# Patient Record
Sex: MALE | Race: WHITE | Employment: OTHER | URBAN - METROPOLITAN AREA
[De-identification: names, ages, dates, MRNs, and addresses within clinical notes are randomized per-mention and may not be internally consistent; named-entity substitution may affect disease eponyms.]

---

## 2018-10-23 ENCOUNTER — OFFICE VISIT (OUTPATIENT)
Dept: INTERNAL MEDICINE CLINIC | Age: 63
End: 2018-10-23
Payer: MEDICAID

## 2018-10-23 VITALS
OXYGEN SATURATION: 96 % | BODY MASS INDEX: 34.74 KG/M2 | HEART RATE: 72 BPM | SYSTOLIC BLOOD PRESSURE: 110 MMHG | HEIGHT: 68 IN | WEIGHT: 229.2 LBS | DIASTOLIC BLOOD PRESSURE: 74 MMHG

## 2018-10-23 DIAGNOSIS — G81.14 LEFT SPASTIC HEMIPLEGIA (HCC): Primary | ICD-10-CM

## 2018-10-23 DIAGNOSIS — Z12.11 COLON CANCER SCREENING: ICD-10-CM

## 2018-10-23 DIAGNOSIS — Z00.00 HEALTHCARE MAINTENANCE: ICD-10-CM

## 2018-10-23 PROCEDURE — 82274 ASSAY TEST FOR BLOOD FECAL: CPT | Performed by: NURSE PRACTITIONER

## 2018-10-23 PROCEDURE — 99202 OFFICE O/P NEW SF 15 MIN: CPT | Performed by: NURSE PRACTITIONER

## 2018-10-23 RX ORDER — LISINOPRIL 20 MG/1
20 TABLET ORAL DAILY
COMMUNITY
Start: 2015-12-12 | End: 2019-05-28 | Stop reason: SDUPTHER

## 2018-10-23 RX ORDER — GABAPENTIN 100 MG/1
200 CAPSULE ORAL 2 TIMES DAILY
COMMUNITY
Start: 2015-12-12 | End: 2018-11-12 | Stop reason: ALTCHOICE

## 2018-10-23 RX ORDER — LORATADINE 10 MG/1
10 TABLET ORAL DAILY PRN
COMMUNITY
Start: 2015-11-06 | End: 2020-03-18 | Stop reason: SDUPTHER

## 2018-10-23 RX ORDER — HYDRALAZINE HYDROCHLORIDE 50 MG/1
50 TABLET, FILM COATED ORAL 3 TIMES DAILY
COMMUNITY
Start: 2013-02-19 | End: 2019-02-16 | Stop reason: SDUPTHER

## 2018-10-23 RX ORDER — AMLODIPINE BESYLATE 10 MG/1
10 TABLET ORAL DAILY
COMMUNITY
Start: 2013-02-19 | End: 2019-05-09 | Stop reason: SDUPTHER

## 2018-10-23 RX ORDER — ATORVASTATIN CALCIUM 40 MG/1
40 TABLET, FILM COATED ORAL DAILY
COMMUNITY
End: 2019-07-11 | Stop reason: SDUPTHER

## 2018-10-23 RX ORDER — DANTROLENE SODIUM 25 MG/1
25 CAPSULE ORAL SEE ADMIN INSTRUCTIONS
COMMUNITY
Start: 2015-11-27 | End: 2019-01-03

## 2018-10-23 ASSESSMENT — PATIENT HEALTH QUESTIONNAIRE - PHQ9
SUM OF ALL RESPONSES TO PHQ QUESTIONS 1-9: 0
1. LITTLE INTEREST OR PLEASURE IN DOING THINGS: 0
2. FEELING DOWN, DEPRESSED OR HOPELESS: 0
SUM OF ALL RESPONSES TO PHQ QUESTIONS 1-9: 0
SUM OF ALL RESPONSES TO PHQ9 QUESTIONS 1 & 2: 0

## 2018-10-23 NOTE — PROGRESS NOTES
10/23/2018    Sharon Ferreira (:  1955) is a 61 y.o. male, here for evaluation of the following medical concerns: This patient is being seen today for referral for Physical Therapy referral and  hypertension. He would like to establish care and have preventive services. He lives alone and has family support in the city. He receives Home health aide assistance daily through [email or web address], assistance with transportation and with with meals. Wheelchair and 4 prong cane assistive devices aid in ambulation. Hypertension   This is a chronic problem. The current episode started more than 1 year ago. The problem is controlled. Pertinent negatives include no chest pain, headaches, neck pain, palpitations or shortness of breath. There are no associated agents to hypertension. Risk factors for coronary artery disease include obesity and male gender. Past treatments include direct vasodilators, calcium channel blockers and ACE inhibitors. The current treatment provides significant improvement. There are no compliance problems. Hypertensive end-organ damage includes CAD/MI. Hyperlipidemia   This is a chronic problem. The current episode started more than 1 year ago. The problem is controlled. There are no known factors aggravating his hyperlipidemia. Pertinent negatives include no chest pain or shortness of breath. Current antihyperlipidemic treatment includes diet change and statins. The current treatment provides moderate improvement of lipids. There are no compliance problems. Risk factors for coronary artery disease include male sex, obesity and hypertension. Hemiparesis:  Has left-sided weakness, ambulates using a 4 prong cane/ wheelchair. Has spasms, tremors, and abnormal tone to left side. Limited active/passive ROM. Fingers to left hand are contracted. Presently not receiving. PT/OT/SP. Reports increased spasticity and increased tone in the AM.  Reports improvement with previous PT.   No barriers Abdominal: Soft. Bowel sounds are normal.   Musculoskeletal:        Left upper arm: He exhibits swelling and deformity (left-sided hemiparesis). Left forearm: He exhibits swelling and deformity (left-sided hemiparesis). Left hand: He exhibits decreased range of motion (left-sided hemiparesis), deformity and swelling. He exhibits no tenderness (left hemiplegia and tone. No passive ROM, limited active ROM). Decreased sensation noted. Decreased strength noted. He exhibits finger abduction, thumb/finger opposition and wrist extension trouble. Left foot: There is decreased range of motion (left-sided hemiparesis, limited movement of left leg and foot, abnormal gait). Left-sided hemiparesis from previous stroke. Limited passive ROM left arm and leg. Active ROM with tone. Swelling of left arm and leg. Fingers are contracted. Lymphadenopathy:        Head (right side): No submental, no submandibular and no tonsillar adenopathy present. Head (left side): No submental, no submandibular and no tonsillar adenopathy present. Right cervical: No superficial cervical adenopathy present. Left cervical: No superficial cervical adenopathy present. Neurological: He is alert and oriented to person, place, and time. He displays atrophy. Tremors: with active ROM. He exhibits abnormal muscle tone. Coordination and gait abnormal. GCS eye subscore is 4. GCS verbal subscore is 5. GCS motor subscore is 6. Skin: Skin is warm and dry. Psychiatric: He has a normal mood and affect. His behavior is normal. Judgment and thought content normal. His speech is slurred (intermittently, from stroke). ASSESSMENT/PLAN:  1. Healthcare maintenance  -have lab work done  Continue current medications. - CBC Auto Differential; Future  - Comprehensive Metabolic Panel; Future  - Hepatitis C Antibody; Future  - Lipid Panel; Future  - Vitamin D 25 Hydroxy;  Future  - T4, Free; Future  - TSH with Reflex;

## 2018-10-24 ENCOUNTER — TELEPHONE (OUTPATIENT)
Dept: INTERNAL MEDICINE CLINIC | Age: 63
End: 2018-10-24

## 2018-10-24 ASSESSMENT — ENCOUNTER SYMPTOMS
COLOR CHANGE: 0
WHEEZING: 0
BACK PAIN: 0
ABDOMINAL DISTENTION: 0
CONSTIPATION: 0
DIARRHEA: 0
SINUS PRESSURE: 0
CHEST TIGHTNESS: 0
SHORTNESS OF BREATH: 0
VOICE CHANGE: 0

## 2018-11-08 ENCOUNTER — TELEPHONE (OUTPATIENT)
Dept: INTERNAL MEDICINE CLINIC | Age: 63
End: 2018-11-08

## 2018-11-08 DIAGNOSIS — I63.9 CEREBROVASCULAR ACCIDENT (CVA), UNSPECIFIED MECHANISM (HCC): ICD-10-CM

## 2018-11-08 NOTE — TELEPHONE ENCOUNTER
Needs orders to go to [facility] for OT and [facility] for [facility]  [phone].  Call [facility] [phone]

## 2018-11-12 ENCOUNTER — TELEPHONE (OUTPATIENT)
Dept: INTERNAL MEDICINE CLINIC | Age: 63
End: 2018-11-12

## 2018-11-12 RX ORDER — GABAPENTIN 100 MG/1
200 CAPSULE ORAL 2 TIMES DAILY
Qty: 120 CAPSULE | Refills: 0 | Status: SHIPPED | OUTPATIENT
Start: 2018-11-12 | End: 2018-12-21 | Stop reason: SDUPTHER

## 2018-11-12 RX ORDER — GABAPENTIN 100 MG/1
200 CAPSULE ORAL 2 TIMES DAILY
Qty: 90 CAPSULE | Status: CANCELLED | OUTPATIENT
Start: 2018-11-12

## 2018-11-13 LAB
ALBUMIN SERPL-MCNC: 4.6 G/DL
ALP BLD-CCNC: 59 U/L
ALT SERPL-CCNC: 15 U/L
AST SERPL-CCNC: 14 U/L
BASOPHILS ABSOLUTE: 0.4 /ΜL
BASOPHILS RELATIVE PERCENT: 0.4 %
BILIRUB SERPL-MCNC: 0.5 MG/DL (ref 0.1–1.4)
BUN BLDV-MCNC: 21 MG/DL
CALCIUM SERPL-MCNC: 9.5 MG/DL
CHLORIDE BLD-SCNC: 104 MMOL/L
CHOLESTEROL, FASTING: 118
CO2: 26 MMOL/L
CREAT SERPL-MCNC: 1.02 MG/DL
EOSINOPHILS ABSOLUTE: 249 /ΜL
EOSINOPHILS RELATIVE PERCENT: 3.5 %
GLUCOSE FASTING: 81 MG/DL
HCT VFR BLD CALC: NORMAL % (ref 41–53)
HDLC SERPL-MCNC: NORMAL MG/DL (ref 35–70)
HEMOGLOBIN: NORMAL G/DL (ref 13.5–17.5)
LDL CHOLESTEROL CALCULATED: 66 MG/DL (ref 0–160)
LYMPHOCYTES ABSOLUTE: 2549 /ΜL
LYMPHOCYTES RELATIVE PERCENT: 35.9 %
MCH RBC QN AUTO: 30.2 PG
MCHC RBC AUTO-ENTMCNC: 33.5 G/DL
MCV RBC AUTO: 90.1 FL
MONOCYTES ABSOLUTE: 7.7 /ΜL
MONOCYTES RELATIVE PERCENT: 7.7 %
NEUTROPHILS ABSOLUTE: 3728 /ΜL
NEUTROPHILS RELATIVE PERCENT: 52.5 %
PDW BLD-RTO: 12.9 %
PLATELET # BLD: 239 K/ΜL
PMV BLD AUTO: 11.4 FL
POTASSIUM SERPL-SCNC: 4.3 MMOL/L
RBC # BLD: 5.73 10^6/ΜL
SODIUM BLD-SCNC: 140 MMOL/L
TOTAL PROTEIN: 7.6 G/DL (ref 6.4–8.2)
TRIGLYCERIDE, FASTING: 86
VITAMIN D 25-HYDROXY: 33
VITAMIN D2, 25 HYDROXY: NORMAL
VITAMIN D3,25 HYDROXY: NORMAL
WBC # BLD: 7.1 10^3/ML

## 2018-11-14 ENCOUNTER — TELEPHONE (OUTPATIENT)
Dept: INTERNAL MEDICINE CLINIC | Age: 63
End: 2018-11-14

## 2018-11-15 ENCOUNTER — TELEPHONE (OUTPATIENT)
Dept: INTERNAL MEDICINE CLINIC | Age: 63
End: 2018-11-15

## 2018-11-15 LAB
CONTROL: PRESENT
HEMOCCULT STL QL: NEGATIVE

## 2018-11-15 NOTE — TELEPHONE ENCOUNTER
Called patient he stated that he had his lab work done on 11/13/18 at [facility]. We are waiting for the results.

## 2018-11-15 NOTE — TELEPHONE ENCOUNTER
Spoke with [facility] PT. She is going to call  Braydon Sabine to get him scheduled today.  I called to let him know his FIT test was Negative,

## 2018-11-19 ENCOUNTER — TELEPHONE (OUTPATIENT)
Dept: INTERNAL MEDICINE CLINIC | Age: 63
End: 2018-11-19

## 2018-11-28 ENCOUNTER — TELEPHONE (OUTPATIENT)
Dept: INTERNAL MEDICINE CLINIC | Age: 63
End: 2018-11-28

## 2018-12-21 RX ORDER — GABAPENTIN 100 MG/1
200 CAPSULE ORAL 2 TIMES DAILY
Qty: 120 CAPSULE | Refills: 2 | Status: SHIPPED | OUTPATIENT
Start: 2018-12-21 | End: 2019-03-18 | Stop reason: SDUPTHER

## 2019-01-03 RX ORDER — DANTROLENE SODIUM 50 MG/1
CAPSULE ORAL
Qty: 180 CAPSULE | Refills: 0 | Status: SHIPPED | OUTPATIENT
Start: 2019-01-03 | End: 2019-01-24 | Stop reason: SDUPTHER

## 2019-01-15 DIAGNOSIS — Z86.73 HISTORY OF STROKE: ICD-10-CM

## 2019-01-15 DIAGNOSIS — I63.019 CEREBRAL INFARCTION DUE TO THROMBOSIS OF VERTEBRAL ARTERY, UNSPECIFIED BLOOD VESSEL LATERALITY (HCC): Primary | ICD-10-CM

## 2019-01-17 PROBLEM — I69.398 ABNORMALITY OF GAIT AS LATE EFFECT OF CEREBROVASCULAR ACCIDENT (CVA): Status: ACTIVE | Noted: 2019-01-17

## 2019-01-17 PROBLEM — R26.9 ABNORMALITY OF GAIT AS LATE EFFECT OF CEREBROVASCULAR ACCIDENT (CVA): Status: ACTIVE | Noted: 2019-01-17

## 2019-01-24 ENCOUNTER — OFFICE VISIT (OUTPATIENT)
Dept: INTERNAL MEDICINE CLINIC | Age: 64
End: 2019-01-24
Payer: MEDICAID

## 2019-01-24 VITALS
DIASTOLIC BLOOD PRESSURE: 75 MMHG | BODY MASS INDEX: 36.57 KG/M2 | OXYGEN SATURATION: 97 % | HEART RATE: 66 BPM | WEIGHT: 233 LBS | SYSTOLIC BLOOD PRESSURE: 131 MMHG | HEIGHT: 67 IN

## 2019-01-24 DIAGNOSIS — R26.9 ABNORMALITY OF GAIT AS LATE EFFECT OF CEREBROVASCULAR ACCIDENT (CVA): ICD-10-CM

## 2019-01-24 DIAGNOSIS — E78.5 HYPERLIPIDEMIA, UNSPECIFIED HYPERLIPIDEMIA TYPE: ICD-10-CM

## 2019-01-24 DIAGNOSIS — I63.019 CEREBRAL INFARCTION DUE TO THROMBOSIS OF VERTEBRAL ARTERY, UNSPECIFIED BLOOD VESSEL LATERALITY (HCC): Primary | ICD-10-CM

## 2019-01-24 DIAGNOSIS — I25.10 ATHEROSCLEROSIS OF CORONARY ARTERY OF NATIVE HEART WITHOUT ANGINA PECTORIS, UNSPECIFIED VESSEL OR LESION TYPE: ICD-10-CM

## 2019-01-24 DIAGNOSIS — I69.398 ABNORMALITY OF GAIT AS LATE EFFECT OF CEREBROVASCULAR ACCIDENT (CVA): ICD-10-CM

## 2019-01-24 DIAGNOSIS — I10 HYPERTENSION, UNSPECIFIED TYPE: ICD-10-CM

## 2019-01-24 PROCEDURE — 99213 OFFICE O/P EST LOW 20 MIN: CPT | Performed by: NURSE PRACTITIONER

## 2019-01-24 RX ORDER — DANTROLENE SODIUM 100 MG/1
100 CAPSULE ORAL 2 TIMES DAILY
Qty: 60 CAPSULE | Refills: 3 | Status: SHIPPED | OUTPATIENT
Start: 2019-01-24 | End: 2019-05-31 | Stop reason: SDUPTHER

## 2019-01-24 ASSESSMENT — ENCOUNTER SYMPTOMS
VOMITING: 0
SHORTNESS OF BREATH: 0
BACK PAIN: 0
COLOR CHANGE: 0
DIARRHEA: 0
COUGH: 0
BLOOD IN STOOL: 0
CHOKING: 0
APNEA: 0
CONSTIPATION: 0

## 2019-02-07 ENCOUNTER — TELEPHONE (OUTPATIENT)
Dept: INTERNAL MEDICINE CLINIC | Age: 64
End: 2019-02-07

## 2019-02-18 RX ORDER — HYDRALAZINE HYDROCHLORIDE 50 MG/1
TABLET, FILM COATED ORAL
Qty: 180 TABLET | Refills: 1 | Status: SHIPPED | OUTPATIENT
Start: 2019-02-18 | End: 2019-06-11 | Stop reason: SDUPTHER

## 2019-02-25 ENCOUNTER — TELEPHONE (OUTPATIENT)
Dept: INTERNAL MEDICINE CLINIC | Age: 64
End: 2019-02-25

## 2019-02-26 ENCOUNTER — TELEPHONE (OUTPATIENT)
Dept: INTERNAL MEDICINE CLINIC | Age: 64
End: 2019-02-26

## 2019-03-18 RX ORDER — GABAPENTIN 100 MG/1
200 CAPSULE ORAL 2 TIMES DAILY
Qty: 120 CAPSULE | Refills: 2 | Status: SHIPPED | OUTPATIENT
Start: 2019-03-18 | End: 2019-06-12 | Stop reason: SDUPTHER

## 2019-03-22 ENCOUNTER — OFFICE VISIT (OUTPATIENT)
Dept: CARDIOLOGY CLINIC | Age: 64
End: 2019-03-22
Payer: COMMERCIAL

## 2019-03-22 VITALS
HEIGHT: 67 IN | BODY MASS INDEX: 35.94 KG/M2 | DIASTOLIC BLOOD PRESSURE: 70 MMHG | SYSTOLIC BLOOD PRESSURE: 142 MMHG | HEART RATE: 72 BPM | WEIGHT: 229 LBS

## 2019-03-22 DIAGNOSIS — I10 ESSENTIAL HYPERTENSION: ICD-10-CM

## 2019-03-22 DIAGNOSIS — I25.10 ATHEROSCLEROSIS OF NATIVE CORONARY ARTERY OF NATIVE HEART WITHOUT ANGINA PECTORIS: Primary | ICD-10-CM

## 2019-03-22 DIAGNOSIS — E78.49 OTHER HYPERLIPIDEMIA: ICD-10-CM

## 2019-03-22 PROCEDURE — 93000 ELECTROCARDIOGRAM COMPLETE: CPT | Performed by: INTERNAL MEDICINE

## 2019-03-22 PROCEDURE — 99214 OFFICE O/P EST MOD 30 MIN: CPT | Performed by: INTERNAL MEDICINE

## 2019-04-25 ENCOUNTER — OFFICE VISIT (OUTPATIENT)
Dept: INTERNAL MEDICINE CLINIC | Age: 64
End: 2019-04-25
Payer: COMMERCIAL

## 2019-04-25 VITALS
WEIGHT: 227.2 LBS | OXYGEN SATURATION: 95 % | DIASTOLIC BLOOD PRESSURE: 78 MMHG | HEART RATE: 65 BPM | SYSTOLIC BLOOD PRESSURE: 118 MMHG | BODY MASS INDEX: 35.58 KG/M2

## 2019-04-25 DIAGNOSIS — I10 ESSENTIAL HYPERTENSION: ICD-10-CM

## 2019-04-25 DIAGNOSIS — R26.89 BALANCE DISORDER: ICD-10-CM

## 2019-04-25 DIAGNOSIS — R26.9 ABNORMALITY OF GAIT AS LATE EFFECT OF CEREBROVASCULAR ACCIDENT (CVA): Primary | ICD-10-CM

## 2019-04-25 DIAGNOSIS — I63.9 CEREBRAL INFARCTION, UNSPECIFIED MECHANISM (HCC): ICD-10-CM

## 2019-04-25 DIAGNOSIS — I69.398 ABNORMALITY OF GAIT AS LATE EFFECT OF CEREBROVASCULAR ACCIDENT (CVA): Primary | ICD-10-CM

## 2019-04-25 PROCEDURE — 99213 OFFICE O/P EST LOW 20 MIN: CPT | Performed by: NURSE PRACTITIONER

## 2019-04-25 ASSESSMENT — PATIENT HEALTH QUESTIONNAIRE - PHQ9
SUM OF ALL RESPONSES TO PHQ QUESTIONS 1-9: 0
1. LITTLE INTEREST OR PLEASURE IN DOING THINGS: 0
SUM OF ALL RESPONSES TO PHQ9 QUESTIONS 1 & 2: 0
SUM OF ALL RESPONSES TO PHQ QUESTIONS 1-9: 0
2. FEELING DOWN, DEPRESSED OR HOPELESS: 0

## 2019-04-25 NOTE — PROGRESS NOTES
HENT: Negative for congestion. Eyes: Negative for visual disturbance. Respiratory: Negative for chest tightness and shortness of breath. Cardiovascular: Negative for chest pain, palpitations and leg swelling. Gastrointestinal: Negative for abdominal pain, constipation and diarrhea. Endocrine: Negative for polyuria. Genitourinary: Negative for dysuria. Musculoskeletal: Positive for gait problem (ambulates with 4 prong cane/wheelchair). Negative for arthralgias and myalgias. Skin: Negative for rash. Neurological: Positive for tremors and weakness (right sided weakness). Negative for dizziness, light-headedness and headaches. Psychiatric/Behavioral: Negative for agitation, decreased concentration and sleep disturbance. The patient is not nervous/anxious. Prior to Visit Medications    Medication Sig Taking? Authorizing Provider   Misc. Devices [facility]) MISC 1 each by Does not apply route daily Yes MADISYN Loredo CNP   hydrALAZINE (APRESOLINE) 50 MG tablet TAKE 1 TABLET BY MOUTH EVERY 8 HOURS Yes MADISYN Loredo CNP   dantrolene (DANTRIUM) 100 MG capsule Take 1 capsule by mouth 2 times daily Yes MADISYN Loredo CNPc. Devices (QUAD CANE) MISC 1 each by Does not apply route daily narrow base Quad cane (NBQC) Yes MADISYN Loredo CNP   atorvastatin (LIPITOR) 40 MG tablet Take 40 mg by mouth daily Yes Historical Provider, MD   amLODIPine (NORVASC) 10 MG tablet Take 10 mg by mouth daily Yes Historical Provider, MD   loratadine (CLARITIN) 10 MG tablet Take 10 mg by mouth daily as needed Yes Historical Provider, MD   lisinopril (PRINIVIL;ZESTRIL) 20 MG tablet Take 20 mg by mouth daily Yes Historical Provider, MD   Sennosides 17.2 MG TABS Take 17.2 mg by mouth Yes Historical Provider, MD   gabapentin (NEURONTIN) 100 MG capsule Take 2 capsules by mouth 2 times daily for 30 days.  Intended supply: 30 days  MADISYN Loredo CNP        Social History     Tobacco Use    Smoking status: Never Smoker    Smokeless tobacco: Never Used   Substance Use Topics    Alcohol use: No        Vitals:    04/25/19 0936   BP: 118/78   Pulse: 65   SpO2: 95%   Weight: 227 lb 3.2 oz (103.1 kg)     Estimated body mass index is 35.58 kg/m² as calculated from the following:    Height as of 3/22/19: 5' 7\" (1.702 m). Weight as of this encounter: 227 lb 3.2 oz (103.1 kg). Physical Exam   Constitutional: He is oriented to person, place, and time. He appears well-developed and well-nourished. HENT:   Head: Normocephalic. Right Ear: External ear normal.   Left Ear: External ear normal.   Eyes: Lids are normal.   Cardiovascular: Normal rate, regular rhythm, S1 normal, S2 normal, normal heart sounds and intact distal pulses. Pulmonary/Chest: Effort normal and breath sounds normal.   Abdominal: Normal appearance and bowel sounds are normal.   Neurological: He is alert and oriented to person, place, and time. He displays atrophy (right side), tremor (intermittent) and abnormal reflex. A sensory deficit is present. He exhibits abnormal muscle tone (right side). Coordination and gait abnormal. GCS eye subscore is 4. GCS verbal subscore is 5. GCS motor subscore is 6. Residual right sided weakness   Skin: Skin is warm and dry. Vitals reviewed. ASSESSMENT/PLAN:  1. Abnormality of gait as late effect of cerebrovascular accident (CVA)  -he has right sided weakness  -he can ambulate with 4 prong cane short distances  -Necessary for wheelchair for physician appointments and transportation  - Misc. Devices [facility]) MISC; 1 each by Does not apply route daily  Dispense: 1 each; Refill: 0    2. Cerebral infarction, unspecified mechanism ([facility] [address]  -residual right sided weakness  - Misc. Devices [facility]) MISC; 1 each by Does not apply route daily  Dispense: 1 each; Refill: 0  -Keep Neurology appointment    3. Balance disorder  -unsteady gait  - Mis.  Devices [facility]) MISC; 1 each by Does not apply route daily  Dispense: 1 each; Refill: 0    4. Essential hypertension  -controlled  -Continue current medications. Return in about 6 months (around 10/25/2019).         --MADISYN Alejo - CNP on 4/27/2019 at 11:50 AM

## 2019-04-26 RX ORDER — WHEELCHAIR
1 EACH MISCELLANEOUS DAILY
Qty: 1 EACH | Refills: 0 | Status: SHIPPED | OUTPATIENT
Start: 2019-04-26 | End: 2020-04-14 | Stop reason: ALTCHOICE

## 2019-04-27 ASSESSMENT — ENCOUNTER SYMPTOMS
CHEST TIGHTNESS: 0
CONSTIPATION: 0
SHORTNESS OF BREATH: 0
DIARRHEA: 0
ABDOMINAL PAIN: 0

## 2019-04-30 ENCOUNTER — HOSPITAL ENCOUNTER (OUTPATIENT)
Dept: ULTRASOUND IMAGING | Age: 64
Discharge: HOME | End: 2019-04-30
Payer: COMMERCIAL

## 2019-04-30 DIAGNOSIS — I25.10 ATHEROSCLEROSIS OF NATIVE CORONARY ARTERY OF NATIVE HEART WITHOUT ANGINA PECTORIS: ICD-10-CM

## 2019-04-30 DIAGNOSIS — I10 ESSENTIAL HYPERTENSION: ICD-10-CM

## 2019-04-30 PROCEDURE — 76775 US EXAM ABDO BACK WALL LIM: CPT

## 2019-05-03 ENCOUNTER — TELEPHONE (OUTPATIENT)
Dept: CARDIOLOGY CLINIC | Age: 64
End: 2019-05-03

## 2019-05-03 NOTE — TELEPHONE ENCOUNTER
----- Message from Ce Yang RN sent at 5/2/2019  7:06 PM EDT -----  Please call and tell him he has no evidence of AAA  dgb/mm

## 2019-05-03 NOTE — TELEPHONE ENCOUNTER
Call placed to patient who was not available to speak with. Per hippa I did leave a message on his vm regarding his results. Patient encouraged to call office with any questions.

## 2019-05-06 ENCOUNTER — TELEPHONE (OUTPATIENT)
Dept: INTERNAL MEDICINE CLINIC | Age: 64
End: 2019-05-06

## 2019-05-06 DIAGNOSIS — I69.398 ABNORMALITY OF GAIT AS LATE EFFECT OF CEREBROVASCULAR ACCIDENT (CVA): Primary | ICD-10-CM

## 2019-05-06 DIAGNOSIS — R26.9 ABNORMALITY OF GAIT AS LATE EFFECT OF CEREBROVASCULAR ACCIDENT (CVA): Primary | ICD-10-CM

## 2019-05-06 DIAGNOSIS — I63.9 CEREBRAL INFARCTION, UNSPECIFIED MECHANISM (HCC): ICD-10-CM

## 2019-05-10 RX ORDER — AMLODIPINE BESYLATE 10 MG/1
TABLET ORAL
Qty: 90 TABLET | Refills: 1 | Status: SHIPPED | OUTPATIENT
Start: 2019-05-10 | End: 2019-11-01 | Stop reason: SDUPTHER

## 2019-05-31 ENCOUNTER — OFFICE VISIT (OUTPATIENT)
Dept: NEUROLOGY | Age: 64
End: 2019-05-31
Payer: COMMERCIAL

## 2019-05-31 VITALS
WEIGHT: 229 LBS | DIASTOLIC BLOOD PRESSURE: 82 MMHG | BODY MASS INDEX: 32.78 KG/M2 | HEART RATE: 67 BPM | SYSTOLIC BLOOD PRESSURE: 126 MMHG | HEIGHT: 70 IN

## 2019-05-31 DIAGNOSIS — I69.398 ABNORMALITY OF GAIT AS LATE EFFECT OF CEREBROVASCULAR ACCIDENT (CVA): ICD-10-CM

## 2019-05-31 DIAGNOSIS — G81.94 LEFT HEMIPARESIS (HCC): Primary | ICD-10-CM

## 2019-05-31 DIAGNOSIS — R26.9 ABNORMALITY OF GAIT AS LATE EFFECT OF CEREBROVASCULAR ACCIDENT (CVA): ICD-10-CM

## 2019-05-31 DIAGNOSIS — G25.3 MYOCLONUS: ICD-10-CM

## 2019-05-31 PROCEDURE — 99245 OFF/OP CONSLTJ NEW/EST HI 55: CPT | Performed by: PSYCHIATRY & NEUROLOGY

## 2019-05-31 NOTE — LETTER
[facility]  [address] [facility] [address]  Phone: [phone]  Fax: [phone]    Noreen Burgos*        May 31, 2019       Patient: Nora Sanderson   MR Number: [id number]   YOB: 1955   Date of Visit: 5/31/2019       Dear Dr. Alyssia Epps: Thank you for the request for consultation for Nora Sanderson to me for the evaluation of involuntary movements on the right side and chronic left hemiparesis. Below are the relevant portions of my assessment and plan of care. NEUROLOGY CONSULTATION     Chief Complaint   Patient presents with    Cerebrovascular Accident     Patient is here today to establish care. Patient had a stroke in 2012 and just wants to be evaluated. HISTORY OF PRESENT ILLNESS :    Nora Sanderson is a 59 y.o. male who is referred by Dr. 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 She was treated at [facility] in [facility]. Since then patient has had left-sided spastic hemiparesis. In the last couple of years patient has developed involuntary movements in his right leg. It starts in the right leg and sometimes if he puts pressure on the leg can spread to the whole right side of the body. It comes and goes without any clear other aggravating or relieving factors. Symptoms are moderately severe. Patient was concerned about the right-sided involuntary movements and hence this consultation. Patient has not noticed any significant change on the left side in the last few years.     REVIEW OF SYSTEMS    Constitutional:  []   Chills   [x]  Fatigue   []  Fevers   []  Malaise   []  Weight loss     [] Denies all of the above    Eyes:  []  Double vision   []  Blurry vision     [x] Denies all of the above    Ears, nose, mouth, throat, and face:  Smoking status: Never Smoker    Smokeless tobacco: Never Used   Substance and Sexual Activity    Alcohol use: No    Drug use: No    Sexual activity: None   Lifestyle    Physical activity:     Days per week: None     Minutes per session: None    Stress: None   Relationships    Social connections:     Talks on phone: None     Gets together: None     Attends [employer] service: None     Active member of club or organization: None     Attends meetings of clubs or organizations: None     Relationship status: None    Intimate partner violence:     Fear of current or ex partner: None     Emotionally abused: None     Physically abused: None     Forced sexual activity: None   Other Topics Concern    None   Social History Narrative    Recently relocated from [place]. Lives alone. PHYSICAL EXAMINATION:  /82   Pulse 67   Ht 5' 10\" (1.778 m)   Wt 229 lb (103.9 kg)   BMI 32.86 kg/m²    Appearance: Well appearing, well nourished and in no distress  Mental Status Exam: Patient is alert, oriented to person, place and time. Recent and remote memory is normal  Fund of Knowledge is normal  Attention/concentration is normal.   Speech : No dysarthria  Language : No aphasia  Funduscopic Exam: sharp disc margins  Cranial Nerves:   II: Visual fields:  Full to confrontation  III: Pupils:  equal, round, reactive to light  III,IV,VI: Extra Ocular Movements are intact. No nystagmus  V: Facial sensation is intact to pin prick and light touch  VII: Facial strength and movements: intact and symmetric smile,cheek puffing and eyebrow elevation  VIII: Hearing:  Intact to finger rub bilaterally  IX: Palate  elevation is symmetric  XI: Shoulder shrug is intact  XII: Tongue movements are normal  Motor:  Muscle tone is increased on the left side and normal on the right side. Strength is 5 over 5 on the right side 3+ over 5 on the left arm and 4/5 in the left leg. Sensory: Decreased to light touch and  pin prick in the left arm and leg  Coordination:  Normal  Finger to Nose and Heel to Hicks on the right side and impaired on the left side   . Reflexes:  DTR +2 on the right side and 3 on the left side. Plantar response: Flexor on the right side and extensor on the left side. Gait: Gait is impaired  Romberg: negative  Vascular: No carotid bruit bilaterally        DATA:  LABS:  General Labs:    CBC:   Lab Results   Component Value Date    WBC 7.1 11/13/2018    RBC 5.73 11/13/2018    MCV 90.1 11/13/2018    MCH 30.2 11/13/2018    MCHC 33.5 11/13/2018    RDW 12.9 11/13/2018     11/13/2018    MPV 11.4 11/13/2018     BMP:    Lab Results   Component Value Date     11/13/2018    K 4.3 11/13/2018     11/13/2018    CO2 26 11/13/2018    BUN 21 11/13/2018    LABALBU 4.6 11/13/2018    CREATININE 1.02 11/13/2018    CALCIUM 9.5 11/13/2018     RADIOLOGY REVIEW:  I have reviewed radiology report(s) of:  CT scan of the head from 2012 as well as 2013    IMPRESSION :  Residual left hemiparesis from previous right thalamic and basal ganglia hemorrhagic infarction  Patient had some involuntary movements on the right arm and leg at times. This seems more when he was paying attention and when he was distracted these movements stopped. 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   I will get a CT head to make sure that these does not have any left-sided hemispheric structural lesion  He will continue with aggressive control of hypertension  Continue statin therapy  I will see him back in 3 months for follow-up  Thank you for this consultation          Please note a portion of this chart was generated using dragon dictation software. Although every effort was made to ensure the accuracy of this automated transcription, some errors in transcription may have occurred. If you have questions, please do not hesitate to call me. I look forward to following Desmond Courser along with you.     Sincerely,        Ryan Gilbert MD    CC providers:  Silvia Luna, APRN - CNP  [phone]  VIA In Basket

## 2019-05-31 NOTE — PROGRESS NOTES
NEUROLOGY CONSULTATION     Chief Complaint   Patient presents with    Cerebrovascular Accident     Patient is here today to establish care. Patient had a stroke in 2012 and just wants to be evaluated. HISTORY OF PRESENT ILLNESS :    Nora Sanderson is a 59 y.o. male who is referred by Dr. 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 She was treated at [facility] in [facility]. Since then patient has had left-sided spastic hemiparesis. In the last couple of years patient has developed involuntary movements in his right leg. It starts in the right leg and sometimes if he puts pressure on the leg can spread to the whole right side of the body. It comes and goes without any clear other aggravating or relieving factors. Symptoms are moderately severe. Patient was concerned about the right-sided involuntary movements and hence this consultation. Patient has not noticed any significant change on the left side in the last few years.     REVIEW OF SYSTEMS    Constitutional:  []   Chills   [x]  Fatigue   []  Fevers   []  Malaise   []  Weight loss     [] Denies all of the above    Eyes:  []  Double vision   []  Blurry vision     [x] Denies all of the above    Ears, nose, mouth, throat, and face:   [] Hearing loss    []   Hoarseness      [x]  Snoring    []  Tinnitus       [] Denies all of the above     Respiratory:   []  Cough    []  Shortness of breath         [x] Denies all of the above     Cardiovascular:   []  Chest pain    []  Exertional chest pressure/discomfort           [] Palpitations    []  Syncope     [x] Denies all of the above    Gastrointestinal:   []  Abdominal pain   [x]  Constipation    []  Diarrhea    []   Dysphagia                      [] Denies all of the above    Genitourinary:      []  Frequency   []  Hematuria     [] Urinary incontinence           [x] Denies all of the above     Hematologic/lymphatic:  []  Bleeding    []  Easy bruising   []  Anemia  [x] Denies all of the above     Musculoskeletal:   [x] Back pain       []  Myalgias    [x]  Neck pain           [] Denies all of the above    Neurological: As noted in HPI    Behavioral/Psych:   [] Anxiety    []  Depression     []  Mood swings     [x] Denies all of the above     Endocrine:   []  Temperature intolerance     [x] Fatigue      [] Denies all of the above     Allergic/Immunologic:   [x] Hay fever    [] Denies all of the above     Past Medical History:   Diagnosis Date    HTN (hypertension)     Hyperlipemia     S/P PTCA (percutaneous transluminal coronary angioplasty) 2010    two stents place    Stroke ([facility] [address] 10/2012    left-sided weakness     Family History   Problem Relation Age of Onset    Hypertension Mother     Heart Disease Father     Breast Cancer Sister     Heart Attack Brother      Social History     Socioeconomic History    Marital status:      Spouse name: None    Number of children: 3    Years of education: None    Highest education level: None   Occupational History    Occupation: remodeling   Social Needs    Financial resource strain: None    Food insecurity:     Worry: None     Inability: None    Transportation needs:     Medical: None     Non-medical: None   Tobacco Use    Smoking status: Never Smoker    Smokeless tobacco: Never Used   Substance and Sexual Activity    Alcohol use: No    Drug use: No    Sexual activity: None   Lifestyle    Physical activity:     Days per week: None     Minutes per session: None    Stress: None   Relationships    Social connections:     Talks on phone: None     Gets together: None     Attends [employer] service: None     Active member of club or organization: None     Attends meetings of clubs or organizations: None     Relationship status: None    Intimate partner violence:     Fear of current or ex partner: None     Emotionally abused: None     Physically abused: None     Forced sexual activity: None   Other Topics Concern    None   Social History Narrative    Recently relocated from [facility]. Lives alone. PHYSICAL EXAMINATION:  /82   Pulse 67   Ht 5' 10\" (1.778 m)   Wt 229 lb (103.9 kg)   BMI 32.86 kg/m²   Appearance: Well appearing, well nourished and in no distress  Mental Status Exam: Patient is alert, oriented to person, place and time. Recent and remote memory is normal  Fund of Knowledge is normal  Attention/concentration is normal.   Speech : No dysarthria  Language : No aphasia  Funduscopic Exam: sharp disc margins  Cranial Nerves:   II: Visual fields:  Full to confrontation  III: Pupils:  equal, round, reactive to light  III,IV,VI: Extra Ocular Movements are intact. No nystagmus  V: Facial sensation is intact to pin prick and light touch  VII: Facial strength and movements: intact and symmetric smile,cheek puffing and eyebrow elevation  VIII: Hearing:  Intact to finger rub bilaterally  IX: Palate  elevation is symmetric  XI: Shoulder shrug is intact  XII: Tongue movements are normal  Motor:  Muscle tone is increased on the left side and normal on the right side. Strength is 5 over 5 on the right side 3+ over 5 on the left arm and 4/5 in the left leg. Sensory: Decreased to light touch and  pin prick in the left arm and leg  Coordination:  Normal  Finger to Nose and Heel to Hicks on the right side and impaired on the left side   . Reflexes:  DTR +2 on the right side and 3 on the left side. Plantar response: Flexor on the right side and extensor on the left side.   Gait: Gait is impaired  Romberg: negative  Vascular: No carotid bruit bilaterally        DATA:  LABS:  General Labs:    CBC:   Lab Results   Component Value Date    WBC 7.1 11/13/2018    RBC 5.73 11/13/2018    MCV 90.1 11/13/2018    MCH 30.2 11/13/2018    MCHC 33.5 11/13/2018    RDW 12.9 11/13/2018     11/13/2018    MPV 11.4 11/13/2018     BMP:    Lab Results   Component Value Date     11/13/2018    K 4.3 11/13/2018     11/13/2018    CO2 26 11/13/2018    BUN 21 11/13/2018    LABALBU 4.6 11/13/2018    CREATININE 1.02 11/13/2018    CALCIUM 9.5 11/13/2018     RADIOLOGY REVIEW:  I have reviewed radiology report(s) of:  CT scan of the head from 2012 as well as 2013    IMPRESSION :  Residual left hemiparesis from previous right thalamic and basal ganglia hemorrhagic infarction  Patient had some involuntary movements on the right arm and leg at times. This seems more when he was paying attention and when he was distracted these movements stopped. 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 I will get a CT head to make sure that these does not have any left-sided hemispheric structural lesion  He will continue with aggressive control of hypertension  Continue statin therapy  I will see him back in 3 months for follow-up  Thank you for this consultation          Please note a portion of this chart was generated using dragon dictation software. Although every effort was made to ensure the accuracy of this automated transcription, some errors in transcription may have occurred.

## 2019-05-31 NOTE — PATIENT INSTRUCTIONS
Please call with any questions or concerns:   [facility]  @ [phone]. LAB RESULTS:  Please obtain any labs or diagnostic tests as discussed today. You should call the office to check the results. Please allow  3 to 7 days for us to get these results. MEDICATION LIST:  Please bring an accurate list of your medications to every visit. APPOINTMENT CONFIRMATION:  We will call you the day before your scheduled appointment to confirm. If we are unable to reach you, you MUST call back by the end of the day to confirm the appointment or we may be forced to cancel.

## 2019-06-03 RX ORDER — DANTROLENE SODIUM 100 MG/1
CAPSULE ORAL
Qty: 60 CAPSULE | Refills: 3 | Status: SHIPPED | OUTPATIENT
Start: 2019-06-03 | End: 2019-09-28 | Stop reason: SDUPTHER

## 2019-06-11 ENCOUNTER — HOSPITAL ENCOUNTER (OUTPATIENT)
Dept: CT IMAGING | Age: 64
Discharge: HOME | End: 2019-06-11
Payer: COMMERCIAL

## 2019-06-11 DIAGNOSIS — G81.94 LEFT HEMIPARESIS (HCC): ICD-10-CM

## 2019-06-11 DIAGNOSIS — G25.3 MYOCLONUS: ICD-10-CM

## 2019-06-11 DIAGNOSIS — R26.9 ABNORMALITY OF GAIT AS LATE EFFECT OF CEREBROVASCULAR ACCIDENT (CVA): ICD-10-CM

## 2019-06-11 DIAGNOSIS — I69.398 ABNORMALITY OF GAIT AS LATE EFFECT OF CEREBROVASCULAR ACCIDENT (CVA): ICD-10-CM

## 2019-06-11 PROCEDURE — 70450 CT HEAD/BRAIN W/O DYE: CPT

## 2019-06-12 ENCOUNTER — TELEPHONE (OUTPATIENT)
Dept: NEUROLOGY | Age: 64
End: 2019-06-12

## 2019-06-12 RX ORDER — GABAPENTIN 100 MG/1
200 CAPSULE ORAL 2 TIMES DAILY
Qty: 120 CAPSULE | Refills: 2 | Status: SHIPPED | OUTPATIENT
Start: 2019-06-12 | End: 2019-08-30 | Stop reason: SDUPTHER

## 2019-06-12 NOTE — TELEPHONE ENCOUNTER
Request refill for these following medications.     gabapentin (NEURONTIN) 100 MG capsule      Disp 120 refill 2  Last refill 3-18-19

## 2019-06-28 ENCOUNTER — TELEPHONE (OUTPATIENT)
Dept: INTERNAL MEDICINE CLINIC | Age: 64
End: 2019-06-28

## 2019-07-01 ENCOUNTER — TELEPHONE (OUTPATIENT)
Dept: INTERNAL MEDICINE CLINIC | Age: 64
End: 2019-07-01

## 2019-07-10 NOTE — TELEPHONE ENCOUNTER
Your appointment with Kylee Serrano is 9-10-19 @ 12:30pm At the [address] office. Address is [address].

## 2019-07-16 ENCOUNTER — TELEPHONE (OUTPATIENT)
Dept: INTERNAL MEDICINE CLINIC | Age: 64
End: 2019-07-16

## 2019-07-16 DIAGNOSIS — R26.9 ABNORMALITY OF GAIT AS LATE EFFECT OF CEREBROVASCULAR ACCIDENT (CVA): Primary | ICD-10-CM

## 2019-07-16 DIAGNOSIS — I63.019 CEREBRAL INFARCTION DUE TO THROMBOSIS OF VERTEBRAL ARTERY, UNSPECIFIED BLOOD VESSEL LATERALITY (HCC): ICD-10-CM

## 2019-07-16 DIAGNOSIS — G83.24 PARALYSIS OF LEFT HAND (HCC): ICD-10-CM

## 2019-07-16 DIAGNOSIS — G81.14 LEFT SPASTIC HEMIPLEGIA (HCC): ICD-10-CM

## 2019-07-16 DIAGNOSIS — I69.398 ABNORMALITY OF GAIT AS LATE EFFECT OF CEREBROVASCULAR ACCIDENT (CVA): Primary | ICD-10-CM

## 2019-07-16 DIAGNOSIS — G83.24 PARALYSIS OF LEFT UPPER EXTREMITY (HCC): ICD-10-CM

## 2019-07-16 DIAGNOSIS — R26.89 BALANCE DISORDER: ICD-10-CM

## 2019-08-08 ENCOUNTER — OFFICE VISIT (OUTPATIENT)
Dept: INTERNAL MEDICINE CLINIC | Age: 64
End: 2019-08-08
Payer: COMMERCIAL

## 2019-08-08 VITALS
WEIGHT: 219.8 LBS | OXYGEN SATURATION: 95 % | HEIGHT: 71 IN | HEART RATE: 68 BPM | SYSTOLIC BLOOD PRESSURE: 137 MMHG | DIASTOLIC BLOOD PRESSURE: 82 MMHG | BODY MASS INDEX: 30.77 KG/M2

## 2019-08-08 DIAGNOSIS — H26.9 CATARACT OF BOTH EYES, UNSPECIFIED CATARACT TYPE: ICD-10-CM

## 2019-08-08 DIAGNOSIS — Z01.818 PRE-OP EXAM: Primary | ICD-10-CM

## 2019-08-08 LAB
ANION GAP SERPL CALCULATED.3IONS-SCNC: 22 MMOL/L (ref 3–16)
BASOPHILS ABSOLUTE: 0 K/UL (ref 0–0.2)
BASOPHILS RELATIVE PERCENT: 0.4 %
BUN BLDV-MCNC: 23 MG/DL (ref 7–20)
CALCIUM SERPL-MCNC: 9.3 MG/DL (ref 8.3–10.6)
CHLORIDE BLD-SCNC: 99 MMOL/L (ref 99–110)
CO2: 21 MMOL/L (ref 21–32)
CREAT SERPL-MCNC: 1 MG/DL (ref 0.8–1.3)
EOSINOPHILS ABSOLUTE: 0.1 K/UL (ref 0–0.6)
EOSINOPHILS RELATIVE PERCENT: 2.2 %
GFR AFRICAN AMERICAN: >60
GFR NON-AFRICAN AMERICAN: >60
GLUCOSE BLD-MCNC: 58 MG/DL (ref 70–99)
HCT VFR BLD CALC: 46.3 % (ref 40.5–52.5)
HEMOGLOBIN: 15 G/DL (ref 13.5–17.5)
LYMPHOCYTES ABSOLUTE: 1.9 K/UL (ref 1–5.1)
LYMPHOCYTES RELATIVE PERCENT: 30 %
MCH RBC QN AUTO: 29.9 PG (ref 26–34)
MCHC RBC AUTO-ENTMCNC: 32.5 G/DL (ref 31–36)
MCV RBC AUTO: 92 FL (ref 80–100)
MONOCYTES ABSOLUTE: 0.6 K/UL (ref 0–1.3)
MONOCYTES RELATIVE PERCENT: 9.8 %
NEUTROPHILS ABSOLUTE: 3.7 K/UL (ref 1.7–7.7)
NEUTROPHILS RELATIVE PERCENT: 57.6 %
PDW BLD-RTO: 14.7 % (ref 12.4–15.4)
PLATELET # BLD: 223 K/UL (ref 135–450)
PMV BLD AUTO: 9.8 FL (ref 5–10.5)
POTASSIUM SERPL-SCNC: 4.2 MMOL/L (ref 3.5–5.1)
RBC # BLD: 5.03 M/UL (ref 4.2–5.9)
SODIUM BLD-SCNC: 142 MMOL/L (ref 136–145)
WBC # BLD: 6.4 K/UL (ref 4–11)

## 2019-08-08 PROCEDURE — 99212 OFFICE O/P EST SF 10 MIN: CPT | Performed by: NURSE PRACTITIONER

## 2019-08-08 PROCEDURE — 93000 ELECTROCARDIOGRAM COMPLETE: CPT | Performed by: NURSE PRACTITIONER

## 2019-08-08 PROCEDURE — 36415 COLL VENOUS BLD VENIPUNCTURE: CPT | Performed by: NURSE PRACTITIONER

## 2019-08-08 NOTE — PROGRESS NOTES
PCP Manuela Flores.   Diagnosis    Coronary atherosclerosis    C[facility] ([facility]    Stroke ([facility]    Abnormality of gait as late effect of cerebrovascular accident (CVA)    Essential hypertension    Other hyperlipidemia       Past Medical History:   Diagnosis Date    HTN (hypertension)     Hyperlipemia     S/P PTCA (percutaneous transluminal coronary angioplasty) 2010    two stents place    Stroke ([facility] 10/2012    left-sided weakness     Past Surgical History:   Procedure Laterality Date    INGUINAL HERNIA REPAIR      right side     Family History   Problem Relation Age of Onset    Hypertension Mother     Heart Disease Father     Breast Cancer Sister     Heart Attack Brother      Social History     Socioeconomic History    Marital status:      Spouse name: Not on file    Number of children: 3    Years of education: Not on file    Highest education level: Not on file   Occupational History    Occupation: remodeling   Social Needs    Financial resource strain: Not on file    Food insecurity:     Worry: Not on file     Inability: Not on file    Transportation needs:     Medical: Not on file     Non-medical: Not on file   Tobacco Use    Smoking status: Never Smoker    Smokeless tobacco: Never Used   Substance and Sexual Activity    Alcohol use: No    Drug use: No    Sexual activity: Not on file   Lifestyle    Physical activity:     Days per week: Not on file     Minutes per session: Not on file    Stress: Not on file   Relationships    Social connections:     Talks on phone: Not on file     Gets together: Not on file     Attends Mandaen service: Not on file     Active member of club or organization: Not on file     Attends meetings of clubs or organizations: Not on file     Relationship status: Not on file    Intimate partner violence:     Fear of current or ex partner: Not on file     Emotionally abused: Not on file     Physically abused: Not on file     Forced sexual activity: Not on file 11/13/2018 4.3     Chloride 11/13/2018 104     CO2 11/13/2018 26     Calcium 11/13/2018 9.5     AST 11/13/2018 14     Alkaline Phosphatase 11/13/2018 59     Total Protein 11/13/2018 7.6     Alb 11/13/2018 4.6     Total Bilirubin 11/13/2018 0.5     ALT 11/13/2018 15     WBC 11/13/2018 7.1     RBC 11/13/2018 5.73     MCV 11/13/2018 90.1     MCH 11/13/2018 30.2     MCHC 11/13/2018 33.5     Platelets 86/28/9222 239     RDW 11/13/2018 12.9     MPV 11/13/2018 11.4     Neutrophils % 11/13/2018 52.5     Lymphocytes % 11/13/2018 35.9     Monocytes % 11/13/2018 7.7     Eosinophils % 11/13/2018 3.5     Basophils % 11/13/2018 0.4     Neutrophils # 11/13/2018 3,728     Lymphocytes # 11/13/2018 2,549     Monocytes # 11/13/2018 7.7     Eosinophils # 11/13/2018 249     Basophils # 11/13/2018 0.4     Vit D, 25-Hydroxy 11/13/2018 33            Assessment:       59 y.o. patient with planned surgery as above. Known risk factors for perioperative complications: Hypertension, stroke, s/p PTCA        Plan:     1. Preoperative workup as follows: ECG, hemoglobin, hematocrit, electrolytes, creatinine, glucose  2. Change in medication regimen before surgery: None  3. Prophylaxis for cardiac events with perioperative beta-blockers: Not indicated  4. Cleared for surgery pending lab results.

## 2019-08-28 ENCOUNTER — ANESTHESIA EVENT (OUTPATIENT)
Dept: SURGERY | Age: 64
End: 2019-08-28
Payer: MEDICARE

## 2019-08-29 ENCOUNTER — OFFICE VISIT (OUTPATIENT)
Dept: NEUROLOGY | Age: 64
End: 2019-08-29
Payer: COMMERCIAL

## 2019-08-29 ENCOUNTER — PREP FOR PROCEDURE (OUTPATIENT)
Dept: OPHTHALMOLOGY | Age: 64
End: 2019-08-29

## 2019-08-29 VITALS
SYSTOLIC BLOOD PRESSURE: 134 MMHG | HEIGHT: 70 IN | DIASTOLIC BLOOD PRESSURE: 84 MMHG | HEART RATE: 80 BPM | WEIGHT: 219 LBS | BODY MASS INDEX: 31.35 KG/M2

## 2019-08-29 DIAGNOSIS — R26.9 ABNORMALITY OF GAIT AS LATE EFFECT OF CEREBROVASCULAR ACCIDENT (CVA): ICD-10-CM

## 2019-08-29 DIAGNOSIS — I69.398 ABNORMALITY OF GAIT AS LATE EFFECT OF CEREBROVASCULAR ACCIDENT (CVA): ICD-10-CM

## 2019-08-29 DIAGNOSIS — G81.94 LEFT HEMIPARESIS (HCC): Primary | ICD-10-CM

## 2019-08-29 DIAGNOSIS — G25.3 MYOCLONUS: ICD-10-CM

## 2019-08-29 PROCEDURE — 99213 OFFICE O/P EST LOW 20 MIN: CPT | Performed by: PSYCHIATRY & NEUROLOGY

## 2019-08-29 RX ORDER — SODIUM CHLORIDE 0.9 % (FLUSH) 0.9 %
10 SYRINGE (ML) INJECTION EVERY 12 HOURS SCHEDULED
Status: CANCELLED | OUTPATIENT
Start: 2019-08-29

## 2019-08-29 RX ORDER — CIPROFLOXACIN HYDROCHLORIDE 3.5 MG/ML
1 SOLUTION/ DROPS TOPICAL SEE ADMIN INSTRUCTIONS
Status: CANCELLED | OUTPATIENT
Start: 2019-08-29

## 2019-08-29 RX ORDER — TETRACAINE HYDROCHLORIDE 5 MG/ML
1 SOLUTION OPHTHALMIC ONCE
Status: CANCELLED | OUTPATIENT
Start: 2019-08-29 | End: 2019-08-29

## 2019-08-29 RX ORDER — SODIUM CHLORIDE 0.9 % (FLUSH) 0.9 %
10 SYRINGE (ML) INJECTION PRN
Status: CANCELLED | OUTPATIENT
Start: 2019-08-29

## 2019-08-29 NOTE — PROGRESS NOTES
Talks on phone: None     Gets together: None     Attends Yazidi service: None     Active member of club or organization: None     Attends meetings of clubs or organizations: None     Relationship status: None    Intimate partner violence:     Fear of current or ex partner: None     Emotionally abused: None     Physically abused: None     Forced sexual activity: None   Other Topics Concern    None   Social History Narrative    Recently relocated from [place]. Lives alone. Objective:  Exam:  /84   Pulse 80   Ht 5' 10\" (1.778 m)   Wt 219 lb (99.3 kg)   BMI 31.42 kg/m²   This is a well-nourished patient in no acute distress  Awake alert oriented x3. Speech normal.  Pupils equal round reactive to light. Extraocular movements intact. Face symmetrical.  Tongue midline. Motor exam shows a left hemiparesis. Right side normal.  Left plantar extensor on the right is flexor. Gait impaired. Data :  LABS:  General Labs:    CBC:   Lab Results   Component Value Date    WBC 6.4 08/08/2019    RBC 5.03 08/08/2019    HGB 15.0 08/08/2019    HCT 46.3 08/08/2019    MCV 92.0 08/08/2019    MCH 29.9 08/08/2019    MCHC 32.5 08/08/2019    RDW 14.7 08/08/2019     08/08/2019    MPV 9.8 08/08/2019     BMP:    Lab Results   Component Value Date     08/08/2019    K 4.2 08/08/2019    CL 99 08/08/2019    CO2 21 08/08/2019    BUN 23 08/08/2019    LABALBU 4.6 11/13/2018    CREATININE 1.0 08/08/2019    CALCIUM 9.3 08/08/2019    GFRAA >60 08/08/2019    LABGLOM >60 08/08/2019    GLUCOSE 58 08/08/2019     RADIOLOGY REVIEW:  I have reviewed radiology image(s) and reports(s) of: CT scan of the head    Impression :  Episodic involuntary movements involving the left side of the body which can be controlled by concentration and sometimes stops when he is distracted. Probably related to stress. CT head images were reviewed with the patient. There is evidence of old stroke on the right side.   No new lesions

## 2019-08-30 ENCOUNTER — HOSPITAL ENCOUNTER (OUTPATIENT)
Age: 64
Setting detail: OUTPATIENT SURGERY
Discharge: HOME | End: 2019-08-30
Attending: OPHTHALMOLOGY | Admitting: OPHTHALMOLOGY
Payer: MEDICARE

## 2019-08-30 ENCOUNTER — ANESTHESIA (OUTPATIENT)
Dept: SURGERY | Age: 64
End: 2019-08-30
Payer: MEDICARE

## 2019-08-30 VITALS
WEIGHT: 219 LBS | DIASTOLIC BLOOD PRESSURE: 80 MMHG | HEART RATE: 62 BPM | BODY MASS INDEX: 32.44 KG/M2 | SYSTOLIC BLOOD PRESSURE: 135 MMHG | RESPIRATION RATE: 14 BRPM | TEMPERATURE: 97.2 F | HEIGHT: 69 IN | OXYGEN SATURATION: 98 %

## 2019-08-30 VITALS
RESPIRATION RATE: 15 BRPM | DIASTOLIC BLOOD PRESSURE: 68 MMHG | OXYGEN SATURATION: 100 % | TEMPERATURE: 98.6 F | SYSTOLIC BLOOD PRESSURE: 130 MMHG

## 2019-08-30 PROCEDURE — 2500000003 HC RX 250 WO HCPCS: Performed by: NURSE ANESTHETIST, CERTIFIED REGISTERED

## 2019-08-30 PROCEDURE — 3600000014 HC SURGERY LEVEL 4 ADDTL 15MIN: Performed by: OPHTHALMOLOGY

## 2019-08-30 PROCEDURE — V2632 POST CHMBR INTRAOCULAR LENS: HCPCS | Performed by: OPHTHALMOLOGY

## 2019-08-30 PROCEDURE — 6370000000 HC RX 637 (ALT 250 FOR IP): Performed by: OPHTHALMOLOGY

## 2019-08-30 PROCEDURE — 2709999900 HC NON-CHARGEABLE SUPPLY: Performed by: OPHTHALMOLOGY

## 2019-08-30 PROCEDURE — 3700000001 HC ADD 15 MINUTES (ANESTHESIA): Performed by: OPHTHALMOLOGY

## 2019-08-30 PROCEDURE — 3700000000 HC ANESTHESIA ATTENDED CARE: Performed by: OPHTHALMOLOGY

## 2019-08-30 PROCEDURE — 6360000002 HC RX W HCPCS: Performed by: NURSE ANESTHETIST, CERTIFIED REGISTERED

## 2019-08-30 PROCEDURE — 3600000004 HC SURGERY LEVEL 4 BASE: Performed by: OPHTHALMOLOGY

## 2019-08-30 PROCEDURE — 2580000003 HC RX 258: Performed by: ANESTHESIOLOGY

## 2019-08-30 PROCEDURE — 2500000003 HC RX 250 WO HCPCS: Performed by: OPHTHALMOLOGY

## 2019-08-30 PROCEDURE — 7100000010 HC PHASE II RECOVERY - FIRST 15 MIN: Performed by: OPHTHALMOLOGY

## 2019-08-30 DEVICE — LENS IOL SN60WF 14.0D: Type: IMPLANTABLE DEVICE | Site: EYE | Status: FUNCTIONAL

## 2019-08-30 RX ORDER — SODIUM CHLORIDE 0.9 % (FLUSH) 0.9 %
10 SYRINGE (ML) INJECTION PRN
Status: DISCONTINUED | OUTPATIENT
Start: 2019-08-30 | End: 2019-08-30 | Stop reason: HOSPADM

## 2019-08-30 RX ORDER — TETRACAINE HYDROCHLORIDE 5 MG/ML
1 SOLUTION OPHTHALMIC ONCE
Status: COMPLETED | OUTPATIENT
Start: 2019-08-30 | End: 2019-08-30

## 2019-08-30 RX ORDER — PROPOFOL 10 MG/ML
INJECTION, EMULSION INTRAVENOUS PRN
Status: DISCONTINUED | OUTPATIENT
Start: 2019-08-30 | End: 2019-08-30 | Stop reason: SDUPTHER

## 2019-08-30 RX ORDER — SODIUM CHLORIDE 9 MG/ML
INJECTION, SOLUTION INTRAVENOUS CONTINUOUS
Status: DISCONTINUED | OUTPATIENT
Start: 2019-08-30 | End: 2019-08-30 | Stop reason: HOSPADM

## 2019-08-30 RX ORDER — LIDOCAINE HYDROCHLORIDE 20 MG/ML
INJECTION, SOLUTION EPIDURAL; INFILTRATION; INTRACAUDAL; PERINEURAL PRN
Status: DISCONTINUED | OUTPATIENT
Start: 2019-08-30 | End: 2019-08-30 | Stop reason: SDUPTHER

## 2019-08-30 RX ORDER — TETRACAINE HYDROCHLORIDE 5 MG/ML
SOLUTION OPHTHALMIC
Status: COMPLETED | OUTPATIENT
Start: 2019-08-30 | End: 2019-08-30

## 2019-08-30 RX ORDER — SODIUM CHLORIDE 0.9 % (FLUSH) 0.9 %
10 SYRINGE (ML) INJECTION EVERY 12 HOURS SCHEDULED
Status: DISCONTINUED | OUTPATIENT
Start: 2019-08-30 | End: 2019-08-30 | Stop reason: HOSPADM

## 2019-08-30 RX ORDER — SODIUM CHLORIDE 0.9 % (FLUSH) 0.9 %
10 SYRINGE (ML) INJECTION EVERY 12 HOURS SCHEDULED
Status: DISCONTINUED | OUTPATIENT
Start: 2019-08-30 | End: 2019-08-30 | Stop reason: SDUPTHER

## 2019-08-30 RX ORDER — SODIUM CHLORIDE 0.9 % (FLUSH) 0.9 %
10 SYRINGE (ML) INJECTION PRN
Status: DISCONTINUED | OUTPATIENT
Start: 2019-08-30 | End: 2019-08-30 | Stop reason: SDUPTHER

## 2019-08-30 RX ORDER — BALANCED SALT SOLUTION 6.4; .75; .48; .3; 3.9; 1.7 MG/ML; MG/ML; MG/ML; MG/ML; MG/ML; MG/ML
SOLUTION OPHTHALMIC
Status: COMPLETED | OUTPATIENT
Start: 2019-08-30 | End: 2019-08-30

## 2019-08-30 RX ORDER — CIPROFLOXACIN HYDROCHLORIDE 3.5 MG/ML
1 SOLUTION/ DROPS TOPICAL SEE ADMIN INSTRUCTIONS
Status: COMPLETED | OUTPATIENT
Start: 2019-08-30 | End: 2019-08-30

## 2019-08-30 RX ORDER — BRIMONIDINE TARTRATE 2 MG/ML
SOLUTION/ DROPS OPHTHALMIC
Status: COMPLETED | OUTPATIENT
Start: 2019-08-30 | End: 2019-08-30

## 2019-08-30 RX ADMIN — POVIDONE-IODINE: 5 SOLUTION OPHTHALMIC at 06:50

## 2019-08-30 RX ADMIN — Medication 3 ML: at 06:50

## 2019-08-30 RX ADMIN — PROPOFOL 20 MG: 10 INJECTION, EMULSION INTRAVENOUS at 07:41

## 2019-08-30 RX ADMIN — PROPOFOL 20 MG: 10 INJECTION, EMULSION INTRAVENOUS at 07:32

## 2019-08-30 RX ADMIN — TETRACAINE HYDROCHLORIDE 1 DROP: 5 SOLUTION OPHTHALMIC at 06:50

## 2019-08-30 RX ADMIN — PROPOFOL 50 MG: 10 INJECTION, EMULSION INTRAVENOUS at 07:29

## 2019-08-30 RX ADMIN — LIDOCAINE HYDROCHLORIDE 75 MG: 20 INJECTION, SOLUTION EPIDURAL; INFILTRATION; INTRACAUDAL; PERINEURAL at 07:26

## 2019-08-30 RX ADMIN — PROPOFOL 150 MG: 10 INJECTION, EMULSION INTRAVENOUS at 07:26

## 2019-08-30 RX ADMIN — PROPOFOL 30 MG: 10 INJECTION, EMULSION INTRAVENOUS at 07:44

## 2019-08-30 RX ADMIN — PROPOFOL 20 MG: 10 INJECTION, EMULSION INTRAVENOUS at 07:39

## 2019-08-30 RX ADMIN — PROPOFOL 40 MG: 10 INJECTION, EMULSION INTRAVENOUS at 07:37

## 2019-08-30 RX ADMIN — LIDOCAINE HYDROCHLORIDE 25 MG: 20 INJECTION, SOLUTION EPIDURAL; INFILTRATION; INTRACAUDAL; PERINEURAL at 07:29

## 2019-08-30 RX ADMIN — SODIUM CHLORIDE: 0.9 INJECTION, SOLUTION INTRAVENOUS at 07:11

## 2019-08-30 RX ADMIN — CIPROFLOXACIN 1 DROP: 3 SOLUTION OPHTHALMIC at 06:50

## 2019-08-30 RX ADMIN — Medication 3 ML: at 07:11

## 2019-08-30 RX ADMIN — PROPOFOL 20 MG: 10 INJECTION, EMULSION INTRAVENOUS at 07:35

## 2019-08-30 RX ADMIN — CIPROFLOXACIN 1 DROP: 3 SOLUTION OPHTHALMIC at 07:11

## 2019-08-30 ASSESSMENT — PULMONARY FUNCTION TESTS
PIF_VALUE: 0
PIF_VALUE: 5
PIF_VALUE: 0
PIF_VALUE: 5
PIF_VALUE: 15
PIF_VALUE: 5
PIF_VALUE: 0
PIF_VALUE: 11
PIF_VALUE: 5
PIF_VALUE: 5
PIF_VALUE: 1
PIF_VALUE: 6
PIF_VALUE: 5
PIF_VALUE: 2
PIF_VALUE: 5
PIF_VALUE: 7
PIF_VALUE: 5
PIF_VALUE: 4
PIF_VALUE: 14
PIF_VALUE: 14
PIF_VALUE: 0
PIF_VALUE: 5
PIF_VALUE: 3
PIF_VALUE: 5

## 2019-08-30 ASSESSMENT — PAIN SCALES - GENERAL
PAINLEVEL_OUTOF10: 0
PAINLEVEL_OUTOF10: 0

## 2019-08-30 ASSESSMENT — PAIN - FUNCTIONAL ASSESSMENT: PAIN_FUNCTIONAL_ASSESSMENT: 0-10

## 2019-08-30 ASSESSMENT — ENCOUNTER SYMPTOMS: SHORTNESS OF BREATH: 0

## 2019-08-30 NOTE — ANESTHESIA POSTPROCEDURE EVALUATION
Department of Anesthesiology  Postprocedure Note    Patient: Samara Bee  MRN: [id number]  YOB: 1955  Date of evaluation: 8/30/2019  Time:  12:28 PM     Procedure Summary     Date:  08/30/19 Room / Location:  [facility]    Anesthesia Start:  0399 Anesthesia Stop:  8052    Procedure:  PHACOEMULSIFICATION WITH INTRAOCULAR LENS IMPLANT (Right Eye) Diagnosis:       Combined forms of age-related cataract of right eye      (cataract of right eye)    Surgeon:  Callie Oleary MD Responsible Provider:  Bita Burgess MD    Anesthesia Type:  general ASA Status:  3          Anesthesia Type: general    Fredy Phase I: Fredy Score: 10    Fredy Phase II: Fredy Score: 10    Last vitals: Reviewed and per EMR flowsheets.        Anesthesia Post Evaluation    Patient location during evaluation: PACU  Level of consciousness: awake and alert  Airway patency: patent  Nausea & Vomiting: no nausea and no vomiting  Complications: no  Cardiovascular status: blood pressure returned to baseline  Respiratory status: acceptable  Hydration status: euvolemic  Comments: Postoperative Anesthesia Note    Name:    Samara Bee  MRN:      [id number]    Patient Vitals in the past 12 hrs:  08/30/19 0806, BP:135/80, Pulse:62, Resp:14, SpO2:98 %  08/30/19 0756, BP:122/76, Temp:97.2 °F (36.2 °C), Temp src:Temporal, Pulse:63, Resp:16, SpO2:98 %  08/30/19 0648, BP:(!) 141/88, Temp:98.1 °F (36.7 °C), Temp src:Temporal, Pulse:57, Resp:16, SpO2:97 %, Height:5' 9\" (1.753 m), Weight:219 lb (99.3 kg)     LABS:    CBC  Lab Results       Component                Value               Date/Time                  WBC                      6.4                 08/08/2019 08:15 AM        HGB                      15.0                08/08/2019 08:15 AM        HCT                      46.3                08/08/2019 08:15 AM        PLT                      223                 08/08/2019 08:15 AM   RENAL  Lab Results

## 2019-09-05 ENCOUNTER — PREP FOR PROCEDURE (OUTPATIENT)
Dept: OPHTHALMOLOGY | Age: 64
End: 2019-09-05

## 2019-09-05 ENCOUNTER — ANESTHESIA EVENT (OUTPATIENT)
Dept: SURGERY | Age: 64
End: 2019-09-05
Payer: MEDICARE

## 2019-09-05 RX ORDER — TETRACAINE HYDROCHLORIDE 5 MG/ML
1 SOLUTION OPHTHALMIC ONCE
Status: CANCELLED | OUTPATIENT
Start: 2019-09-05 | End: 2019-09-05

## 2019-09-05 RX ORDER — CIPROFLOXACIN HYDROCHLORIDE 3.5 MG/ML
1 SOLUTION/ DROPS TOPICAL SEE ADMIN INSTRUCTIONS
Status: CANCELLED | OUTPATIENT
Start: 2019-09-05

## 2019-09-05 RX ORDER — SODIUM CHLORIDE 0.9 % (FLUSH) 0.9 %
10 SYRINGE (ML) INJECTION PRN
Status: CANCELLED | OUTPATIENT
Start: 2019-09-05

## 2019-09-05 RX ORDER — SODIUM CHLORIDE 0.9 % (FLUSH) 0.9 %
10 SYRINGE (ML) INJECTION EVERY 12 HOURS SCHEDULED
Status: CANCELLED | OUTPATIENT
Start: 2019-09-05

## 2019-09-06 ENCOUNTER — HOSPITAL ENCOUNTER (OUTPATIENT)
Age: 64
Setting detail: OUTPATIENT SURGERY
Discharge: HOME | End: 2019-09-06
Attending: OPHTHALMOLOGY | Admitting: OPHTHALMOLOGY
Payer: MEDICARE

## 2019-09-06 ENCOUNTER — ANESTHESIA (OUTPATIENT)
Dept: SURGERY | Age: 64
End: 2019-09-06
Payer: MEDICARE

## 2019-09-06 VITALS
BODY MASS INDEX: 32.44 KG/M2 | HEART RATE: 63 BPM | WEIGHT: 219 LBS | DIASTOLIC BLOOD PRESSURE: 65 MMHG | RESPIRATION RATE: 16 BRPM | OXYGEN SATURATION: 96 % | HEIGHT: 69 IN | TEMPERATURE: 97.5 F | SYSTOLIC BLOOD PRESSURE: 115 MMHG

## 2019-09-06 VITALS
SYSTOLIC BLOOD PRESSURE: 93 MMHG | DIASTOLIC BLOOD PRESSURE: 56 MMHG | RESPIRATION RATE: 14 BRPM | OXYGEN SATURATION: 100 %

## 2019-09-06 PROCEDURE — V2632 POST CHMBR INTRAOCULAR LENS: HCPCS | Performed by: OPHTHALMOLOGY

## 2019-09-06 PROCEDURE — 3700000001 HC ADD 15 MINUTES (ANESTHESIA): Performed by: OPHTHALMOLOGY

## 2019-09-06 PROCEDURE — 2500000003 HC RX 250 WO HCPCS: Performed by: OPHTHALMOLOGY

## 2019-09-06 PROCEDURE — 6370000000 HC RX 637 (ALT 250 FOR IP): Performed by: OPHTHALMOLOGY

## 2019-09-06 PROCEDURE — 7100000011 HC PHASE II RECOVERY - ADDTL 15 MIN: Performed by: OPHTHALMOLOGY

## 2019-09-06 PROCEDURE — 3600000014 HC SURGERY LEVEL 4 ADDTL 15MIN: Performed by: OPHTHALMOLOGY

## 2019-09-06 PROCEDURE — 3700000000 HC ANESTHESIA ATTENDED CARE: Performed by: OPHTHALMOLOGY

## 2019-09-06 PROCEDURE — 2580000003 HC RX 258: Performed by: ANESTHESIOLOGY

## 2019-09-06 PROCEDURE — 7100000010 HC PHASE II RECOVERY - FIRST 15 MIN: Performed by: OPHTHALMOLOGY

## 2019-09-06 PROCEDURE — 6360000002 HC RX W HCPCS: Performed by: NURSE ANESTHETIST, CERTIFIED REGISTERED

## 2019-09-06 PROCEDURE — 2709999900 HC NON-CHARGEABLE SUPPLY: Performed by: OPHTHALMOLOGY

## 2019-09-06 PROCEDURE — 3600000004 HC SURGERY LEVEL 4 BASE: Performed by: OPHTHALMOLOGY

## 2019-09-06 PROCEDURE — 2500000003 HC RX 250 WO HCPCS: Performed by: NURSE ANESTHETIST, CERTIFIED REGISTERED

## 2019-09-06 DEVICE — LENS INTOCU +14.5 DIOPT L13MM DIA6MM 0DEG HAPTIC ANG A: Type: IMPLANTABLE DEVICE | Site: LENS | Status: FUNCTIONAL

## 2019-09-06 RX ORDER — SODIUM CHLORIDE 0.9 % (FLUSH) 0.9 %
10 SYRINGE (ML) INJECTION PRN
Status: DISCONTINUED | OUTPATIENT
Start: 2019-09-06 | End: 2019-09-06 | Stop reason: HOSPADM

## 2019-09-06 RX ORDER — PROPOFOL 10 MG/ML
INJECTION, EMULSION INTRAVENOUS PRN
Status: DISCONTINUED | OUTPATIENT
Start: 2019-09-06 | End: 2019-09-06 | Stop reason: SDUPTHER

## 2019-09-06 RX ORDER — ONDANSETRON 2 MG/ML
4 INJECTION INTRAMUSCULAR; INTRAVENOUS
Status: DISCONTINUED | OUTPATIENT
Start: 2019-09-06 | End: 2019-09-06 | Stop reason: HOSPADM

## 2019-09-06 RX ORDER — ONDANSETRON 2 MG/ML
INJECTION INTRAMUSCULAR; INTRAVENOUS PRN
Status: DISCONTINUED | OUTPATIENT
Start: 2019-09-06 | End: 2019-09-06 | Stop reason: SDUPTHER

## 2019-09-06 RX ORDER — SODIUM CHLORIDE 0.9 % (FLUSH) 0.9 %
10 SYRINGE (ML) INJECTION EVERY 12 HOURS SCHEDULED
Status: DISCONTINUED | OUTPATIENT
Start: 2019-09-06 | End: 2019-09-06 | Stop reason: HOSPADM

## 2019-09-06 RX ORDER — LIDOCAINE HYDROCHLORIDE 20 MG/ML
INJECTION, SOLUTION EPIDURAL; INFILTRATION; INTRACAUDAL; PERINEURAL PRN
Status: DISCONTINUED | OUTPATIENT
Start: 2019-09-06 | End: 2019-09-06 | Stop reason: SDUPTHER

## 2019-09-06 RX ORDER — TETRACAINE HYDROCHLORIDE 5 MG/ML
1 SOLUTION OPHTHALMIC ONCE
Status: COMPLETED | OUTPATIENT
Start: 2019-09-06 | End: 2019-09-06

## 2019-09-06 RX ORDER — SODIUM CHLORIDE 9 MG/ML
INJECTION, SOLUTION INTRAVENOUS CONTINUOUS
Status: DISCONTINUED | OUTPATIENT
Start: 2019-09-06 | End: 2019-09-06 | Stop reason: HOSPADM

## 2019-09-06 RX ORDER — CIPROFLOXACIN HYDROCHLORIDE 3.5 MG/ML
1 SOLUTION/ DROPS TOPICAL SEE ADMIN INSTRUCTIONS
Status: COMPLETED | OUTPATIENT
Start: 2019-09-06 | End: 2019-09-06

## 2019-09-06 RX ORDER — SODIUM CHLORIDE 0.9 % (FLUSH) 0.9 %
10 SYRINGE (ML) INJECTION PRN
Status: DISCONTINUED | OUTPATIENT
Start: 2019-09-06 | End: 2019-09-06 | Stop reason: SDUPTHER

## 2019-09-06 RX ORDER — LIDOCAINE HYDROCHLORIDE 10 MG/ML
INJECTION, SOLUTION EPIDURAL; INFILTRATION; INTRACAUDAL; PERINEURAL
Status: COMPLETED | OUTPATIENT
Start: 2019-09-06 | End: 2019-09-06

## 2019-09-06 RX ORDER — TETRACAINE HYDROCHLORIDE 5 MG/ML
SOLUTION OPHTHALMIC
Status: COMPLETED | OUTPATIENT
Start: 2019-09-06 | End: 2019-09-06

## 2019-09-06 RX ORDER — BALANCED SALT SOLUTION 6.4; .75; .48; .3; 3.9; 1.7 MG/ML; MG/ML; MG/ML; MG/ML; MG/ML; MG/ML
SOLUTION OPHTHALMIC
Status: COMPLETED | OUTPATIENT
Start: 2019-09-06 | End: 2019-09-06

## 2019-09-06 RX ORDER — BRIMONIDINE TARTRATE 2 MG/ML
SOLUTION/ DROPS OPHTHALMIC
Status: COMPLETED | OUTPATIENT
Start: 2019-09-06 | End: 2019-09-06

## 2019-09-06 RX ORDER — SODIUM CHLORIDE 0.9 % (FLUSH) 0.9 %
10 SYRINGE (ML) INJECTION EVERY 12 HOURS SCHEDULED
Status: DISCONTINUED | OUTPATIENT
Start: 2019-09-06 | End: 2019-09-06 | Stop reason: SDUPTHER

## 2019-09-06 RX ADMIN — TETRACAINE HYDROCHLORIDE 1 DROP: 5 SOLUTION OPHTHALMIC at 06:56

## 2019-09-06 RX ADMIN — ONDANSETRON 4 MG: 2 INJECTION INTRAMUSCULAR; INTRAVENOUS at 07:33

## 2019-09-06 RX ADMIN — PROPOFOL 200 MG: 10 INJECTION, EMULSION INTRAVENOUS at 07:27

## 2019-09-06 RX ADMIN — Medication 3 ML: at 06:56

## 2019-09-06 RX ADMIN — Medication 3 ML: at 07:01

## 2019-09-06 RX ADMIN — SODIUM CHLORIDE: 9 INJECTION, SOLUTION INTRAVENOUS at 06:57

## 2019-09-06 RX ADMIN — CIPROFLOXACIN 1 DROP: 3 SOLUTION OPHTHALMIC at 07:01

## 2019-09-06 RX ADMIN — POVIDONE-IODINE 0.1 ML: 5 SOLUTION OPHTHALMIC at 06:57

## 2019-09-06 RX ADMIN — SODIUM CHLORIDE: 9 INJECTION, SOLUTION INTRAVENOUS at 07:20

## 2019-09-06 RX ADMIN — CIPROFLOXACIN 1 DROP: 3 SOLUTION OPHTHALMIC at 06:56

## 2019-09-06 RX ADMIN — LIDOCAINE HYDROCHLORIDE 100 MG: 20 INJECTION, SOLUTION EPIDURAL; INFILTRATION; INTRACAUDAL; PERINEURAL at 07:27

## 2019-09-06 ASSESSMENT — PULMONARY FUNCTION TESTS
PIF_VALUE: 3
PIF_VALUE: 13
PIF_VALUE: 9
PIF_VALUE: 12
PIF_VALUE: 0
PIF_VALUE: 12
PIF_VALUE: 10
PIF_VALUE: 11
PIF_VALUE: 7
PIF_VALUE: 3
PIF_VALUE: 3
PIF_VALUE: 1
PIF_VALUE: 3
PIF_VALUE: 10
PIF_VALUE: 9
PIF_VALUE: 3
PIF_VALUE: 3
PIF_VALUE: 2
PIF_VALUE: 3
PIF_VALUE: 12
PIF_VALUE: 8
PIF_VALUE: 0
PIF_VALUE: 3

## 2019-09-06 ASSESSMENT — PAIN SCALES - GENERAL
PAINLEVEL_OUTOF10: 0

## 2019-09-06 ASSESSMENT — PAIN - FUNCTIONAL ASSESSMENT: PAIN_FUNCTIONAL_ASSESSMENT: 0-10

## 2019-09-06 ASSESSMENT — ENCOUNTER SYMPTOMS: SHORTNESS OF BREATH: 0

## 2019-09-06 NOTE — OP NOTE
Emerita Vaughn    OPERATIVE NOTE    Preoperative Diagnosis: Cataract left eye    Postoperative Diagnosis: Cataract left eye    Procedure: Phacoemulsification with intraocular lens inplantation, left eye  Surgeon: Sixto Gilbert MD    Anesthesia: General.    Complications: none    Estimated blood loss: minimal    Specimens: none    Indications for procedure: The patient is a 59y.o. year old with decreased vision, glare and halos around lights, and trouble with activities of daily living. Examination revealed a visually significant cataract in the left eye. Risks, benefits, and alternatives to surgery were discussed with the patient and the patient elected to proceed with phacoemulsification with lens implantation. Details of the procedure: Following informed consent, the patient was taken to the operating room and placed in the supine position. General anesthesia was administered. The eye was prepped and draped in the usual sterile fashion using aseptic technique for cataract surgery. A side port incision was made. 1% preservative free lidocaine was injected through the side port incision for topical anesthesia. The eye was filled with viscoelastic and a 2.4 mm keratome blade was used to make a 3-plane clear corneal incision in the temporal cornea. The cystitome was used to make a tear in the anterior capsule and a Utrata forceps was used to make a complete curvilinear capsulorrhexis. The lens was hydrodissected and freely rotated. Phacoemulsification was performed. Irrigation/aspiration was used to remove all cortical material from the capsular bag. The eye was filled with viscoelastic and a foldable posterior chamber intraocular lens was injected into the capsular bag. The lens was rotated to the appropriate axis as needed. Irrigation/aspiration was used to remove all excess viscoelastic. The eye was pressurized with BSS and the wounds were check for leaks and none were found.   The patient had

## 2019-09-06 NOTE — ANESTHESIA POSTPROCEDURE EVALUATION
Department of Anesthesiology  Postprocedure Note    Patient: Filipe Gandara  MRN: [id number]  YOB: 1955  Date of evaluation: 9/6/2019  Time:  8:29 AM     Procedure Summary     Date:  09/06/19 Room / Location:  [address] [facility]    Anesthesia Start:  1474 Anesthesia Stop:  1204    Procedure:  PHACOEMULSIFICATION WITH INTRAOCULAR LENS IMPLANT (Left Eye) Diagnosis:       Combined forms of age-related cataract of left eye      (cataract of left eye)    Surgeon:  Neetu Mckoen MD Responsible Provider:  Clementine Mcgee MD    Anesthesia Type:  general ASA Status:  3          Anesthesia Type: general    Fredy Phase I: Fredy Score: 10    Fredy Phase II: Fredy Score: 10    Last vitals: Reviewed and per EMR flowsheets.        Anesthesia Post Evaluation    Patient location during evaluation: PACU  Patient participation: complete - patient participated  Level of consciousness: awake and alert  Airway patency: patent  Nausea & Vomiting: no nausea and no vomiting  Complications: no  Cardiovascular status: hemodynamically stable  Respiratory status: acceptable  Hydration status: stable

## 2019-09-06 NOTE — ANESTHESIA PRE PROCEDURE
Devices [facility]) MISC 1 each by Does not apply route daily 1 each 0    Misc.  Devices (QUAD CANE) MISC 1 each by Does not apply route daily narrow base Quad cane (NBQC) 1 each 0    loratadine (CLARITIN) 10 MG tablet Take 10 mg by mouth daily as needed      Sennosides 17.2 MG TABS Take 17.2 mg by mouth       Current Facility-Administered Medications   Medication Dose Route Frequency Provider Last Rate Last Dose    0.9 % sodium chloride infusion   Intravenous Continuous Alena Kraft MD 75 mL/hr at 19 0657      sodium chloride flush 0.9 % injection 10 mL  10 mL Intravenous 2 times per day Alena Kraft MD        sodium chloride flush 0.9 % injection 10 mL  10 mL Intravenous PRN Alena Kraft MD        cyclopentolate 1%, phenylephrine 2.5%, tropicamide 1%, ketorolac 0.5% ophthalmic solution  3 mL Ophthalmic See Admin Instructions Cayden Link MD   3 mL at 19 0701     Vital Signs (Current)   Vitals:    19 0857 1954   BP:  118/67   Pulse:  65   Resp:  16   Temp:  98.1 °F (36.7 °C)   TempSrc:  Temporal   SpO2:  98%   Weight: 219 lb (99.3 kg)    Height: 5' 9\" (1.753 m)                                           BP Readings from Last 3 Encounters:   19 118/67   19 130/68   19 135/80     Vital Signs Statistics (for past 48 hrs)     Temp  Av.1 °F (36.7 °C)  Min: 98.1 °F (36.7 °C)   Min taken time: 19  Max: 98.1 °F (36.7 °C)   Max taken time: 19  Pulse  Av  Min: 72   Min taken time: 19  Max: 72   Max taken time: 19  Resp  Av  Min: 12   Min taken time: 19  Max: 12   Max taken time: 19  BP  Min: 118/67   Min taken time: 19  Max: 118/67   Max taken time: 19  SpO2  Av %  Min: 98 %   Min taken time: 19  Max: 98 %   Max taken time: 19  BP Readings from Last 3 Encounters:   19 118/67   19 130/68   19 135/80

## 2019-09-30 ENCOUNTER — TELEPHONE (OUTPATIENT)
Dept: INTERNAL MEDICINE CLINIC | Age: 64
End: 2019-09-30

## 2019-09-30 RX ORDER — DANTROLENE SODIUM 100 MG/1
CAPSULE ORAL
Qty: 60 CAPSULE | Refills: 3 | Status: SHIPPED | OUTPATIENT
Start: 2019-09-30 | End: 2020-01-24

## 2019-10-22 ENCOUNTER — OFFICE VISIT (OUTPATIENT)
Dept: INTERNAL MEDICINE CLINIC | Age: 64
End: 2019-10-22
Payer: MEDICAID

## 2019-10-22 VITALS
DIASTOLIC BLOOD PRESSURE: 74 MMHG | HEART RATE: 68 BPM | BODY MASS INDEX: 36.46 KG/M2 | OXYGEN SATURATION: 95 % | WEIGHT: 218.8 LBS | SYSTOLIC BLOOD PRESSURE: 122 MMHG | HEIGHT: 65 IN

## 2019-10-22 DIAGNOSIS — R26.9 ABNORMALITY OF GAIT AS LATE EFFECT OF CEREBROVASCULAR ACCIDENT (CVA): ICD-10-CM

## 2019-10-22 DIAGNOSIS — E78.49 OTHER HYPERLIPIDEMIA: ICD-10-CM

## 2019-10-22 DIAGNOSIS — I69.398 ABNORMALITY OF GAIT AS LATE EFFECT OF CEREBROVASCULAR ACCIDENT (CVA): ICD-10-CM

## 2019-10-22 DIAGNOSIS — I10 ESSENTIAL HYPERTENSION: Primary | ICD-10-CM

## 2019-10-22 PROCEDURE — 99213 OFFICE O/P EST LOW 20 MIN: CPT | Performed by: NURSE PRACTITIONER

## 2019-10-22 ASSESSMENT — ENCOUNTER SYMPTOMS
CHEST TIGHTNESS: 0
CONSTIPATION: 0
RECTAL PAIN: 0
BLOOD IN STOOL: 0
ANAL BLEEDING: 0
ABDOMINAL PAIN: 0
DIARRHEA: 0
SHORTNESS OF BREATH: 0
NAUSEA: 0

## 2019-11-01 RX ORDER — AMLODIPINE BESYLATE 10 MG/1
TABLET ORAL
Qty: 90 TABLET | Refills: 1 | Status: SHIPPED | OUTPATIENT
Start: 2019-11-01 | End: 2020-03-18 | Stop reason: SDUPTHER

## 2019-11-05 ENCOUNTER — TELEPHONE (OUTPATIENT)
Dept: INTERNAL MEDICINE CLINIC | Age: 64
End: 2019-11-05

## 2019-11-05 DIAGNOSIS — I69.398 ABNORMALITY OF GAIT AS LATE EFFECT OF CEREBROVASCULAR ACCIDENT (CVA): Primary | ICD-10-CM

## 2019-11-05 DIAGNOSIS — I63.019 CEREBRAL INFARCTION DUE TO THROMBOSIS OF VERTEBRAL ARTERY, UNSPECIFIED BLOOD VESSEL LATERALITY (HCC): ICD-10-CM

## 2019-11-05 DIAGNOSIS — R26.9 ABNORMALITY OF GAIT AS LATE EFFECT OF CEREBROVASCULAR ACCIDENT (CVA): Primary | ICD-10-CM

## 2019-11-12 ENCOUNTER — OFFICE VISIT (OUTPATIENT)
Dept: PSYCHIATRY | Age: 64
End: 2019-11-12
Payer: MEDICAID

## 2019-11-12 VITALS
DIASTOLIC BLOOD PRESSURE: 82 MMHG | SYSTOLIC BLOOD PRESSURE: 144 MMHG | WEIGHT: 218.4 LBS | HEIGHT: 69 IN | BODY MASS INDEX: 32.35 KG/M2 | HEART RATE: 62 BPM

## 2019-11-12 DIAGNOSIS — F32.A DEPRESSION, UNSPECIFIED DEPRESSION TYPE: Primary | ICD-10-CM

## 2019-11-12 DIAGNOSIS — F44.4 PSYCHOGENIC TREMOR: ICD-10-CM

## 2019-11-12 PROCEDURE — 99204 OFFICE O/P NEW MOD 45 MIN: CPT | Performed by: NURSE PRACTITIONER

## 2019-11-12 RX ORDER — SERTRALINE HYDROCHLORIDE 25 MG/1
25 TABLET, FILM COATED ORAL NIGHTLY
Qty: 30 TABLET | Refills: 3 | Status: SHIPPED | OUTPATIENT
Start: 2019-11-12 | End: 2020-03-09 | Stop reason: SDUPTHER

## 2019-11-12 ASSESSMENT — ANXIETY QUESTIONNAIRES
4. TROUBLE RELAXING: 0-NOT AT ALL
5. BEING SO RESTLESS THAT IT IS HARD TO SIT STILL: 2-OVER HALF THE DAYS
7. FEELING AFRAID AS IF SOMETHING AWFUL MIGHT HAPPEN: 1-SEVERAL DAYS
3. WORRYING TOO MUCH ABOUT DIFFERENT THINGS: 2-OVER HALF THE DAYS
6. BECOMING EASILY ANNOYED OR IRRITABLE: 2-OVER HALF THE DAYS
1. FEELING NERVOUS, ANXIOUS, OR ON EDGE: 2-OVER HALF THE DAYS
2. NOT BEING ABLE TO STOP OR CONTROL WORRYING: 1-SEVERAL DAYS
GAD7 TOTAL SCORE: 10

## 2019-11-12 ASSESSMENT — PATIENT HEALTH QUESTIONNAIRE - PHQ9
4. FEELING TIRED OR HAVING LITTLE ENERGY: 3
1. LITTLE INTEREST OR PLEASURE IN DOING THINGS: 0
8. MOVING OR SPEAKING SO SLOWLY THAT OTHER PEOPLE COULD HAVE NOTICED. OR THE OPPOSITE, BEING SO FIGETY OR RESTLESS THAT YOU HAVE BEEN MOVING AROUND A LOT MORE THAN USUAL: 0
2. FEELING DOWN, DEPRESSED OR HOPELESS: 0
10. IF YOU CHECKED OFF ANY PROBLEMS, HOW DIFFICULT HAVE THESE PROBLEMS MADE IT FOR YOU TO DO YOUR WORK, TAKE CARE OF THINGS AT HOME, OR GET ALONG WITH OTHER PEOPLE: 0
9. THOUGHTS THAT YOU WOULD BE BETTER OFF DEAD, OR OF HURTING YOURSELF: 0
SUM OF ALL RESPONSES TO PHQ9 QUESTIONS 1 & 2: 0
5. POOR APPETITE OR OVEREATING: 0
SUM OF ALL RESPONSES TO PHQ QUESTIONS 1-9: 9
7. TROUBLE CONCENTRATING ON THINGS, SUCH AS READING THE NEWSPAPER OR WATCHING TELEVISION: 0
6. FEELING BAD ABOUT YOURSELF - OR THAT YOU ARE A FAILURE OR HAVE LET YOURSELF OR YOUR FAMILY DOWN: 3
SUM OF ALL RESPONSES TO PHQ QUESTIONS 1-9: 9
3. TROUBLE FALLING OR STAYING ASLEEP: 3

## 2019-11-29 RX ORDER — GABAPENTIN 100 MG/1
200 CAPSULE ORAL 2 TIMES DAILY
Qty: 60 CAPSULE | Refills: 3 | Status: SHIPPED | OUTPATIENT
Start: 2019-11-29 | End: 2020-03-23 | Stop reason: SDUPTHER

## 2019-12-04 RX ORDER — ATORVASTATIN CALCIUM 40 MG/1
TABLET, FILM COATED ORAL
Qty: 30 TABLET | Refills: 5 | Status: SHIPPED | OUTPATIENT
Start: 2019-12-04 | End: 2020-03-18 | Stop reason: SDUPTHER

## 2019-12-12 RX ORDER — GABAPENTIN 100 MG/1
200 CAPSULE ORAL 2 TIMES DAILY
Qty: 120 CAPSULE | Refills: 3 | Status: SHIPPED | OUTPATIENT
Start: 2019-12-12 | End: 2020-04-13

## 2019-12-20 ENCOUNTER — TELEPHONE (OUTPATIENT)
Dept: INTERNAL MEDICINE CLINIC | Age: 64
End: 2019-12-20

## 2020-01-27 RX ORDER — DANTROLENE SODIUM 100 MG/1
CAPSULE ORAL
Qty: 60 CAPSULE | Refills: 3 | Status: SHIPPED | OUTPATIENT
Start: 2020-01-27 | End: 2020-03-16 | Stop reason: ALTCHOICE

## 2020-02-28 ENCOUNTER — OFFICE VISIT (OUTPATIENT)
Dept: PSYCHIATRY | Age: 65
End: 2020-02-28
Payer: MEDICARE

## 2020-02-28 VITALS
SYSTOLIC BLOOD PRESSURE: 127 MMHG | WEIGHT: 220 LBS | BODY MASS INDEX: 32.58 KG/M2 | DIASTOLIC BLOOD PRESSURE: 84 MMHG | HEART RATE: 72 BPM | HEIGHT: 69 IN

## 2020-02-28 PROCEDURE — G8484 FLU IMMUNIZE NO ADMIN: HCPCS | Performed by: NURSE PRACTITIONER

## 2020-02-28 PROCEDURE — G8417 CALC BMI ABV UP PARAM F/U: HCPCS | Performed by: NURSE PRACTITIONER

## 2020-02-28 PROCEDURE — 3017F COLORECTAL CA SCREEN DOC REV: CPT | Performed by: NURSE PRACTITIONER

## 2020-02-28 PROCEDURE — 1036F TOBACCO NON-USER: CPT | Performed by: NURSE PRACTITIONER

## 2020-02-28 PROCEDURE — 4040F PNEUMOC VAC/ADMIN/RCVD: CPT | Performed by: NURSE PRACTITIONER

## 2020-02-28 PROCEDURE — 1123F ACP DISCUSS/DSCN MKR DOCD: CPT | Performed by: NURSE PRACTITIONER

## 2020-02-28 PROCEDURE — G8427 DOCREV CUR MEDS BY ELIG CLIN: HCPCS | Performed by: NURSE PRACTITIONER

## 2020-02-28 PROCEDURE — 99213 OFFICE O/P EST LOW 20 MIN: CPT | Performed by: NURSE PRACTITIONER

## 2020-02-28 ASSESSMENT — PATIENT HEALTH QUESTIONNAIRE - PHQ9
6. FEELING BAD ABOUT YOURSELF - OR THAT YOU ARE A FAILURE OR HAVE LET YOURSELF OR YOUR FAMILY DOWN: 0
8. MOVING OR SPEAKING SO SLOWLY THAT OTHER PEOPLE COULD HAVE NOTICED. OR THE OPPOSITE, BEING SO FIGETY OR RESTLESS THAT YOU HAVE BEEN MOVING AROUND A LOT MORE THAN USUAL: 0
10. IF YOU CHECKED OFF ANY PROBLEMS, HOW DIFFICULT HAVE THESE PROBLEMS MADE IT FOR YOU TO DO YOUR WORK, TAKE CARE OF THINGS AT HOME, OR GET ALONG WITH OTHER PEOPLE: 0
SUM OF ALL RESPONSES TO PHQ QUESTIONS 1-9: 5
7. TROUBLE CONCENTRATING ON THINGS, SUCH AS READING THE NEWSPAPER OR WATCHING TELEVISION: 1
1. LITTLE INTEREST OR PLEASURE IN DOING THINGS: 0
9. THOUGHTS THAT YOU WOULD BE BETTER OFF DEAD, OR OF HURTING YOURSELF: 0
4. FEELING TIRED OR HAVING LITTLE ENERGY: 2
SUM OF ALL RESPONSES TO PHQ QUESTIONS 1-9: 5
5. POOR APPETITE OR OVEREATING: 0
3. TROUBLE FALLING OR STAYING ASLEEP: 2
SUM OF ALL RESPONSES TO PHQ9 QUESTIONS 1 & 2: 0
2. FEELING DOWN, DEPRESSED OR HOPELESS: 0

## 2020-02-28 ASSESSMENT — ANXIETY QUESTIONNAIRES
4. TROUBLE RELAXING: 1-SEVERAL DAYS
7. FEELING AFRAID AS IF SOMETHING AWFUL MIGHT HAPPEN: 0-NOT AT ALL
3. WORRYING TOO MUCH ABOUT DIFFERENT THINGS: 1-SEVERAL DAYS
5. BEING SO RESTLESS THAT IT IS HARD TO SIT STILL: 1-SEVERAL DAYS
1. FEELING NERVOUS, ANXIOUS, OR ON EDGE: 0-NOT AT ALL
GAD7 TOTAL SCORE: 3
6. BECOMING EASILY ANNOYED OR IRRITABLE: 0-NOT AT ALL
2. NOT BEING ABLE TO STOP OR CONTROL WORRYING: 0-NOT AT ALL

## 2020-02-28 NOTE — PROGRESS NOTES
PSYCHIATRY PROGRESS NOTE    Pb Henriquez  20    Face to Face time: 45 mins  CC:   Chief Complaint   Patient presents with    Follow-up     Per excerpt of initial eval as completed by this provider on 19:    History of stroke, L sided weakness due to this.      Reports having tremors from previous stroke. Patient has heard that anti-depressants could potentially \"slow down his brain\"      Continues to have these tremors, states that  Neurologist can't figure out what the tremors are from (per Neurology note, stress induced tremors/psychogenic dystonia)     Having head jerking more frequently, R arm involuntary jerks out to his side, leg will tremor as well, and his back will twist around. Worried about falling        Sleep poor, 3-3.5 hours at night. Issues with tremors at night.      One month ago when to  stroke research, backwards walking study, he actually completed it. 2 weeks of 4x/week, put in a harness, ran as fast as he could with his can, about 60 feet. Then stand on a balance pad. This has helped him tremendously in regards to advancing with his movement.      Participates in PT and OT weekly.      7 years ago he couldn't move after stroke. Therefore,  his mood is better because he is moving around and able to get around. Eventually, he wants to be able to drive.      One grandson. Two daughters just got . A lot of good things going in life, feels fortunate.      Issues with memory after the stroke. Had an episode of dehydration/constipation after CVA, \"didn't care if I lived or . \" Cookie Moore that he survived that as many people did not think he would.      Reports depression when he thinks about the people around him that have passed away. He was in the nursing home for one year after stroke. Had two roommates die that he was very close with. His cousin also  during this time and he took that really hard.     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  Able to difficulty with task completion  + fidgets  + talks excessively            + history of ADHD symptoms or signs in elementary school            +history of academic performance problems               PTSD: DENIES nightmares, flashbacks, hypervigilance, easily startled, decreased sleep, reliving the event, avoiding situations that remind you of trauma, physical and mental paralysis when reminded of the experience, same despair, easily angry or irritable, trouble concentrating, fear for safety,  Numbness of emotions, feeling of detachment     Eating disorders: DENIES history of eating disorder         GORDON 7 SCORE 2/28/2020 11/12/2019   GORDON-7 Total Score 3 10     Interpretation of GORDON-7 score: 5-9 = mild anxiety, 10-14 = moderate anxiety,   15+ = severe anxiety. Recommend referral to behavioral health for scores 10 or greater. PHQ-9 Total Score: 5 (2/28/2020  9:35 AM)  Thoughts that you would be better off dead, or of hurting yourself in some way: 0 (2/28/2020  9:35 AM)    PHQ-9 Total Score: 9 (11/12/2019  1:32 PM)  Thoughts that you would be better off dead, or of hurting yourself in some way: 0 (11/12/2019  1:32 PM)  Interpretation of PHQ-9 score:  1-4 = minimal depression, 5-9 = mild depression,   10-14 = moderate depression; 15-19 = moderately severe depression, 20-27 = severe depression      Past Psychiatric History:               Prior hospitalizations: Denies              Prior diagnoses:  Denies              Outpatient Treatment: [facility].  Would see a psychologist sometimes                          Psychiatrist: Denies                              Therapist: Denies              Suicide Attempts: Denies              Hx SH:  Denies     Past Psychopharmacologic Trials (including response/reactions):  Denies      Past Medical/Surgical History:   Past Medical History:   Diagnosis Date    CAD (coronary artery disease)     HTN (hypertension)     Hyperlipemia     S/P PTCA (percutaneous transluminal  Sennosides 17.2 MG TABS Take 17.2 mg by mouth      gabapentin (NEURONTIN) 100 MG capsule Take 2 capsules by mouth 2 times daily for 30 days. Indications: take 2 tab morning and then 1 tab a@ 1:30pm, 1 tab @ 5:30pm on Wrk Days Intended supply: 30 days 120 capsule 3     No current facility-administered medications on file prior to visit. Controlled Substance Monitoring:    Acute and Chronic Pain Monitoring:   No flowsheet data found. OBJECTIVE:  Vitals:    Wt Readings from Last 3 Encounters:   02/28/20 220 lb (99.8 kg)   11/12/19 218 lb 6.4 oz (99.1 kg)   10/22/19 218 lb 12.8 oz (99.2 kg)       Vitals:    02/28/20 0934   BP: 127/84   Site: Right Upper Arm   Position: Sitting   Cuff Size: Large Adult   Pulse: 72   Weight: 220 lb (99.8 kg)   Height: 5' 9\" (1.753 m)           ROS: Denies trouble with fever, rash, headache, vision changes, chest pain, shortness of breath, nausea, extremity pain, weakness, dysuria. + Tremors, unsteady gait, sometimes pain in limbs post CVA     Mental Status Exam:      Appearance    alert, cooperative, appropriate dress for season, well groomed, appears stated age  Muscle strength/tone: L sided atrophy, tremors, muscle spasms/involuntary movements right sided  Gait/station: unsteady when standing, manual wheelchair  Speech    spontaneous, normal rate and normal volume  Mood    Euthymic  Affect    normal affect Congruent to thought content and mood  Thought Content    intact, no delusions voiced  Thought Process    coherent   Associations    logical connections  Perceptions: denies AH/VH, does not appear preoccupied with the internal environment  Insight    Good  Judgment    Intact  Orientation    oriented to person, place, time, and general circumstances  Memory    recent and remote memory intact  Attention/Concentration    intact  Ability to understand instructions Yes  Ability to respond meaningfully Yes  Language: [address] of knowledge/Intellect: Average  SI:   no suicidal ideation  HI: Denies HI      Labs:     No visits with results within 6 Month(s) from this visit. Latest known visit with results is:   Office Visit on 2019   Component Date Value    WBC 2019 6.4     RBC 2019 5.03     Hemoglobin 2019 15.0     Hematocrit 2019 46.3     MCV 2019 92.0     MCH 2019 29.9     MCHC 2019 32.5     RDW 2019 14.7     Platelets  223     MPV 2019 9.8     Neutrophils % 2019 57.6     Lymphocytes % 2019 30.0     Monocytes % 2019 9.8     Eosinophils % 2019 2.2     Basophils % 2019 0.4     Neutrophils Absolute 2019 3.7     Lymphocytes Absolute 2019 1.9     Monocytes Absolute 2019 0.6     Eosinophils Absolute 2019 0.1     Basophils Absolute 2019 0.0     Sodium 2019 142     Potassium 2019 4.2     Chloride 2019 99     CO2 2019 21     Anion Gap 2019 22*    Glucose 2019 58*    BUN 2019 23*    CREATININE 2019 1.0     GFR Non- 2019 >60     GFR  2019 >60     Calcium 2019 9.3        Last Drug screen:  No results found for: Wright[place] Mathews, LABBENZ, COCAIMETSCRU, THC, MDMA, LABMETH, OPIATESCREENURINE, OXTCOSU, PHENCYCLIDINESCREENURINE, PROPOXYPHENE, METAMPU        Imagin19:  Ct head wo contrast:    Impression   No hemorrhage or mass.       Remote ischemic changes on right       Paranasal sinus disease         ASSESSMENT AND PLAN     Diagnosis Orders   1. Depression, unspecified depression type     2. Psychogenic tremor         1. Safety: NO Imminent risk of danger to/self/others based on the factors considered below. Appropriate for outpatient level of care.   Safety plan includes: 911, PES, hotlines, and interventions discussed today.      Risk factors: Age <25 or >49, male gender, chronic pain or medical illness, social isolation,

## 2020-03-09 RX ORDER — SERTRALINE HYDROCHLORIDE 25 MG/1
25 TABLET, FILM COATED ORAL NIGHTLY
Qty: 30 TABLET | Refills: 3 | Status: SHIPPED | OUTPATIENT
Start: 2020-03-09 | End: 2021-04-12 | Stop reason: ALTCHOICE

## 2020-03-16 ENCOUNTER — TELEPHONE (OUTPATIENT)
Dept: INTERNAL MEDICINE CLINIC | Age: 65
End: 2020-03-16

## 2020-03-16 RX ORDER — BACLOFEN 20 MG/1
40 TABLET ORAL 2 TIMES DAILY
Qty: 60 TABLET | Refills: 3 | Status: SHIPPED | OUTPATIENT
Start: 2020-03-16 | End: 2020-03-18 | Stop reason: SDUPTHER

## 2020-03-16 NOTE — TELEPHONE ENCOUNTER
Dantrolene isn't listed under [facility] covered medications. He goes through [facility]. He says he takes this for \"tone\"  (muscle tone? ?) after having his stroke. He would like to know if there is an alternative medicine or can we get it to be authorized by his insurance? Please call him back about this.

## 2020-03-19 RX ORDER — BACLOFEN 20 MG/1
40 TABLET ORAL 2 TIMES DAILY
Qty: 360 TABLET | Refills: 1 | Status: SHIPPED
Start: 2020-03-19 | End: 2020-08-11 | Stop reason: ALTCHOICE

## 2020-03-19 RX ORDER — ATORVASTATIN CALCIUM 40 MG/1
40 TABLET, FILM COATED ORAL DAILY
Qty: 90 TABLET | Refills: 1 | Status: SHIPPED | OUTPATIENT
Start: 2020-03-19 | End: 2020-09-30 | Stop reason: SDUPTHER

## 2020-03-19 RX ORDER — HYDRALAZINE HYDROCHLORIDE 50 MG/1
50 TABLET, FILM COATED ORAL 3 TIMES DAILY
Qty: 270 TABLET | Refills: 1 | Status: SHIPPED | OUTPATIENT
Start: 2020-03-19 | End: 2020-09-30 | Stop reason: SDUPTHER

## 2020-03-19 RX ORDER — AMLODIPINE BESYLATE 10 MG/1
10 TABLET ORAL DAILY
Qty: 90 TABLET | Refills: 1 | Status: SHIPPED | OUTPATIENT
Start: 2020-03-19 | End: 2020-05-11 | Stop reason: SDUPTHER

## 2020-03-19 RX ORDER — LORATADINE 10 MG/1
10 TABLET ORAL DAILY
Qty: 90 TABLET | Refills: 1 | Status: SHIPPED | OUTPATIENT
Start: 2020-03-19

## 2020-03-19 RX ORDER — LISINOPRIL 20 MG/1
20 TABLET ORAL DAILY
Qty: 90 TABLET | Refills: 1 | Status: SHIPPED | OUTPATIENT
Start: 2020-03-19 | End: 2020-09-30 | Stop reason: SDUPTHER

## 2020-03-23 NOTE — TELEPHONE ENCOUNTER
Lov: 10/22/19    Future Appointments   Date Time Provider Dolores Marcelle   4/7/2020  8:30 AM Deirdre Pascual MD [facility]io [facility]   4/21/2020  9:15 AM Chesterhill Rasher, APRN - CNP [facility]   4/24/2020 12:30 PM MADISYN Tinajero CNP PSY [facility]

## 2020-03-24 RX ORDER — GABAPENTIN 100 MG/1
200 CAPSULE ORAL 4 TIMES DAILY
Qty: 360 CAPSULE | Refills: 1 | Status: SHIPPED | OUTPATIENT
Start: 2020-03-24 | End: 2020-04-13

## 2020-04-02 ENCOUNTER — TELEPHONE (OUTPATIENT)
Dept: CARDIOLOGY CLINIC | Age: 65
End: 2020-04-02

## 2020-04-02 NOTE — TELEPHONE ENCOUNTER
Had flu with temp 2 days ago with nausea and diarrhea, vomiting. Unsure of temp. Will change next week visit to a virtual visit. He denies chest pain, sob palpitations or dizziness. He is self isolating for one month.   Instructed to call if he has any change in symptoms

## 2020-04-07 ENCOUNTER — VIRTUAL VISIT (OUTPATIENT)
Dept: CARDIOLOGY CLINIC | Age: 65
End: 2020-04-07
Payer: MEDICARE

## 2020-04-07 ENCOUNTER — TELEPHONE (OUTPATIENT)
Dept: CARDIOLOGY CLINIC | Age: 65
End: 2020-04-07

## 2020-04-07 VITALS
BODY MASS INDEX: 34.53 KG/M2 | SYSTOLIC BLOOD PRESSURE: 127 MMHG | HEIGHT: 67 IN | DIASTOLIC BLOOD PRESSURE: 79 MMHG | TEMPERATURE: 97.9 F | WEIGHT: 220 LBS

## 2020-04-07 PROCEDURE — G2012 BRIEF CHECK IN BY MD/QHP: HCPCS | Performed by: INTERNAL MEDICINE

## 2020-04-07 NOTE — PROGRESS NOTES
with no signs of ataxia         [] Normal range of motion of neck        [] Abnormal-       Neurological:        [] No Facial Asymmetry (Cranial nerve 7 motor function) (limited exam to video visit)          [] No gaze palsy        [] Abnormal-         Skin:        [] No significant exanthematous lesions or discoloration noted on facial skin         [] Abnormal-            Psychiatric:       [x] Normal Affect [] No Hallucinations        [] Abnormal-     Other pertinent observable physical exam findings-   4/3/19 abd ultrasound--neg for AAA  2012  Echo--EF 45%  2015  Carotid  Doppler--no significant stenosis  ASSESSMENT/PLAN:  Assessment:   CAD   2009 APRIL to prox circ and mid right  2/2/17 myoview  Stable  On aspirin without excess bleeding or bruising     Htn  /79 (Site: Right Upper Arm, Position: Sitting, Cuff Size: Large Adult)   Temp 97.9 °F (36.6 °C)   Ht 5' 7\" (1.702 m)   Wt 220 lb (99.8 kg)   BMI 34.46 kg/m²    on lisinopril hydralazine norvasc  stable    hchol   11/13/18  Tc 1118 ldl 66 tri 86  On lipitor 40 mg daily  Will repeat     CVA  2012  intracranial hemorrhage [facility] [place])  Residual left sided spastic hemiparesis ( from previous right thalamic and basal ganglia hemorrhagic infarction   thought to be due to htn, was not on plavix at the time of the stroke  Followed by Dr Elizabet Moon  involuntary movement of right right arm and leg--thought to be due to stress--follow up with Dr Breanne Sanchez all medications  Call for any changes  Follow up in one year    Denver Gerson is a 72 y.o. male being evaluated by a Virtual Visit (video visit) encounter to address concerns as mentioned above. A caregiver was present when appropriate. Due to this being a TeleHealth encounter (During [facility] public health emergency), evaluation of the following organ systems was limited: Vitals/Constitutional/EENT/Resp/CV/GI//MS/Neuro/Skin/Heme-Lymph-Imm.   Pursuant to the emergency declaration under the [facility], [address] [place] [address] authority and the [employer] Act, this Virtual Visit was conducted with patient's (and/or legal guardian's) consent, to reduce the patient's risk of exposure to COVID-19 and provide necessary medical care. The patient (and/or legal guardian) has also been advised to contact this office for worsening conditions or problems, and seek emergency medical treatment and/or call 911 if deemed necessary. Services were provided through a video synchronous discussion virtually to substitute for in-person clinic visit. Patient and provider were located at their individual homes. --Angelica Blood RN on 4/7/2020 at 9:04 AM   Provider Jessica Womack is working as a scribe for and in the presence of ciara Cosby MD). Working as a scribe, Tom Jon may have prepopulated components of this note with my historical  intellectual property under my direct supervision. Any additions to this intellectual property were performed in my presence and at my direction. Furthermore, the content and accuracy of this note have been reviewed by ciara Cosby MD). An electronic signature was used to authenticate this note.

## 2020-04-12 NOTE — PROGRESS NOTES
[address] [facility]  [address] [address] [place] [address]  Dept: [phone]     TELEHEALTH EVALUATION -- Audio (During ZYEIJ-41 public health emergency)  2020   Bren Murcia (:  1955)is a 72 y.o. male, here for evaluation via telephone. ( visual portion of phone did not connect)    LOV: 10/22/2019  This is his 6 month follow up for hypertension, hyperlipidemia, and stroke. [facility]   Specialty Team  Psychiatry- Mukund Monteiro, [facility]  Neurology- Dr. Chelsea Rubi  Cardiology- Dr. Alejandra Rahman  Today he states he is doing well,  he reports his aide is not coming due to COVID restrictions, states his daughter is assisting him at home. His therapy at [facility] has been postponed due to COVID restrictions. He reports consistent tremors that at times awakens him at night, continues to exercise and walk frequently at home. He reports his blood pressure 126/69. Consent:  He and/or health care decision maker is aware that that he may receive a bill for this telephone service, depending on his insurance coverage, and has provided verbal consent to proceed: Yes      Documentation:  I communicated with the patient and/or health care decision maker about hypertension, hyperlipidemia, and stroke. Details of this discussion including any medical advice provided: Continue medications as prescribed, resume physical therapy when permitted. Advised patient that lab work will be postponed for 3 months. I affirm this is a Patient Initiated Episode with an Established Patient who has not had a related appointment within my department in the past 7 days or scheduled within the next 24 hours.     Total Time: minutes: 5-10 minutes    Note: not billable if this call serves to triage the patient into an appointment for the relevant concern      Lab Results   Component Value Date    [address] 66 2018     Lab Results   Component Value Date    WBC 6.4 2019    HGB 15.0 2019    HCT 46.3 Provider, MD        Social History     Tobacco Use    Smoking status: Never Smoker    Smokeless tobacco: Never Used   Substance Use Topics    Alcohol use: No        There were no vitals filed for this visit. Estimated body mass index is 34.46 kg/m² as calculated from the following:    Height as of 4/7/20: 5' 7\" (1.702 m). Weight as of 4/7/20: 220 lb (99.8 kg). Physical Exam    ASSESSMENT/PLAN:  1. Essential hypertension  -Continue current medications.  -Labs postponed due to COVID, to be drawn in 3 months    2. Other hyperlipidemia  -Continue current medications.  -Labs postponed due to COVID, to be drawn in 3 months    3. 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 No follow-ups on file. Kaylene Gonzalez is a 72 y.o. male being evaluated by a telephone encounter to address concerns as mentioned above. A caregiver was present when appropriate. (During [facility] public health emergency), evaluation of the following organ systems was limited: Vitals/Constitutional/EENT/Resp/CV/GI//MS/Neuro/Skin/Heme-Lymph-Imm. Pursuant to the emergency declaration under the [facility], 1135 waiver authority and the [employer] and [employer] Act, this telephone Visit was conducted with patient's consent, to reduce the patient's risk of exposure to COVID-19 and provide necessary medical care. The patient (and/or legal guardian) has also been advised to contact this office for worsening conditions or problems, and seek emergency medical treatment and/or call 911 if deemed necessary. Patient and provider were located at their individual homes. --MADISYN Neely CNP on 4/14/2020 at 10:36 AM    An electronic signature was used to authenticate this note.     --MADISYN Neely CNP on 4/14/2020 at 10:36 AM

## 2020-04-13 RX ORDER — GABAPENTIN 100 MG/1
CAPSULE ORAL
Qty: 360 CAPSULE | Refills: 1 | Status: SHIPPED | OUTPATIENT
Start: 2020-04-13 | End: 2020-04-27 | Stop reason: SDUPTHER

## 2020-04-14 ENCOUNTER — OFFICE VISIT (OUTPATIENT)
Dept: INTERNAL MEDICINE CLINIC | Age: 65
End: 2020-04-14
Payer: MEDICARE

## 2020-04-14 PROCEDURE — G8428 CUR MEDS NOT DOCUMENT: HCPCS | Performed by: NURSE PRACTITIONER

## 2020-04-14 PROCEDURE — 99441 PR PHYS/QHP TELEPHONE EVALUATION 5-10 MIN: CPT | Performed by: NURSE PRACTITIONER

## 2020-04-14 PROCEDURE — G8417 CALC BMI ABV UP PARAM F/U: HCPCS | Performed by: NURSE PRACTITIONER

## 2020-04-15 PROBLEM — H25.813 COMBINED FORMS OF AGE-RELATED CATARACT, BILATERAL: Status: ACTIVE | Noted: 2020-04-15

## 2020-04-15 PROBLEM — R25.2 SPASTICITY: Status: ACTIVE | Noted: 2019-07-09

## 2020-04-15 PROBLEM — Z96.1 PRESENCE OF INTRAOCULAR LENS: Status: ACTIVE | Noted: 2020-04-15

## 2020-04-18 ENCOUNTER — TELEPHONE (OUTPATIENT)
Dept: INTERNAL MEDICINE CLINIC | Age: 65
End: 2020-04-18

## 2020-04-18 NOTE — TELEPHONE ENCOUNTER
Patient son would like to know if dosage on recent medication could be changed because his dad had a fall this morning and had to be picked up by paramedics.      Please advise [phone]

## 2020-04-22 ENCOUNTER — TELEPHONE (OUTPATIENT)
Dept: INTERNAL MEDICINE CLINIC | Age: 65
End: 2020-04-22

## 2020-04-23 NOTE — TELEPHONE ENCOUNTER
Patient stated that he will wait until her sees Dr. Huma Trevino again. He said that he has discussed this and Dr. Huma Trevino dosen;t understand why he is having the increased tremors.

## 2020-04-27 ENCOUNTER — VIRTUAL VISIT (OUTPATIENT)
Dept: INTERNAL MEDICINE CLINIC | Age: 65
End: 2020-04-27
Payer: MEDICARE

## 2020-04-27 PROCEDURE — G0402 INITIAL PREVENTIVE EXAM: HCPCS | Performed by: NURSE PRACTITIONER

## 2020-04-27 PROCEDURE — 4040F PNEUMOC VAC/ADMIN/RCVD: CPT | Performed by: NURSE PRACTITIONER

## 2020-04-27 PROCEDURE — 1123F ACP DISCUSS/DSCN MKR DOCD: CPT | Performed by: NURSE PRACTITIONER

## 2020-04-27 PROCEDURE — 3017F COLORECTAL CA SCREEN DOC REV: CPT | Performed by: NURSE PRACTITIONER

## 2020-04-27 RX ORDER — GABAPENTIN 100 MG/1
100 CAPSULE ORAL 4 TIMES DAILY
Qty: 30 CAPSULE | Refills: 0 | Status: SHIPPED | OUTPATIENT
Start: 2020-04-27 | End: 2020-08-11 | Stop reason: SDUPTHER

## 2020-04-27 RX ORDER — DANTROLENE SODIUM 100 MG/1
CAPSULE ORAL
Qty: 60 CAPSULE | Refills: 3 | Status: SHIPPED | OUTPATIENT
Start: 2020-04-27 | End: 2020-06-30 | Stop reason: SDUPTHER

## 2020-04-27 RX ORDER — GABAPENTIN 100 MG/1
100 CAPSULE ORAL 4 TIMES DAILY
Qty: 360 CAPSULE | Refills: 1 | Status: SHIPPED | OUTPATIENT
Start: 2020-04-27 | End: 2020-09-17 | Stop reason: SDUPTHER

## 2020-04-27 SDOH — ECONOMIC STABILITY: FOOD INSECURITY: WITHIN THE PAST 12 MONTHS, THE FOOD YOU BOUGHT JUST DIDN'T LAST AND YOU DIDN'T HAVE MONEY TO GET MORE.: NEVER TRUE

## 2020-04-27 SDOH — ECONOMIC STABILITY: TRANSPORTATION INSECURITY
IN THE PAST 12 MONTHS, HAS THE LACK OF TRANSPORTATION KEPT YOU FROM MEDICAL APPOINTMENTS OR FROM GETTING MEDICATIONS?: NO

## 2020-04-27 SDOH — ECONOMIC STABILITY: INCOME INSECURITY: HOW HARD IS IT FOR YOU TO PAY FOR THE VERY BASICS LIKE FOOD, HOUSING, MEDICAL CARE, AND HEATING?: NOT VERY HARD

## 2020-04-27 SDOH — ECONOMIC STABILITY: TRANSPORTATION INSECURITY
IN THE PAST 12 MONTHS, HAS LACK OF TRANSPORTATION KEPT YOU FROM MEETINGS, WORK, OR FROM GETTING THINGS NEEDED FOR DAILY LIVING?: NO

## 2020-04-27 SDOH — ECONOMIC STABILITY: FOOD INSECURITY: WITHIN THE PAST 12 MONTHS, YOU WORRIED THAT YOUR FOOD WOULD RUN OUT BEFORE YOU GOT MONEY TO BUY MORE.: NEVER TRUE

## 2020-04-27 ASSESSMENT — PATIENT HEALTH QUESTIONNAIRE - PHQ9
2. FEELING DOWN, DEPRESSED OR HOPELESS: 0
SUM OF ALL RESPONSES TO PHQ9 QUESTIONS 1 & 2: 0
SUM OF ALL RESPONSES TO PHQ QUESTIONS 1-9: 0
1. LITTLE INTEREST OR PLEASURE IN DOING THINGS: 0
SUM OF ALL RESPONSES TO PHQ QUESTIONS 1-9: 0

## 2020-04-27 NOTE — PATIENT INSTRUCTIONS
Personalized Preventive Plan for Jack Albrecht - 4/27/2020  Medicare offers a range of preventive health benefits. Some of the tests and screenings are paid in full while other may be subject to a deductible, co-insurance, and/or copay. Some of these benefits include a comprehensive review of your medical history including lifestyle, illnesses that may run in your family, and various assessments and screenings as appropriate. After reviewing your medical record and screening and assessments performed today your provider may have ordered immunizations, labs, imaging, and/or referrals for you. A list of these orders (if applicable) as well as your Preventive Care list are included within your After Visit Summary for your review. Other Preventive Recommendations:    · A preventive eye exam performed by an eye specialist is recommended every 1-2 years to screen for glaucoma; cataracts, macular degeneration, and other eye disorders. · A preventive dental visit is recommended every 6 months. · Try to get at least 150 minutes of exercise per week or 10,000 steps per day on a pedometer . · Order or download the FREE \"Exercise & Physical Activity: Your Everyday Guide\" from [employer]. Call 9-620.745.2764 or search [employer] online. · You need 1938-8706 mg of calcium and 2947-8346 IU of vitamin D per day. It is possible to meet your calcium requirement with diet alone, but a vitamin D supplement is usually necessary to meet this goal.  · When exposed to the sun, use a sunscreen that protects against both UVA and UVB radiation with an SPF of 30 or greater. Reapply every 2 to 3 hours or after sweating, drying off with a towel, or swimming. · Always wear a seat belt when traveling in a car. Always wear a helmet when riding a bicycle or motorcycle.

## 2020-04-29 ENCOUNTER — TELEPHONE (OUTPATIENT)
Dept: ORTHOPEDIC SURGERY | Age: 65
End: 2020-04-29

## 2020-05-01 ENCOUNTER — TELEPHONE (OUTPATIENT)
Dept: INTERNAL MEDICINE CLINIC | Age: 65
End: 2020-05-01

## 2020-05-01 ENCOUNTER — PATIENT MESSAGE (OUTPATIENT)
Dept: INTERNAL MEDICINE CLINIC | Age: 65
End: 2020-05-01

## 2020-05-06 ENCOUNTER — TELEPHONE (OUTPATIENT)
Dept: INTERNAL MEDICINE CLINIC | Age: 65
End: 2020-05-06

## 2020-05-11 NOTE — TELEPHONE ENCOUNTER
Lov: 4-14-20    Future Appointments   Date Time Provider Dolores Ibanez   8/11/2020  9:00 AM MADISYN Ling - CNP [facility]

## 2020-05-13 RX ORDER — AMLODIPINE BESYLATE 10 MG/1
10 TABLET ORAL DAILY
Qty: 90 TABLET | Refills: 1 | Status: SHIPPED | OUTPATIENT
Start: 2020-05-13 | End: 2021-02-18 | Stop reason: SDUPTHER

## 2020-06-15 ENCOUNTER — TELEPHONE (OUTPATIENT)
Dept: INTERNAL MEDICINE CLINIC | Age: 65
End: 2020-06-15

## 2020-06-17 ENCOUNTER — OFFICE VISIT (OUTPATIENT)
Dept: PRIMARY CARE CLINIC | Age: 65
End: 2020-06-17
Payer: MEDICARE

## 2020-06-17 VITALS — HEART RATE: 64 BPM | TEMPERATURE: 98.2 F | OXYGEN SATURATION: 98 %

## 2020-06-17 PROCEDURE — G8428 CUR MEDS NOT DOCUMENT: HCPCS | Performed by: NURSE PRACTITIONER

## 2020-06-17 PROCEDURE — G8417 CALC BMI ABV UP PARAM F/U: HCPCS | Performed by: NURSE PRACTITIONER

## 2020-06-17 PROCEDURE — 1123F ACP DISCUSS/DSCN MKR DOCD: CPT | Performed by: NURSE PRACTITIONER

## 2020-06-17 PROCEDURE — 99211 OFF/OP EST MAY X REQ PHY/QHP: CPT | Performed by: NURSE PRACTITIONER

## 2020-06-17 PROCEDURE — 1036F TOBACCO NON-USER: CPT | Performed by: NURSE PRACTITIONER

## 2020-06-17 PROCEDURE — 4040F PNEUMOC VAC/ADMIN/RCVD: CPT | Performed by: NURSE PRACTITIONER

## 2020-06-17 PROCEDURE — 3017F COLORECTAL CA SCREEN DOC REV: CPT | Performed by: NURSE PRACTITIONER

## 2020-06-17 NOTE — PATIENT INSTRUCTIONS
Advance Care Planning  People with COVID-19 may have no symptoms, mild symptoms, such as fever, cough, and shortness of breath or they may have more severe illness, developing severe and fatal pneumonia. As a result, Advance Care Planning with attention to naming a health care decision maker (someone you trust to make healthcare decisions for you if you could not speak for yourself) and sharing other health care preferences is important BEFORE a possible health crisis. Please contact your Primary Care Provider to discuss Advance Care Planning. Preventing the Spread of Coronavirus Disease 2019 in Homes and Residential Communities  For the most recent information go to [email or web address]    Prevention steps for People with confirmed or suspected COVID-19 (including persons under investigation) who do not need to be hospitalized  and   People with confirmed COVID-19 who were hospitalized and determined to be medically stable to go home    Your healthcare provider and public health staff will evaluate whether you can be cared for at home. If it is determined that you do not need to be hospitalized and can be isolated at home, you will be monitored by staff from your local or state health department. You should follow the prevention steps below until a healthcare provider or local or state health department says you can return to your normal activities. Stay home except to get medical care  People who are mildly ill with COVID-19 are able to isolate at home during their illness. You should restrict activities outside your home, except for getting medical care. Do not go to work, school, or public areas. Avoid using public transportation, ride-sharing, or taxis. Separate yourself from other people and animals in your home  People: As much as possible, you should stay in a specific room and away from other people in your home.  Also, you should use a separate before eating or preparing food. If soap and water are not readily available, use an alcohol-based hand  with at least 60% alcohol, covering all surfaces of your hands and rubbing them together until they feel dry. Soap and water are the best option if hands are visibly dirty. Avoid touching your eyes, nose, and mouth with unwashed hands. Avoid sharing personal household items  You should not share dishes, drinking glasses, cups, eating utensils, towels, or bedding with other people or pets in your home. After using these items, they should be washed thoroughly with soap and water. Clean all high-touch surfaces everyday  High touch surfaces include counters, tabletops, doorknobs, bathroom fixtures, toilets, phones, keyboards, tablets, and bedside tables. Also, clean any surfaces that may have blood, stool, or body fluids on them. Use a household cleaning spray or wipe, according to the label instructions. Labels contain instructions for safe and effective use of the cleaning product including precautions you should take when applying the product, such as wearing gloves and making sure you have good ventilation during use of the product. Monitor your symptoms  Seek prompt medical attention if your illness is worsening (e.g., difficulty breathing). Before seeking care, call your healthcare provider and tell them that you have, or are being evaluated for, COVID-19. Put on a facemask before you enter the facility. These steps will help the healthcare providers office to keep other people in the office or waiting room from getting infected or exposed. Ask your healthcare provider to call the local or state health department. Persons who are placed under active monitoring or facilitated self-monitoring should follow instructions provided by their local health department or occupational health professionals, as appropriate. When working with your local health department check their available hours.   If you

## 2020-06-19 LAB
SARS-COV-2: NOT DETECTED
SOURCE: NORMAL

## 2020-06-30 RX ORDER — DANTROLENE SODIUM 100 MG/1
CAPSULE ORAL
Qty: 120 CAPSULE | Refills: 1 | Status: SHIPPED | OUTPATIENT
Start: 2020-06-30 | End: 2020-11-27 | Stop reason: SDUPTHER

## 2020-06-30 NOTE — TELEPHONE ENCOUNTER
This medication Dantrolene 100 mg BID was changed to a 90 day supply. Sent to provider for approval. SD 6-30-20.

## 2020-06-30 NOTE — TELEPHONE ENCOUNTER
Need a 7 day supply sent over to [employer] , [phone] for Dantroloene 100 mg 2 time a day. Can we please change it to 90 day supply and send to [employer].

## 2020-08-09 NOTE — PROGRESS NOTES
[address]  [address] [facility] [place] [address]  Dept: [phone]       2020   Due to the COVID-19 pandemic restrictions on close contact interactions as recommended by CDC and health authorities, the patient's visit was conducted via telemedicine in lieu of a face to face visit. Patient verbally consented and agreed to proceed  Luli Lawrence (:  1955)is a 72 y.o. male, here for evaluation of the following medical concerns: This patient recently relocated to this area. Dominick Lara had a prior stroke (), residual left-sided spastic hemiparesis. He reports history of heart attack, followed by Cardiology and Neurology. Korene Oms stroke he has been active in several exercise programs and working part-time. Since pandemic has not been working. He recently has been able to start exercising at the [facility] (twice weekly). He ambulates using wheelchair and 4 prong cane. He denies recent falls or skin breakdown. He continues to report difficulty with tone and spasticity. He denies fever, cough or feeling ill. HPI     Treatment Adherence:   Medication compliance:  compliant most of the time  Diet compliance:  compliant most of the time  Weight trend: unable to assess due to virtual visit  Current exercise: reports going to wellness center twice weekly to increase motor capability of left side. Barriers: history of stroke, causing left hemiparesis    Hypertension:  Home blood pressure monitoring: No. Patient denies chest pain, lightheadedness, blurred vision, palpitations and fatigue. Antihypertensive medication side effects: no medication side effects noted. Use of agents associated with hypertension: none.                                         Sodium (mmol/L)   Date Value   2019 142    BUN (mg/dL)   Date Value   2019 23 (H)    Glucose (mg/dL)   Date Value   2019 58 (L)      Potassium (mmol/L)   Date Value   2019 4.2 CREATININE (mg/dL)   Date Value   08/08/2019 1.0         Hyperlipidemia:  No new myalgias or GI upset on atorvastatin (Lipitor). Lab Results   Component Value Date    LDLCALC 66 11/13/2018     Lab Results   Component Value Date    ALT 15 11/13/2018    AST 14 11/13/2018        Lab Results   Component Value Date    LDLCALC 66 11/13/2018     Lab Results   Component Value Date    WBC 6.4 08/08/2019    HGB 15.0 08/08/2019    HCT 46.3 08/08/2019    MCV 92.0 08/08/2019     08/08/2019     Lab Results   Component Value Date     08/08/2019    K 4.2 08/08/2019    CL 99 08/08/2019    CO2 21 08/08/2019    BUN 23 (H) 08/08/2019    CREATININE 1.0 08/08/2019    GLUCOSE 58 (L) 08/08/2019    CALCIUM 9.3 08/08/2019    PROT 7.6 11/13/2018    LABALBU 4.6 11/13/2018    BILITOT 0.5 11/13/2018    ALKPHOS 59 11/13/2018    AST 14 11/13/2018    ALT 15 11/13/2018    LABGLOM >60 08/08/2019    GFRAA >60 08/08/2019       Review of Systems   Constitutional: Negative for activity change and fever. HENT: Negative for congestion. Eyes: Negative for visual disturbance. Respiratory: Negative for chest tightness and shortness of breath. Cardiovascular: Negative for chest pain, palpitations and leg swelling. Gastrointestinal: Negative for abdominal pain, blood in stool, constipation, diarrhea, nausea, rectal pain and vomiting. Endocrine: Negative for polyuria. Genitourinary: Negative for dysuria. Musculoskeletal: Positive for gait problem. Negative for arthralgias and myalgias. Skin: Negative for rash. Neurological: Positive for tremors and weakness (left-sided hemaparesis). Negative for dizziness, light-headedness and headaches. Psychiatric/Behavioral: Negative for agitation, decreased concentration and sleep disturbance. The patient is not nervous/anxious. Prior to Visit Medications    Medication Sig Taking?  Authorizing Provider   dantrolene (DANTRIUM) 100 MG capsule TAKE 1 CAPSULE BY MOUTH TWICE A DAY treatment and/or call 911 if deemed necessary. Patient identification was verified at the start of the visit: Yes    Total time spent for this encounter: Not billed by time    Services were provided through a video synchronous discussion virtually to substitute for in-person clinic visit. Patient and provider were located at their individual homes. An electronic signature was used to authenticate this note.       --MADISYN Robertson - CNP on 8/11/2020 at 9:41 AM

## 2020-08-11 ENCOUNTER — VIRTUAL VISIT (OUTPATIENT)
Dept: INTERNAL MEDICINE CLINIC | Age: 65
End: 2020-08-11
Payer: MEDICARE

## 2020-08-11 PROBLEM — G81.94 LEFT HEMIPARESIS (HCC): Status: ACTIVE | Noted: 2020-08-11

## 2020-08-11 PROCEDURE — 99214 OFFICE O/P EST MOD 30 MIN: CPT | Performed by: NURSE PRACTITIONER

## 2020-08-11 PROCEDURE — 4040F PNEUMOC VAC/ADMIN/RCVD: CPT | Performed by: NURSE PRACTITIONER

## 2020-08-11 PROCEDURE — G8427 DOCREV CUR MEDS BY ELIG CLIN: HCPCS | Performed by: NURSE PRACTITIONER

## 2020-08-11 PROCEDURE — 1036F TOBACCO NON-USER: CPT | Performed by: NURSE PRACTITIONER

## 2020-08-11 PROCEDURE — 1123F ACP DISCUSS/DSCN MKR DOCD: CPT | Performed by: NURSE PRACTITIONER

## 2020-08-11 PROCEDURE — 3017F COLORECTAL CA SCREEN DOC REV: CPT | Performed by: NURSE PRACTITIONER

## 2020-08-11 PROCEDURE — G8417 CALC BMI ABV UP PARAM F/U: HCPCS | Performed by: NURSE PRACTITIONER

## 2020-08-11 ASSESSMENT — ENCOUNTER SYMPTOMS
RECTAL PAIN: 0
BLOOD IN STOOL: 0
ABDOMINAL PAIN: 0
SHORTNESS OF BREATH: 0
CONSTIPATION: 0
NAUSEA: 0
CHEST TIGHTNESS: 0
VOMITING: 0
DIARRHEA: 0

## 2020-08-11 ASSESSMENT — VISUAL ACUITY: OU: 1

## 2020-08-14 ENCOUNTER — HOSPITAL ENCOUNTER (OUTPATIENT)
Age: 65
Discharge: HOME | End: 2020-08-14
Payer: MEDICARE

## 2020-08-14 LAB
A/G RATIO: 1.1 (ref 1.1–2.2)
ALBUMIN SERPL-MCNC: 4.1 G/DL (ref 3.4–5)
ALP BLD-CCNC: 69 U/L (ref 40–129)
ALT SERPL-CCNC: 13 U/L (ref 10–40)
ANION GAP SERPL CALCULATED.3IONS-SCNC: 12 MMOL/L (ref 3–16)
AST SERPL-CCNC: 16 U/L (ref 15–37)
BASOPHILS ABSOLUTE: 0 K/UL (ref 0–0.2)
BASOPHILS RELATIVE PERCENT: 0.4 %
BILIRUB SERPL-MCNC: 0.4 MG/DL (ref 0–1)
BUN BLDV-MCNC: 20 MG/DL (ref 7–20)
CALCIUM SERPL-MCNC: 9.3 MG/DL (ref 8.3–10.6)
CHLORIDE BLD-SCNC: 101 MMOL/L (ref 99–110)
CHOLESTEROL, TOTAL: 101 MG/DL (ref 0–199)
CO2: 24 MMOL/L (ref 21–32)
CREAT SERPL-MCNC: 0.8 MG/DL (ref 0.8–1.3)
EOSINOPHILS ABSOLUTE: 0.1 K/UL (ref 0–0.6)
EOSINOPHILS RELATIVE PERCENT: 1.9 %
GFR AFRICAN AMERICAN: >60
GFR NON-AFRICAN AMERICAN: >60
GLOBULIN: 3.7 G/DL
GLUCOSE BLD-MCNC: 91 MG/DL (ref 70–99)
HCT VFR BLD CALC: 47.1 % (ref 40.5–52.5)
HDLC SERPL-MCNC: 28 MG/DL (ref 40–60)
HEMOGLOBIN: 15.4 G/DL (ref 13.5–17.5)
LDL CHOLESTEROL CALCULATED: 56 MG/DL
LYMPHOCYTES ABSOLUTE: 2.1 K/UL (ref 1–5.1)
LYMPHOCYTES RELATIVE PERCENT: 32.2 %
MCH RBC QN AUTO: 29.8 PG (ref 26–34)
MCHC RBC AUTO-ENTMCNC: 32.8 G/DL (ref 31–36)
MCV RBC AUTO: 90.9 FL (ref 80–100)
MONOCYTES ABSOLUTE: 0.6 K/UL (ref 0–1.3)
MONOCYTES RELATIVE PERCENT: 9 %
NEUTROPHILS ABSOLUTE: 3.6 K/UL (ref 1.7–7.7)
NEUTROPHILS RELATIVE PERCENT: 56.5 %
PDW BLD-RTO: 15 % (ref 12.4–15.4)
PLATELET # BLD: 201 K/UL (ref 135–450)
PLATELET SLIDE REVIEW: ADEQUATE
PMV BLD AUTO: 9.8 FL (ref 5–10.5)
POTASSIUM SERPL-SCNC: 4.1 MMOL/L (ref 3.5–5.1)
PROSTATE SPECIFIC ANTIGEN: 4.17 NG/ML (ref 0–4)
RBC # BLD: 5.18 M/UL (ref 4.2–5.9)
SODIUM BLD-SCNC: 137 MMOL/L (ref 136–145)
TOTAL PROTEIN: 7.8 G/DL (ref 6.4–8.2)
TRIGL SERPL-MCNC: 87 MG/DL (ref 0–150)
TSH REFLEX: 2.86 UIU/ML (ref 0.27–4.2)
VLDLC SERPL CALC-MCNC: 17 MG/DL
WBC # BLD: 6.4 K/UL (ref 4–11)

## 2020-08-14 PROCEDURE — 86803 HEPATITIS C AB TEST: CPT

## 2020-08-14 PROCEDURE — 84153 ASSAY OF PSA TOTAL: CPT

## 2020-08-14 PROCEDURE — 36415 COLL VENOUS BLD VENIPUNCTURE: CPT

## 2020-08-14 PROCEDURE — 85025 COMPLETE CBC W/AUTO DIFF WBC: CPT

## 2020-08-14 PROCEDURE — 80061 LIPID PANEL: CPT

## 2020-08-14 PROCEDURE — 84443 ASSAY THYROID STIM HORMONE: CPT

## 2020-08-14 PROCEDURE — 80053 COMPREHEN METABOLIC PANEL: CPT

## 2020-08-18 LAB — HEPATITIS C ANTIBODY INTERPRETATION: NORMAL

## 2020-09-17 RX ORDER — GABAPENTIN 100 MG/1
100 CAPSULE ORAL 4 TIMES DAILY
Qty: 360 CAPSULE | Refills: 1 | Status: SHIPPED | OUTPATIENT
Start: 2020-09-17 | End: 2021-01-25 | Stop reason: SDUPTHER

## 2020-09-30 ENCOUNTER — OFFICE VISIT (OUTPATIENT)
Dept: INTERNAL MEDICINE CLINIC | Age: 65
End: 2020-09-30
Payer: MEDICARE

## 2020-09-30 VITALS
DIASTOLIC BLOOD PRESSURE: 77 MMHG | HEART RATE: 54 BPM | SYSTOLIC BLOOD PRESSURE: 134 MMHG | RESPIRATION RATE: 18 BRPM | TEMPERATURE: 97.5 F | OXYGEN SATURATION: 97 %

## 2020-09-30 PROCEDURE — G8427 DOCREV CUR MEDS BY ELIG CLIN: HCPCS | Performed by: NURSE PRACTITIONER

## 2020-09-30 PROCEDURE — G8417 CALC BMI ABV UP PARAM F/U: HCPCS | Performed by: NURSE PRACTITIONER

## 2020-09-30 PROCEDURE — 4040F PNEUMOC VAC/ADMIN/RCVD: CPT | Performed by: NURSE PRACTITIONER

## 2020-09-30 PROCEDURE — 1036F TOBACCO NON-USER: CPT | Performed by: NURSE PRACTITIONER

## 2020-09-30 PROCEDURE — 90732 PPSV23 VACC 2 YRS+ SUBQ/IM: CPT | Performed by: NURSE PRACTITIONER

## 2020-09-30 PROCEDURE — 3017F COLORECTAL CA SCREEN DOC REV: CPT | Performed by: NURSE PRACTITIONER

## 2020-09-30 PROCEDURE — 1123F ACP DISCUSS/DSCN MKR DOCD: CPT | Performed by: NURSE PRACTITIONER

## 2020-09-30 PROCEDURE — G0009 ADMIN PNEUMOCOCCAL VACCINE: HCPCS | Performed by: NURSE PRACTITIONER

## 2020-09-30 PROCEDURE — 99214 OFFICE O/P EST MOD 30 MIN: CPT | Performed by: NURSE PRACTITIONER

## 2020-09-30 RX ORDER — ATORVASTATIN CALCIUM 40 MG/1
40 TABLET, FILM COATED ORAL DAILY
Qty: 90 TABLET | Refills: 1 | Status: SHIPPED | OUTPATIENT
Start: 2020-09-30 | End: 2021-02-10 | Stop reason: SDUPTHER

## 2020-09-30 RX ORDER — LISINOPRIL 20 MG/1
20 TABLET ORAL DAILY
Qty: 90 TABLET | Refills: 1 | Status: SHIPPED | OUTPATIENT
Start: 2020-09-30 | End: 2021-02-10 | Stop reason: SDUPTHER

## 2020-09-30 RX ORDER — HYDRALAZINE HYDROCHLORIDE 50 MG/1
50 TABLET, FILM COATED ORAL 3 TIMES DAILY
Qty: 270 TABLET | Refills: 1 | Status: SHIPPED | OUTPATIENT
Start: 2020-09-30 | End: 2021-02-10 | Stop reason: SDUPTHER

## 2020-09-30 ASSESSMENT — ENCOUNTER SYMPTOMS
ABDOMINAL PAIN: 0
DIARRHEA: 0
CHEST TIGHTNESS: 0
CONSTIPATION: 0
SHORTNESS OF BREATH: 0

## 2020-09-30 NOTE — PROGRESS NOTES
[address]  [address]  [address] [facility] [place] [address]  Dept: [phone]       2020     Francisco J Smith (:  1955)is a 72 y.o. male, here for evaluation of the following medical concerns:    HPI  This patient recently relocated to this area. [facility] had a prior stroke (), residual left-sided spastic hemiparesis. He reports history of heart attack  followed by Cardiology and Neurology.  Since stroke he has been active in several exercise programs and working part-time. He recently has been able to start exercising at the [address] [employer] [facility] (twice weekly). He ambulates using wheelchair and 4 prong cane. He denies recent falls or skin breakdown. He continues to report difficulty with tone and spasticity. He denies fever, cough or feeling ill. Treatment Adherence:   Medication compliance:  compliant most of the time  Diet compliance:  compliant most of the time  Weight trend: stable  Current exercise: reports going to wellness center twice weekly to increase motor capability of left side. Barriers: history of stroke, causing left hemiparesis     Hypertension:  Home blood pressure monitoring: No. Patient denies chest pain, lightheadedness, blurred vision, palpitations and fatigue. Antihypertensive medication side effects: no medication side effects noted. Use of agents associated with hypertension: none.                 Lab Results   Component Value Date    CHOL 101 2020     Lab Results   Component Value Date    TRIG 87 2020     Lab Results   Component Value Date    HDL 28 (L) 2020     Lab Results   Component Value Date    LDLCALC 56 2020    LDLCALC 66 2018     Lab Results   Component Value Date    LABVLDL 17 2020     No results found for: [facility]  Lab Results   Component Value Date    WBC 6.4 2020    HGB 15.4 2020    HCT 47.1 2020    MCV 90.9 2020     2020     Lab Results Component Value Date     08/14/2020    K 4.1 08/14/2020     08/14/2020    CO2 24 08/14/2020    BUN 20 08/14/2020    CREATININE 0.8 08/14/2020    GLUCOSE 91 08/14/2020    CALCIUM 9.3 08/14/2020    PROT 7.8 08/14/2020    LABALBU 4.1 08/14/2020    BILITOT 0.4 08/14/2020    ALKPHOS 69 08/14/2020    AST 16 08/14/2020    ALT 13 08/14/2020    LABGLOM >60 08/14/2020    GFRAA >60 08/14/2020    AGRATIO 1.1 08/14/2020    GLOB 3.7 08/14/2020       Review of Systems   Constitutional: Negative for activity change and fever. HENT: Negative for congestion. Eyes: Negative for visual disturbance. Respiratory: Negative for chest tightness and shortness of breath. Cardiovascular: Negative for chest pain, palpitations and leg swelling. Gastrointestinal: Negative for abdominal pain, constipation and diarrhea. Endocrine: Negative for polyuria. Genitourinary: Negative for dysuria. Musculoskeletal: Positive for gait problem (abnormal due to CVA). Negative for arthralgias and myalgias. Skin: Negative for rash. Neurological: Positive for weakness (left side ). Negative for dizziness, light-headedness and headaches. Chronic spasticity and tone     Psychiatric/Behavioral: Negative for agitation, decreased concentration and sleep disturbance. The patient is not nervous/anxious. Prior to Visit Medications    Medication Sig Taking? Authorizing Provider   hydrALAZINE (APRESOLINE) 50 MG tablet Take 1 tablet by mouth 3 times daily Yes MADISYN Law CNP   lisinopril (PRINIVIL;ZESTRIL) 20 MG tablet Take 1 tablet by mouth daily Yes AMDISYN Law CNP   atorvastatin (LIPITOR) 40 MG tablet Take 1 tablet by mouth daily Yes MADISYN Law CNP   loratadine (CLARITIN) 10 MG tablet Take 1 tablet by mouth daily Pt needs to come in and be seen before more rx's are given.  Yes MADISYN Law CNP   Sennosides 17.2 MG TABS Take 17.2 mg by mouth Yes Historical Provider, MD gabapentin (NEURONTIN) 100 MG capsule Take 1 capsule by mouth 4 times daily for 180 days. Charmaine MADISYN Wyman CNP   dantrolene (DANTRIUM) 100 MG capsule TAKE 1 CAPSULE BY MOUTH TWICE A DAY  Keeganmarla MADISYN Wyman CNP   amLODIPine (NORVASC) 10 MG tablet Take 1 tablet by mouth daily  Keeganmarla MADISYN Wyman CNP   sertraline (ZOLOFT) 25 MG tablet Take 1 tablet by mouth nightly  Patient not taking: Reported on 9/30/2020  MADISYN Chen CNP   aspirin 81 MG tablet Take 81 mg by mouth daily  Historical Provider, MD        Social History     Tobacco Use    Smoking status: Never Smoker    Smokeless tobacco: Never Used   Substance Use Topics    Alcohol use: No        Vitals:    09/30/20 1023   BP: 134/77   Pulse: 54   Resp: 18   Temp: 97.5 °F (36.4 °C)   TempSrc: Temporal   SpO2: 97%     Estimated body mass index is 34.46 kg/m² as calculated from the following:    Height as of 4/7/20: 5' 7\" (1.702 m). Weight as of 4/7/20: 220 lb (99.8 kg). Physical Exam  Vitals signs reviewed. Constitutional:       Appearance: Normal appearance. He is well-developed, well-groomed and overweight. HENT:      Head: Normocephalic. Right Ear: Hearing normal.      Left Ear: Hearing normal.   Eyes:      General: Lids are normal. Vision grossly intact. Cardiovascular:      Rate and Rhythm: Normal rate and regular rhythm. Heart sounds: Normal heart sounds, S1 normal and S2 normal.   Pulmonary:      Effort: Pulmonary effort is normal.      Breath sounds: Normal breath sounds. Abdominal:      General: Bowel sounds are normal.      Palpations: Abdomen is soft. Tenderness: There is no abdominal tenderness. Neurological:      Mental Status: He is alert and oriented to person, place, and time. Psychiatric:         Attention and Perception: Attention normal.         Mood and Affect: Mood normal.         Speech: Speech normal.         Behavior: Behavior is cooperative. ASSESSMENT/PLAN:  1. Flu vaccine need  -declined    2. Need for pneumococcal vaccine    - PNEUMOVAX 23 subcutaneous/IM (Pneumococcal polysaccharide vaccine 23-valent >= 1yo)    3. At high risk for falls  -given tips for safety    4. Essential hypertension  -Continue current medications. Return in about 6 months (around 3/30/2021) for HTN. Reviewed patient's pertinent medical history, relevant laboratory results, imaging studies, and health maintenance. Medications have been reviewed and discussed with the patient, refills otherwise up-to-date. Discussed the importance of adhering to current medication regimen. Advised:  (1) continue to work on eating a healthy balanced diet; (2) stay active by exercising within your personal limits. Patient was advised to keep future appointments with their respective specialty care team(s). Questions and concerns addressed, care plan reviewed and patient is agreeable with the care plan following 308 MADISYN Hurd CNP on 10/2/2020 at 12:12 PM    On the basis of positive falls risk screening, assessment and plan is as follows: home safety tips provided.

## 2020-10-02 ASSESSMENT — VISUAL ACUITY: OU: 1

## 2020-10-09 ENCOUNTER — NURSE ONLY (OUTPATIENT)
Dept: INTERNAL MEDICINE CLINIC | Age: 65
End: 2020-10-09
Payer: MEDICARE

## 2020-10-09 PROCEDURE — 90694 VACC AIIV4 NO PRSRV 0.5ML IM: CPT | Performed by: FAMILY MEDICINE

## 2020-10-09 PROCEDURE — G0008 ADMIN INFLUENZA VIRUS VAC: HCPCS | Performed by: FAMILY MEDICINE

## 2020-10-09 NOTE — PROGRESS NOTES
Vaccine Information Sheet, \"Influenza - Inactivated\"  given to Clair Rodrigues, or parent/legal guardian of  Clair Rodrigues and verbalized understanding. Patient responses:    Have you ever had a reaction to a flu vaccine? No  Do you have any current illness? No  Have you ever had Guillian Topmost Syndrome? No  Do you have a serious allergy to any of the follow: Neomycin, Polymyxin, Thimerosal, eggs or egg products? No    Flu vaccine given per order. Please see immunization tab. Risks and benefits explained. Current VIS given.

## 2020-10-28 ENCOUNTER — TELEPHONE (OUTPATIENT)
Dept: INTERNAL MEDICINE CLINIC | Age: 65
End: 2020-10-28

## 2020-10-28 NOTE — TELEPHONE ENCOUNTER
Pt is calling to find out if a rx for the following  Med was sent in:   gabapentin (NEURONTIN) 100 MG capsule     Pt ramy: [phone]  Thank you

## 2020-10-28 NOTE — TELEPHONE ENCOUNTER
[facility] will fill RX. It was on hold d/t not covered by Medicare. They will use the discount card he had in the past. He really wants it sent to [facility] mail order from now on.

## 2020-11-27 RX ORDER — DANTROLENE SODIUM 100 MG/1
100 CAPSULE ORAL 2 TIMES DAILY
Qty: 180 CAPSULE | Refills: 1 | Status: SHIPPED | OUTPATIENT
Start: 2020-11-27 | End: 2021-08-02 | Stop reason: SDUPTHER

## 2021-01-04 ENCOUNTER — TELEPHONE (OUTPATIENT)
Dept: PRIMARY CARE CLINIC | Age: 66
End: 2021-01-04

## 2021-01-04 NOTE — TELEPHONE ENCOUNTER
Pt had stroke 8 years ago. He had a backwards walking study at [facility] and the  neurologist there recommended that he get a Baclofen pump to see if that would help with the tone in his legs. Pt is asking of Hersevita Smith would give him a referral to someone who does that. PATIENT:  [phone]   If he does not answer may leave VM, text or e-mail.

## 2021-01-04 NOTE — TELEPHONE ENCOUNTER
Tracey Williamson, would you like Juan Miguel Serranocel to have an appointment with you to discuss this referral?

## 2021-01-08 NOTE — TELEPHONE ENCOUNTER
The pain doctors are not in pain medication pumps. Give patient an appointment to see one of our pain doctors.

## 2021-01-14 NOTE — TELEPHONE ENCOUNTER
Pt called back about the previous msg. I told him what Dr. Dontrell Randle said. He is waiting to hear back about a pain doctor. He wanted me to point out that he does not have pain, but he has spasticity (tone) in his legs. That's what the pump is for. He said to let him know when we find something out.

## 2021-01-22 ENCOUNTER — TELEPHONE (OUTPATIENT)
Dept: PRIMARY CARE CLINIC | Age: 66
End: 2021-01-22

## 2021-01-25 RX ORDER — GABAPENTIN 100 MG/1
100 CAPSULE ORAL 4 TIMES DAILY
Qty: 360 CAPSULE | Refills: 1 | Status: SHIPPED | OUTPATIENT
Start: 2021-01-25 | End: 2021-09-08 | Stop reason: SDUPTHER

## 2021-02-10 RX ORDER — LISINOPRIL 20 MG/1
20 TABLET ORAL DAILY
Qty: 90 TABLET | Refills: 1 | Status: SHIPPED | OUTPATIENT
Start: 2021-02-10 | End: 2021-08-02 | Stop reason: SDUPTHER

## 2021-02-10 RX ORDER — HYDRALAZINE HYDROCHLORIDE 50 MG/1
50 TABLET, FILM COATED ORAL 3 TIMES DAILY
Qty: 270 TABLET | Refills: 1 | Status: SHIPPED | OUTPATIENT
Start: 2021-02-10 | End: 2021-08-02 | Stop reason: SDUPTHER

## 2021-02-10 RX ORDER — ATORVASTATIN CALCIUM 40 MG/1
40 TABLET, FILM COATED ORAL DAILY
Qty: 90 TABLET | Refills: 1 | Status: SHIPPED | OUTPATIENT
Start: 2021-02-10 | End: 2021-10-29 | Stop reason: SDUPTHER

## 2021-02-18 RX ORDER — AMLODIPINE BESYLATE 10 MG/1
10 TABLET ORAL DAILY
Qty: 90 TABLET | Refills: 1 | Status: SHIPPED | OUTPATIENT
Start: 2021-02-18 | End: 2021-02-24 | Stop reason: SDUPTHER

## 2021-02-24 RX ORDER — AMLODIPINE BESYLATE 10 MG/1
10 TABLET ORAL DAILY
Qty: 90 TABLET | Refills: 1 | Status: SHIPPED | OUTPATIENT
Start: 2021-02-24 | End: 2021-08-02 | Stop reason: SDUPTHER

## 2021-03-08 ENCOUNTER — TELEPHONE (OUTPATIENT)
Dept: NEUROLOGY | Age: 66
End: 2021-03-08

## 2021-03-08 NOTE — TELEPHONE ENCOUNTER
I do not think Ailin Tinoco does anything with baclofen pumps, I believe that may be pain management. I will print for his review when he returns to office.

## 2021-03-15 ENCOUNTER — TELEPHONE (OUTPATIENT)
Dept: PRIMARY CARE CLINIC | Age: 66
End: 2021-03-15

## 2021-03-15 NOTE — TELEPHONE ENCOUNTER
----- Message from Dora Victoria sent at 3/12/2021 12:03 PM EST -----  Subject: Referral Request    QUESTIONS   Reason for referral request? Pt is in need of an baclofen pump. He states   that he may need an pain management doctor. Has the physician seen you for this condition before? Yes  Select a date? 2021-02-12  Select the physician (PCP or Specialist)? Caro Lorenzo   Preferred Specialist (if applicable)? Do you already have an appointment scheduled? No  Additional Information for Provider?   ---------------------------------------------------------------------------  --------------  CALL BACK INFO  What is the best way for the office to contact you? OK to leave message on   voicemail  Preferred Call Back Phone Number?  [phone]

## 2021-03-16 DIAGNOSIS — R25.2 SPASTICITY: Primary | ICD-10-CM

## 2021-03-25 ENCOUNTER — TELEPHONE (OUTPATIENT)
Dept: PRIMARY CARE CLINIC | Age: 66
End: 2021-03-25

## 2021-03-25 DIAGNOSIS — R25.2 SPASTICITY: Primary | ICD-10-CM

## 2021-03-25 NOTE — TELEPHONE ENCOUNTER
Need a referral to Dr Shante Rose, she is a neurologist., baclophin pump. evaluation  and treatment     Please call him when this is done.   [phone]    Her number is [phone]

## 2021-03-26 ENCOUNTER — TELEPHONE (OUTPATIENT)
Dept: PRIMARY CARE CLINIC | Age: 66
End: 2021-03-26

## 2021-03-26 NOTE — TELEPHONE ENCOUNTER
----- Message from MADISYN Peters CNP sent at 3/25/2021  5:21 PM EDT -----  Regarding: external referral  Please send external referral and the notes from the Neurologist he seen here at [facility].  Let him know when you have sent the referral

## 2021-03-29 NOTE — PROGRESS NOTES
[id number] [facility] [address] [address]  Dept: [phone]       2021     Tara Murillo (:  1955)is a 77 y.o. male, here for evaluation of CAD, hypertension, hyperlipidemia. He presents today in a wheelchair and has a 4 prong cane for assistance to stand.      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 Since then patient has had left-sided spastic hemiparesis. In the last couple of years patient has developed tremors in his right leg. It starts in the right leg and sometimes if he puts pressure on the leg, can spread to the whole right side of the body. It comes and goes without any clear other aggravating or relieving factors. Symptoms are moderately severe.    Since stroke he has been active in several exercise programs and working part-time. He reports he had a fall in the bathtub without injury last month. He has an alarm pendant. He lives alone.     Treatment Adherence:   Medication compliance:  compliant most of the time  Diet compliance:  compliant most of the time  Weight trend: stable  Current exercise: reports going to wellness center twice weekly to increase motor capability of left side.   Barriers: history of stroke, causing left hemiparesis     Hypertension:  Home blood pressure monitoring: No. Patient denies chest pain, lightheadedness, blurred vision, palpitations and fatigue.  Antihypertensive medication side effects: no medication side effects noted.  Use of agents associated with hypertension: none.       Hyperlipidemia:  No new myalgias or GI upset on atorvastatin (Lipitor).             He participates in a weekly virtual occupational therapy study with other patients that have suffered from a stroke.      He participated in a walking study at [facility] and a Neurologist recommended he obtain a baclofen pump. A referral was sent, patient reports he has not been contacted. Will send referral again. He has bilateral lower extremity swelling, wears compression stockings, and swelling resolves with elevation of legs. Lab Results   Component Value Date    CHOL 101 08/14/2020     Lab Results   Component Value Date    TRIG 87 08/14/2020     Lab Results   Component Value Date    HDL 28 (L) 08/14/2020     Lab Results   Component Value Date    LDLCALC 56 08/14/2020    LDLCALC 66 11/13/2018     Lab Results   Component Value Date    LABVLDL 17 08/14/2020     Lab Results   Component Value Date    WBC 6.4 08/14/2020    HGB 15.4 08/14/2020    HCT 47.1 08/14/2020    MCV 90.9 08/14/2020     08/14/2020     Lab Results   Component Value Date     08/14/2020    K 4.1 08/14/2020     08/14/2020    CO2 24 08/14/2020    BUN 20 08/14/2020    CREATININE 0.8 08/14/2020    GLUCOSE 91 08/14/2020    CALCIUM 9.3 08/14/2020    PROT 7.8 08/14/2020    LABALBU 4.1 08/14/2020    BILITOT 0.4 08/14/2020    ALKPHOS 69 08/14/2020    AST 16 08/14/2020    ALT 13 08/14/2020    LABGLOM >60 08/14/2020    GFRAA >60 08/14/2020    AGRATIO 1.1 08/14/2020    GLOB 3.7 08/14/2020       Review of Systems   Cardiovascular: Positive for leg swelling (left greater than right). Hypertension, hyperlipidemia and CAD   Neurological:        History of a right thalamic and basal ganglia hemorrhagic infarction in October 2012. Since then patient has had left-sided spastic hemiparesis. Right leg tremors       Prior to Visit Medications    Medication Sig Taking?  Authorizing Provider   amLODIPine (NORVASC) 10 MG tablet Take 1 tablet by mouth daily Yes Estrellita Dooley, APRN - CNP   atorvastatin (LIPITOR) 40 MG tablet Take 1 tablet by mouth daily Yes Estrellita Dooley, APRN - CNP   hydrALAZINE (APRESOLINE) 50 MG tablet Take 1 tablet by mouth 3 times daily Yes Estrellita Dooley APRN - CNP   lisinopril (PRINIVIL;ZESTRIL) 20 MG tablet Take 1 tablet by mouth daily Yes MADISYN Benítez CNP   gabapentin (NEURONTIN) 100 MG capsule Take 1 capsule by mouth 4 times daily for 180 days. Yes MADISYN Benítez CNP   dantrolene (DANTRIUM) 100 MG capsule Take 1 capsule by mouth 2 times daily Yes MADISYN Benítez CNP   loratadine (CLARITIN) 10 MG tablet Take 1 tablet by mouth daily Pt needs to come in and be seen before more rx's are given. Yes MADISYN Benítez CNP   aspirin 81 MG tablet Take 81 mg by mouth daily Yes Historical Provider, MD   Sennosides 17.2 MG TABS Take 17.2 mg by mouth Yes Historical Provider, MD   sertraline (ZOLOFT) 25 MG tablet Take 1 tablet by mouth nightly  Patient not taking: Reported on 4/1/2021  MADISYN Licona CNP        Social History     Tobacco Use    Smoking status: Never Smoker    Smokeless tobacco: Never Used   Substance Use Topics    Alcohol use: No        Vitals:    04/01/21 1433   BP: 104/66   Pulse: 71   Temp: 98.1 °F (36.7 °C)   TempSrc: Temporal   SpO2: 96%   Weight: 225 lb 6.4 oz (102.2 kg)     Estimated body mass index is 35.3 kg/m² as calculated from the following:    Height as of 4/7/20: 5' 7\" (1.702 m). Weight as of this encounter: 225 lb 6.4 oz (102.2 kg). Physical Exam  Vitals signs reviewed. Constitutional:       Appearance: He is well-developed. He is not diaphoretic. HENT:      Head: Normocephalic. Right Ear: Hearing and external ear normal. No tenderness. Left Ear: Hearing and external ear normal. No tenderness. Nose: Nose normal.   Eyes:      General: Lids are normal.   Cardiovascular:      Rate and Rhythm: Normal rate and regular rhythm. Heart sounds: Normal heart sounds, S1 normal and S2 normal.   Pulmonary:      Effort: Pulmonary effort is normal.      Breath sounds: Normal breath sounds. Musculoskeletal: Normal range of motion.    Lymphadenopathy:      Head:      Right side of head: No submental or submandibular following today's visit.       --Aylin Barrow, MADISYN - CNP on 4/1/2021 at 8:21 PM

## 2021-04-01 ENCOUNTER — OFFICE VISIT (OUTPATIENT)
Dept: PRIMARY CARE CLINIC | Age: 66
End: 2021-04-01
Payer: MEDICARE

## 2021-04-01 VITALS
BODY MASS INDEX: 35.3 KG/M2 | OXYGEN SATURATION: 96 % | TEMPERATURE: 98.1 F | SYSTOLIC BLOOD PRESSURE: 104 MMHG | DIASTOLIC BLOOD PRESSURE: 66 MMHG | HEART RATE: 71 BPM | WEIGHT: 225.4 LBS

## 2021-04-01 DIAGNOSIS — R25.2 SPASTICITY: Primary | ICD-10-CM

## 2021-04-01 DIAGNOSIS — I63.019 CEREBRAL INFARCTION DUE TO THROMBOSIS OF VERTEBRAL ARTERY, UNSPECIFIED BLOOD VESSEL LATERALITY (HCC): ICD-10-CM

## 2021-04-01 DIAGNOSIS — E78.49 OTHER HYPERLIPIDEMIA: ICD-10-CM

## 2021-04-01 DIAGNOSIS — I10 ESSENTIAL HYPERTENSION: ICD-10-CM

## 2021-04-01 PROCEDURE — 1123F ACP DISCUSS/DSCN MKR DOCD: CPT | Performed by: NURSE PRACTITIONER

## 2021-04-01 PROCEDURE — 1036F TOBACCO NON-USER: CPT | Performed by: NURSE PRACTITIONER

## 2021-04-01 PROCEDURE — 4040F PNEUMOC VAC/ADMIN/RCVD: CPT | Performed by: NURSE PRACTITIONER

## 2021-04-01 PROCEDURE — G8427 DOCREV CUR MEDS BY ELIG CLIN: HCPCS | Performed by: NURSE PRACTITIONER

## 2021-04-01 PROCEDURE — G8417 CALC BMI ABV UP PARAM F/U: HCPCS | Performed by: NURSE PRACTITIONER

## 2021-04-01 PROCEDURE — 99214 OFFICE O/P EST MOD 30 MIN: CPT | Performed by: NURSE PRACTITIONER

## 2021-04-01 PROCEDURE — 3017F COLORECTAL CA SCREEN DOC REV: CPT | Performed by: NURSE PRACTITIONER

## 2021-04-01 ASSESSMENT — PATIENT HEALTH QUESTIONNAIRE - PHQ9
SUM OF ALL RESPONSES TO PHQ QUESTIONS 1-9: 0
SUM OF ALL RESPONSES TO PHQ QUESTIONS 1-9: 0

## 2021-04-02 ENCOUNTER — TELEPHONE (OUTPATIENT)
Dept: PRIMARY CARE CLINIC | Age: 66
End: 2021-04-02

## 2021-04-02 NOTE — TELEPHONE ENCOUNTER
----- Message from MADISYN Hanks CNP sent at 4/1/2021  3:11 PM EDT -----  Regarding: referral  Send referral with notes.

## 2021-04-12 ENCOUNTER — OFFICE VISIT (OUTPATIENT)
Dept: CARDIOLOGY CLINIC | Age: 66
End: 2021-04-12
Payer: MEDICARE

## 2021-04-12 VITALS
BODY MASS INDEX: 35.2 KG/M2 | HEART RATE: 86 BPM | DIASTOLIC BLOOD PRESSURE: 80 MMHG | HEIGHT: 67 IN | RESPIRATION RATE: 18 BRPM | SYSTOLIC BLOOD PRESSURE: 110 MMHG | OXYGEN SATURATION: 98 % | WEIGHT: 224.3 LBS

## 2021-04-12 DIAGNOSIS — E78.5 HYPERLIPIDEMIA, UNSPECIFIED HYPERLIPIDEMIA TYPE: ICD-10-CM

## 2021-04-12 DIAGNOSIS — I63.9 CEREBROVASCULAR ACCIDENT (CVA), UNSPECIFIED MECHANISM (HCC): ICD-10-CM

## 2021-04-12 DIAGNOSIS — I25.10 CORONARY ARTERY DISEASE INVOLVING NATIVE CORONARY ARTERY OF NATIVE HEART WITHOUT ANGINA PECTORIS: Primary | ICD-10-CM

## 2021-04-12 DIAGNOSIS — I10 ESSENTIAL HYPERTENSION: ICD-10-CM

## 2021-04-12 PROCEDURE — G8417 CALC BMI ABV UP PARAM F/U: HCPCS | Performed by: INTERNAL MEDICINE

## 2021-04-12 PROCEDURE — 1036F TOBACCO NON-USER: CPT | Performed by: INTERNAL MEDICINE

## 2021-04-12 PROCEDURE — 1123F ACP DISCUSS/DSCN MKR DOCD: CPT | Performed by: INTERNAL MEDICINE

## 2021-04-12 PROCEDURE — 99214 OFFICE O/P EST MOD 30 MIN: CPT | Performed by: INTERNAL MEDICINE

## 2021-04-12 PROCEDURE — G8427 DOCREV CUR MEDS BY ELIG CLIN: HCPCS | Performed by: INTERNAL MEDICINE

## 2021-04-12 PROCEDURE — 4040F PNEUMOC VAC/ADMIN/RCVD: CPT | Performed by: INTERNAL MEDICINE

## 2021-04-12 PROCEDURE — 3017F COLORECTAL CA SCREEN DOC REV: CPT | Performed by: INTERNAL MEDICINE

## 2021-04-12 NOTE — ASSESSMENT & PLAN NOTE
Lab Results   Component Value Date    CHOL 101 08/14/2020    TRIG 87 08/14/2020    HDL 28 08/14/2020    LDLCALC 56 08/14/2020     Meds~ Lipitor  Plan~ stable

## 2021-04-12 NOTE — PATIENT INSTRUCTIONS
Continue with your current medications  Call our office if you have any concerns  Follow up with Dr Haylie Selby 1 year

## 2021-04-12 NOTE — PROGRESS NOTES
[facility]   Cardiac Evaluation      Patient: Epi Briggs  YOB: 1955         Chief Complaint   Patient presents with   Eddie Wheeler Cardiologist    Coronary Artery Disease    Hypertension    Hyperlipidemia        Referring provider: MADISYN Higgins CNP    History of Present Illness:   Mr Yolis Ray is a 59 y.o. male seen for management of CAD, Hypertension, hyperlipidemia. He was previously seen as an new patient with Dr Claudia Wiley in '19. He also has a history of Hemorrhagic CVA with tremors (from htn was off plavix at the time of the stroke), ambulates with a cane and goes to  at [facility]. Today he is here in a wheelchair. He is able to walk with a cane for shorter distances. He states that he has mild SOB  but it is not new. He denies chest pain/tightness. He has no concerns today. He has tremors from his CVA and is slow moving in the morning that improves throughout the day. He gets up out of his chair often and walks every day with his cane. He lives alone and is able to care for himself. With regard to medication therapy he/she has been compliant with prescribed regimen and has tolerated therapy to date. Past Medical History:   has a past medical history of CAD (coronary artery disease), HTN (hypertension), Hyperlipemia, S/P PTCA (percutaneous transluminal coronary angioplasty), and Stroke ([facility] [address] Surgical History:   has a past surgical history that includes Inguinal hernia repair; Cardiac surgery; Intracapsular cataract extraction (Right, 8/30/2019); and Intracapsular cataract extraction (Left, 9/6/2019).      Current Outpatient Medications   Medication Sig Dispense Refill    amLODIPine (NORVASC) 10 MG tablet Take 1 tablet by mouth daily 90 tablet 1    atorvastatin (LIPITOR) 40 MG tablet Take 1 tablet by mouth daily 90 tablet 1    hydrALAZINE (APRESOLINE) 50 MG tablet Take 1 tablet by mouth 3 times daily 270 tablet 1    lisinopril (PRINIVIL;ZESTRIL) 20 MG tablet Take 1 tablet by mouth daily 90 tablet 1    gabapentin (NEURONTIN) 100 MG capsule Take 1 capsule by mouth 4 times daily for 180 days. 360 capsule 1    dantrolene (DANTRIUM) 100 MG capsule Take 1 capsule by mouth 2 times daily 180 capsule 1    loratadine (CLARITIN) 10 MG tablet Take 1 tablet by mouth daily Pt needs to come in and be seen before more rx's are given. 90 tablet 1    aspirin 81 MG tablet Take 81 mg by mouth daily      Sennosides 17.2 MG TABS Take 17.2 mg by mouth       No current facility-administered medications for this visit. Allergies:  Patient has no known allergies.      Social History:  Social History     Socioeconomic History    Marital status:      Spouse name: Not on file    Number of children: 3    Years of education: Not on file    Highest education level: Not on file   Occupational History    Occupation: remodeling   Social Needs    Financial resource strain: Not very hard    Food insecurity     Worry: Never true     Inability: Never true    Transportation needs     Medical: No     Non-medical: No   Tobacco Use    Smoking status: Never Smoker    Smokeless tobacco: Never Used   Substance and Sexual Activity    Alcohol use: No    Drug use: No    Sexual activity: Not on file   Lifestyle    Physical activity     Days per week: Not on file     Minutes per session: Not on file    Stress: Not on file   Relationships    Social connections     Talks on phone: Not on file     Gets together: Not on file     Attends Latter-day service: Not on file     Active member of club or organization: Not on file     Attends meetings of clubs or organizations: Not on file     Relationship status: Not on file    Intimate partner violence     Fear of current or ex partner: Not on file     Emotionally abused: Not on file     Physically abused: Not on file     Forced sexual activity: Not on file   Other Topics Concern    Not on file   Social History Narrative    Recently relocated from [place] [facility]. Lives alone. Family History:   Family History   Problem Relation Age of Onset    Hypertension Mother     Heart Disease Father     Breast Cancer Sister     Heart Attack Brother      Family history has been reviewed and not pertinent except as noted above. Review of Systems:   · Constitutional: there has been no unanticipated weight loss. No change in energy or activity level   · Eyes: No visual changes   · ENT: No Headaches, hearing loss or vertigo. No mouth sores or sore throat. · Cardiovascular: Reviewed in HPI  · Respiratory: No cough or wheezing, no sputum production. · Gastrointestinal: No abdominal pain, appetite loss, blood in stools. No change in bowel or bladder habits. · Genitourinary: No nocturia, dysuria, trouble voiding  · Musculoskeletal:  No gait disturbance, weakness or joint complaints. · Integumentary: No rash or pruritis. · Neurological: No headache, change in muscle strength, numbness or tingling. No change in gait, balance, coordination, mood, affect, memory, mentation, behavior. · Psychiatric: No anxiety or depression  · Endocrine: No malaise or fever  · Hematologic/Lymphatic: No abnormal bruising or bleeding, blood clots or swollen lymph nodes. · Allergic/Immunologic: No nasal congestion or hives. Physical Examination:    Vitals:    04/12/21 1351   BP: 110/80   Site: Right Upper Arm   Position: Sitting   Cuff Size: Large Adult   Pulse: 86   Resp: 18   SpO2: 98%   Weight: 224 lb 4.8 oz (101.7 kg)   Height: 5' 7\" (1.702 m)     Body mass index is 35.13 kg/m². Wt Readings from Last 3 Encounters:   04/12/21 224 lb 4.8 oz (101.7 kg)   04/01/21 225 lb 6.4 oz (102.2 kg)   04/07/20 220 lb (99.8 kg)      BP Readings from Last 3 Encounters:   04/12/21 110/80   04/01/21 104/66   09/30/20 134/77        Physical Examination:    · CONSTITUTIONAL: Well developed, well nourished  · EYES: PERRLA.  No xanthelasma, sclera non icteric  · presence of Dr. Rogelio Thomson MD by Jori Car RN.    I, Dr. Rogelio Thomson, personally performed the services described in this documentation, as scribed by the above signed scribe in my presence. It is both accurate and complete to my knowledge. I agree with the details independently gathered by the clinical support staff, while the remaining scribed note accurately describes my personal service to the patient.

## 2021-07-02 RX ORDER — HYDRALAZINE HYDROCHLORIDE 50 MG/1
50 TABLET, FILM COATED ORAL 3 TIMES DAILY
Qty: 270 TABLET | Refills: 1 | Status: CANCELLED | OUTPATIENT
Start: 2021-07-02

## 2021-08-02 RX ORDER — HYDRALAZINE HYDROCHLORIDE 50 MG/1
50 TABLET, FILM COATED ORAL 3 TIMES DAILY
Qty: 270 TABLET | Refills: 1 | Status: SHIPPED | OUTPATIENT
Start: 2021-08-02 | End: 2021-12-14 | Stop reason: SDUPTHER

## 2021-08-02 RX ORDER — DANTROLENE SODIUM 100 MG/1
100 CAPSULE ORAL 2 TIMES DAILY
Qty: 180 CAPSULE | Refills: 1 | Status: SHIPPED | OUTPATIENT
Start: 2021-08-02 | End: 2021-12-14 | Stop reason: SDUPTHER

## 2021-08-02 RX ORDER — AMLODIPINE BESYLATE 10 MG/1
10 TABLET ORAL DAILY
Qty: 90 TABLET | Refills: 1 | Status: SHIPPED | OUTPATIENT
Start: 2021-08-02 | End: 2021-08-13 | Stop reason: SDUPTHER

## 2021-08-02 RX ORDER — LISINOPRIL 20 MG/1
20 TABLET ORAL DAILY
Qty: 90 TABLET | Refills: 1 | Status: SHIPPED | OUTPATIENT
Start: 2021-08-02 | End: 2021-12-14 | Stop reason: SDUPTHER

## 2021-08-02 NOTE — TELEPHONE ENCOUNTER
Medication:   Requested Prescriptions     Pending Prescriptions Disp Refills    amLODIPine (NORVASC) 10 MG tablet 90 tablet 1     Sig: Take 1 tablet by mouth daily    dantrolene (DANTRIUM) 100 MG capsule 180 capsule 1     Sig: Take 1 capsule by mouth 2 times daily    lisinopril (PRINIVIL;ZESTRIL) 20 MG tablet 90 tablet 1     Sig: Take 1 tablet by mouth daily    hydrALAZINE (APRESOLINE) 50 MG tablet 270 tablet 1     Sig: Take 1 tablet by mouth 3 times daily        Last Filled:      Patient Phone Number: [phone] (home)     Last appt: 4/1/2021   Next appt: 10/4/2021    Last OARRS: No flowsheet data found.

## 2021-08-04 ENCOUNTER — TELEPHONE (OUTPATIENT)
Dept: PRIMARY CARE CLINIC | Age: 66
End: 2021-08-04

## 2021-08-04 NOTE — TELEPHONE ENCOUNTER
----- Message from Andres Issa sent at 8/3/2021  3:50 PM EDT -----  Subject: Referral Request    QUESTIONS   Reason for referral request? Pt. would like a referral to Leonarda Schofield. Robert Maddox MD, MSc Professor of Neurology at [facility]   Has the physician seen you for this condition before? No   Preferred Specialist (if applicable)? Do you already have an appointment scheduled? No  Additional Information for Provider? Can text or send an email  ---------------------------------------------------------------------------  --------------  [address]  What is the best way for the office to contact you? OK to leave message on   voicemail, OK to respond with electronic message via [employer] portal (only   for patients who have registered [employer] account)  Preferred Call Back Phone Number?  [phone]

## 2021-08-05 DIAGNOSIS — I69.398 ABNORMALITY OF GAIT AS LATE EFFECT OF CEREBROVASCULAR ACCIDENT (CVA): ICD-10-CM

## 2021-08-05 DIAGNOSIS — R25.2 SPASTICITY: Primary | ICD-10-CM

## 2021-08-05 DIAGNOSIS — R26.9 ABNORMALITY OF GAIT AS LATE EFFECT OF CEREBROVASCULAR ACCIDENT (CVA): ICD-10-CM

## 2021-08-05 DIAGNOSIS — I63.019 CEREBRAL INFARCTION DUE TO THROMBOSIS OF VERTEBRAL ARTERY, UNSPECIFIED BLOOD VESSEL LATERALITY (HCC): ICD-10-CM

## 2021-08-05 DIAGNOSIS — G81.94 LEFT HEMIPARESIS (HCC): ICD-10-CM

## 2021-08-13 RX ORDER — AMLODIPINE BESYLATE 10 MG/1
10 TABLET ORAL DAILY
Qty: 90 TABLET | Refills: 3 | Status: SHIPPED | OUTPATIENT
Start: 2021-08-13 | End: 2021-12-14 | Stop reason: SDUPTHER

## 2021-08-13 NOTE — TELEPHONE ENCOUNTER
Medication:   Requested Prescriptions     Pending Prescriptions Disp Refills    amLODIPine (NORVASC) 10 MG tablet 90 tablet 1     Sig: Take 1 tablet by mouth daily        Last Filled:      Patient Phone Number: [phone] (home)     Last appt: 4/1/2021   Next appt: 10/4/2021    Last OARRS: No flowsheet data found.

## 2021-09-08 ENCOUNTER — OFFICE VISIT (OUTPATIENT)
Dept: PRIMARY CARE CLINIC | Age: 66
End: 2021-09-08
Payer: MEDICARE

## 2021-09-08 VITALS
HEART RATE: 68 BPM | HEIGHT: 67 IN | WEIGHT: 217 LBS | TEMPERATURE: 97.1 F | SYSTOLIC BLOOD PRESSURE: 115 MMHG | OXYGEN SATURATION: 95 % | DIASTOLIC BLOOD PRESSURE: 68 MMHG | BODY MASS INDEX: 34.06 KG/M2

## 2021-09-08 DIAGNOSIS — Z13.29 SCREENING FOR THYROID DISORDER: ICD-10-CM

## 2021-09-08 DIAGNOSIS — I10 ESSENTIAL HYPERTENSION: ICD-10-CM

## 2021-09-08 DIAGNOSIS — B35.1 FUNGAL INFECTION OF NAIL: ICD-10-CM

## 2021-09-08 DIAGNOSIS — Z13.0 SCREENING FOR DEFICIENCY ANEMIA: ICD-10-CM

## 2021-09-08 DIAGNOSIS — T14.8XXA BLISTER: ICD-10-CM

## 2021-09-08 DIAGNOSIS — G81.94 LEFT HEMIPARESIS (HCC): ICD-10-CM

## 2021-09-08 DIAGNOSIS — S91.302D OPEN WOUND OF LEFT FOOT, SUBSEQUENT ENCOUNTER: Primary | ICD-10-CM

## 2021-09-08 PROCEDURE — 4040F PNEUMOC VAC/ADMIN/RCVD: CPT | Performed by: NURSE PRACTITIONER

## 2021-09-08 PROCEDURE — 1036F TOBACCO NON-USER: CPT | Performed by: NURSE PRACTITIONER

## 2021-09-08 PROCEDURE — 99214 OFFICE O/P EST MOD 30 MIN: CPT | Performed by: NURSE PRACTITIONER

## 2021-09-08 PROCEDURE — G8417 CALC BMI ABV UP PARAM F/U: HCPCS | Performed by: NURSE PRACTITIONER

## 2021-09-08 PROCEDURE — 1123F ACP DISCUSS/DSCN MKR DOCD: CPT | Performed by: NURSE PRACTITIONER

## 2021-09-08 PROCEDURE — G8427 DOCREV CUR MEDS BY ELIG CLIN: HCPCS | Performed by: NURSE PRACTITIONER

## 2021-09-08 PROCEDURE — 3017F COLORECTAL CA SCREEN DOC REV: CPT | Performed by: NURSE PRACTITIONER

## 2021-09-08 RX ORDER — SULFAMETHOXAZOLE AND TRIMETHOPRIM 400; 80 MG/1; MG/1
1 TABLET ORAL 2 TIMES DAILY
COMMUNITY
End: 2022-05-06 | Stop reason: ALTCHOICE

## 2021-09-08 RX ORDER — CEPHALEXIN 500 MG/1
500 CAPSULE ORAL 4 TIMES DAILY
COMMUNITY
End: 2022-01-19

## 2021-09-08 RX ORDER — GABAPENTIN 100 MG/1
100 CAPSULE ORAL 4 TIMES DAILY
Qty: 360 CAPSULE | Refills: 1 | Status: SHIPPED | OUTPATIENT
Start: 2021-09-08 | End: 2022-01-19 | Stop reason: SDUPTHER

## 2021-09-08 SDOH — ECONOMIC STABILITY: FOOD INSECURITY: WITHIN THE PAST 12 MONTHS, THE FOOD YOU BOUGHT JUST DIDN'T LAST AND YOU DIDN'T HAVE MONEY TO GET MORE.: NEVER TRUE

## 2021-09-08 SDOH — ECONOMIC STABILITY: FOOD INSECURITY: WITHIN THE PAST 12 MONTHS, YOU WORRIED THAT YOUR FOOD WOULD RUN OUT BEFORE YOU GOT MONEY TO BUY MORE.: NEVER TRUE

## 2021-09-08 ASSESSMENT — ENCOUNTER SYMPTOMS
CONSTIPATION: 0
DIARRHEA: 0
ABDOMINAL PAIN: 0
CHEST TIGHTNESS: 0
SHORTNESS OF BREATH: 0

## 2021-09-08 ASSESSMENT — SOCIAL DETERMINANTS OF HEALTH (SDOH): HOW HARD IS IT FOR YOU TO PAY FOR THE VERY BASICS LIKE FOOD, HOUSING, MEDICAL CARE, AND HEATING?: NOT HARD AT ALL

## 2021-09-08 NOTE — PROGRESS NOTES
Sarahi Marino (:  1955) is a 77 y.o. male,Established patient, here for evaluation of the following chief complaint(s): Wound Check (urgent care - foot wound pressure wound)  Has left foot wound and blister on posterior lower left leg. Daughter is present during appointment. ASSESSMENT/PLAN:  1. Open wound of left foot, subsequent encounter  -     [address] [address]  -follow up with Podiatry  -Bactrim  -Keflex  2. [address]  3. Left hemiparesis (HCC)-Stable  -Uses wheelchair and 4 prong cane  -Denies recent falls  4. Fungal infection of nail  -     Amb External Referral To Podiatry  5. Essential hypertension  -Norvasc  -Lisinopril  -Hydralazine  -     Comprehensive Metabolic Panel  6. Screening for deficiency anemia  -     CBC Auto Differential  7. Screening for thyroid disorder  -     TSH with Reflex      I am having Sarahi Marino maintain his Sennosides, aspirin, loratadine, atorvastatin, dantrolene, lisinopril, hydrALAZINE, amLODIPine, cephALEXin, sulfamethoxazole-trimethoprim, and gabapentin. Return in about 6 weeks (around 10/20/2021). 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 In the last couple of years patient has developed tremors in his right leg.  It starts in the right leg and sometimes if he puts pressure on the leg, can spread to the whole right side of the body.  It comes and goes without any clear other aggravating or relieving factors.  Symptoms are moderately severe.    Since stroke he has been active in several exercise programs and working part-time. He reports he had a fall in the bathtub without injury last month. He has an alarm pendant. He lives alone.     He is able to use both feet to move around The patient is a 58y Male complaining of code: adult cardiac. in wheelchair, stand, and ambulate short distances with 4 prong cane. Reports the bottom of left foot was bothering him for 2 days, daughter found a blister on the bottom of his foot. She expelled blood and pus on Friday and Saturday, keeping wound clean and dry. Was evaluated by Urgent care on Sunday, started on Bactrim. Swelling and redness increased, evaluated at Urgent care yesterday, started on Keflex in addition to Bactrim and advised to see PCP. Daughter also noted a blister on the back of his left leg, states he spends a lot of time in recliner.      Hypertension:  Home blood pressure monitoring: No. Patient denies chest pain, lightheadedness, blurred vision, palpitations and fatigue.  Antihypertensive medication side effects: no medication side effects noted.  Use of agents associated with hypertension: none.       Hyperlipidemia:  No new myalgias or GI upset on atorvastatin (Lipitor).              Review of Systems   Constitutional: Negative for activity change and fever. HENT: Negative for congestion. Uses wheelchair and 4 prong cane to ambulate   Eyes: Negative for visual disturbance. Respiratory: Negative for chest tightness and shortness of breath. Cardiovascular: Positive for leg swelling (left greater than right). Negative for chest pain and palpitations. Hypertension, hyperlipidemia and CAD   Gastrointestinal: Negative for abdominal pain, constipation and diarrhea. Endocrine: Negative for polyuria. Genitourinary: Negative for dysuria. Musculoskeletal: Negative for arthralgias and myalgias. Skin: Positive for wound (wound left foot and blister left posterior leg). Negative for rash. Neurological: Negative for dizziness, light-headedness and headaches. History of a right thalamic and basal ganglia hemorrhagic infarction in October 2012. Since then patient has had left-sided spastic hemiparesis.   Right leg tremors   Psychiatric/Behavioral: Negative for signature was used to authenticate this note.     --Zac Tate, MADISYN - CNP

## 2021-09-10 ENCOUNTER — VIRTUAL VISIT (OUTPATIENT)
Dept: PRIMARY CARE CLINIC | Age: 66
End: 2021-09-10
Payer: MEDICARE

## 2021-09-10 DIAGNOSIS — S90.822D BLISTER OF LEFT FOOT, SUBSEQUENT ENCOUNTER: Primary | ICD-10-CM

## 2021-09-10 DIAGNOSIS — S80.822D BLISTER OF LEFT LOWER EXTREMITY, SUBSEQUENT ENCOUNTER: ICD-10-CM

## 2021-09-10 PROBLEM — S90.822A BLISTER OF LEFT FOOT: Status: ACTIVE | Noted: 2021-09-10

## 2021-09-10 PROBLEM — S80.822A BLISTER OF LEFT LEG: Status: ACTIVE | Noted: 2021-09-10

## 2021-09-10 PROCEDURE — 1123F ACP DISCUSS/DSCN MKR DOCD: CPT | Performed by: NURSE PRACTITIONER

## 2021-09-10 PROCEDURE — 3017F COLORECTAL CA SCREEN DOC REV: CPT | Performed by: NURSE PRACTITIONER

## 2021-09-10 PROCEDURE — 4040F PNEUMOC VAC/ADMIN/RCVD: CPT | Performed by: NURSE PRACTITIONER

## 2021-09-10 PROCEDURE — G8427 DOCREV CUR MEDS BY ELIG CLIN: HCPCS | Performed by: NURSE PRACTITIONER

## 2021-09-10 PROCEDURE — 99212 OFFICE O/P EST SF 10 MIN: CPT | Performed by: NURSE PRACTITIONER

## 2021-09-10 PROCEDURE — 1036F TOBACCO NON-USER: CPT | Performed by: NURSE PRACTITIONER

## 2021-09-10 PROCEDURE — G8417 CALC BMI ABV UP PARAM F/U: HCPCS | Performed by: NURSE PRACTITIONER

## 2021-09-10 ASSESSMENT — VISUAL ACUITY: OU: 1

## 2021-09-10 ASSESSMENT — ENCOUNTER SYMPTOMS
CHEST TIGHTNESS: 0
ABDOMINAL PAIN: 0
DIARRHEA: 0
SHORTNESS OF BREATH: 0
CONSTIPATION: 0

## 2021-09-10 NOTE — PROGRESS NOTES
Lino Leon (:  1955) is a 77 y.o. male,Established patient, here for evaluation of the following chief complaint(s): Foot Swelling (pressure wound)  Daughter is present during appointment. 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 In the last couple of years patient has developed tremors in his right leg.  It starts in the right leg and sometimes if he puts pressure on the leg, can spread to the whole right side of the body.  It comes and goes without any clear other aggravating or relieving factors.  Symptoms are moderately severe.    Since stroke he has been active in several exercise programs and working part-time. Victoria Alessandro reports he had a fall in the bathtub without injury last month. He has an alarm pendant. He lives alone. ASSESSMENT/PLAN:  1. Blister of left foot, subsequent encounter  -Keep clean and dry  -Continue antibiotics  -F/U with wound care on Monday  -Seek medical attention, confusion, fever, increased redness or decreased sensation    2. Blister of left lower extremity, subsequent encounter  -Keep clean and dry  -Continue antibiotics  -F/U with wound care on Monday      I am having Lino Leon maintain his Sennosides, aspirin, loratadine, atorvastatin, dantrolene, lisinopril, hydrALAZINE, amLODIPine, cephALEXin, sulfamethoxazole-trimethoprim, and gabapentin. No follow-ups on file. Subjective   SUBJECTIVE/OBJECTIVE:  HPI  He is able to use both feet to move around in wheelchair, stand, and ambulate short distances with 4 prong cane. Reports the bottom of left foot was bothering him for 2 days, daughter found a blister on the bottom of his foot. She expelled blood and pus on Friday and Saturday, keeping wound clean and dry. Was evaluated by Urgent care on , started on Bactrim.  Swelling and redness increased, evaluated at Urgent care yesterday, started on Keflex in addition to Bactrim and advised to see PCP. Daughter also noted a blister on the back of his left leg, states he spends a lot of time in recliner. Both wounds were evaluated on 9/8. Bj states the wound on his foot has improved, significant amount of drainage this morning on dressing but redness has decreased. Skin remains intact over blister. She called the office yesterday concerned about the blister on the back of his leg that burst. She has been keeping area clean and dry. Skin has been removed from blister on back of leg. He is planning to work today. He does remain in his wheelchair at work, does not have to stand. He has an appointment with wound care on Monday. Baseline has decreased sensation to extremity due to stroke and neuropathy. Review of Systems   Constitutional: Negative for activity change and fever. HENT: Negative for congestion. Uses wheelchair and 4 prong cane to ambulate   Eyes: Negative for visual disturbance. Respiratory: Negative for chest tightness and shortness of breath. Cardiovascular: Positive for leg swelling (left greater than right). Negative for chest pain and palpitations. Hypertension, hyperlipidemia and CAD   Gastrointestinal: Negative for abdominal pain, constipation and diarrhea. Endocrine: Negative for polyuria. Genitourinary: Negative for dysuria. Musculoskeletal: Negative for arthralgias and myalgias. Skin: Positive for wound (wound left foot and blister left posterior leg). Negative for rash. Neurological: Negative for dizziness, light-headedness and headaches. History of a right thalamic and basal ganglia hemorrhagic infarction in October 2012. Since then patient has had left-sided spastic hemiparesis. Right leg tremors   Psychiatric/Behavioral: Negative for agitation, decreased concentration and sleep disturbance. The patient is not nervous/anxious.            Objective Physical exam limited due to virtual visit. Physical Exam  Constitutional:       Appearance: Normal appearance. He is well-developed and well-groomed. HENT:      Head: Normocephalic. Right Ear: Hearing normal.      Left Ear: Hearing normal.   Eyes:      General: Lids are normal. Vision grossly intact. Pulmonary:      Effort: Pulmonary effort is normal.   Musculoskeletal:        Feet:    Skin:         Neurological:      Mental Status: He is alert. Psychiatric:         Attention and Perception: Attention normal.         Mood and Affect: Mood normal.         Behavior: Behavior is cooperative. On this date 9/10/2021 I have spent 15 minutes reviewing previous notes, test results and face to face with the patient discussing the diagnosis and importance of compliance with the treatment plan as well as documenting on the day of the visit. Margaux Mahmood, was evaluated through a synchronous (real-time) audio-video encounter. The patient (or guardian if applicable) is aware that this is a billable service. Verbal consent to proceed has been obtained within the past 12 months. The visit was conducted pursuant to the emergency declaration under the [address] [place] [address], [address] [place] [address] authority and the [employer] and [employer] General Act. Patient identification was verified, and a caregiver was present when appropriate. The patient was located in a state where the provider was credentialed to provide care. Total time spent for this encounter: Not billed by time    --MADISYN Cancino CNP on 9/10/2021 at 10:15 AM    An electronic signature was used to authenticate this note. An electronic signature was used to authenticate this note.     --MADISYN Cancino CNP

## 2021-09-13 ENCOUNTER — HOSPITAL ENCOUNTER (OUTPATIENT)
Dept: WOUND CARE | Age: 66
Discharge: HOME | End: 2021-09-13
Payer: MEDICARE

## 2021-09-13 VITALS
HEIGHT: 67 IN | RESPIRATION RATE: 16 BRPM | TEMPERATURE: 97.4 F | HEART RATE: 66 BPM | BODY MASS INDEX: 33.99 KG/M2 | DIASTOLIC BLOOD PRESSURE: 84 MMHG | SYSTOLIC BLOOD PRESSURE: 143 MMHG

## 2021-09-13 DIAGNOSIS — S91.302A OPEN WOUND OF LEFT FOOT, INITIAL ENCOUNTER: ICD-10-CM

## 2021-09-13 DIAGNOSIS — M79.89 LEG SWELLING: ICD-10-CM

## 2021-09-13 DIAGNOSIS — L89.522 PRESSURE INJURY OF LEFT ANKLE, STAGE 2 (HCC): Primary | ICD-10-CM

## 2021-09-13 PROCEDURE — 99213 OFFICE O/P EST LOW 20 MIN: CPT

## 2021-09-13 PROCEDURE — 99213 OFFICE O/P EST LOW 20 MIN: CPT | Performed by: NURSE PRACTITIONER

## 2021-09-13 PROCEDURE — 11042 DBRDMT SUBQ TIS 1ST 20SQCM/<: CPT | Performed by: NURSE PRACTITIONER

## 2021-09-13 PROCEDURE — 11042 DBRDMT SUBQ TIS 1ST 20SQCM/<: CPT

## 2021-09-13 RX ORDER — BACITRACIN, NEOMYCIN, POLYMYXIN B 400; 3.5; 5 [USP'U]/G; MG/G; [USP'U]/G
OINTMENT TOPICAL ONCE
Status: CANCELLED | OUTPATIENT
Start: 2021-09-13 | End: 2021-09-13

## 2021-09-13 RX ORDER — BACITRACIN ZINC AND POLYMYXIN B SULFATE 500; 1000 [USP'U]/G; [USP'U]/G
OINTMENT TOPICAL ONCE
Status: CANCELLED | OUTPATIENT
Start: 2021-09-13 | End: 2021-09-13

## 2021-09-13 RX ORDER — LIDOCAINE HYDROCHLORIDE 20 MG/ML
JELLY TOPICAL ONCE
Status: CANCELLED | OUTPATIENT
Start: 2021-09-13 | End: 2021-09-13

## 2021-09-13 RX ORDER — CLOBETASOL PROPIONATE 0.5 MG/G
OINTMENT TOPICAL ONCE
Status: CANCELLED | OUTPATIENT
Start: 2021-09-13 | End: 2021-09-13

## 2021-09-13 RX ORDER — LIDOCAINE 50 MG/G
OINTMENT TOPICAL ONCE
Status: CANCELLED | OUTPATIENT
Start: 2021-09-13 | End: 2021-09-13

## 2021-09-13 RX ORDER — GINSENG 100 MG
CAPSULE ORAL ONCE
Status: CANCELLED | OUTPATIENT
Start: 2021-09-13 | End: 2021-09-13

## 2021-09-13 RX ORDER — LIDOCAINE HYDROCHLORIDE 40 MG/ML
SOLUTION TOPICAL ONCE
Status: CANCELLED | OUTPATIENT
Start: 2021-09-13 | End: 2021-09-13

## 2021-09-13 RX ORDER — LIDOCAINE 40 MG/G
CREAM TOPICAL ONCE
Status: CANCELLED | OUTPATIENT
Start: 2021-09-13 | End: 2021-09-13

## 2021-09-13 RX ORDER — GENTAMICIN SULFATE 1 MG/G
OINTMENT TOPICAL ONCE
Status: CANCELLED | OUTPATIENT
Start: 2021-09-13 | End: 2021-09-13

## 2021-09-13 RX ORDER — LIDOCAINE 50 MG/G
OINTMENT TOPICAL ONCE
Status: COMPLETED | OUTPATIENT
Start: 2021-09-13 | End: 2021-09-13

## 2021-09-13 RX ORDER — BETAMETHASONE DIPROPIONATE 0.05 %
OINTMENT (GRAM) TOPICAL ONCE
Status: CANCELLED | OUTPATIENT
Start: 2021-09-13 | End: 2021-09-13

## 2021-09-13 RX ADMIN — LIDOCAINE: 50 OINTMENT TOPICAL at 12:16

## 2021-09-13 ASSESSMENT — PAIN SCALES - GENERAL: PAINLEVEL_OUTOF10: 0

## 2021-09-13 NOTE — PLAN OF CARE
Patient Name:  Lino Leon  YOB: 1955  Today's Date:  September 13, 2021  Medical Record Number:  [id number]  Provider:    [address] [place] [address] Pkwy   Appointment Treatment Guidelines        The [address] [place] [address] Pkwy Appointment Treatment Guidelines were reviewed on September 13, 2021 with the patient. Mr. Dalila Vieyra verbalizes understanding of the [address] [place] [address] Pkwy Appointment Treatment Guidelines.       Electronically signed by Luis M Tanner RN on 9/13/21 at 10:41 AM EDT
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
## 2021-09-13 NOTE — LETTER
[address] [address]  [phone]  Dept: [phone]   TODAYS DATE: 9/8/2021    [address] [place] [address],[address] Floor Wound Care   Appointment Treatment Guidelines  Welcome Mr. Bisi Patten to the [address] [place] [address] Pkwy. We appreciate the confidence you have shown in choosing us as your wound care provider. Our goal is to heal your wound(s) as quickly as possible. Please read the items below regarding the nature of your appointments. 1. We will make every effort to schedule appointments that are convenient for you. Certain days and times may not be available, depending on your providers office hours and details of your care. 2. Patients will not necessarily be brought to an exam room in the order in which they arrive. Many providers work out of this office and patients are here for different procedures. Our goal is to serve you as quickly as possible. 3. We acknowledge that your time is valuable. Please remember that wound healing takes time and we appreciate your understanding that the length of each patients appointment will vary depending upon their need. 4. It is for your protection that we ask for insurance cards, photo ID, and new consent forms on your first visit and periodically throughout your treatment at all our facilities. 5. Wound Care treatment is known to be most effective when provided on a regular basis. Missed appointments, and not following the recommended plan of care can result in ineffective treatment and a poor outcome. If you find it difficult to keep appointments or to follow the recommended plan of care, it is your responsibility to let the staff know, so that we can work with you toward a solution. 6. If you need to miss an appointment, please call to let us know. We expect 24 hours notice for all cancellations.  We also expect missed visits to be rescheduled as soon as possible, preferably within the same week to promote the most effective healing time for your wound(s). 7. If you will be late for an appointment, please call our center to be sure that the provider can still see you when you arrive. If you are more than 15 minutes late your appointment may need to be rescheduled. 8. If two (2) appointments are missed without notifying us, your care plan may be discontinued. The same may happen if multiple visits are cancelled or rescheduled, even with notice. A missed visit is time when another patient, who also needs care, could have been seen. Thank you for your understanding and consideration.

## 2021-09-20 ENCOUNTER — HOSPITAL ENCOUNTER (OUTPATIENT)
Dept: WOUND CARE | Age: 66
Discharge: HOME | End: 2021-09-20
Payer: MEDICARE

## 2021-09-20 VITALS
SYSTOLIC BLOOD PRESSURE: 132 MMHG | HEART RATE: 55 BPM | TEMPERATURE: 98.2 F | RESPIRATION RATE: 16 BRPM | DIASTOLIC BLOOD PRESSURE: 78 MMHG

## 2021-09-20 DIAGNOSIS — L89.522 PRESSURE INJURY OF LEFT ANKLE, STAGE 2 (HCC): Primary | ICD-10-CM

## 2021-09-20 DIAGNOSIS — S91.302A OPEN WOUND OF LEFT FOOT, INITIAL ENCOUNTER: ICD-10-CM

## 2021-09-20 DIAGNOSIS — M79.89 LEG SWELLING: ICD-10-CM

## 2021-09-20 PROCEDURE — 99213 OFFICE O/P EST LOW 20 MIN: CPT | Performed by: NURSE PRACTITIONER

## 2021-09-20 PROCEDURE — 99212 OFFICE O/P EST SF 10 MIN: CPT

## 2021-09-20 RX ORDER — CLOBETASOL PROPIONATE 0.5 MG/G
OINTMENT TOPICAL ONCE
Status: CANCELLED | OUTPATIENT
Start: 2021-09-20 | End: 2021-09-20

## 2021-09-20 RX ORDER — GENTAMICIN SULFATE 1 MG/G
OINTMENT TOPICAL ONCE
Status: CANCELLED | OUTPATIENT
Start: 2021-09-20 | End: 2021-09-20

## 2021-09-20 RX ORDER — LIDOCAINE 50 MG/G
OINTMENT TOPICAL ONCE
Status: CANCELLED | OUTPATIENT
Start: 2021-09-20 | End: 2021-09-20

## 2021-09-20 RX ORDER — LIDOCAINE HYDROCHLORIDE 20 MG/ML
JELLY TOPICAL ONCE
Status: CANCELLED | OUTPATIENT
Start: 2021-09-20 | End: 2021-09-20

## 2021-09-20 RX ORDER — BACITRACIN ZINC AND POLYMYXIN B SULFATE 500; 1000 [USP'U]/G; [USP'U]/G
OINTMENT TOPICAL ONCE
Status: CANCELLED | OUTPATIENT
Start: 2021-09-20 | End: 2021-09-20

## 2021-09-20 RX ORDER — LIDOCAINE 40 MG/G
CREAM TOPICAL ONCE
Status: CANCELLED | OUTPATIENT
Start: 2021-09-20 | End: 2021-09-20

## 2021-09-20 RX ORDER — GINSENG 100 MG
CAPSULE ORAL ONCE
Status: CANCELLED | OUTPATIENT
Start: 2021-09-20 | End: 2021-09-20

## 2021-09-20 RX ORDER — BETAMETHASONE DIPROPIONATE 0.05 %
OINTMENT (GRAM) TOPICAL ONCE
Status: CANCELLED | OUTPATIENT
Start: 2021-09-20 | End: 2021-09-20

## 2021-09-20 RX ORDER — LIDOCAINE HYDROCHLORIDE 40 MG/ML
SOLUTION TOPICAL ONCE
Status: CANCELLED | OUTPATIENT
Start: 2021-09-20 | End: 2021-09-20

## 2021-09-20 RX ORDER — BACITRACIN, NEOMYCIN, POLYMYXIN B 400; 3.5; 5 [USP'U]/G; MG/G; [USP'U]/G
OINTMENT TOPICAL ONCE
Status: CANCELLED | OUTPATIENT
Start: 2021-09-20 | End: 2021-09-20

## 2021-09-20 ASSESSMENT — PAIN SCALES - GENERAL
PAINLEVEL_OUTOF10: 0
PAINLEVEL_OUTOF10: 0
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
## 2021-10-29 ENCOUNTER — TELEPHONE (OUTPATIENT)
Dept: PRIMARY CARE CLINIC | Age: 66
End: 2021-10-29

## 2021-10-29 RX ORDER — ATORVASTATIN CALCIUM 40 MG/1
40 TABLET, FILM COATED ORAL DAILY
Qty: 90 TABLET | Refills: 3 | Status: SHIPPED | OUTPATIENT
Start: 2021-10-29 | End: 2022-09-29 | Stop reason: SDUPTHER

## 2021-10-29 NOTE — TELEPHONE ENCOUNTER
Patient needs a refill for his Atorvastatin sent to [address] [place] [address] 9/821.

## 2021-10-29 NOTE — TELEPHONE ENCOUNTER
Medication:   Requested Prescriptions     Pending Prescriptions Disp Refills    atorvastatin (LIPITOR) 40 MG tablet 90 tablet 1     Sig: Take 1 tablet by mouth daily        Last Filled:      Patient Phone Number: [phone] (home)     Last appt: 9/10/2021   Next appt: 11/4/2021    Last OARRS: No flowsheet data found.

## 2021-12-14 RX ORDER — HYDRALAZINE HYDROCHLORIDE 50 MG/1
50 TABLET, FILM COATED ORAL 3 TIMES DAILY
Qty: 270 TABLET | Refills: 1 | Status: SHIPPED | OUTPATIENT
Start: 2021-12-14

## 2021-12-14 RX ORDER — AMLODIPINE BESYLATE 10 MG/1
10 TABLET ORAL DAILY
Qty: 90 TABLET | Refills: 3 | Status: SHIPPED | OUTPATIENT
Start: 2021-12-14 | End: 2022-09-22 | Stop reason: SDUPTHER

## 2021-12-14 RX ORDER — DANTROLENE SODIUM 100 MG/1
100 CAPSULE ORAL 2 TIMES DAILY
Qty: 180 CAPSULE | Refills: 1 | Status: SHIPPED
Start: 2021-12-14 | End: 2022-04-05

## 2021-12-14 RX ORDER — LISINOPRIL 20 MG/1
20 TABLET ORAL DAILY
Qty: 90 TABLET | Refills: 1 | Status: SHIPPED | OUTPATIENT
Start: 2021-12-14

## 2021-12-14 NOTE — TELEPHONE ENCOUNTER
Medication:   Requested Prescriptions     Pending Prescriptions Disp Refills    hydrALAZINE (APRESOLINE) 50 MG tablet 270 tablet 1     Sig: Take 1 tablet by mouth 3 times daily    amLODIPine (NORVASC) 10 MG tablet 90 tablet 3     Sig: Take 1 tablet by mouth daily    lisinopril (PRINIVIL;ZESTRIL) 20 MG tablet 90 tablet 1     Sig: Take 1 tablet by mouth daily    dantrolene (DANTRIUM) 100 MG capsule 180 capsule 1     Sig: Take 1 capsule by mouth 2 times daily       Last Filled:      Patient Phone Number: [phone] (home)     Last appt: 9/10/2021   Next appt: Visit date not found    Last Labs DM: No results found for: Cory Mcknight

## 2022-01-19 ENCOUNTER — OFFICE VISIT (OUTPATIENT)
Dept: PRIMARY CARE CLINIC | Age: 67
End: 2022-01-19
Payer: MEDICARE

## 2022-01-19 VITALS
OXYGEN SATURATION: 82 % | SYSTOLIC BLOOD PRESSURE: 113 MMHG | HEIGHT: 69 IN | BODY MASS INDEX: 31.84 KG/M2 | DIASTOLIC BLOOD PRESSURE: 76 MMHG | HEART RATE: 86 BPM | WEIGHT: 215 LBS | TEMPERATURE: 98.4 F

## 2022-01-19 DIAGNOSIS — E78.5 HYPERLIPIDEMIA, UNSPECIFIED HYPERLIPIDEMIA TYPE: ICD-10-CM

## 2022-01-19 DIAGNOSIS — R31.0 GROSS HEMATURIA: ICD-10-CM

## 2022-01-19 DIAGNOSIS — I10 ESSENTIAL HYPERTENSION: ICD-10-CM

## 2022-01-19 DIAGNOSIS — R31.9 URINARY TRACT INFECTION WITH HEMATURIA, SITE UNSPECIFIED: Primary | ICD-10-CM

## 2022-01-19 DIAGNOSIS — Z13.1 SCREENING FOR DIABETES MELLITUS: ICD-10-CM

## 2022-01-19 DIAGNOSIS — N39.0 URINARY TRACT INFECTION WITH HEMATURIA, SITE UNSPECIFIED: Primary | ICD-10-CM

## 2022-01-19 DIAGNOSIS — G81.94 LEFT HEMIPARESIS (HCC): ICD-10-CM

## 2022-01-19 DIAGNOSIS — R80.9 PROTEINURIA, UNSPECIFIED TYPE: ICD-10-CM

## 2022-01-19 DIAGNOSIS — Z13.29 SCREENING FOR THYROID DISORDER: ICD-10-CM

## 2022-01-19 DIAGNOSIS — Z12.11 COLON CANCER SCREENING: ICD-10-CM

## 2022-01-19 DIAGNOSIS — Z13.0 SCREENING, ANEMIA, DEFICIENCY, IRON: ICD-10-CM

## 2022-01-19 PROBLEM — L89.522 PRESSURE INJURY OF LEFT ANKLE, STAGE 2 (HCC): Status: RESOLVED | Noted: 2021-09-13 | Resolved: 2022-01-19

## 2022-01-19 LAB
BILIRUBIN, POC: ABNORMAL
BLOOD URINE, POC: ABNORMAL
CLARITY, POC: ABNORMAL
COLOR, POC: ABNORMAL
CREATININE URINE POCT: 425.3
GLUCOSE URINE, POC: NEGATIVE
KETONES, POC: NEGATIVE
LEUKOCYTE EST, POC: NEGATIVE
MICROALBUMIN/CREAT 24H UR: 140.7 MG/G{CREAT}
MICROALBUMIN/CREAT UR-RTO: 33.1
NITRITE, POC: NEGATIVE
PH, POC: 6
PROTEIN, POC: >=300
SPECIFIC GRAVITY, POC: >=1.03
UROBILINOGEN, POC: ABNORMAL

## 2022-01-19 PROCEDURE — 1123F ACP DISCUSS/DSCN MKR DOCD: CPT | Performed by: NURSE PRACTITIONER

## 2022-01-19 PROCEDURE — G8484 FLU IMMUNIZE NO ADMIN: HCPCS | Performed by: NURSE PRACTITIONER

## 2022-01-19 PROCEDURE — 99214 OFFICE O/P EST MOD 30 MIN: CPT | Performed by: NURSE PRACTITIONER

## 2022-01-19 PROCEDURE — 3017F COLORECTAL CA SCREEN DOC REV: CPT | Performed by: NURSE PRACTITIONER

## 2022-01-19 PROCEDURE — 4040F PNEUMOC VAC/ADMIN/RCVD: CPT | Performed by: NURSE PRACTITIONER

## 2022-01-19 PROCEDURE — G8427 DOCREV CUR MEDS BY ELIG CLIN: HCPCS | Performed by: NURSE PRACTITIONER

## 2022-01-19 PROCEDURE — 82044 UR ALBUMIN SEMIQUANTITATIVE: CPT | Performed by: NURSE PRACTITIONER

## 2022-01-19 PROCEDURE — 81002 URINALYSIS NONAUTO W/O SCOPE: CPT | Performed by: NURSE PRACTITIONER

## 2022-01-19 PROCEDURE — 1036F TOBACCO NON-USER: CPT | Performed by: NURSE PRACTITIONER

## 2022-01-19 PROCEDURE — G8417 CALC BMI ABV UP PARAM F/U: HCPCS | Performed by: NURSE PRACTITIONER

## 2022-01-19 RX ORDER — BENZONATATE 100 MG/1
100 CAPSULE ORAL 3 TIMES DAILY PRN
Qty: 90 CAPSULE | Refills: 0 | Status: SHIPPED
Start: 2022-01-19 | End: 2022-01-19 | Stop reason: SDUPTHER

## 2022-01-19 RX ORDER — BENZONATATE 100 MG/1
100 CAPSULE ORAL 3 TIMES DAILY PRN
Qty: 90 CAPSULE | Refills: 1 | Status: SHIPPED | OUTPATIENT
Start: 2022-01-19 | End: 2022-02-18

## 2022-01-19 RX ORDER — GABAPENTIN 100 MG/1
100 CAPSULE ORAL 4 TIMES DAILY
Qty: 360 CAPSULE | Refills: 1 | Status: SHIPPED | OUTPATIENT
Start: 2022-01-19 | End: 2022-04-01 | Stop reason: SDUPTHER

## 2022-01-19 RX ORDER — LEVOFLOXACIN 500 MG/1
500 TABLET, FILM COATED ORAL DAILY
Qty: 10 TABLET | Refills: 0 | Status: SHIPPED | OUTPATIENT
Start: 2022-01-19 | End: 2022-01-29

## 2022-01-19 ASSESSMENT — ENCOUNTER SYMPTOMS
CONSTIPATION: 0
SHORTNESS OF BREATH: 0
DIARRHEA: 0
COUGH: 1
ABDOMINAL PAIN: 0
HEMOPTYSIS: 0
CHEST TIGHTNESS: 0

## 2022-01-19 NOTE — TELEPHONE ENCOUNTER
Medication:   Requested Prescriptions     Pending Prescriptions Disp Refills    gabapentin (NEURONTIN) 100 MG capsule 360 capsule 1     Sig: Take 1 capsule by mouth 4 times daily for 180 days. Last Filled:      Patient Phone Number: [phone] (home)     Last appt: 1/19/2022   Next appt: Visit date not found    Last OARRS: No flowsheet data found.

## 2022-01-19 NOTE — PROGRESS NOTES
Shreyas Ron (:  1955) is a 77 y.o. male,Established patient, here for evaluation of the following chief complaint(s):  Urinary Tract Infection (3 days ) and Cough         ASSESSMENT/PLAN:  1. Urinary tract infection with hematuria, site unspecified  -     levoFLOXacin (LEVAQUIN) 500 MG tablet; Take 1 tablet by mouth daily for 10 days, Disp-10 tablet, R-0Normal  2. Gross hematuria  -     Culture, Urine  -     POCT microalbumin  -     POCT Urinalysis no Micro- positive for blood  -     CBC Auto Differential; Future  3. Left hemiparesis ([address]  4. Screening, anemia, deficiency, iron  -     CBC Auto Differential; Future  5. Screening for diabetes mellitus  -have labs drawn  6. Screening for thyroid disorder  -     TSH with Reflex; Future  -     T4, Free; Future  7. Colon cancer screening  -     POCT Fecal Immunochemical Test (FIT); Future  8. Proteinuria, unspecified type  -     POCT microalbumin  9. Essential hypertension  -  Continue current medications. Comprehensive Metabolic Panel; Future  10. Hyperlipidemia, unspecified hyperlipidemia type  -   Continue current medications. Lipid Panel; Future        Healthcare Maintenance:  Covid vaccine: 2/2 doses, no booster  Colon cancer screen: FIT test today  Prostate Cancer screen: followed annually by Urology for elevated PSA  Return in about 3 months (around 2022). Subjective   SUBJECTIVE/OBJECTIVE:    Patient suffered a right thalamic and basal ganglia hemorrhagic infarction in 2012, was treated at [facility] in [address] [place] [address] then patient has had left-sided spastic hemiparesis.   In the last couple of years patient has developed tremors in his right leg.  It starts in the right leg and sometimes if he puts pressure on the leg, can spread to the whole right side of the body.  It comes and goes without any clear other aggravating or relieving factors.  Symptoms are moderately severe.    Since stroke he has been active in several exercise programs and working part-time. He reports he had a fall in the bathtub without injury last month. He has an alarm pendant. He lives alone.     Hypertension:  Home blood pressure monitoring: No. Patient denies chest pain, lightheadedness, blurred vision, palpitations and fatigue.  Antihypertensive medication side effects: no medication side effects noted.  Use of agents associated with hypertension: none.       Hyperlipidemia:  No new myalgias or GI upset on atorvastatin (Lipitor).         Elevated PSA  Followed by Urology,    7/19/2021 PSA 3.56          Urinary Tract Infection   This is a new problem. The current episode started in the past 7 days. The quality of the pain is described as burning. The pain is mild. There has been no fever. Pertinent negatives include no chills. He has tried nothing for the symptoms. Cough  This is a new problem. The current episode started 1 to 4 weeks ago. The problem occurs constantly. The cough is productive of sputum. Pertinent negatives include no chest pain, chills, ear pain, fever, headaches, hemoptysis, myalgias, postnasal drip, rash or shortness of breath. clear. Negative Covid test X2. Denies chest pain, shortness of breath, dizziness. Had botox injection to control tone, had 1 injection, cost 500.00. Using TENS machine for left arm. Participated in occupational study at [address] [place] [address], included 5 weeks in home PT and 6 month  virtual visit. Plans to participate in another study at [address] [place] [address] this year. Functional Neurological Disorder  Diagnosed last year with, will start cognitive behavioral therapy by Psychologist- Dr. 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 Review of Systems   Constitutional: Negative for activity change, chills and fever. HENT: Negative for congestion, ear pain and postnasal drip. Uses wheelchair and 4 prong cane to ambulate   Eyes: Negative for visual disturbance. Respiratory: Positive for cough. Negative for hemoptysis, chest tightness and shortness of breath. Cardiovascular: Positive for leg swelling (left greater than right). Negative for chest pain and palpitations. Hypertension, hyperlipidemia and CAD   Gastrointestinal: Negative for abdominal pain, constipation and diarrhea. Endocrine: Negative for polyuria. Genitourinary: Negative for dysuria. Musculoskeletal: Negative for arthralgias and myalgias. Skin: Positive for wound (wound left foot and blister left posterior leg). Negative for rash. Neurological: Negative for dizziness, light-headedness and headaches. History of a right thalamic and basal ganglia hemorrhagic infarction in October 2012. Since then patient has had left-sided spastic hemiparesis. Right leg tremors   Psychiatric/Behavioral: Negative for agitation, decreased concentration and sleep disturbance. The patient is not nervous/anxious.            Objective    Past Medical History:   Diagnosis Date    CAD (coronary artery disease)     HTN (hypertension)     Hyperlipemia     Pressure injury of left ankle, stage 2 ([address] 9/13/2021    S/P PTCA (percutaneous transluminal coronary angioplasty) 2010    two stents place    Stroke [facility]) 10/2012    left-sided weakness     Past Surgical History:   Procedure Laterality Date    CARDIAC SURGERY      INGUINAL HERNIA REPAIR      right side    INTRACAPSULAR CATARACT EXTRACTION Right 8/30/2019    PHACOEMULSIFICATION WITH INTRAOCULAR LENS IMPLANT performed by Maritza Luna MD at [place] [address] Left 9/6/2019    PHACOEMULSIFICATION WITH INTRAOCULAR LENS IMPLANT performed by Maritza Luna MD at [address] [place] [address]     Outpatient Encounter Medications as of 1/19/2022   Medication Sig Dispense Refill    benzonatate (TESSALON PERLES) 100 MG capsule Take 1 capsule by mouth 3 times daily as needed for Cough 90 capsule 1    levoFLOXacin (LEVAQUIN) 500 MG tablet Take 1 tablet by mouth daily for 10 days 10 tablet 0    hydrALAZINE (APRESOLINE) 50 MG tablet Take 1 tablet by mouth 3 times daily 270 tablet 1    amLODIPine (NORVASC) 10 MG tablet Take 1 tablet by mouth daily 90 tablet 3    lisinopril (PRINIVIL;ZESTRIL) 20 MG tablet Take 1 tablet by mouth daily 90 tablet 1    dantrolene (DANTRIUM) 100 MG capsule Take 1 capsule by mouth 2 times daily 180 capsule 1    atorvastatin (LIPITOR) 40 MG tablet Take 1 tablet by mouth daily 90 tablet 3    sulfamethoxazole-trimethoprim (BACTRIM;SEPTRA) 400-80 MG per tablet Take 1 tablet by mouth 2 times daily      [DISCONTINUED] gabapentin (NEURONTIN) 100 MG capsule Take 1 capsule by mouth 4 times daily for 180 days. 360 capsule 1    loratadine (CLARITIN) 10 MG tablet Take 1 tablet by mouth daily Pt needs to come in and be seen before more rx's are given. 90 tablet 1    aspirin 81 MG tablet Take 81 mg by mouth daily      Sennosides 17.2 MG TABS Take 17.2 mg by mouth      [DISCONTINUED] benzonatate (TESSALON PERLES) 100 MG capsule Take 1 capsule by mouth 3 times daily as needed for Cough 90 capsule 0    [DISCONTINUED] cephALEXin (KEFLEX) 500 MG capsule Take 500 mg by mouth 4 times daily       No facility-administered encounter medications on file as of 1/19/2022.    height is 5' 9\" (1.753 m) and weight is 215 lb (97.5 kg). His temperature is 98.4 °F (36.9 °C). His blood pressure is 113/76 and his pulse is 86. His oxygen saturation is 82% (abnormal). Physical Exam  Vitals reviewed. Constitutional:       Appearance: He is well-developed. He is not diaphoretic. HENT:      Head: Normocephalic. Right Ear: Hearing and external ear normal. No tenderness. Left Ear: Hearing and external ear normal. No tenderness. Nose: Nose normal.   Eyes:      General: Lids are normal.   Cardiovascular:      Rate and Rhythm: Normal rate and regular rhythm.       Heart sounds: Normal heart sounds, S1 normal and S2 normal.   Pulmonary:      Effort: Pulmonary effort is normal.      Breath sounds: Normal breath sounds. Musculoskeletal:         General: Normal range of motion. Lymphadenopathy:      Head:      Right side of head: No submental or submandibular adenopathy. Left side of head: No submental or submandibular adenopathy. Cervical:      Right cervical: No superficial cervical adenopathy. Left cervical: No superficial cervical adenopathy. Skin:     General: Skin is warm and dry. Findings: No rash. Nails: There is no clubbing. Neurological:      Mental Status: He is alert and oriented to person, place, and time. GCS: GCS eye subscore is 4. GCS verbal subscore is 5. GCS motor subscore is 6. Motor: Weakness, tremor and abnormal muscle tone present. Gait: Gait abnormal.      Comments: Left hemiparesis, ambulates with wheelchair and 4 prong cane. Psychiatric:         Speech: Speech normal.         Behavior: Behavior normal. Behavior is cooperative. Judgment: Judgment normal.            On this date 1/19/2022 I have spent 25 minutes reviewing previous notes, test results and face to face with the patient discussing the diagnosis and importance of compliance with the treatment plan as well as documenting on the day of the visit. An electronic signature was used to authenticate this note.     --MADISYN Joiner - CNP

## 2022-01-21 ENCOUNTER — TELEPHONE (OUTPATIENT)
Dept: PRIMARY CARE CLINIC | Age: 67
End: 2022-01-21

## 2022-01-21 LAB
ORGANISM: ABNORMAL
URINE CULTURE, ROUTINE: ABNORMAL

## 2022-01-21 NOTE — TELEPHONE ENCOUNTER
----- Message from MADISYN Renee CNP sent at 1/20/2022  5:26 PM EST -----  Regarding: labs  Have labs drawn at hospital within next 3 months

## 2022-03-08 ENCOUNTER — TELEPHONE (OUTPATIENT)
Dept: PRIMARY CARE CLINIC | Age: 67
End: 2022-03-08

## 2022-03-08 NOTE — TELEPHONE ENCOUNTER
Please call Patient about his Labs &  other question he has please call Pt  @ [phone] please be advise

## 2022-03-25 DIAGNOSIS — I10 ESSENTIAL HYPERTENSION: ICD-10-CM

## 2022-03-25 DIAGNOSIS — E78.5 HYPERLIPIDEMIA, UNSPECIFIED HYPERLIPIDEMIA TYPE: ICD-10-CM

## 2022-03-25 DIAGNOSIS — Z13.29 SCREENING FOR THYROID DISORDER: ICD-10-CM

## 2022-03-25 DIAGNOSIS — R31.0 GROSS HEMATURIA: ICD-10-CM

## 2022-03-25 DIAGNOSIS — Z13.0 SCREENING, ANEMIA, DEFICIENCY, IRON: ICD-10-CM

## 2022-03-25 LAB
A/G RATIO: 1.6 (ref 1.1–2.2)
ALBUMIN SERPL-MCNC: 4.7 G/DL (ref 3.4–5)
ALP BLD-CCNC: 78 U/L (ref 40–129)
ALT SERPL-CCNC: 13 U/L (ref 10–40)
ANION GAP SERPL CALCULATED.3IONS-SCNC: 15 MMOL/L (ref 3–16)
AST SERPL-CCNC: 15 U/L (ref 15–37)
BASOPHILS ABSOLUTE: 0 K/UL (ref 0–0.2)
BASOPHILS RELATIVE PERCENT: 0.3 %
BILIRUB SERPL-MCNC: 0.4 MG/DL (ref 0–1)
BUN BLDV-MCNC: 22 MG/DL (ref 7–20)
CALCIUM SERPL-MCNC: 9.5 MG/DL (ref 8.3–10.6)
CHLORIDE BLD-SCNC: 101 MMOL/L (ref 99–110)
CHOLESTEROL, TOTAL: 110 MG/DL (ref 0–199)
CO2: 22 MMOL/L (ref 21–32)
CREAT SERPL-MCNC: 0.9 MG/DL (ref 0.8–1.3)
EOSINOPHILS ABSOLUTE: 0.1 K/UL (ref 0–0.6)
EOSINOPHILS RELATIVE PERCENT: 1.7 %
GFR AFRICAN AMERICAN: >60
GFR NON-AFRICAN AMERICAN: >60
GLUCOSE BLD-MCNC: 89 MG/DL (ref 70–99)
HCT VFR BLD CALC: 49.2 % (ref 40.5–52.5)
HDLC SERPL-MCNC: 35 MG/DL (ref 40–60)
HEMOGLOBIN: 16 G/DL (ref 13.5–17.5)
LDL CHOLESTEROL CALCULATED: 63 MG/DL
LYMPHOCYTES ABSOLUTE: 2.2 K/UL (ref 1–5.1)
LYMPHOCYTES RELATIVE PERCENT: 32.8 %
MCH RBC QN AUTO: 29.8 PG (ref 26–34)
MCHC RBC AUTO-ENTMCNC: 32.6 G/DL (ref 31–36)
MCV RBC AUTO: 91.5 FL (ref 80–100)
MONOCYTES ABSOLUTE: 0.6 K/UL (ref 0–1.3)
MONOCYTES RELATIVE PERCENT: 9.3 %
NEUTROPHILS ABSOLUTE: 3.7 K/UL (ref 1.7–7.7)
NEUTROPHILS RELATIVE PERCENT: 55.9 %
PDW BLD-RTO: 14.5 % (ref 12.4–15.4)
PLATELET # BLD: 196 K/UL (ref 135–450)
PMV BLD AUTO: 10.1 FL (ref 5–10.5)
POTASSIUM SERPL-SCNC: 4.3 MMOL/L (ref 3.5–5.1)
RBC # BLD: 5.38 M/UL (ref 4.2–5.9)
SODIUM BLD-SCNC: 138 MMOL/L (ref 136–145)
T4 FREE: 1.2 NG/DL (ref 0.9–1.8)
TOTAL PROTEIN: 7.6 G/DL (ref 6.4–8.2)
TRIGL SERPL-MCNC: 59 MG/DL (ref 0–150)
TSH REFLEX: 3.65 UIU/ML (ref 0.27–4.2)
VLDLC SERPL CALC-MCNC: 12 MG/DL
WBC # BLD: 6.6 K/UL (ref 4–11)

## 2022-03-29 ENCOUNTER — TELEPHONE (OUTPATIENT)
Dept: PRIMARY CARE CLINIC | Age: 67
End: 2022-03-29

## 2022-03-29 NOTE — TELEPHONE ENCOUNTER
Patient says that his medication Dantrolene sodium is is too expensive and wants to know what Cloe Forbes thinks of either lowering the dose or switching him to Tizanidine.  Please call and advise ASAP

## 2022-04-01 ENCOUNTER — TELEPHONE (OUTPATIENT)
Dept: PRIMARY CARE CLINIC | Age: 67
End: 2022-04-01

## 2022-04-01 RX ORDER — GABAPENTIN 100 MG/1
100 CAPSULE ORAL 4 TIMES DAILY
Qty: 20 CAPSULE | Refills: 0 | Status: SHIPPED | OUTPATIENT
Start: 2022-04-01 | End: 2022-08-22 | Stop reason: SDUPTHER

## 2022-04-01 NOTE — TELEPHONE ENCOUNTER
Patient stated that he needs about 5 days of the gabapentin (NEURONTIN) 100 MG to tied him over until he receives mail order; please send medication request to [facility] pharmacy on [facility]

## 2022-04-05 ENCOUNTER — TELEPHONE (OUTPATIENT)
Dept: PRIMARY CARE CLINIC | Age: 67
End: 2022-04-05

## 2022-04-05 RX ORDER — BACLOFEN 10 MG/1
10 TABLET ORAL 2 TIMES DAILY
Qty: 120 TABLET | Refills: 1 | Status: SHIPPED | OUTPATIENT
Start: 2022-04-05

## 2022-04-05 NOTE — TELEPHONE ENCOUNTER
Discontinue Dantrium due to cost. Will start Baclofen 10 mg BID, will increase to 20 mg BID after 1 week. If medication is not effective at that dose, call the office. Previously took 40 mg BID which caused increased drowsiness.

## 2022-04-08 ENCOUNTER — TELEPHONE (OUTPATIENT)
Dept: PRIMARY CARE CLINIC | Age: 67
End: 2022-04-08

## 2022-05-03 ENCOUNTER — TELEPHONE (OUTPATIENT)
Dept: PRIMARY CARE CLINIC | Age: 67
End: 2022-05-03

## 2022-05-03 NOTE — TELEPHONE ENCOUNTER
PRECIOUS for PT to call the office regarding below message. Please help schedule PT if they call back. ----- Message from Des Jaimeyobany sent at 5/3/2022 12:46 PM EDT -----  Subject: Appointment Request    Reason for Call: Routine Existing Condition Follow Up    QUESTIONS  Type of Appointment? Established Patient  Reason for appointment request? No appointments available during search  Additional Information for Provider? PT NEEDS VIRTUAL VISIT IN MAY AND   NEEDS AFTERNOON LIKE AFTER 1 IF POSSIBLE NO AVAILABLE TIMES   ---------------------------------------------------------------------------  --------------  CALL BACK INFO  What is the best way for the office to contact you? OK to leave message on   voicemail  Preferred Call Back Phone Number? [phone]  ---------------------------------------------------------------------------  --------------  SCRIPT ANSWERS  Relationship to Patient? Self  Is this follow up request related to routine Diabetes Management? No  Have you been diagnosed with, awaiting test results for, or told that you   are suspected of having COVID-19 (Coronavirus)? (If patient has tested   negative or was tested as a requirement for work, school, or travel and   not based on symptoms, answer no)? No  Within the past 10 days have you developed any of the following symptoms   (answer no if symptoms have been present longer than 10 days or began   more than 10 days ago)? Fever or Chills, Cough, Shortness of breath or   difficulty breathing, Loss of taste or smell, Sore throat, Nasal   congestion, Sneezing or runny nose, Fatigue or generalized body aches   (answer no if pain is specific to a body part e.g. back pain), Diarrhea,   Headache? No  Have you had close contact with someone with COVID-19 in the last 7 days? No  (Service Expert - click yes below to proceed with [facility] [employer]ual   Scheduling)?  Yes

## 2022-05-06 ENCOUNTER — NURSE ONLY (OUTPATIENT)
Dept: PRIMARY CARE CLINIC | Age: 67
End: 2022-05-06
Payer: MEDICARE

## 2022-05-06 DIAGNOSIS — I63.019 CEREBRAL INFARCTION DUE TO THROMBOSIS OF VERTEBRAL ARTERY, UNSPECIFIED BLOOD VESSEL LATERALITY (HCC): ICD-10-CM

## 2022-05-06 DIAGNOSIS — R25.2 SPASTICITY: ICD-10-CM

## 2022-05-06 DIAGNOSIS — E78.2 MIXED HYPERLIPIDEMIA: ICD-10-CM

## 2022-05-06 DIAGNOSIS — R19.5 POSITIVE FIT (FECAL IMMUNOCHEMICAL TEST): Primary | ICD-10-CM

## 2022-05-06 DIAGNOSIS — I10 ESSENTIAL HYPERTENSION: ICD-10-CM

## 2022-05-06 PROCEDURE — 99212 OFFICE O/P EST SF 10 MIN: CPT | Performed by: NURSE PRACTITIONER

## 2022-05-06 NOTE — PROGRESS NOTES
Cy Madrigal (:  1955) is a 79 y.o. male,Established patient, here for evaluation of the following chief complaint(s):  Hypertension         ASSESSMENT/PLAN:  1. Positive FIT (fecal immunochemical test)  -Schedule appointment with GI  2. Spasticity  -continue Baclofen, will increase as needed  -follows up with Kinesiologist  -continue Magnesium  -Continue calcium  3. Essential hypertension  -Continue current medications. 4. Mixed hyperlipidemia  -Continue current medications. 5. 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 No follow-ups on file. Subjective   SUBJECTIVE/OBJECTIVE:  HPI  Had spasticity of arms and legs. Previously prescribed Dantrium but insurance no longer covers medication, re started 1/2 tablet of Baclofen (10 mg). Kinesiologist, adjusted his back, did some stretching,  started on Magnesium powder and calcium, to help with neuro paths. No pain or blood in stool. Positive FIT test 3/7/2022. Patient suffered a right thalamic and basal ganglia hemorrhagic infarction in 2012, was treated at [facility] in [address] [place] [address] then patient has had left-sided spastic hemiparesis. In the last couple of years patient has developed tremors in his right leg.  It starts in the right leg and sometimes if he puts pressure on the leg, can spread to the whole right side of the body.  It comes and goes without any clear other aggravating or relieving factors.  Symptoms are moderately severe.    Since stroke he has been active in several exercise programs and working part-time. [facility] reports he had a fall in the bathtub without injury last month. He has an alarm pendant.  He lives alone.     Hypertension:  Home blood pressure monitoring: No. Patient denies chest pain, lightheadedness, blurred vision, palpitations and fatigue.  Antihypertensive medication side effects: no medication side effects noted.  Use of agents associated with hypertension: none.       Hyperlipidemia:  No new myalgias or GI upset on atorvastatin (Lipitor).          Elevated PSA  Followed by Urology,    7/19/2021 PSA 3.56           Urinary Tract Infection   This is a new problem. The current episode started in the past 7 days. The quality of the pain is described as burning. The pain is mild. There has been no fever. Pertinent negatives include no chills. He has tried nothing for the symptoms. Cough  This is a new problem. The current episode started 1 to 4 weeks ago. The problem occurs constantly. The cough is productive of sputum. Pertinent negatives include no chest pain, chills, ear pain, fever, headaches, hemoptysis, myalgias, postnasal drip, rash or shortness of breath. clear. Negative Covid test X2.      Denies chest pain, shortness of breath, dizziness.      Had botox injection to control tone, had 1 injection, cost 500.00. Using TENS machine for left arm.     Participated in occupational study at [address] [place] [address], included 5 weeks in home PT and 6 month  virtual visit. Plans to participate in another study at [address] [place] [address] this year.        Functional Neurological Disorder  Diagnosed last year with, will start cognitive behavioral therapy by Psychologist- Dr. Jurado Common       Review of Systems   Constitutional: Negative for activity change, chills and fever. HENT: Negative for congestion, ear pain and postnasal drip. Uses wheelchair and 4 prong cane to ambulate   Eyes: Negative for visual disturbance. Respiratory: Positive for cough. Negative for chest tightness and shortness of breath. Cardiovascular: Positive for leg swelling (left greater than right). Negative for chest pain and palpitations. Hypertension, hyperlipidemia and CAD   Gastrointestinal: Negative for abdominal pain, constipation and diarrhea. Endocrine: Negative for polyuria. Genitourinary: Negative for dysuria. Musculoskeletal: Negative for arthralgias and myalgias. Skin: Positive for wound (wound left foot and blister left posterior leg). Negative for rash. Neurological: Negative for dizziness, light-headedness and headaches. History of a right thalamic and basal ganglia hemorrhagic infarction in October 2012. Since then patient has had left-sided spastic hemiparesis. Right leg tremors   Psychiatric/Behavioral: Negative for agitation, decreased concentration and sleep disturbance. The patient is not nervous/anxious. Objective    Lab Results   Component Value Date    WBC 6.6 03/25/2022    HGB 16.0 03/25/2022    HCT 49.2 03/25/2022    MCV 91.5 03/25/2022     03/25/2022     Lab Results   Component Value Date     03/25/2022    K 4.3 03/25/2022     03/25/2022    CO2 22 03/25/2022    BUN 22 (H) 03/25/2022    CREATININE 0.9 03/25/2022    GLUCOSE 89 03/25/2022    CALCIUM 9.5 03/25/2022    PROT 7.6 03/25/2022    LABALBU 4.7 03/25/2022    BILITOT 0.4 03/25/2022    ALKPHOS 78 03/25/2022    AST 15 03/25/2022    ALT 13 03/25/2022    LABGLOM >60 03/25/2022    GFRAA >60 03/25/2022    AGRATIO 1.6 03/25/2022    GLOB 3.7 08/14/2020       Physical Exam  Pulmonary:      Effort: Pulmonary effort is normal.   Neurological:      General: No focal deficit present. Mental Status: He is alert and oriented to person, place, and time. GCS: GCS eye subscore is 4. GCS verbal subscore is 5. GCS motor subscore is 6. Psychiatric:         Attention and Perception: Attention normal.         Mood and Affect: Mood normal.         Speech: Speech normal.         Behavior: Behavior is cooperative. Glo Mullins was evaluated through a synchronous (real-time) audio-video encounter. The patient (or guardian if applicable) is aware that this is a billable service, which includes applicable co-pays. This Virtual Visit was conducted with patient's (and/or legal guardian's) consent.  The visit was conducted pursuant to the emergency declaration under the [facility], [facility] authority and the [employer] General Act. Patient identification was verified, and a caregiver was present when appropriate. The patient was located at home in a state where the provider was licensed to provide care. Total time spent for this encounter: Not billed by time    --MADISYN Acuña CNP on 5/17/2022 at 1:34 PM    An electronic signature was used to authenticate this note. On this date 5/6/2022 I have spent 15 minutes reviewing previous notes, test results and face to face with the patient discussing the diagnosis and importance of compliance with the treatment plan as well as documenting on the day of the visit. An electronic signature was used to authenticate this note.     --MADISYN Acuña CNP

## 2022-05-10 ENCOUNTER — TELEPHONE (OUTPATIENT)
Dept: PRIMARY CARE CLINIC | Age: 67
End: 2022-05-10

## 2022-05-10 NOTE — TELEPHONE ENCOUNTER
82 Maria Eugenia Cadena MD  [address] [place] [address] Drive, [facility], [facility] 24  Phone: [phone]    Left a message needs a colonoscopy but is wheelchair bound. Alternatives to usual prep since he can't be constantly going to the toilet. Someone will get back to me tomorrow.

## 2022-05-11 DIAGNOSIS — R19.5 POSITIVE FIT (FECAL IMMUNOCHEMICAL TEST): Primary | ICD-10-CM

## 2022-05-11 NOTE — TELEPHONE ENCOUNTER
Foster Acharya has decided to go ahead an refer Christina Brown to Dr Shweta Rosado for consult. Left message on machine per [facility] with phone NUMBER.

## 2022-05-17 ASSESSMENT — ENCOUNTER SYMPTOMS
SHORTNESS OF BREATH: 0
COUGH: 1
CHEST TIGHTNESS: 0
DIARRHEA: 0
CONSTIPATION: 0
ABDOMINAL PAIN: 0

## 2022-05-19 ENCOUNTER — TELEPHONE (OUTPATIENT)
Dept: PRIMARY CARE CLINIC | Age: 67
End: 2022-05-19

## 2022-05-19 NOTE — TELEPHONE ENCOUNTER
Left message on machine per [employer]  Referred to 82 Maria Eugenia Cadena MD   [address], [address]   Phone: [phone]   Please call to make an appointment and call back to let me know he got the message

## 2022-05-19 NOTE — TELEPHONE ENCOUNTER
----- Message from Reyna Gómez sent at 5/19/2022 12:57 PM EDT -----  Subject: Message to Provider    QUESTIONS  Information for Provider? Pt was returning a call form the office but no   one picked up tried to call office a few times no answer . ---------------------------------------------------------------------------  --------------  Martha Carrier INFO  What is the best way for the office to contact you? OK to leave message on   voicemail  Preferred Call Back Phone Number? [phone]  ---------------------------------------------------------------------------  --------------  SCRIPT ANSWERS  Relationship to Patient?  Self

## 2022-05-24 ENCOUNTER — TELEPHONE (OUTPATIENT)
Dept: PRIMARY CARE CLINIC | Age: 67
End: 2022-05-24

## 2022-05-24 NOTE — TELEPHONE ENCOUNTER
AMBER He has not heard back from Dr Sherin Pineda. The schedule was not in they said she would call him back to schedule.

## 2022-06-16 ENCOUNTER — TELEPHONE (OUTPATIENT)
Dept: PRIMARY CARE CLINIC | Age: 67
End: 2022-06-16

## 2022-06-16 NOTE — TELEPHONE ENCOUNTER
----- Message from Nathaly Quan sent at 6/16/2022 10:38 AM EDT -----  Subject: Message to Provider    QUESTIONS  Information for Provider? patient is returning a provider call and states   that the call was to schedule a vv and would like a call back to discuss   ---------------------------------------------------------------------------  --------------  [address]  What is the best way for the office to contact you? OK to leave message on   voicemail  Preferred Call Back Phone Number?  [phone]  ---------------------------------------------------------------------------  --------------  SCRIPT ANSWERS  undefined

## 2022-06-16 NOTE — TELEPHONE ENCOUNTER
Called patient and his son answered his phone and states they are at the ED now and they will call us at another time

## 2022-07-21 ENCOUNTER — TELEPHONE (OUTPATIENT)
Dept: PRIMARY CARE CLINIC | Age: 67
End: 2022-07-21

## 2022-07-21 NOTE — TELEPHONE ENCOUNTER
----- Message from Shelly Griffith sent at 7/20/2022  2:42 PM EDT -----  Subject: Message to Provider    QUESTIONS  Information for Provider? Pt. made several attempts from the email he   rec'd on his phone to cancel his appt on 7/20/22 at 10 am. He is currently   in a nursing home w/ a broken leg and pressure wound on his foot. Will   reschedule when he knows more.   ---------------------------------------------------------------------------  --------------  Pooja Murphying INFO  [phone]; OK to leave message on voicemail  ---------------------------------------------------------------------------  --------------  SCRIPT ANSWERS  Relationship to Patient?  Self

## 2022-08-03 ENCOUNTER — OUTSIDE SERVICES (OUTPATIENT)
Dept: WOUND CARE | Age: 67
End: 2022-08-03
Payer: MEDICARE

## 2022-08-03 DIAGNOSIS — L89.620 UNSTAGEABLE PRESSURE ULCER OF LEFT HEEL (HCC): Primary | ICD-10-CM

## 2022-08-03 PROCEDURE — 99308 SBSQ NF CARE LOW MDM 20: CPT | Performed by: CLINICAL NURSE SPECIALIST

## 2022-08-05 NOTE — DISCHARGE INSTRUCTIONS
[] Orders placed during your visit:          Electronically signed by Rafael Abad RN on 8/8/2022 at 2:38 PM        [facility] Information: Should you experience any significant changes in your wound(s) or have questions about your wound care, please contact the [address] [place] [address] at [address] 8:30 am - 4:30 pm and Friday 8:30 am - 1:00 pm.  If you need help with your wound outside these hours and cannot wait until we are again available, contact your PCP or go to the hospital emergency room. PLEASE NOTE: IF YOU ARE UNABLE TO OBTAIN WOUND SUPPLIES, CONTINUE TO USE THE SUPPLIES YOU HAVE AVAILABLE UNTIL YOU ARE ABLE TO REACH US. KEEP THE WOUND COVERED AT ALL TIMES.          Physician Signature:_______________________    Date: ___________ Time:  ____________       [x] Jo Doll CNP

## 2022-08-08 ENCOUNTER — HOSPITAL ENCOUNTER (OUTPATIENT)
Dept: WOUND CARE | Age: 67
Discharge: HOME | End: 2022-08-08
Payer: MEDICARE

## 2022-08-08 VITALS
WEIGHT: 230 LBS | BODY MASS INDEX: 32.93 KG/M2 | SYSTOLIC BLOOD PRESSURE: 116 MMHG | HEIGHT: 70 IN | TEMPERATURE: 98.2 F | DIASTOLIC BLOOD PRESSURE: 70 MMHG | RESPIRATION RATE: 16 BRPM | HEART RATE: 73 BPM

## 2022-08-08 DIAGNOSIS — I63.9 CEREBROVASCULAR ACCIDENT (CVA), UNSPECIFIED MECHANISM (HCC): ICD-10-CM

## 2022-08-08 DIAGNOSIS — L89.620 PRESSURE ULCER OF HEEL, LEFT, UNSTAGEABLE (HCC): ICD-10-CM

## 2022-08-08 DIAGNOSIS — R25.2 SPASTICITY: Primary | ICD-10-CM

## 2022-08-08 DIAGNOSIS — G81.94 LEFT HEMIPARESIS (HCC): ICD-10-CM

## 2022-08-08 PROCEDURE — 99213 OFFICE O/P EST LOW 20 MIN: CPT | Performed by: NURSE PRACTITIONER

## 2022-08-08 PROCEDURE — 99214 OFFICE O/P EST MOD 30 MIN: CPT

## 2022-08-08 RX ORDER — GINSENG 100 MG
CAPSULE ORAL ONCE
Status: CANCELLED | OUTPATIENT
Start: 2022-08-08 | End: 2022-08-08

## 2022-08-08 RX ORDER — BACITRACIN, NEOMYCIN, POLYMYXIN B 400; 3.5; 5 [USP'U]/G; MG/G; [USP'U]/G
OINTMENT TOPICAL ONCE
Status: CANCELLED | OUTPATIENT
Start: 2022-08-08 | End: 2022-08-08

## 2022-08-08 RX ORDER — BETAMETHASONE DIPROPIONATE 0.05 %
OINTMENT (GRAM) TOPICAL ONCE
Status: CANCELLED | OUTPATIENT
Start: 2022-08-08 | End: 2022-08-08

## 2022-08-08 RX ORDER — LIDOCAINE 40 MG/G
CREAM TOPICAL ONCE
Status: CANCELLED | OUTPATIENT
Start: 2022-08-08 | End: 2022-08-08

## 2022-08-08 RX ORDER — GENTAMICIN SULFATE 1 MG/G
OINTMENT TOPICAL ONCE
Status: CANCELLED | OUTPATIENT
Start: 2022-08-08 | End: 2022-08-08

## 2022-08-08 RX ORDER — CLOBETASOL PROPIONATE 0.5 MG/G
OINTMENT TOPICAL ONCE
Status: CANCELLED | OUTPATIENT
Start: 2022-08-08 | End: 2022-08-08

## 2022-08-08 RX ORDER — LIDOCAINE 50 MG/G
OINTMENT TOPICAL ONCE
Status: CANCELLED | OUTPATIENT
Start: 2022-08-08 | End: 2022-08-08

## 2022-08-08 RX ORDER — BACITRACIN ZINC AND POLYMYXIN B SULFATE 500; 1000 [USP'U]/G; [USP'U]/G
OINTMENT TOPICAL ONCE
Status: CANCELLED | OUTPATIENT
Start: 2022-08-08 | End: 2022-08-08

## 2022-08-08 RX ORDER — LIDOCAINE HYDROCHLORIDE 20 MG/ML
JELLY TOPICAL ONCE
Status: CANCELLED | OUTPATIENT
Start: 2022-08-08 | End: 2022-08-08

## 2022-08-08 RX ORDER — LIDOCAINE HYDROCHLORIDE 40 MG/ML
SOLUTION TOPICAL ONCE
Status: CANCELLED | OUTPATIENT
Start: 2022-08-08 | End: 2022-08-08

## 2022-08-08 RX ORDER — LIDOCAINE HYDROCHLORIDE 40 MG/ML
SOLUTION TOPICAL ONCE
Status: COMPLETED | OUTPATIENT
Start: 2022-08-08 | End: 2022-08-08

## 2022-08-08 RX ADMIN — LIDOCAINE HYDROCHLORIDE: 40 SOLUTION TOPICAL at 16:38

## 2022-08-08 ASSESSMENT — PAIN SCALES - GENERAL
PAINLEVEL_OUTOF10: 0
PAINLEVEL_OUTOF10: 0

## 2022-08-08 NOTE — LETTER
[address]  [address] [place]ANIE [address] [facility] [address]  [phone]  Dept: [phone]        Thank you . Daniela Marcus for choosing [facility] for your Wound Care needs. We hope you found your first visit both encouraging and educational.  We look forward to serving you throughout the healing process. Before your next visit please review the information you received in your [employer]. The first visit can be overwhelming and this is a useful tool to refresh what information you may have been given. If you find yourself with any questions prior to your upcoming appointment, please feel free to contact us. Often wounds can be challenging and it is our goal to see you through the healing process with as much support possible. Again, thank you for choosing [address] [facility]!     Sincerely,      The Staff of [address] [place] [address] Pkwy and [facility] Therapy

## 2022-08-08 NOTE — LETTER
[address]  [address] OFFICE [place] 2 [facility] 110  [address] [facility] [address]  [phone]  Dept: [phone]   TODAYS DATE: 8/4/2022    [facility] AT [facility]   Appointment Treatment Guidelines  Welcome Mr. Ochoa President to the [address] [place] [address]. We appreciate the confidence you have shown in choosing us as your wound care provider. Our goal is to heal your wound(s) as quickly as possible. Please read the items below regarding the nature of your appointments. 1. We will make every effort to schedule appointments that are convenient for you. Certain days and times may not be available, depending on your providers office hours and details of your care. 2. Patients will not necessarily be brought to an exam room in the order in which they arrive. Many providers work out of this office and patients are here for different procedures. Our goal is to serve you as quickly as possible. 3. We acknowledge that your time is valuable. Please remember that wound healing takes time and we appreciate your understanding that the length of each patients appointment will vary depending upon their need. 4. It is for your protection that we ask for insurance cards, photo ID, and new consent forms on your first visit and periodically throughout your treatment at all our facilities. 5. Wound Care treatment is known to be most effective when provided on a regular basis. Missed appointments, and not following the recommended plan of care can result in ineffective treatment and a poor outcome. If you find it difficult to keep appointments or to follow the recommended plan of care, it is your responsibility to let the staff know, so that we can work with you toward a solution. 6. If you need to miss an appointment, please call to let us know. We expect 24 hours notice for all cancellations.  We also expect missed visits to be rescheduled as soon as possible, preferably within the same week to promote the most effective healing time for your wound(s). 7. If you will be late for an appointment, please call our center to be sure that the provider can still see you when you arrive. If you are more than 15 minutes late your appointment may need to be rescheduled. 8. If two (2) appointments are missed without notifying us, your care plan may be discontinued. The same may happen if multiple visits are cancelled or rescheduled, even with notice. A missed visit is time when another patient, who also needs care, could have been seen. Thank you for your understanding and consideration.

## 2022-08-09 NOTE — PROGRESS NOTES
[address]   Progress Note and Procedure Note      Cortez Wei  MEDICAL RECORD NUMBER:  [id number]  AGE: 79 y.o. GENDER: male  : 1955  EPISODE DATE:  2022    Subjective:     Chief Complaint   Patient presents with    Wound Check     Initial visit - left heel wound. States noted in hospital about 2022 - was in hospital after fall and broke hip. Using Betadine. HISTORY of PRESENT ILLNESS HPI  Primary diagnosis for wound-care consultation: Wound to left heel     History of Present Illness:  79 yr male presents today for wound left heel assessment and evaluation Recent displaced fracture of base of neck of left femur. Working with OT and PT @[facility]. Unstageable pressure injury to left heel. with hard black stable eschar. Dry and adherent. Offload boot  applied when in bed. PT and DP pulse strong with Doppler. No overt signs of infection. Daughter with pt on this visit and demonstrated how to monitor heel for signs of infection. Reports good and getting protein supplements. Pt transferring only no walking with walker still too unstable. Displaced fracture of base of neck of left femur, history of CVA, hemiplegia and hemiparesis following CVA affecting left nondominant side, with intermittent spasms  both extremities. PAST MEDICAL HISTORY        Diagnosis Date    CAD (coronary artery disease)     HTN (hypertension)     Hyperlipemia     Pressure injury of left ankle, stage 2 ([address] 2021    Pressure ulcer of heel, left, unstageable ([address] 2022    Stable eschar covered.     S/P PTCA (percutaneous transluminal coronary angioplasty) 2010    two stents place    Stroke [facility]) 10/2012    left-sided weakness       PAST SURGICAL HISTORY    Past Surgical History:   Procedure Laterality Date    CARDIAC SURGERY      INGUINAL HERNIA REPAIR      right side    INTRACAPSULAR CATARACT EXTRACTION Right 2019    PHACOEMULSIFICATION WITH INTRAOCULAR LENS IMPLANT performed by Osvaldo Stewart MD at [address] EXTRACTION Left 9/6/2019    PHACOEMULSIFICATION WITH INTRAOCULAR LENS IMPLANT performed by Osvaldo Stewart MD at [facility]    Family History   Problem Relation Age of Onset    Hypertension Mother     Heart Disease Father     Breast Cancer Sister     Heart Attack Brother        SOCIAL HISTORY    Social History     Tobacco Use    Smoking status: Never    Smokeless tobacco: Never   Vaping Use    Vaping Use: Never used   Substance Use Topics    Alcohol use: No    Drug use: No       ALLERGIES    No Known Allergies    MEDICATIONS    Current Outpatient Medications on File Prior to Encounter   Medication Sig Dispense Refill    baclofen (LIORESAL) 10 MG tablet Take 1 tablet by mouth 2 times daily 120 tablet 1    gabapentin (NEURONTIN) 100 MG capsule Take 1 capsule by mouth 4 times daily for 180 days. 20 capsule 0    hydrALAZINE (APRESOLINE) 50 MG tablet Take 1 tablet by mouth 3 times daily 270 tablet 1    amLODIPine (NORVASC) 10 MG tablet Take 1 tablet by mouth daily 90 tablet 3    lisinopril (PRINIVIL;ZESTRIL) 20 MG tablet Take 1 tablet by mouth daily 90 tablet 1    atorvastatin (LIPITOR) 40 MG tablet Take 1 tablet by mouth daily 90 tablet 3    loratadine (CLARITIN) 10 MG tablet Take 1 tablet by mouth daily Pt needs to come in and be seen before more rx's are given. 90 tablet 1    aspirin 81 MG tablet Take 81 mg by mouth daily      Sennosides 17.2 MG TABS Take 17.2 mg by mouth       No current facility-administered medications on file prior to encounter. REVIEW OF SYSTEMS  Review of Systems    Pertinent items are noted in HPI.     Objective:      /70   Pulse 73   Temp 98.2 °F (36.8 °C) (Temporal)   Resp 16   Ht 5' 10\" (1.778 m)   Wt 230 lb (104.3 kg)   BMI 33.00 kg/m²     Wt Readings from Last 3 Encounters:   08/08/22 230 lb (104.3 kg)   01/19/22 215 lb (97.5 kg)   09/08/21 217 lb (98.4 kg) PHYSICAL EXAM  Physical Exam    General Appearance: alert and oriented to person, place and time and pale  Skin: warm and dry, no rash or erythema  Head: normocephalic and atraumatic  Eyes: pupils equal, round, and reactive to light  Pulmonary/Chest: clear to auscultation bilaterally- no wheezes, rales or rhonchi, normal air movement, no respiratory distress  Cardiovascular: normal rate, regular rhythm, and distal pulses diminished  Neurologic: speech normal, gait abnormal- not walking at present, muscle weakness present, and involuntary spasms of legs. Assessment:        Problem List Items Addressed This Visit       Pressure ulcer of heel, left, unstageable (HCC)    Stroke ([facility]) (Chronic)    Spasticity - Primary    Left hemiparesis ([facility]        Procedure Note  Indications:  Based on my examination of this patient's wound(s)/ulcer(s) today, debridement is not required to promote healing and evaluate the wound base.   Wound/Ulcer Descriptions are Pre Debridement except measurements:    Wound 08/08/22 Heel Left #1 ( states since about 6/24/2022) (Active)   Wound Image   08/08/22 1404   Wound Etiology Pressure Unstageable 08/08/22 1404   Dressing/Treatment Betadine swabs/povidone iodine;Gauze dressing/dressing sponge;Roll gauze 08/08/22 1500   Wound Length (cm) 3.8 cm 08/08/22 1404   Wound Width (cm) 4.5 cm 08/08/22 1404   Wound Depth (cm) 0.1 cm 08/08/22 1404   Wound Surface Area (cm^2) 17.1 cm^2 08/08/22 1404   Wound Volume (cm^3) 1.71 cm^3 08/08/22 1404   Post-Procedure Length (cm) 3.8 cm 08/08/22 1436   Post-Procedure Width (cm) 4.5 cm 08/08/22 1436   Post-Procedure Depth (cm) 0.1 cm 08/08/22 1436   Post-Procedure Surface Area (cm^2) 17.1 cm^2 08/08/22 1436   Post-Procedure Volume (cm^3) 1.71 cm^3 08/08/22 1436   Wound Assessment Eschar dry 08/08/22 1404   Drainage Amount None 08/08/22 1404   Odor None 08/08/22 1404   Anahy-wound Assessment Dry/flaky 08/08/22 1404   Margins Undefined edges 08/08/22 1407 Wound Thickness Description not for Pressure Injury Full thickness 08/08/22 1404   Number of days: 1        Plan:     Treatment Note please see attached Discharge Instructions    Written patient dismissal instructions given to patient and signed by patient or POA. Discharge [id number] [facility] Physician Orders and Discharge Instructions  [address] [place] [address] Drive, [address], [address]  Telephone: [phone]  [address] 8:30 am - 4:30 pm and Friday 8:30 am - 1:00 pm.        NAME:  Daniel Steiner  YOB: 1955  MEDICAL RECORD NUMBER:  [id number]  TODAYS DATE:  8/8/22       Please wash hands with soap and water prior to and right after  every dressing change       Vashe wound cleanser:                                [] Vashe Soak  5 minutes in clinic today . DO NOT rinse after Vashe. WOUND LOCATION   Left Heel     May rinse wounds with 0.9% saline   Do NOT SCRUB WOUND. Keep wounds dry in the shower unless otherwise instructed by the physician. Topical Treatments:              [x] Apply around the wound:   [x] moisturizing lotion  [] Antifungal ointment      [] No-Sting barrier film   [] Zinc paste     PRIMARY DRESSING:                               [x] OTHER  -Betadine                            SECONDARY DRESSING:               [x] Gauze ( Light guaze)                              [x] Rolled Gauze                          HOW OFTEN TO CHANGE DRESSINGS:   [x] Daily                    COMPRESSION IS A SIGNIFICANT FACTOR TO HEALING YOUR WOUNDS. Elevate leg(s) above the level of the heart when sitting.      Avoid prolonged standing in one place.                _________________________________________________________________  PRESSURE RELIEF AND OFF-LOADING:    Off-Loading:   With heel protector  [x] Off-loading when       [x] in bed         [x] sitting                            Specialty equipment ordered: [] Wheelchair cushion    [] Specialty Bed/Mattress    [x] Assistive Device: please continue to use any assistive devices advised by the provider discussed during your visit    [x] Surgical shoe    [] Podus Boot(s)   [] Foam Boot(s)    [] Raf [place]     _____________________________________________________________________________________________    Dietary:  Continue your diet as tolerated. Eat heart-healthy foods. These foods include vegetables, fruits, nuts, beans, lean meat, fish, and whole grains. Limit sodium, alcohol, and sugar. Protein is a teresa nutrient in helping to repair damaged tissue and promote new tissue growth. Good sources of protein are milk, yogurt, cheese, fish, lean meat, and beans. If you are a diabetic, having diabetes can make it hard for wounds to heal. So try to keep your blood sugar in its target range.  __________________________________________________________________________________________________    ACTIVITY:   Activity as tolerated  ________________________________________________________________________________________________________________    PAIN:    Please note, A small amount of pain, drainage and/or bleeding from this process might be expected, and is NORMAL. Elevate the affected limb. Use over-the-counter medications you would normally use for pain as permitted by your family doctor. For persistent pain not relieved by the above interventions, please call your family doctor.  ________________________________________________________________________________  Call your doctor now or seek immediate medical care if:    You have symptoms of infection, such as: Increased pain, swelling, warmth, or redness. Red streaks leading from the area. Pus draining from the area. A fever.      _______________________________________________________________________    Return Appointment:  DME/Wound Dressing Supplies Provided by:    (Supply companies distribute one month supply at a time.  Please call them directly to reorder supplies when you run out)     ECF or Home Healthcare: [address] [place] [address] is responsible to order your supplies. Return Appointment: With Janae Lopez CNP  in  [address] [place] [address])                  [] Orders placed during your visit:          Electronically signed by Abhishek Gomez RN on 8/8/2022 at 2:38 PM        [facility] Information: Should you experience any significant changes in your wound(s) or have questions about your wound care, please contact the [address] [place] [address] at [address] 8:30 am - 4:30 pm and Friday 8:30 am - 1:00 pm.  If you need help with your wound outside these hours and cannot wait until we are again available, contact your PCP or go to the hospital emergency room. PLEASE NOTE: IF YOU ARE UNABLE TO OBTAIN WOUND SUPPLIES, CONTINUE TO USE THE SUPPLIES YOU HAVE AVAILABLE UNTIL YOU ARE ABLE TO REACH US. KEEP THE WOUND COVERED AT ALL TIMES.          Physician Signature:_______________________    Date: ___________ Time:  ____________       [x] Janae Lopez CNP                    Electronically signed by MADISYN Eden CNP on 8/9/2022 at 5:18 PM

## 2022-08-10 ENCOUNTER — TELEPHONE (OUTPATIENT)
Dept: PRIMARY CARE CLINIC | Age: 67
End: 2022-08-10

## 2022-08-10 NOTE — TELEPHONE ENCOUNTER
Juan Guerra from [facility] called and will like verbal orders for physical therapy for safe transfer, and walking due to fracture femur. Please advise    Juan Guerra can be reach at [phone]  Please leave voicemail if no answer.

## 2022-08-11 NOTE — DISCHARGE INSTRUCTIONS
[facility] \[facility]\"" Physician Orders and Discharge Instructions  [address] [place] [address] Drive, [address], [address]  Telephone: [phone]  [address] 8:30 am - 4:30 pm and Friday 8:30 am - 1:00 pm.          NAME:  Yoel Interiano  YOB: 1955  MEDICAL RECORD NUMBER:  [id number]  TODAYS DATE:  8/15/22         Please wash hands with soap and water prior to and right after  every dressing change        Vashe wound cleanser:                                [] Vashe Soak  5 minutes in clinic today . DO NOT rinse after Vashe. WOUND LOCATION   Left Heel      May rinse wounds with 0.9% saline  Do NOT SCRUB WOUND. Keep wounds dry in the shower unless otherwise instructed by the physician. Topical Treatments:              [x] Apply around the wound:   [x] moisturizing lotion  [] Antifungal ointment      [] No-Sting barrier film   [] Zinc paste      PRIMARY DRESSING:                                [x] Betadine directly to wound bed and a few centimeters around isabel wound, let dry                       SECONDARY DRESSING:                [x] Gauze   ( Light guaze)   NO TELFA                                       [x] Rolled Gauze, spandage to help hold on dressing please give extra for home                                                       try no ace bandage- to decrease pressure to the area      [address]:            [x] Daily                              COMPRESSION IS A SIGNIFICANT FACTOR TO HEALING YOUR WOUNDS. Elevate leg(s) above the level of the heart when sitting.      Avoid prolonged standing in one place.                 _________________________________________________________________  PRESSURE RELIEF AND OFF-LOADING:     Off-Loading:   With heel protector  [x] Off-loading when       [x] in bed         [x] sitting                             Specialty equipment ordered:       [] Wheelchair cushion    [] Specialty Bed/Mattress     [x] Assistive Device: please continue to use any assistive devices advised by the provider discussed during your visit   [x] Surgical shoe    [] Podus Boot(s)   [] Foam Boot(s)    [] [place]     _____________________________________________________________________________________________     Dietary:  Continue your diet as tolerated. Eat heart-healthy foods. These foods include vegetables, fruits, nuts, beans, lean meat, fish, and whole grains. Limit sodium, alcohol, and sugar. Protein is a teresa nutrient in helping to repair damaged tissue and promote new tissue growth. Good sources of protein are milk, yogurt, cheese, fish, lean meat, and beans. If you are a diabetic, having diabetes can make it hard for wounds to heal. So try to keep your blood sugar in its target range.  __________________________________________________________________________________________________     ACTIVITY:   Activity as tolerated  ________________________________________________________________________________________________________________     PAIN:     Please note, A small amount of pain, drainage and/or bleeding from this process might be expected, and is NORMAL. Elevate the affected limb. Use over-the-counter medications you would normally use for pain as permitted by your family doctor. For persistent pain not relieved by the above interventions, please call your family doctor.  ________________________________________________________________________________  Call your doctor now or seek immediate medical care if:    You have symptoms of infection, such as: Increased pain, swelling, warmth, or redness. Red streaks leading from the area. Pus draining from the area. A fever. _______________________________________________________________________     Return Appointment:  DME/Wound Dressing Supplies Provided by:    (Supply companies distribute one month supply at a time.   Please call them directly to

## 2022-08-15 ENCOUNTER — HOSPITAL ENCOUNTER (OUTPATIENT)
Dept: WOUND CARE | Age: 67
Discharge: HOME | End: 2022-08-15
Payer: MEDICARE

## 2022-08-15 ENCOUNTER — TELEPHONE (OUTPATIENT)
Dept: PRIMARY CARE CLINIC | Age: 67
End: 2022-08-15

## 2022-08-15 VITALS
HEART RATE: 75 BPM | DIASTOLIC BLOOD PRESSURE: 67 MMHG | RESPIRATION RATE: 18 BRPM | TEMPERATURE: 98.4 F | SYSTOLIC BLOOD PRESSURE: 118 MMHG

## 2022-08-15 DIAGNOSIS — R25.2 SPASTICITY: ICD-10-CM

## 2022-08-15 DIAGNOSIS — I63.9 CEREBROVASCULAR ACCIDENT (CVA), UNSPECIFIED MECHANISM (HCC): ICD-10-CM

## 2022-08-15 DIAGNOSIS — L89.620 PRESSURE ULCER OF HEEL, LEFT, UNSTAGEABLE (HCC): Primary | ICD-10-CM

## 2022-08-15 DIAGNOSIS — G81.94 LEFT HEMIPARESIS (HCC): ICD-10-CM

## 2022-08-15 PROCEDURE — 99213 OFFICE O/P EST LOW 20 MIN: CPT

## 2022-08-15 PROCEDURE — 99212 OFFICE O/P EST SF 10 MIN: CPT | Performed by: NURSE PRACTITIONER

## 2022-08-15 RX ORDER — LIDOCAINE HYDROCHLORIDE 20 MG/ML
JELLY TOPICAL ONCE
Status: CANCELLED | OUTPATIENT
Start: 2022-08-15 | End: 2022-08-15

## 2022-08-15 RX ORDER — LIDOCAINE 50 MG/G
OINTMENT TOPICAL ONCE
Status: CANCELLED | OUTPATIENT
Start: 2022-08-15 | End: 2022-08-15

## 2022-08-15 RX ORDER — BACITRACIN, NEOMYCIN, POLYMYXIN B 400; 3.5; 5 [USP'U]/G; MG/G; [USP'U]/G
OINTMENT TOPICAL ONCE
Status: CANCELLED | OUTPATIENT
Start: 2022-08-15 | End: 2022-08-15

## 2022-08-15 RX ORDER — LIDOCAINE 40 MG/G
CREAM TOPICAL ONCE
Status: CANCELLED | OUTPATIENT
Start: 2022-08-15 | End: 2022-08-15

## 2022-08-15 RX ORDER — CLOBETASOL PROPIONATE 0.5 MG/G
OINTMENT TOPICAL ONCE
Status: CANCELLED | OUTPATIENT
Start: 2022-08-15 | End: 2022-08-15

## 2022-08-15 RX ORDER — BACITRACIN ZINC AND POLYMYXIN B SULFATE 500; 1000 [USP'U]/G; [USP'U]/G
OINTMENT TOPICAL ONCE
Status: CANCELLED | OUTPATIENT
Start: 2022-08-15 | End: 2022-08-15

## 2022-08-15 RX ORDER — LIDOCAINE HYDROCHLORIDE 40 MG/ML
SOLUTION TOPICAL ONCE
Status: COMPLETED | OUTPATIENT
Start: 2022-08-15 | End: 2022-08-15

## 2022-08-15 RX ORDER — GINSENG 100 MG
CAPSULE ORAL ONCE
Status: CANCELLED | OUTPATIENT
Start: 2022-08-15 | End: 2022-08-15

## 2022-08-15 RX ORDER — BETAMETHASONE DIPROPIONATE 0.05 %
OINTMENT (GRAM) TOPICAL ONCE
Status: CANCELLED | OUTPATIENT
Start: 2022-08-15 | End: 2022-08-15

## 2022-08-15 RX ORDER — GENTAMICIN SULFATE 1 MG/G
OINTMENT TOPICAL ONCE
Status: CANCELLED | OUTPATIENT
Start: 2022-08-15 | End: 2022-08-15

## 2022-08-15 RX ORDER — LIDOCAINE HYDROCHLORIDE 40 MG/ML
SOLUTION TOPICAL ONCE
Status: CANCELLED | OUTPATIENT
Start: 2022-08-15 | End: 2022-08-15

## 2022-08-15 RX ADMIN — LIDOCAINE HYDROCHLORIDE 5 ML: 40 SOLUTION TOPICAL at 13:11

## 2022-08-15 ASSESSMENT — PAIN - FUNCTIONAL ASSESSMENT
PAIN_FUNCTIONAL_ASSESSMENT: PREVENTS OR INTERFERES SOME ACTIVE ACTIVITIES AND ADLS
PAIN_FUNCTIONAL_ASSESSMENT: ACTIVITIES ARE NOT PREVENTED

## 2022-08-15 ASSESSMENT — PAIN DESCRIPTION - FREQUENCY: FREQUENCY: INTERMITTENT

## 2022-08-15 ASSESSMENT — PAIN DESCRIPTION - PAIN TYPE
TYPE: ACUTE PAIN
TYPE: ACUTE PAIN

## 2022-08-15 ASSESSMENT — PAIN DESCRIPTION - LOCATION
LOCATION: LEG;OTHER (COMMENT)
LOCATION: FOOT

## 2022-08-15 ASSESSMENT — PAIN DESCRIPTION - DESCRIPTORS
DESCRIPTORS: ACHING
DESCRIPTORS: ACHING

## 2022-08-15 ASSESSMENT — PAIN DESCRIPTION - ORIENTATION
ORIENTATION: LEFT
ORIENTATION: LEFT

## 2022-08-15 ASSESSMENT — PAIN SCALES - GENERAL
PAINLEVEL_OUTOF10: 4
PAINLEVEL_OUTOF10: 4

## 2022-08-15 ASSESSMENT — PAIN DESCRIPTION - ONSET: ONSET: ON-GOING

## 2022-08-15 NOTE — TELEPHONE ENCOUNTER
Belinda/rep reagan/ Shahla would like verbal order for occupational therapy & can patient(he) take a shower if wound is cover; please call back at [phone]

## 2022-08-16 NOTE — PROGRESS NOTES
[address]   Progress Note and Procedure Note      Grover Patino  MEDICAL RECORD NUMBER:  [id number]  AGE: 79 y.o. GENDER: male  : 1955  EPISODE DATE:  8/15/2022    Subjective:     Chief Complaint   Patient presents with    Wound Check     Follow up visit left heel wound         HISTORY of PRESENT ILLNESS HPI       History of Present Illness:  79 yr male presents today for wound left heel assessment and evaluation Recent displaced fracture of base of neck of left femur. Unstageable pressure injury to left heel. with hard black stable eschar. Dry and adherent. Offload boot  applied when in bed. PT and DP pulse strong with Doppler. No overt signs of infection. Daughter with pt on this visit and demonstrated how to monitor heel for signs of infection. Reports good and getting protein supplements. Pt transferring only no walking with walker still too unstable. Noting intermittent spastic movements legs during visit. Concern about friction on left heel with this movement. Daughter states they have made allowances to prevent friction during these movements at home. Displaced fracture of base of neck of left femur, history of CVA, hemiplegia and hemiparesis following CVA affecting left nondominant side, with intermittent spasms  both extremities. PAST MEDICAL HISTORY        Diagnosis Date    CAD (coronary artery disease)     HTN (hypertension)     Hyperlipemia     Left hemiparesis ([address] 2020    Pressure injury of left ankle, stage 2 ([address] 2021    Pressure ulcer of heel, left, unstageable ([address] 2022    Stable eschar covered.     S/P PTCA (percutaneous transluminal coronary angioplasty)     two stents place    Spasticity 2019    Stroke ([address] 10/2012    left-sided weakness       PAST SURGICAL HISTORY    Past Surgical History:   Procedure Laterality Date    CARDIAC SURGERY      INGUINAL HERNIA REPAIR      right side    INTRACAPSULAR CATARACT EXTRACTION Right 8/30/2019    PHACOEMULSIFICATION WITH INTRAOCULAR LENS IMPLANT performed by Geneva Freitas MD at [facility] EXTRACTION Left 9/6/2019    PHACOEMULSIFICATION WITH INTRAOCULAR LENS IMPLANT performed by Geneva Freitas MD at [facility]    Family History   Problem Relation Age of Onset    Hypertension Mother     Heart Disease Father     Breast Cancer Sister     Heart Attack Brother        SOCIAL HISTORY    Social History     Tobacco Use    Smoking status: Never    Smokeless tobacco: Never   Vaping Use    Vaping Use: Never used   Substance Use Topics    Alcohol use: No    Drug use: No       ALLERGIES    No Known Allergies    MEDICATIONS    Current Outpatient Medications on File Prior to Encounter   Medication Sig Dispense Refill    baclofen (LIORESAL) 10 MG tablet Take 1 tablet by mouth 2 times daily 120 tablet 1    gabapentin (NEURONTIN) 100 MG capsule Take 1 capsule by mouth 4 times daily for 180 days. 20 capsule 0    hydrALAZINE (APRESOLINE) 50 MG tablet Take 1 tablet by mouth 3 times daily 270 tablet 1    amLODIPine (NORVASC) 10 MG tablet Take 1 tablet by mouth daily 90 tablet 3    lisinopril (PRINIVIL;ZESTRIL) 20 MG tablet Take 1 tablet by mouth daily 90 tablet 1    atorvastatin (LIPITOR) 40 MG tablet Take 1 tablet by mouth daily 90 tablet 3    loratadine (CLARITIN) 10 MG tablet Take 1 tablet by mouth daily Pt needs to come in and be seen before more rx's are given. 90 tablet 1    aspirin 81 MG tablet Take 81 mg by mouth daily      Sennosides 17.2 MG TABS Take 17.2 mg by mouth       No current facility-administered medications on file prior to encounter. REVIEW OF SYSTEMS  Review of Systems    Pertinent items are noted in HPI.     Objective:      /67   Pulse 75   Temp 98.4 °F (36.9 °C) (Infrared)   Resp 18     Wt Readings from Last 3 Encounters:   08/08/22 230 lb (104.3 kg)   01/19/22 215 lb (97.5 kg)   09/08/21 217 lb (98.4 kg) Assessment Eschar dry; Hyper granulation tissue 08/15/22 1305   Drainage Amount Small 08/15/22 1305   Drainage Description Serous; Yellow 08/15/22 1305   Odor None 08/15/22 1305   Anahy-wound Assessment Dry/flaky 08/15/22 1305   Margins Undefined edges 08/15/22 1305   Wound Thickness Description not for Pressure Injury Full thickness 08/15/22 1305   Number of days: 7       Plan:   Education per provider related to monitoring of stable eschar left heel during daily application of Betadine. Advised to cover wound with dry gauze. Both agreeable to plan of care and questions answered. Treatment Note please see attached Discharge Instructions    Written patient dismissal instructions given to patient and signed by patient or POA. Discharge [id number] [facility] Physician Orders and Discharge Instructions  [address], [address]  Telephone: [phone]  [address] 8:30 am - 4:30 pm and Friday 8:30 am - 1:00 pm.          NAME:  Doreen Orozco  YOB: 1955  MEDICAL RECORD NUMBER:  [id number]  TODAYS DATE:  8/15/22         Please wash hands with soap and water prior to and right after  every dressing change        Vashe wound cleanser:                                [] Vashe Soak  5 minutes in clinic today . DO NOT rinse after Vashe. WOUND LOCATION   Left Heel      May rinse wounds with 0.9% saline  Do NOT SCRUB WOUND. Keep wounds dry in the shower unless otherwise instructed by the physician.      Topical Treatments:              [x] Apply around the wound:   [x] moisturizing lotion  [] Antifungal ointment      [] No-Sting barrier film   [] Zinc paste      PRIMARY DRESSING:                                [x] Betadine directly to wound bed and a few centimeters around anahy wound, let dry                       SECONDARY DRESSING:                [x] Gauze   ( Light guaze)   NO TELFA [x] Rolled Gauze, spandage to help hold on dressing please give extra for home                                                       try no ace bandage- to decrease pressure to the area      [address]:            [x] Daily                              COMPRESSION IS A SIGNIFICANT FACTOR TO HEALING YOUR WOUNDS. Elevate leg(s) above the level of the heart when sitting. Avoid prolonged standing in one place.                 _________________________________________________________________  PRESSURE RELIEF AND OFF-LOADING:     Off-Loading:   With heel protector  [x] Off-loading when       [x] in bed         [x] sitting                             Specialty equipment ordered:       [] Wheelchair cushion    [] Specialty Bed/Mattress     [x] Assistive Device: please continue to use any assistive devices advised by the provider discussed during your visit   [x] Surgical shoe    [] Podus Boot(s)   [] Foam Boot(s)    [] CROW Boot     _____________________________________________________________________________________________     Dietary:  Continue your diet as tolerated. Eat heart-healthy foods. These foods include vegetables, fruits, nuts, beans, lean meat, fish, and whole grains. Limit sodium, alcohol, and sugar. Protein is a teresa nutrient in helping to repair damaged tissue and promote new tissue growth. Good sources of protein are milk, yogurt, cheese, fish, lean meat, and beans.   If you are a diabetic, having diabetes can make it hard for wounds to heal. So try to keep your blood sugar in its target range.  __________________________________________________________________________________________________     ACTIVITY:   Activity as tolerated  ________________________________________________________________________________________________________________     PAIN:     Please note, A small amount of pain, drainage and/or bleeding from this process might be expected, and is NORMAL. Elevate the affected limb. Use over-the-counter medications you would normally use for pain as permitted by your family doctor. For persistent pain not relieved by the above interventions, please call your family doctor.  ________________________________________________________________________________  Call your doctor now or seek immediate medical care if:    You have symptoms of infection, such as: Increased pain, swelling, warmth, or redness. Red streaks leading from the area. Pus draining from the area. A fever. _______________________________________________________________________     Return Appointment:  DME/Wound Dressing Supplies Provided by:    (Supply companies distribute one month supply at a time. Please call them directly to reorder supplies when you run out)     [facility] or [facility]: [address] [place] [address] is responsible to order your supplies. Return Appointment: With Abraham Rey CNP  in  [address] [place] [address])                   [] Orders placed during your visit:                  Electronically signed by : Taylor Marshall. on 8/15/2022 at [facility] Information: Should you experience any significant changes in your wound(s) or have questions about your wound care, please contact the [address] [place] [address] at [address] 8:30 am - 4:30 pm and Friday 8:30 am - 1:00 pm.  If you need help with your wound outside these hours and cannot wait until we are again available, contact your PCP or go to the hospital emergency room. PLEASE NOTE: IF YOU ARE UNABLE TO OBTAIN WOUND SUPPLIES, CONTINUE TO USE THE SUPPLIES YOU HAVE AVAILABLE UNTIL YOU ARE ABLE TO REACH US. KEEP THE WOUND COVERED AT ALL TIMES.            Physician Signature:_______________________     Date: ___________ Time:  ____________                 [x] Abraham Rey CNP             Electronically signed by MADISYN Babb CNP on 8/16/2022 at 11:19 AM

## 2022-08-19 RX ORDER — GABAPENTIN 100 MG/1
100 CAPSULE ORAL 4 TIMES DAILY
Qty: 360 CAPSULE | Refills: 1 | Status: CANCELLED | OUTPATIENT
Start: 2022-08-19 | End: 2023-02-15

## 2022-08-22 RX ORDER — GABAPENTIN 100 MG/1
100 CAPSULE ORAL 4 TIMES DAILY
Qty: 360 CAPSULE | Refills: 1 | Status: SHIPPED | OUTPATIENT
Start: 2022-08-22 | End: 2023-02-18

## 2022-08-24 ENCOUNTER — HOSPITAL ENCOUNTER (OUTPATIENT)
Dept: WOUND CARE | Age: 67
Discharge: HOME | End: 2022-08-24
Payer: MEDICARE

## 2022-08-24 VITALS
TEMPERATURE: 97.8 F | RESPIRATION RATE: 18 BRPM | SYSTOLIC BLOOD PRESSURE: 143 MMHG | HEART RATE: 66 BPM | DIASTOLIC BLOOD PRESSURE: 81 MMHG

## 2022-08-24 DIAGNOSIS — L89.620 PRESSURE ULCER OF HEEL, LEFT, UNSTAGEABLE (HCC): Primary | ICD-10-CM

## 2022-08-24 DIAGNOSIS — R26.9 ABNORMALITY OF GAIT AS LATE EFFECT OF CEREBROVASCULAR ACCIDENT (CVA): ICD-10-CM

## 2022-08-24 DIAGNOSIS — G81.94 LEFT HEMIPARESIS (HCC): ICD-10-CM

## 2022-08-24 DIAGNOSIS — R25.2 SPASTICITY: ICD-10-CM

## 2022-08-24 DIAGNOSIS — I69.398 ABNORMALITY OF GAIT AS LATE EFFECT OF CEREBROVASCULAR ACCIDENT (CVA): ICD-10-CM

## 2022-08-24 DIAGNOSIS — I63.9 CEREBROVASCULAR ACCIDENT (CVA), UNSPECIFIED MECHANISM (HCC): ICD-10-CM

## 2022-08-24 PROCEDURE — 99212 OFFICE O/P EST SF 10 MIN: CPT | Performed by: NURSE PRACTITIONER

## 2022-08-24 PROCEDURE — 99213 OFFICE O/P EST LOW 20 MIN: CPT

## 2022-08-24 RX ORDER — GENTAMICIN SULFATE 1 MG/G
OINTMENT TOPICAL ONCE
Status: CANCELLED | OUTPATIENT
Start: 2022-08-24 | End: 2022-08-24

## 2022-08-24 RX ORDER — BACITRACIN, NEOMYCIN, POLYMYXIN B 400; 3.5; 5 [USP'U]/G; MG/G; [USP'U]/G
OINTMENT TOPICAL ONCE
Status: CANCELLED | OUTPATIENT
Start: 2022-08-24 | End: 2022-08-24

## 2022-08-24 RX ORDER — CLOBETASOL PROPIONATE 0.5 MG/G
OINTMENT TOPICAL ONCE
Status: CANCELLED | OUTPATIENT
Start: 2022-08-24 | End: 2022-08-24

## 2022-08-24 RX ORDER — LIDOCAINE HYDROCHLORIDE 40 MG/ML
SOLUTION TOPICAL ONCE
Status: COMPLETED | OUTPATIENT
Start: 2022-08-24 | End: 2022-08-24

## 2022-08-24 RX ORDER — LIDOCAINE HYDROCHLORIDE 40 MG/ML
SOLUTION TOPICAL ONCE
Status: CANCELLED | OUTPATIENT
Start: 2022-08-24 | End: 2022-08-24

## 2022-08-24 RX ORDER — BETAMETHASONE DIPROPIONATE 0.05 %
OINTMENT (GRAM) TOPICAL ONCE
Status: CANCELLED | OUTPATIENT
Start: 2022-08-24 | End: 2022-08-24

## 2022-08-24 RX ORDER — GINSENG 100 MG
CAPSULE ORAL ONCE
Status: CANCELLED | OUTPATIENT
Start: 2022-08-24 | End: 2022-08-24

## 2022-08-24 RX ORDER — LIDOCAINE 50 MG/G
OINTMENT TOPICAL ONCE
Status: CANCELLED | OUTPATIENT
Start: 2022-08-24 | End: 2022-08-24

## 2022-08-24 RX ORDER — BACITRACIN ZINC AND POLYMYXIN B SULFATE 500; 1000 [USP'U]/G; [USP'U]/G
OINTMENT TOPICAL ONCE
Status: CANCELLED | OUTPATIENT
Start: 2022-08-24 | End: 2022-08-24

## 2022-08-24 RX ORDER — LIDOCAINE HYDROCHLORIDE 20 MG/ML
JELLY TOPICAL ONCE
Status: CANCELLED | OUTPATIENT
Start: 2022-08-24 | End: 2022-08-24

## 2022-08-24 RX ORDER — LIDOCAINE 40 MG/G
CREAM TOPICAL ONCE
Status: CANCELLED | OUTPATIENT
Start: 2022-08-24 | End: 2022-08-24

## 2022-08-24 RX ADMIN — LIDOCAINE HYDROCHLORIDE 5 ML: 40 SOLUTION TOPICAL at 08:21

## 2022-08-24 ASSESSMENT — PAIN SCALES - GENERAL: PAINLEVEL_OUTOF10: 0

## 2022-08-24 NOTE — PROGRESS NOTES
[address]   Progress Note and Procedure Note      Simon Osorio  MEDICAL RECORD NUMBER:  [id number]  AGE: 79 y.o. GENDER: male  : 1955  EPISODE DATE:  2022    Subjective:     Chief Complaint   Patient presents with    Wound Check     Follow up visit left heel wound         HISTORY of PRESENT ILLNESS HPI  History of Present Illness:  79 yr male presents today for wound left heel assessment and evaluation Recent displaced fracture of base of neck of left femur. Unstageable pressure injury to left heel. with hard black stable eschar. Dry and adherent. Offload boot applied when in bed. PT and DP pulse strong with Doppler. No overt signs of infection. Daughter with pt on this visit and demonstrated how to monitor heel for signs of infection. Reports good and getting protein supplements. Pt transferring only no walking with walker still too unstable. Noting intermittent spastic movements legs during visit. Concern about friction on left heel with this movement. Daughter states they have made allowances to prevent friction during these movements at home. Displaced fracture of base of neck of left femur, history of CVA, hemiplegia and hemiparesis following CVA affecting left nondominant side, with intermittent spasms  both extremities. PAST MEDICAL HISTORY        Diagnosis Date    CAD (coronary artery disease)     HTN (hypertension)     Hyperlipemia     Left hemiparesis ([address] 2020    Pressure injury of left ankle, stage 2 ([address] 2021    Pressure ulcer of heel, left, unstageable ([address] 2022    Stable eschar covered.     S/P PTCA (percutaneous transluminal coronary angioplasty)     two stents place    Spasticity 2019    Stroke ([address] 10/2012    left-sided weakness       PAST SURGICAL HISTORY    Past Surgical History:   Procedure Laterality Date    CARDIAC SURGERY      INGUINAL HERNIA REPAIR      right side    INTRACAPSULAR CATARACT EXTRACTION Right 8/30/2019    PHACOEMULSIFICATION WITH INTRAOCULAR LENS IMPLANT performed by Danica Angel MD at [address] EXTRACTION Left 9/6/2019    PHACOEMULSIFICATION WITH INTRAOCULAR LENS IMPLANT performed by Danica Angel MD at [facility]    Family History   Problem Relation Age of Onset    Hypertension Mother     Heart Disease Father     Breast Cancer Sister     Heart Attack Brother        SOCIAL HISTORY    Social History     Tobacco Use    Smoking status: Never    Smokeless tobacco: Never   Vaping Use    Vaping Use: Never used   Substance Use Topics    Alcohol use: No    Drug use: No       ALLERGIES    No Known Allergies    MEDICATIONS    Current Outpatient Medications on File Prior to Encounter   Medication Sig Dispense Refill    gabapentin (NEURONTIN) 100 MG capsule Take 1 capsule by mouth 4 times daily for 180 days. 360 capsule 1    baclofen (LIORESAL) 10 MG tablet Take 1 tablet by mouth 2 times daily 120 tablet 1    hydrALAZINE (APRESOLINE) 50 MG tablet Take 1 tablet by mouth 3 times daily 270 tablet 1    amLODIPine (NORVASC) 10 MG tablet Take 1 tablet by mouth daily 90 tablet 3    lisinopril (PRINIVIL;ZESTRIL) 20 MG tablet Take 1 tablet by mouth daily 90 tablet 1    atorvastatin (LIPITOR) 40 MG tablet Take 1 tablet by mouth daily 90 tablet 3    loratadine (CLARITIN) 10 MG tablet Take 1 tablet by mouth daily Pt needs to come in and be seen before more rx's are given. 90 tablet 1    aspirin 81 MG tablet Take 81 mg by mouth daily      Sennosides 17.2 MG TABS Take 17.2 mg by mouth       No current facility-administered medications on file prior to encounter. REVIEW OF SYSTEMS  Review of Systems    Pertinent items are noted in HPI.     Objective:      BP (!) 143/81   Pulse 66   Temp 97.8 °F (36.6 °C) (Infrared)   Resp 18     Wt Readings from Last 3 Encounters:   08/08/22 230 lb (104.3 kg)   01/19/22 215 lb (97.5 kg)   09/08/21 217 lb (98.4 kg) PHYSICAL EXAM  Physical Exam    General Appearance: alert and oriented to person, place and time  Skin: warm and dry, no rash or erythema  Head: normocephalic and atraumatic  Eyes: pupils equal, round, and reactive to light  Pulmonary/Chest: normal air movement, no respiratory distress  Cardiovascular: normal rate, regular rhythm, and distal pulses diminished      Assessment:        Problem List Items Addressed This Visit       Pressure ulcer of heel, left, unstageable (HCC) - Primary    Relevant Orders    Initiate Outpatient Wound Care Protocol    Stroke [facility]) (Chronic)    Relevant Orders    Initiate Outpatient Wound Care Protocol    Abnormality of gait as late effect of cerebrovascular accident (CVA)    Spasticity    Relevant Orders    Initiate Outpatient Wound Care Protocol    Left hemiparesis [facility])    Relevant Orders    Initiate Outpatient Wound Care Protocol        Procedure Note  Indications:  Based on my examination of this patient's wound(s)/ulcer(s) today, debridement is not required to promote healing and evaluate the wound base. Post Debridement Measurements:  Wound/Ulcer Descriptions are Pre Debridement except measurements:    Wound 08/08/22 Heel Left #1 ( states since about 6/24/2022) (Active)   Wound Image   08/08/22 1404   Wound Etiology Pressure Unstageable 08/08/22 1404   Dressing/Treatment Betadine swabs/povidone iodine;Dry dressing;Roll gauze; Other (comment) 08/15/22 1330   Wound Length (cm) 3.6 cm 08/24/22 0815   Wound Width (cm) 3.7 cm 08/24/22 0815   Wound Depth (cm) 0.1 cm 08/24/22 0815   Wound Surface Area (cm^2) 13.32 cm^2 08/24/22 0815   Change in Wound Size % (l*w) 22.11 08/24/22 0815   Wound Volume (cm^3) 1.332 cm^3 08/24/22 0815   Wound Healing % 22 08/24/22 0815   Post-Procedure Length (cm) 3.6 cm 08/24/22 0832   Post-Procedure Width (cm) 3.7 cm 08/24/22 0832   Post-Procedure Depth (cm) 0.1 cm 08/24/22 0832   Post-Procedure Surface Area (cm^2) 13.32 cm^2 08/24/22 0079 Post-Procedure Volume (cm^3) 1.332 cm^3 08/24/22 0832   Wound Assessment Eschar dry 08/24/22 0815   Drainage Amount Scant 08/24/22 0815   Drainage Description Serous 08/24/22 0815   Odor None 08/24/22 0815   Anahy-wound Assessment Dry/flaky 08/24/22 0815   Margins Undefined edges 08/24/22 0815   Wound Thickness Description not for Pressure Injury Full thickness 08/24/22 0815   Number of days: 15          Plan:     Treatment Note please see attached Discharge Instructions    Written patient dismissal instructions given to patient and signed by patient or POA. Discharge Instructions           Discharge [address] [place] [address] Physician Orders and Discharge Instructions  [address] [place] [address] Drive, [facility], [facility]  Telephone: [phone]  [address] 8:30 am - 4:30 pm and Friday 8:30 am - 1:00 pm.          NAME:  Talita Schultz  YOB: 1955  MEDICAL RECORD NUMBER:  [id number]  TODAYS DATE:  8/24/22         Please wash hands with soap and water prior to and right after  every dressing change        Vashe wound cleanser:                                [] Vashe Soak  5 minutes in clinic today . DO NOT rinse after Vashe. WOUND LOCATION   Left Heel      May rinse wounds with 0.9% saline  Do NOT SCRUB WOUND. Keep wounds dry in the shower unless otherwise instructed by the physician.      Topical Treatments:              [x] Apply around the wound:   [x] moisturizing lotion  [] Antifungal ointment      [] No-Sting barrier film   [] Zinc paste      PRIMARY DRESSING:                                [x] Betadine directly to wound bed and a few centimeters around anahy wound, let dry BEFORE DRESSING IS PLACED                      SECONDARY DRESSING:                [x] Gauze   ( Light guaze)         NO TELFA                                       [x] Rolled Gauze, spandage to help hold on dressing please give extra for home try no ace bandage- to decrease pressure to the area      [address]:            [x] Daily                              COMPRESSION IS A SIGNIFICANT FACTOR TO HEALING YOUR WOUNDS. Elevate leg(s) above the level of the heart when sitting. Avoid prolonged standing in one place.                 _________________________________________________________________  PRESSURE RELIEF AND OFF-LOADING:     Off-Loading:   With heel protector  [x] Off-loading when       [x] in bed         [x] sitting                             Specialty equipment ordered:       [] Wheelchair cushion    [] Specialty Bed/Mattress     [x] Assistive Device: please continue to use any assistive devices advised by the provider discussed during your visit   [x] Surgical shoe    [] Podus Boot(s)   [] Foam Boot(s)    [] CROW Boot     _____________________________________________________________________________________________     Dietary:  Continue your diet as tolerated. Eat heart-healthy foods. These foods include vegetables, fruits, nuts, beans, lean meat, fish, and whole grains. Limit sodium, alcohol, and sugar. Protein is a teresa nutrient in helping to repair damaged tissue and promote new tissue growth. Good sources of protein are milk, yogurt, cheese, fish, lean meat, and beans. If you are a diabetic, having diabetes can make it hard for wounds to heal. So try to keep your blood sugar in its target range.  __________________________________________________________________________________________________     ACTIVITY:   Activity as tolerated  ________________________________________________________________________________________________________________     PAIN:     Please note, A small amount of pain, drainage and/or bleeding from this process might be expected, and is NORMAL. Elevate the affected limb.   Use over-the-counter medications you would normally use for pain as permitted by your family doctor. For persistent pain not relieved by the above interventions, please call your family doctor.  ________________________________________________________________________________  Call your doctor now or seek immediate medical care if:    You have symptoms of infection, such as: Increased pain, swelling, warmth, or redness. Red streaks leading from the area. Pus draining from the area. A fever. _______________________________________________________________________     Return Appointment:  DME/Wound Dressing Supplies Provided by:    (Supply companies distribute one month supply at a time. Please call them directly to reorder supplies when you run out)     [facility] or [facility]: [address] [place] [address] is responsible to order your supplies. Return Appointment: With Lawanda Neville CNP  in  [address] [place] [address])                   [] Orders placed during your visit:                Electronically signed by Paula Anthony RN on 8/24/2022 at 8:33 AM  \             [address] [place] [address] Information: Should you experience any significant changes in your wound(s) or have questions about your wound care, please contact the [address] [place] [address] at [address] 8:30 am - 4:30 pm and Friday 8:30 am - 1:00 pm.  If you need help with your wound outside these hours and cannot wait until we are again available, contact your PCP or go to the hospital emergency room. PLEASE NOTE: IF YOU ARE UNABLE TO OBTAIN WOUND SUPPLIES, CONTINUE TO USE THE SUPPLIES YOU HAVE AVAILABLE UNTIL YOU ARE ABLE TO REACH US. KEEP THE WOUND COVERED AT ALL TIMES.            Physician Signature:_______________________     Date: ___________ Time:  ____________                 [x] Lawanda Neville CNP             Electronically signed by MADISYN Dominguez CNP on 8/24/2022 at 8:46 AM

## 2022-08-24 NOTE — DISCHARGE INSTRUCTIONS
Discharge 7 [facility] Physician Orders and Discharge Instructions  [address] [facility], [address] [place] [address]  Telephone: [phone]  [address] 8:30 am - 4:30 pm and Friday 8:30 am - 1:00 pm.          NAME:  Junaid Barnard  YOB: 1955  MEDICAL RECORD NUMBER:  [id number]  TODAYS DATE:  8/24/22         Please wash hands with soap and water prior to and right after  every dressing change        Vashe wound cleanser:                                [] Vashe Soak  5 minutes in clinic today . DO NOT rinse after Vashe. WOUND LOCATION   Left Heel      May rinse wounds with 0.9% saline  Do NOT SCRUB WOUND. Keep wounds dry in the shower unless otherwise instructed by the physician. Topical Treatments:              [x] Apply around the wound:   [x] moisturizing lotion  [] Antifungal ointment      [] No-Sting barrier film   [] Zinc paste      PRIMARY DRESSING:                                [x] Betadine directly to wound bed and a few centimeters around isabel wound, let dry BEFORE DRESSING IS PLACED                      SECONDARY DRESSING:                [x] Gauze   ( Light guaze)         NO TELFA                                       [x] Rolled Gauze, spandage to help hold on dressing please give extra for home                                                       try no ace bandage- to decrease pressure to the area      [address]:            [x] Daily                              COMPRESSION IS A SIGNIFICANT FACTOR TO HEALING YOUR WOUNDS. Elevate leg(s) above the level of the heart when sitting.      Avoid prolonged standing in one place.                 _________________________________________________________________  PRESSURE RELIEF AND OFF-LOADING:     Off-Loading:   With heel protector  [x] Off-loading when       [x] in bed         [x] sitting                             Specialty equipment ordered:       [] Wheelchair cushion    [] Specialty Bed/Mattress     [x] Assistive Device: please continue to use any assistive devices advised by the provider discussed during your visit   [x] Surgical shoe    [] Podus Boot(s)   [] Foam Boot(s)    [] Bro Abreu     _____________________________________________________________________________________________     Dietary:  Continue your diet as tolerated. Eat heart-healthy foods. These foods include vegetables, fruits, nuts, beans, lean meat, fish, and whole grains. Limit sodium, alcohol, and sugar. Protein is a teresa nutrient in helping to repair damaged tissue and promote new tissue growth. Good sources of protein are milk, yogurt, cheese, fish, lean meat, and beans. If you are a diabetic, having diabetes can make it hard for wounds to heal. So try to keep your blood sugar in its target range.  __________________________________________________________________________________________________     ACTIVITY:   Activity as tolerated  ________________________________________________________________________________________________________________     PAIN:     Please note, A small amount of pain, drainage and/or bleeding from this process might be expected, and is NORMAL. Elevate the affected limb. Use over-the-counter medications you would normally use for pain as permitted by your family doctor. For persistent pain not relieved by the above interventions, please call your family doctor.  ________________________________________________________________________________  Call your doctor now or seek immediate medical care if:    You have symptoms of infection, such as: Increased pain, swelling, warmth, or redness. Red streaks leading from the area. Pus draining from the area. A fever.       _______________________________________________________________________     Return Appointment:  DME/Wound Dressing Supplies Provided by:    [employer] distribute one month supply at a time. Please call them directly to reorder supplies when you run out)     ECF or Home Healthcare: [facility] is responsible to order your supplies. Return Appointment: With Jo Doll CNP  in  [address] [place] [address])                   [] Orders placed during your visit:                Electronically signed by Rafael Abad RN on 8/24/2022 at 8:33 AM  \             [address] Information: Should you experience any significant changes in your wound(s) or have questions about your wound care, please contact the [address] [place] [address] at [address] 8:30 am - 4:30 pm and Friday 8:30 am - 1:00 pm.  If you need help with your wound outside these hours and cannot wait until we are again available, contact your PCP or go to the hospital emergency room. PLEASE NOTE: IF YOU ARE UNABLE TO OBTAIN WOUND SUPPLIES, CONTINUE TO USE THE SUPPLIES YOU HAVE AVAILABLE UNTIL YOU ARE ABLE TO REACH US. KEEP THE WOUND COVERED AT ALL TIMES.            Physician Signature:_______________________     Date: ___________ Time:  ____________                 [x] Jo Doll CNP

## 2022-08-25 ENCOUNTER — TELEPHONE (OUTPATIENT)
Dept: WOUND CARE | Age: 67
End: 2022-08-25

## 2022-08-25 DIAGNOSIS — R25.2 SPASTICITY: ICD-10-CM

## 2022-08-25 DIAGNOSIS — I63.9 CEREBROVASCULAR ACCIDENT (CVA), UNSPECIFIED MECHANISM (HCC): ICD-10-CM

## 2022-08-25 DIAGNOSIS — G81.94 LEFT HEMIPARESIS (HCC): ICD-10-CM

## 2022-08-25 DIAGNOSIS — L89.620 PRESSURE ULCER OF HEEL, LEFT, UNSTAGEABLE (HCC): Primary | ICD-10-CM

## 2022-08-25 NOTE — TELEPHONE ENCOUNTER
Patient and his OT wanted to know if he can keep his boot on in the shower, and if he can put pressure on his heel. Per CHANO George, STEFFANY  the boot may be left on as long as they can cover it and they can keep the dressing dry. He may put pressure on the foot but, must protect the dressing and keep it dry. HIPPA compliant message left for patient at the phone number provided by him.

## 2022-09-01 ENCOUNTER — OFFICE VISIT (OUTPATIENT)
Dept: PRIMARY CARE CLINIC | Age: 67
End: 2022-09-01
Payer: MEDICARE

## 2022-09-01 VITALS
DIASTOLIC BLOOD PRESSURE: 70 MMHG | SYSTOLIC BLOOD PRESSURE: 111 MMHG | BODY MASS INDEX: 32.93 KG/M2 | OXYGEN SATURATION: 98 % | WEIGHT: 230 LBS | HEIGHT: 70 IN | HEART RATE: 61 BPM | TEMPERATURE: 97.1 F

## 2022-09-01 DIAGNOSIS — L89.620 PRESSURE ULCER OF HEEL, LEFT, UNSTAGEABLE (HCC): ICD-10-CM

## 2022-09-01 DIAGNOSIS — Z00.00 INITIAL MEDICARE ANNUAL WELLNESS VISIT: Primary | ICD-10-CM

## 2022-09-01 PROCEDURE — 1123F ACP DISCUSS/DSCN MKR DOCD: CPT | Performed by: NURSE PRACTITIONER

## 2022-09-01 PROCEDURE — G0438 PPPS, INITIAL VISIT: HCPCS | Performed by: NURSE PRACTITIONER

## 2022-09-01 PROCEDURE — 3017F COLORECTAL CA SCREEN DOC REV: CPT | Performed by: NURSE PRACTITIONER

## 2022-09-01 ASSESSMENT — LIFESTYLE VARIABLES
HOW MANY STANDARD DRINKS CONTAINING ALCOHOL DO YOU HAVE ON A TYPICAL DAY: PATIENT DOES NOT DRINK
HOW OFTEN DO YOU HAVE A DRINK CONTAINING ALCOHOL: NEVER

## 2022-09-01 ASSESSMENT — PATIENT HEALTH QUESTIONNAIRE - PHQ9
SUM OF ALL RESPONSES TO PHQ9 QUESTIONS 1 & 2: 0
2. FEELING DOWN, DEPRESSED OR HOPELESS: 0
1. LITTLE INTEREST OR PLEASURE IN DOING THINGS: 0
SUM OF ALL RESPONSES TO PHQ QUESTIONS 1-9: 0

## 2022-09-01 NOTE — DISCHARGE INSTRUCTIONS
[facility] Physician Orders and Discharge Instructions  [address] [place] [address] Drive, [address], [address]  Telephone: [phone]  [address] 8:30 am - 4:30 pm and Friday 8:30 am - 1:00 pm.          NAME:  Cortez Wei  YOB: 1955  MEDICAL RECORD NUMBER:  [id number]  TODAYS DATE:  9/7/22         Please wash hands with soap and water prior to and right after  every dressing change        Vashe wound cleanser:                                [] Vashe Soak  5 minutes in clinic today . DO NOT rinse after Vashe. WOUND LOCATION   Left Heel      May rinse wounds with 0.9% saline  Do NOT SCRUB WOUND. Keep wounds dry in the shower unless otherwise instructed by the physician. Topical Treatments:              [x] Apply around the wound:   [x] moisturizing lotion  [] Antifungal ointment      [] No-Sting barrier film   [] Zinc paste      PRIMARY DRESSING:                                [x] Betadine directly to wound bed and a few centimeters around isabel wound, let dry BEFORE DRESSING IS PLACED                      SECONDARY DRESSING:                [x] Gauze   ( Light guaze)         NO TELFA                                       [x] Rolled Gauze, spandage to help hold on dressing please give extra for home                                                       try no ace bandage- to decrease pressure to the area      [address]:            [x] Daily                              COMPRESSION IS A SIGNIFICANT FACTOR TO HEALING YOUR WOUNDS. Elevate leg(s) above the level of the heart when sitting.      Avoid prolonged standing in one place.                 _________________________________________________________________  PRESSURE RELIEF AND OFF-LOADING:     Off-Loading:   With heel protector  [x] Off-loading when       [x] in bed         [x] sitting                             Specialty equipment ordered:       [] Wheelchair cushion    [] Specialty Bed/Mattress     [x] Assistive Device: please continue to use any assistive devices advised by the provider discussed during your visit   [x] Surgical shoe    [] Podus Boot(s)   [] Foam Boot(s)    [] Saeid Shade     _____________________________________________________________________________________________     Dietary:  Continue your diet as tolerated. Eat heart-healthy foods. These foods include vegetables, fruits, nuts, beans, lean meat, fish, and whole grains. Limit sodium, alcohol, and sugar. Protein is a teresa nutrient in helping to repair damaged tissue and promote new tissue growth. Good sources of protein are milk, yogurt, cheese, fish, lean meat, and beans. If you are a diabetic, having diabetes can make it hard for wounds to heal. So try to keep your blood sugar in its target range.  __________________________________________________________________________________________________     ACTIVITY:   Activity as tolerated  ________________________________________________________________________________________________________________     PAIN:     Please note, A small amount of pain, drainage and/or bleeding from this process might be expected, and is NORMAL. Elevate the affected limb. Use over-the-counter medications you would normally use for pain as permitted by your family doctor. For persistent pain not relieved by the above interventions, please call your family doctor.  ________________________________________________________________________________  Call your doctor now or seek immediate medical care if:    You have symptoms of infection, such as: Increased pain, swelling, warmth, or redness. Red streaks leading from the area. Pus draining from the area. A fever.       _______________________________________________________________________     Return Appointment:  DME/Wound Dressing Supplies Provided by:    (Supply companies distribute one month supply at a time.  Please call them directly to reorder supplies when you run out)     ECF or Home Healthcare: Your Home Care Agency is responsible to order your supplies. Return Appointment: With Xiomara Pastor CNP  in  [address] [place] [address])                   [x] Orders placed during your visit:    culture obtained 9/7/22           Electronically signed by Golden Stovall RN on 9/7/2022 at 9:19 AM             [address] [facility] Information: Should you experience any significant changes in your wound(s) or have questions about your wound care, please contact the [address] [place] [address] at [address] 8:30 am - 4:30 pm and Friday 8:30 am - 1:00 pm.  If you need help with your wound outside these hours and cannot wait until we are again available, contact your PCP or go to the hospital emergency room. PLEASE NOTE: IF YOU ARE UNABLE TO OBTAIN WOUND SUPPLIES, CONTINUE TO USE THE SUPPLIES YOU HAVE AVAILABLE UNTIL YOU ARE ABLE TO REACH US. KEEP THE WOUND COVERED AT ALL TIMES.            Physician Signature:_______________________     Date: ___________ Time:  ____________                 [x] Xiomara Pastor CNP

## 2022-09-01 NOTE — PROGRESS NOTES
Medicare Annual Wellness Visit    Jeanne Rosecho is here for Medicare AWV and Follow-up (Follow up from surgery and needs needs foot rewrapped )    Assessment & Plan   Initial Medicare annual wellness visit  Pressure ulcer of heel, left, unstageable ([address]    Recommendations for Preventive Services Due: see orders and patient instructions/AVS.  Recommended screening schedule for the next 5-10 years is provided to the patient in written form: see Patient Instructions/AVS.     Return for Medicare Annual Wellness Visit in 1 year. Subjective   The following acute and/or chronic problems were also addressed today:  Mr. Dominique Mascorro has history of HTN, stroke, l-sided hemiparesis, tremors. He  fell from his wheelchair and suffered a closed displaced fracture of left femoral neck on 6/16/2022. S/p left hip hemiarthroplasty on 6/17. Hospitalized from 6/16-6/22, transferred to rehab. Rehab facility from 6/22-7/9. Treated for pressure ulcer of left buttock which has healed. Has unstageable pressure ulcer on left heel. Heel is without dressing, states accidentially came off this morning. Cleansed heal with betadine, placed dressing, patient tolerated well. He reports rehab was giving hem Primidone for tremors, which was effective but caused drowsiness. He is interested in a lower dose. Unable to find tablet smaller than 25 mg, 12.5 caused drowsiness. He works 2-3 days/week and would be unable to work taking 12.5 mg of medication. Ideally would like to try 7.5 mg daily. Ambulates in wheelchair but walks short distances with 4 prong cane. Patient's complete Health Risk Assessment and screening values have been reviewed and are found in Flowsheets. The following problems were reviewed today and where indicated follow up appointments were made and/or referrals ordered.     Positive Risk Factor Screenings with Interventions:    Fall Risk:  Do you feel unsteady or are you worried about falling? : (!) yes  2 or more falls in past year?: (!) yes  Fall with injury in past year?: (!) yes (broke leg)   Fall Risk Interventions:    Home safety tips provided  Completed physical therapy    Cognitive: Words recalled: 1 Word Recalled (1 out of 3 words)  Total Score Interpretation: Abnormal Mini-Cog  Cognitive Impairment Interventions:  History of stroke, no worsening symptoms           General Health and ACP:  General  In general, how would you say your health is?: Fair  In the past 7 days, have you experienced any of the following: New or Increased Pain, New or Increased Fatigue, Loneliness, Social Isolation, Stress or Anger?: (!) Yes  Select all that apply: (!) New or Increased Fatigue, New or Increased Pain  Do you get the social and emotional support that you need?: Yes  Do you have a Living Will?: Yes    Advance Directives       Power of  Living Will ACP-Advance Directive ACP-Power of     Not on File Not on File Not on File Not on File        General Health Risk Interventions:  No Living Will: [address] addressed with patient today    Health Habits/Nutrition:  Physical Activity: Inactive    Days of Exercise per Week: 0 days    Minutes of Exercise per Session: 0 min     Have you lost any weight without trying in the past 3 months?: No  Body mass index: (!) 33  Have you seen the dentist within the past year?: (!) No  Health Habits/Nutrition Interventions:  Ambulates in hallway. S/p stroke, wheelchair dependent.  Intermittently uses cane      ADLs:  In the past 7 days, did you need help from others to perform any of the following everyday activities: Eating, dressing, grooming, bathing, toileting, or walking/balance?: (!) Yes  Select all that apply: (!) Bathing  In the past 7 days, did you need help from others to take care of any of the following: Laundry, housekeeping, banking/finances, shopping, telephone use, food preparation, transportation, or taking medications?: (!) Yes  Select all that apply: [employer]  ADL Interventions:  Has assistance          Objective   Vitals:    09/01/22 0656   BP: 111/70   Site: Right Upper Arm   Position: Sitting   Pulse: 61   Temp: 97.1 °F (36.2 °C)   SpO2: 98%   Weight: 230 lb (104.3 kg)   Height: 5' 10\" (1.778 m)      Body mass index is 33 kg/m². No Known Allergies  Prior to Visit Medications    Medication Sig Taking? Authorizing Provider   gabapentin (NEURONTIN) 100 MG capsule Take 1 capsule by mouth 4 times daily for 180 days. MADISYN Gan CNP   baclofen (LIORESAL) 10 MG tablet Take 1 tablet by mouth 2 times daily  MADISYN Gan CNP   hydrALAZINE (APRESOLINE) 50 MG tablet Take 1 tablet by mouth 3 times daily  MADISYN Gan CNP   amLODIPine (NORVASC) 10 MG tablet Take 1 tablet by mouth daily  MADISYN Gan CNP   lisinopril (PRINIVIL;ZESTRIL) 20 MG tablet Take 1 tablet by mouth daily  MADISYN Gan CNP   atorvastatin (LIPITOR) 40 MG tablet Take 1 tablet by mouth daily  MADISYN Gan - CNP   loratadine (CLARITIN) 10 MG tablet Take 1 tablet by mouth daily Pt needs to come in and be seen before more rx's are given.   MADISYN Gan CNP   aspirin 81 MG tablet Take 81 mg by mouth daily  Historical Provider, MD   Sennosides 17.2 MG TABS Take 17.2 mg by mouth  Historical Provider, MD Simpson (Including outside providers/suppliers regularly involved in providing care):   Patient Care Team:  MADISYN Gan CNP as PCP - General (Nurse Practitioner Adult Health)  MADISYN Gan CNP as PCP - [facility] Tomi Bowling Provider  José Holland (Physical Medicine and Rehab)     Reviewed and updated this visit:  Tobacco  Allergies  Meds  Problems  Med Hx  Surg Hx  Soc Hx  Fam Hx

## 2022-09-02 ENCOUNTER — TELEPHONE (OUTPATIENT)
Dept: CARDIOLOGY CLINIC | Age: 67
End: 2022-09-02

## 2022-09-02 NOTE — TELEPHONE ENCOUNTER
Pt just wanted [facility] to know he fell and broke his leg and that's why he missed his last appt but has one rescheduled.

## 2022-09-07 ENCOUNTER — HOSPITAL ENCOUNTER (OUTPATIENT)
Dept: WOUND CARE | Age: 67
Discharge: HOME | End: 2022-09-07
Payer: MEDICARE

## 2022-09-07 VITALS
TEMPERATURE: 98 F | HEART RATE: 79 BPM | SYSTOLIC BLOOD PRESSURE: 136 MMHG | RESPIRATION RATE: 20 BRPM | DIASTOLIC BLOOD PRESSURE: 82 MMHG

## 2022-09-07 DIAGNOSIS — R25.2 SPASTICITY: ICD-10-CM

## 2022-09-07 DIAGNOSIS — I63.9 CEREBROVASCULAR ACCIDENT (CVA), UNSPECIFIED MECHANISM (HCC): ICD-10-CM

## 2022-09-07 DIAGNOSIS — G81.94 LEFT HEMIPARESIS (HCC): ICD-10-CM

## 2022-09-07 DIAGNOSIS — L89.620 PRESSURE ULCER OF HEEL, LEFT, UNSTAGEABLE (HCC): Primary | ICD-10-CM

## 2022-09-07 PROCEDURE — 87077 CULTURE AEROBIC IDENTIFY: CPT

## 2022-09-07 PROCEDURE — 87205 SMEAR GRAM STAIN: CPT

## 2022-09-07 PROCEDURE — 11042 DBRDMT SUBQ TIS 1ST 20SQCM/<: CPT

## 2022-09-07 PROCEDURE — 11042 DBRDMT SUBQ TIS 1ST 20SQCM/<: CPT | Performed by: NURSE PRACTITIONER

## 2022-09-07 PROCEDURE — 87186 SC STD MICRODIL/AGAR DIL: CPT

## 2022-09-07 PROCEDURE — 87070 CULTURE OTHR SPECIMN AEROBIC: CPT

## 2022-09-07 PROCEDURE — 87075 CULTR BACTERIA EXCEPT BLOOD: CPT

## 2022-09-07 RX ORDER — LIDOCAINE HYDROCHLORIDE 40 MG/ML
SOLUTION TOPICAL ONCE
Status: CANCELLED | OUTPATIENT
Start: 2022-09-07 | End: 2022-09-07

## 2022-09-07 RX ORDER — LIDOCAINE 50 MG/G
OINTMENT TOPICAL ONCE
Status: CANCELLED | OUTPATIENT
Start: 2022-09-07 | End: 2022-09-07

## 2022-09-07 RX ORDER — LIDOCAINE HYDROCHLORIDE 20 MG/ML
JELLY TOPICAL ONCE
Status: CANCELLED | OUTPATIENT
Start: 2022-09-07 | End: 2022-09-07

## 2022-09-07 RX ORDER — BACITRACIN ZINC AND POLYMYXIN B SULFATE 500; 1000 [USP'U]/G; [USP'U]/G
OINTMENT TOPICAL ONCE
Status: CANCELLED | OUTPATIENT
Start: 2022-09-07 | End: 2022-09-07

## 2022-09-07 RX ORDER — CLOBETASOL PROPIONATE 0.5 MG/G
OINTMENT TOPICAL ONCE
Status: CANCELLED | OUTPATIENT
Start: 2022-09-07 | End: 2022-09-07

## 2022-09-07 RX ORDER — LIDOCAINE HYDROCHLORIDE 40 MG/ML
SOLUTION TOPICAL ONCE
Status: COMPLETED | OUTPATIENT
Start: 2022-09-07 | End: 2022-09-07

## 2022-09-07 RX ORDER — LIDOCAINE 40 MG/G
CREAM TOPICAL ONCE
Status: CANCELLED | OUTPATIENT
Start: 2022-09-07 | End: 2022-09-07

## 2022-09-07 RX ORDER — GINSENG 100 MG
CAPSULE ORAL ONCE
Status: CANCELLED | OUTPATIENT
Start: 2022-09-07 | End: 2022-09-07

## 2022-09-07 RX ORDER — BETAMETHASONE DIPROPIONATE 0.05 %
OINTMENT (GRAM) TOPICAL ONCE
Status: CANCELLED | OUTPATIENT
Start: 2022-09-07 | End: 2022-09-07

## 2022-09-07 RX ORDER — BACITRACIN, NEOMYCIN, POLYMYXIN B 400; 3.5; 5 [USP'U]/G; MG/G; [USP'U]/G
OINTMENT TOPICAL ONCE
Status: CANCELLED | OUTPATIENT
Start: 2022-09-07 | End: 2022-09-07

## 2022-09-07 RX ORDER — GENTAMICIN SULFATE 1 MG/G
OINTMENT TOPICAL ONCE
Status: CANCELLED | OUTPATIENT
Start: 2022-09-07 | End: 2022-09-07

## 2022-09-07 RX ADMIN — LIDOCAINE HYDROCHLORIDE 2.5 ML: 40 SOLUTION TOPICAL at 08:38

## 2022-09-07 ASSESSMENT — PAIN DESCRIPTION - LOCATION: LOCATION: FOOT

## 2022-09-07 ASSESSMENT — PAIN DESCRIPTION - FREQUENCY: FREQUENCY: INTERMITTENT

## 2022-09-07 ASSESSMENT — PAIN SCALES - GENERAL
PAINLEVEL_OUTOF10: 3
PAINLEVEL_OUTOF10: 0

## 2022-09-07 ASSESSMENT — PAIN DESCRIPTION - ONSET: ONSET: ON-GOING

## 2022-09-07 ASSESSMENT — PAIN DESCRIPTION - PAIN TYPE: TYPE: ACUTE PAIN

## 2022-09-07 ASSESSMENT — PAIN DESCRIPTION - DESCRIPTORS: DESCRIPTORS: OTHER (COMMENT)

## 2022-09-07 ASSESSMENT — PAIN - FUNCTIONAL ASSESSMENT: PAIN_FUNCTIONAL_ASSESSMENT: ACTIVITIES ARE NOT PREVENTED

## 2022-09-07 ASSESSMENT — PAIN DESCRIPTION - ORIENTATION: ORIENTATION: LEFT

## 2022-09-07 NOTE — PROGRESS NOTES
[address]   Progress Note and Procedure Note      Andreea Poster  MEDICAL RECORD NUMBER:  [id number]  AGE: 79 y.o. GENDER: male  : 1955  EPISODE DATE:  2022    Subjective:     Chief Complaint   Patient presents with    Wound Check     F/u visit - left heel wound         HISTORY of PRESENT ILLNESS HPI  History of Present Illness:  79 yr male presents today for wound left heel assessment and evaluation Recent displaced fracture of base of neck of left femur. Unstageable pressure injury to left heel. with hard black stable eschar. Dry and adherent. Offload boot applied when in bed. PT and DP pulse strong with Doppler. No overt signs of infection. But noting small amount of purulent drainage when pressed eschar Edges of of eschar curled up. Reports getting protein supplements. Pt transferring only no walking with walker still too unstable. Noting intermittent spastic movements legs during visit. Concern about friction on left heel with this movement. Pt wears post-op shoe on left foot. Displaced fracture of base of neck of left femur, history of CVA, hemiplegia and hemiparesis following CVA affecting left nondominant side, with intermittent spasms of both lower extremities. PAST MEDICAL HISTORY        Diagnosis Date    CAD (coronary artery disease)     HTN (hypertension)     Hyperlipemia     Left hemiparesis ([address] 2020    Pressure injury of left ankle, stage 2 ([address] 2021    Pressure ulcer of heel, left, unstageable ([address] 2022    Stable eschar covered.     S/P PTCA (percutaneous transluminal coronary angioplasty)     two stents place    Spasticity 2019    Stroke ([address] 10/2012    left-sided weakness       PAST SURGICAL HISTORY    Past Surgical History:   Procedure Laterality Date    CARDIAC SURGERY      INGUINAL HERNIA REPAIR      right side    INTRACAPSULAR CATARACT EXTRACTION Right 2019    PHACOEMULSIFICATION WITH INTRAOCULAR LENS IMPLANT performed by Tom Momin MD at [address] EXTRACTION Left 9/6/2019    PHACOEMULSIFICATION WITH INTRAOCULAR LENS IMPLANT performed by Tom Momin MD at [facility]    Family History   Problem Relation Age of Onset    Hypertension Mother     Heart Disease Father     Breast Cancer Sister     Heart Attack Brother        SOCIAL HISTORY    Social History     Tobacco Use    Smoking status: Never    Smokeless tobacco: Never   Vaping Use    Vaping Use: Never used   Substance Use Topics    Alcohol use: No    Drug use: No       ALLERGIES    No Known Allergies    MEDICATIONS    Current Outpatient Medications on File Prior to Encounter   Medication Sig Dispense Refill    gabapentin (NEURONTIN) 100 MG capsule Take 1 capsule by mouth 4 times daily for 180 days. 360 capsule 1    baclofen (LIORESAL) 10 MG tablet Take 1 tablet by mouth 2 times daily 120 tablet 1    hydrALAZINE (APRESOLINE) 50 MG tablet Take 1 tablet by mouth 3 times daily 270 tablet 1    amLODIPine (NORVASC) 10 MG tablet Take 1 tablet by mouth daily 90 tablet 3    lisinopril (PRINIVIL;ZESTRIL) 20 MG tablet Take 1 tablet by mouth daily 90 tablet 1    atorvastatin (LIPITOR) 40 MG tablet Take 1 tablet by mouth daily 90 tablet 3    loratadine (CLARITIN) 10 MG tablet Take 1 tablet by mouth daily Pt needs to come in and be seen before more rx's are given. 90 tablet 1    aspirin 81 MG tablet Take 81 mg by mouth daily      Sennosides 17.2 MG TABS Take 17.2 mg by mouth       No current facility-administered medications on file prior to encounter. REVIEW OF SYSTEMS  Review of Systems    Pertinent items are noted in HPI.     Objective:      /82   Pulse 79   Temp 98 °F (36.7 °C) (Temporal)   Resp 20     Wt Readings from Last 3 Encounters:   09/01/22 230 lb (104.3 kg)   08/08/22 230 lb (104.3 kg)   01/19/22 215 lb (97.5 kg)       PHYSICAL EXAM  Physical Exam    General Appearance: alert and oriented to person, place and time, well-developed and well-nourished, in no acute distress  Skin: warm and dry, no rash or erythema  Head: normocephalic and atraumatic  Eyes: pupils equal, round, and reactive to light  Pulmonary/Chest: clear to auscultation bilaterally- no wheezes, rales or rhonchi, normal air movement, no respiratory distress  Cardiovascular: normal rate, regular rhythm, and distal pulses diminished      Assessment:        Problem List Items Addressed This Visit       Pressure ulcer of heel, left, unstageable (HCC) - Primary    Relevant Orders    Initiate Outpatient Wound Care Protocol    Stroke [facility]) (Chronic)    Relevant Orders    Initiate Outpatient Wound Care Protocol    Spasticity    Relevant Orders    Initiate Outpatient Wound Care Protocol    Left hemiparesis [facility])    Relevant Orders    Initiate Outpatient Wound Care Protocol        Procedure Note  Indications:  Based on my examination of this patient's wound(s)/ulcer(s) today, debridement is required to promote healing and evaluate the wound base. Performed by: MADISYN Barney - CNP    Consent obtained:  Yes    Time out taken:  Yes    Pain Control: Anesthetic  Anesthetic: 4% Lidocaine Liquid Topical (2.5ml)       Debridement: Excisional Debridement    Using curette, scissors, and forceps the wound(s)/ulcer(s) was/were debrided down through and including the removal of epidermis, dermis, and subcutaneous tissue. Trimmed edges of eschar to evaluate tissue, culture obtained. Will continue with current treatment.      Devitalized Tissue Debrided:  fibrin, biofilm, slough, and necrotic/eschar    Pre Debridement Measurements:  Are located in the [facility]  Documentation Flow Sheet    Diabetic/Pressure/Non Pressure Ulcers only:  Ulcer: Pressure ulcer, unstageable\"     Wound/Ulcer #: 1    Post Debridement Measurements:  Wound/Ulcer Descriptions are Pre Debridement except measurements:    Wound 08/08/22 Heel Left #1 ( states since about 6/24/2022) (Active)   Wound Image   09/07/22 0829   Wound Etiology Pressure Unstageable 08/08/22 1404   Dressing/Treatment Betadine swabs/povidone iodine;Dry dressing;Roll gauze 09/07/22 0920   Wound Length (cm) 3.1 cm 09/07/22 0829   Wound Width (cm) 3.4 cm 09/07/22 0829   Wound Depth (cm) 0.1 cm 09/07/22 0829   Wound Surface Area (cm^2) 10.54 cm^2 09/07/22 0829   Change in Wound Size % (l*w) 38.36 09/07/22 0829   Wound Volume (cm^3) 1. 054 cm^3 09/07/22 0829   Wound Healing % 38 09/07/22 0829   Post-Procedure Length (cm) 3.2 cm 09/07/22 0915   Post-Procedure Width (cm) 3.5 cm 09/07/22 0915   Post-Procedure Depth (cm) 0.2 cm 09/07/22 0915   Post-Procedure Surface Area (cm^2) 11.2 cm^2 09/07/22 0915   Post-Procedure Volume (cm^3) 2.24 cm^3 09/07/22 0915   Wound Assessment Eschar dry 09/07/22 0829   Drainage Amount Moderate 09/07/22 0829   Drainage Description Serosanguinous 09/07/22 0829   Odor Mild 09/07/22 0829   Anahy-wound Assessment Dry/flaky 09/07/22 0829   Margins Undefined edges 09/07/22 0829   Wound Thickness Description not for Pressure Injury Full thickness 09/07/22 0829   Number of days: 30          Total Surface Area Debrided:  2.8 sq cm     Estimated Blood Loss:  Minimal    Hemostasis Achieved:  not needed    Procedural Pain:  1  / 10     Post Procedural Pain:  0 / 10     Response to treatment:  Well tolerated by patient. Plan:   Pt education per provider related to status of wound and concerns about stability of eschar, also discussed and slight changes to topical treatment. Pt is agreeable and will follow-up in one week. Treatment Note please see attached Discharge Instructions    Written patient dismissal instructions given to patient and signed by patient or POA.          Discharge [address] Wound Care Physician Orders and Discharge Instructions  [address] [place] [address]  Telephone: [phone] [place] [address] 8:30 am - 4:30 pm and Friday 8:30 am - 1:00 pm.          NAME:  Tanya Malave  YOB: 1955  MEDICAL RECORD NUMBER:  [id number]  TODAYS DATE:  9/7/22         Please wash hands with soap and water prior to and right after  every dressing change        Vashe wound cleanser:                                [] Vashe Soak  5 minutes in clinic today . DO NOT rinse after Vashe. WOUND LOCATION   Left Heel      May rinse wounds with 0.9% saline  Do NOT SCRUB WOUND. Keep wounds dry in the shower unless otherwise instructed by the physician. Topical Treatments:              [x] Apply around the wound:   [x] moisturizing lotion  [] Antifungal ointment      [] No-Sting barrier film   [] Zinc paste      PRIMARY DRESSING:                                [x] Betadine directly to wound bed and a few centimeters around isabel wound, let dry BEFORE DRESSING IS PLACED                      SECONDARY DRESSING:                [x] Gauze   ( Light guaze)         NO TELFA                                       [x] Rolled Gauze, spandage to help hold on dressing please give extra for home                                                       try no ace bandage- to decrease pressure to the area      [address]:            [x] Daily                              COMPRESSION IS A SIGNIFICANT FACTOR TO HEALING YOUR WOUNDS. Elevate leg(s) above the level of the heart when sitting.      Avoid prolonged standing in one place.                 _________________________________________________________________  PRESSURE RELIEF AND OFF-LOADING:     Off-Loading:   With heel protector  [x] Off-loading when       [x] in bed         [x] sitting                             Specialty equipment ordered:       [] Wheelchair cushion    [] Specialty Bed/Mattress     [x] Assistive Device: please continue to use any assistive devices advised by the provider discussed during your visit   [x] Surgical shoe    [] Podus Boot(s)   [] Foam Boot(s)    [] CROW Boot     _____________________________________________________________________________________________     Dietary:  Continue your diet as tolerated. Eat heart-healthy foods. These foods include vegetables, fruits, nuts, beans, lean meat, fish, and whole grains. Limit sodium, alcohol, and sugar. Protein is a teresa nutrient in helping to repair damaged tissue and promote new tissue growth. Good sources of protein are milk, yogurt, cheese, fish, lean meat, and beans. If you are a diabetic, having diabetes can make it hard for wounds to heal. So try to keep your blood sugar in its target range.  __________________________________________________________________________________________________     ACTIVITY:   Activity as tolerated  ________________________________________________________________________________________________________________     PAIN:     Please note, A small amount of pain, drainage and/or bleeding from this process might be expected, and is NORMAL. Elevate the affected limb. Use over-the-counter medications you would normally use for pain as permitted by your family doctor. For persistent pain not relieved by the above interventions, please call your family doctor.  ________________________________________________________________________________  Call your doctor now or seek immediate medical care if:    You have symptoms of infection, such as: Increased pain, swelling, warmth, or redness. Red streaks leading from the area. Pus draining from the area. A fever. _______________________________________________________________________     Return Appointment:  DME/Wound Dressing Supplies Provided by:    (Supply companies distribute one month supply at a time. Please call them directly to reorder supplies when you run out)     ECF or Home Healthcare: Your Home Care Agency is responsible to order your supplies.      Return Appointment:

## 2022-09-10 LAB
ANAEROBIC CULTURE: ABNORMAL
GRAM STAIN RESULT: ABNORMAL
ORGANISM: ABNORMAL
WOUND/ABSCESS: ABNORMAL

## 2022-09-12 RX ORDER — SULFAMETHOXAZOLE AND TRIMETHOPRIM 800; 160 MG/1; MG/1
1 TABLET ORAL 2 TIMES DAILY
Qty: 14 TABLET | Refills: 0 | Status: SHIPPED | OUTPATIENT
Start: 2022-09-12 | End: 2022-09-19

## 2022-09-12 NOTE — DISCHARGE INSTRUCTIONS
[address] [place] [address] Physician Orders and Discharge Instructions  [address] [place] [address] Drive, [address], [address]  Telephone: [phone] [address] 8:30 am - 4:30 pm and Friday 8:30 am - 1:00 pm.          NAME:  Jamil Vick  YOB: 1955  MEDICAL RECORD NUMBER:  [id number]  TODAYS DATE:  9/14/22         Please wash hands with soap and water prior to and right after  every dressing change        Vashe wound cleanser:                                [x] Vashe Soak  5 minutes in clinic today . DO NOT rinse after Vashe. WOUND LOCATION   Left Heel      May rinse wounds with 0.9% saline  Do NOT SCRUB WOUND. Keep wounds dry in the shower unless otherwise instructed by the physician. Topical Treatments:              [x] Apply around the wound:   [x] moisturizing lotion  [] Antifungal ointment      [] No-Sting barrier film   [] Zinc paste      PRIMARY DRESSING:                                [x] Betadine directly to wound bed and a few centimeters around isabel wound, let dry BEFORE DRESSING IS PLACED                      SECONDARY DRESSING:                [x] Gauze   ( Light guaze)         NO TELFA                                       [x] Rolled Gauze, spandage to help hold on dressing please give extra for home                                                       try no ace bandage- to decrease pressure to the area      [address]:            [x] Daily                              COMPRESSION IS A SIGNIFICANT FACTOR TO HEALING YOUR WOUNDS. Elevate leg(s) above the level of the heart when sitting. Avoid prolonged standing in one place.               (X) medium spandagrip to left leg.  _________________________________________________________________  PRESSURE RELIEF AND OFF-LOADING:     Off-Loading:   With heel protector  [x] Off-loading when       [x] in bed         [x] sitting                             Specialty equipment ordered:       [] Wheelchair cushion    [] Specialty Bed/Mattress     [x] Assistive Device: please continue to use any assistive devices advised by the provider discussed during your visit   [x] Surgical shoe    [] Podus Boot(s)   [] Foam Boot(s)    [] Keith No     _____________________________________________________________________________________________     Dietary:  Continue your diet as tolerated. Eat heart-healthy foods. These foods include vegetables, fruits, nuts, beans, lean meat, fish, and whole grains. Limit sodium, alcohol, and sugar. Protein is a teresa nutrient in helping to repair damaged tissue and promote new tissue growth. Good sources of protein are milk, yogurt, cheese, fish, lean meat, and beans. If you are a diabetic, having diabetes can make it hard for wounds to heal. So try to keep your blood sugar in its target range.  __________________________________________________________________________________________________     ACTIVITY:   Activity as tolerated  ________________________________________________________________________________________________________________     PAIN:     Please note, A small amount of pain, drainage and/or bleeding from this process might be expected, and is NORMAL. Elevate the affected limb. Use over-the-counter medications you would normally use for pain as permitted by your family doctor. For persistent pain not relieved by the above interventions, please call your family doctor.  ________________________________________________________________________________  Call your doctor now or seek immediate medical care if:    You have symptoms of infection, such as: Increased pain, swelling, warmth, or redness. Red streaks leading from the area. Pus draining from the area. A fever.       _______________________________________________________________________     Return Appointment:  DME/Wound Dressing Supplies Provided by:    [employer] distribute one month supply at a time. Please call them directly to reorder supplies when you run out)     ECF or Home Healthcare: Your Home Care Agency is responsible to order your supplies. Return Appointment: With Anthony Thomas CNP  in  [address] [place] [address])                   [x] Orders placed during your visit:    culture obtained 9/7/22 antibiotics ordered                         Jill Keller [address]: Should you experience any significant changes in your wound(s) or have questions about your wound care, please contact the [address] [place] [address] at [address] 8:30 am - 4:30 pm and Friday 8:30 am - 1:00 pm.  If you need help with your wound outside these hours and cannot wait until we are again available, contact your PCP or go to the hospital emergency room. PLEASE NOTE: IF YOU ARE UNABLE TO OBTAIN WOUND SUPPLIES, CONTINUE TO USE THE SUPPLIES YOU HAVE AVAILABLE UNTIL YOU ARE ABLE TO REACH US. KEEP THE WOUND COVERED AT ALL TIMES.            Physician Signature:_______________________     Date: ___________ Time:  ____________                 [x] Anthony Thomas CNP

## 2022-09-14 ENCOUNTER — HOSPITAL ENCOUNTER (OUTPATIENT)
Dept: WOUND CARE | Age: 67
Discharge: HOME | End: 2022-09-14
Payer: MEDICARE

## 2022-09-14 VITALS
DIASTOLIC BLOOD PRESSURE: 80 MMHG | SYSTOLIC BLOOD PRESSURE: 137 MMHG | RESPIRATION RATE: 20 BRPM | HEART RATE: 65 BPM | TEMPERATURE: 98.1 F

## 2022-09-14 DIAGNOSIS — Z22.322 MRSA (METHICILLIN RESISTANT STAPH AUREUS) CULTURE POSITIVE: ICD-10-CM

## 2022-09-14 DIAGNOSIS — R25.2 SPASTICITY: ICD-10-CM

## 2022-09-14 DIAGNOSIS — G81.94 LEFT HEMIPARESIS (HCC): ICD-10-CM

## 2022-09-14 DIAGNOSIS — I63.9 CEREBROVASCULAR ACCIDENT (CVA), UNSPECIFIED MECHANISM (HCC): ICD-10-CM

## 2022-09-14 DIAGNOSIS — L89.620 PRESSURE ULCER OF HEEL, LEFT, UNSTAGEABLE (HCC): Primary | ICD-10-CM

## 2022-09-14 PROCEDURE — 11042 DBRDMT SUBQ TIS 1ST 20SQCM/<: CPT

## 2022-09-14 PROCEDURE — 11042 DBRDMT SUBQ TIS 1ST 20SQCM/<: CPT | Performed by: NURSE PRACTITIONER

## 2022-09-14 RX ORDER — GENTAMICIN SULFATE 1 MG/G
OINTMENT TOPICAL ONCE
Status: CANCELLED | OUTPATIENT
Start: 2022-09-14 | End: 2022-09-14

## 2022-09-14 RX ORDER — LIDOCAINE 40 MG/G
CREAM TOPICAL ONCE
Status: CANCELLED | OUTPATIENT
Start: 2022-09-14 | End: 2022-09-14

## 2022-09-14 RX ORDER — LIDOCAINE 50 MG/G
OINTMENT TOPICAL ONCE
Status: CANCELLED | OUTPATIENT
Start: 2022-09-14 | End: 2022-09-14

## 2022-09-14 RX ORDER — BACITRACIN, NEOMYCIN, POLYMYXIN B 400; 3.5; 5 [USP'U]/G; MG/G; [USP'U]/G
OINTMENT TOPICAL ONCE
Status: CANCELLED | OUTPATIENT
Start: 2022-09-14 | End: 2022-09-14

## 2022-09-14 RX ORDER — GINSENG 100 MG
CAPSULE ORAL ONCE
Status: CANCELLED | OUTPATIENT
Start: 2022-09-14 | End: 2022-09-14

## 2022-09-14 RX ORDER — LIDOCAINE HYDROCHLORIDE 20 MG/ML
JELLY TOPICAL ONCE
Status: CANCELLED | OUTPATIENT
Start: 2022-09-14 | End: 2022-09-14

## 2022-09-14 RX ORDER — CLOBETASOL PROPIONATE 0.5 MG/G
OINTMENT TOPICAL ONCE
Status: CANCELLED | OUTPATIENT
Start: 2022-09-14 | End: 2022-09-14

## 2022-09-14 RX ORDER — BACITRACIN ZINC AND POLYMYXIN B SULFATE 500; 1000 [USP'U]/G; [USP'U]/G
OINTMENT TOPICAL ONCE
Status: CANCELLED | OUTPATIENT
Start: 2022-09-14 | End: 2022-09-14

## 2022-09-14 RX ORDER — LIDOCAINE 50 MG/G
OINTMENT TOPICAL ONCE
Status: COMPLETED | OUTPATIENT
Start: 2022-09-14 | End: 2022-09-14

## 2022-09-14 RX ORDER — LIDOCAINE HYDROCHLORIDE 40 MG/ML
SOLUTION TOPICAL ONCE
Status: CANCELLED | OUTPATIENT
Start: 2022-09-14 | End: 2022-09-14

## 2022-09-14 RX ORDER — BETAMETHASONE DIPROPIONATE 0.05 %
OINTMENT (GRAM) TOPICAL ONCE
Status: CANCELLED | OUTPATIENT
Start: 2022-09-14 | End: 2022-09-14

## 2022-09-14 RX ADMIN — LIDOCAINE 1 G: 50 OINTMENT TOPICAL at 08:32

## 2022-09-14 ASSESSMENT — PAIN DESCRIPTION - FREQUENCY
FREQUENCY: INTERMITTENT
FREQUENCY: INTERMITTENT

## 2022-09-14 ASSESSMENT — PAIN SCALES - GENERAL
PAINLEVEL_OUTOF10: 2
PAINLEVEL_OUTOF10: 3

## 2022-09-14 ASSESSMENT — PAIN DESCRIPTION - ORIENTATION
ORIENTATION: LEFT
ORIENTATION: LEFT

## 2022-09-14 ASSESSMENT — PAIN DESCRIPTION - PAIN TYPE
TYPE: CHRONIC PAIN
TYPE: CHRONIC PAIN

## 2022-09-14 ASSESSMENT — PAIN DESCRIPTION - ONSET
ONSET: ON-GOING
ONSET: ON-GOING

## 2022-09-14 ASSESSMENT — PAIN - FUNCTIONAL ASSESSMENT
PAIN_FUNCTIONAL_ASSESSMENT: ACTIVITIES ARE NOT PREVENTED
PAIN_FUNCTIONAL_ASSESSMENT: ACTIVITIES ARE NOT PREVENTED

## 2022-09-14 ASSESSMENT — PAIN DESCRIPTION - DESCRIPTORS
DESCRIPTORS: BURNING
DESCRIPTORS: BURNING

## 2022-09-14 ASSESSMENT — PAIN DESCRIPTION - LOCATION
LOCATION: FOOT
LOCATION: FOOT

## 2022-09-14 NOTE — PLAN OF CARE
[address]:     [employer] [id number] [facility], [address] [place] [address] p: [phone] f: [phone]     Ordering Center:     [place] [address] [address]  [address] [address]  [phone]  WOUND CARE Dept: [phone]   FAX NUMBER [unfilled]    Patient Information:      [facility]  [address] [facility]   [phone]   : 1955  AGE: 79 y.o.      GENDER: male   TODAYS DATE:  2022    Insurance:      PRIMARY INSURANCE:  Plan: MEDICARE PART A AND B  Coverage: MEDICARE  Effective Date: 2020  [id number] - (Medicare)    SECONDARY INSURANCE:  Plan:   Coverage:   Effective Date:   [unfilled]    [unfilled]   [unfilled]     Patient Wound Information:      Problem List Items Addressed This Visit          Circulatory    Stroke ([address] (Chronic)    Relevant Orders    Initiate Outpatient Wound Care Protocol       Other    Spasticity    Relevant Orders    Initiate Outpatient Wound Care Protocol    Left hemiparesis ([address]    Relevant Orders    Initiate Outpatient Wound Care Protocol    Pressure ulcer of heel, left, unstageable ([address] - Primary    Relevant Orders    Initiate Outpatient Wound Care Protocol     ICD-10 codes: L89.620    WOUNDS REQUIRING DRESSING SUPPLIES:     Wound 22 Heel Left #1 ( states since about 2022) (Active)   Wound Image   22 0829   Wound Etiology Pressure Unstageable 22 1404   Wound Cleansed Vashe 22 0919   Dressing/Treatment Betadine swabs/povidone iodine;Dry dressing;Roll gauze 22 0920   Wound Length (cm) 2.6 cm 22 0828   Wound Width (cm) 3 cm 22 0828   Wound Depth (cm) 0.1 cm 22 0828   Wound Surface Area (cm^2) 7.8 cm^2 22 0828   Change in Wound Size % (l*w) 54.39 22 0828   Wound Volume (cm^3) 0.78 cm^3 2228   Wound Healing % 54 22   Post-Procedure Length (cm) 2.7 cm 09/14/22 0835   Post-Procedure Width (cm) 3.1 cm 09/14/22 0835   Post-Procedure Depth (cm) 0.2 cm 09/14/22 0835   Post-Procedure Surface Area (cm^2) 8.37 cm^2 09/14/22 0835   Post-Procedure Volume (cm^3) 1.674 cm^3 09/14/22 0835   Wound Assessment Eschar dry 09/14/22 0828   Drainage Amount Small 09/14/22 0828   Drainage Description Brown;Yellow 09/14/22 0828   Odor None 09/14/22 0828   Anahy-wound Assessment Dry/flaky 09/14/22 0828   Margins Undefined edges 09/14/22 0828   Wound Thickness Description not for Pressure Injury Full thickness 09/14/22 0828   Number of days: 36          Supplies Requested :      WOUND #: 1   PRIMARY DRESSING:  Other: Memo Lopez and Secure with: 4X4 gauze pad  Bulky roll gauze     FREQUENCY OF DRESSING CHANGES:  Daily             ADDITIONAL ITEMS:  [x] Gloves Small  [] Gloves Medium [x] Gloves Large [] Gloves XLarge  [x] Tape 1\" [x] Tape 2\" [] Tape 3\"  [] Medipore Tape  [x] Saline  [] Skin Prep   [] Adhesive Remover   [] Cotton Tip Applicators   [] Other:    Patient Wound(s) Debrided: [x] Yes   [] No    Debribement Type: subcutaneous tissue    Debridement Date: 9/14/22    Patient currently being seen by Home Health: [] Yes   [x] No    Duration for needed supplies:  []15  []30  []60  [x]90 Days    Provider Information:      PROVIDER'S NAME: MADISYN Eller CNP     NPI: CHRISSIE FAJARDO  [id number]

## 2022-09-14 NOTE — PROGRESS NOTES
[address]   Progress Note and Procedure Note      Sylvia Palomino  MEDICAL RECORD NUMBER:  [id number]  AGE: 79 y.o. GENDER: male  : 1955  EPISODE DATE:  2022    Subjective:     Chief Complaint   Patient presents with    Wound Check     Follow up visit left heel wound         HISTORY of PRESENT ILLNESS HPI  History of Present Illness:  79 yr male presents today for wound left heel assessment and evaluation . Recent displaced fracture of base of neck of left femur. Unstageable pressure injury to left heel. with hard black stable eschar. Dry and adherent. Offload boot applied when in bed. PT and DP pulse strong with Doppler. No overt signs of infection. No fluctuance or purulent drainage from around eschar. Periwound still pink, but non tender. Reports getting protein supplements. Pt transferring only no walking with walker still too unstable. Noting intermittent spastic movements legs during visit. Concern about friction on left heel with this movement. Pt wears post-op shoe on left foot. His son Jeffery Trejo is changing dressings daily and is aware of MRSA and taking appropriate precautions. Pt just starting on ordered antibiotics. New left lower leg swelling noted and low compression added to plan of care. Denies constitutional issues  Displaced fracture of base of neck of left femur, history of CVA, hemiplegia and hemiparesis following CVA affecting left nondominant side, with intermittent spasms of both lower extremities. PAST MEDICAL HISTORY        Diagnosis Date    CAD (coronary artery disease)     HTN (hypertension)     Hyperlipemia     Left hemiparesis ([address] 2020    MRSA (methicillin resistant staph aureus) culture positive 2022    heel    Pressure injury of left ankle, stage 2 ([address] 2021    Pressure ulcer of heel, left, unstageable ([address] 2022    Stable eschar covered.     S/P PTCA (percutaneous transluminal coronary angioplasty)     two stents place    Spasticity 7/9/2019    Stroke ([address] 10/2012    left-sided weakness       PAST SURGICAL HISTORY    Past Surgical History:   Procedure Laterality Date    CARDIAC SURGERY      INGUINAL HERNIA REPAIR      right side    INTRACAPSULAR CATARACT EXTRACTION Right 8/30/2019    PHACOEMULSIFICATION WITH INTRAOCULAR LENS IMPLANT performed by Bridget Mera MD at [address] EXTRACTION Left 9/6/2019    PHACOEMULSIFICATION WITH INTRAOCULAR LENS IMPLANT performed by Bridget Mera MD at [facility]    Family History   Problem Relation Age of Onset    Hypertension Mother     Heart Disease Father     Breast Cancer Sister     Heart Attack Brother        SOCIAL HISTORY    Social History     Tobacco Use    Smoking status: Never    Smokeless tobacco: Never   Vaping Use    Vaping Use: Never used   Substance Use Topics    Alcohol use: No    Drug use: No       ALLERGIES    No Known Allergies    MEDICATIONS    Current Outpatient Medications on File Prior to Encounter   Medication Sig Dispense Refill    sulfamethoxazole-trimethoprim (BACTRIM DS;SEPTRA DS) 800-160 MG per tablet Take 1 tablet by mouth 2 times daily for 7 days 14 tablet 0    gabapentin (NEURONTIN) 100 MG capsule Take 1 capsule by mouth 4 times daily for 180 days. 360 capsule 1    baclofen (LIORESAL) 10 MG tablet Take 1 tablet by mouth 2 times daily 120 tablet 1    hydrALAZINE (APRESOLINE) 50 MG tablet Take 1 tablet by mouth 3 times daily 270 tablet 1    amLODIPine (NORVASC) 10 MG tablet Take 1 tablet by mouth daily 90 tablet 3    lisinopril (PRINIVIL;ZESTRIL) 20 MG tablet Take 1 tablet by mouth daily 90 tablet 1    atorvastatin (LIPITOR) 40 MG tablet Take 1 tablet by mouth daily 90 tablet 3    loratadine (CLARITIN) 10 MG tablet Take 1 tablet by mouth daily Pt needs to come in and be seen before more rx's are given.  90 tablet 1    aspirin 81 MG tablet Take 81 mg by mouth daily      Sennosides 17.2 MG TABS Take 17.2 mg by mouth       No current facility-administered medications on file prior to encounter. REVIEW OF SYSTEMS  Review of Systems    Pertinent items are noted in HPI. Objective:      /80   Pulse 65   Temp 98.1 °F (36.7 °C) (Infrared)   Resp 20     Wt Readings from Last 3 Encounters:   09/01/22 230 lb (104.3 kg)   08/08/22 230 lb (104.3 kg)   01/19/22 215 lb (97.5 kg)       PHYSICAL EXAM  Physical Exam    General Appearance: alert and oriented to person, place and time  Skin: warm and dry, no rash or erythema  Head: normocephalic and atraumatic  Eyes: pupils equal, round, and reactive to light  Pulmonary/Chest: clear to auscultation bilaterally- no wheezes, rales or rhonchi, normal air movement, no respiratory distress  Cardiovascular: normal rate, regular rhythm, and intact distal pulses      Assessment:        Problem List Items Addressed This Visit       Pressure ulcer of heel, left, unstageable (HCC) - Primary    Relevant Orders    Initiate Outpatient Wound Care Protocol    MRSA (methicillin resistant staph aureus) culture positive    Stroke [address] (Chronic)    Relevant Orders    Initiate Outpatient Wound Care Protocol    Spasticity    Relevant Orders    Initiate Outpatient Wound Care Protocol    Left hemiparesis [facility])    Relevant Orders    Initiate Outpatient Wound Care Protocol        Procedure Note  Indications:  Based on my examination of this patient's wound(s)/ulcer(s) today, debridement is required to promote healing and evaluate the wound base. Performed by: MADISYN Sierra - CNP    Consent obtained:  Yes    Time out taken:  Yes    Pain Control: Anesthetic  Anesthetic: 5% Lidocaine Ointment Topical       Debridement: Excisional Debridement    Using curette the wound(s)/ulcer(s) was/were debrided down through and including the removal of epidermis, dermis, and subcutaneous tissue.         Devitalized Tissue Debrided:  fibrin, biofilm, and slough    Pre Debridement Measurements:  Are located in the [place]  Documentation Flow Sheet    Diabetic/Pressure/Non Pressure Ulcers only:  Ulcer: Pressure ulcer, unstageable\"     Wound/Ulcer #: 1    Post Debridement Measurements:  Wound/Ulcer Descriptions are Pre Debridement except measurements:    Wound 08/08/22 Heel Left #1 ( states since about 6/24/2022) (Active)   Wound Image   09/07/22 0829   Wound Etiology Pressure Unstageable 08/08/22 1404   Wound Cleansed Vashe 09/14/22 0919   Dressing/Treatment Betadine swabs/povidone iodine;Dry dressing;Roll gauze 09/14/22 0930   Wound Length (cm) 2.6 cm 09/14/22 0828   Wound Width (cm) 3 cm 09/14/22 0828   Wound Depth (cm) 0.1 cm 09/14/22 0828   Wound Surface Area (cm^2) 7.8 cm^2 09/14/22 0828   Change in Wound Size % (l*w) 54.39 09/14/22 0828   Wound Volume (cm^3) 0.78 cm^3 09/14/22 0828   Wound Healing % 54 09/14/22 0828   Post-Procedure Length (cm) 2.7 cm 09/14/22 0835   Post-Procedure Width (cm) 3.1 cm 09/14/22 0835   Post-Procedure Depth (cm) 0.2 cm 09/14/22 0835   Post-Procedure Surface Area (cm^2) 8.37 cm^2 09/14/22 0835   Post-Procedure Volume (cm^3) 1.674 cm^3 09/14/22 0835   Wound Assessment Eschar dry 09/14/22 0828   Drainage Amount Small 09/14/22 0828   Drainage Description Brown;Yellow 09/14/22 0828   Odor None 09/14/22 0828   Anahy-wound Assessment Dry/flaky 09/14/22 0828   Margins Undefined edges 09/14/22 0828   Wound Thickness Description not for Pressure Injury Full thickness 09/14/22 0828   Number of days: 36          Total Surface Area Debrided:  0.83 sq cm     Estimated Blood Loss:  Minimal    Hemostasis Achieved:  not needed    Procedural Pain:  1  / 10     Post Procedural Pain:  0 / 10     Response to treatment:  Well tolerated by patient. Plan:   Pt education per provider related to status of wound and continuation of current plan of care.    Treatment Note please see attached Discharge Instructions    Written patient dismissal instructions given to patient and signed by patient or POA. Discharge [address] [facility] Physician Orders and Discharge Instructions  [address] [facility], [address], [address]  Telephone: [phone] [address] 8:30 am - 4:30 pm and Friday 8:30 am - 1:00 pm.          NAME:  Rome Choe  YOB: 1955  MEDICAL RECORD NUMBER:  [id number]  TODAYS DATE:  9/14/22         Please wash hands with soap and water prior to and right after  every dressing change        Vashe wound cleanser:                                [x] Vashe Soak  5 minutes in clinic today . DO NOT rinse after Vashe. WOUND LOCATION   Left Heel      May rinse wounds with 0.9% saline  Do NOT SCRUB WOUND. Keep wounds dry in the shower unless otherwise instructed by the physician. Topical Treatments:              [x] Apply around the wound:   [x] moisturizing lotion  [] Antifungal ointment      [] No-Sting barrier film   [] Zinc paste      PRIMARY DRESSING:                                [x] Betadine directly to wound bed and a few centimeters around isabel wound, let dry BEFORE DRESSING IS PLACED                      SECONDARY DRESSING:                [x] Gauze   ( Light guaze)         NO TELFA                                       [x] Rolled Gauze, spandage to help hold on dressing please give extra for home                                                       try no ace bandage- to decrease pressure to the area      [address]:            [x] Daily                              COMPRESSION IS A SIGNIFICANT FACTOR TO HEALING YOUR WOUNDS. Elevate leg(s) above the level of the heart when sitting. Avoid prolonged standing in one place.               (X) medium spandagrip to left leg.  _________________________________________________________________  PRESSURE RELIEF AND OFF-LOADING:     Off-Loading:   With heel protector  [x] Off-loading when       [x] in bed         [x] sitting                             Specialty equipment ordered:       [] Wheelchair cushion    [] Specialty Bed/Mattress     [x] Assistive Device: please continue to use any assistive devices advised by the provider discussed during your visit   [x] Surgical shoe    [] Podus Boot(s)   [] Foam Boot(s)    [] Colville Claude     _____________________________________________________________________________________________     Dietary:  Continue your diet as tolerated. Eat heart-healthy foods. These foods include vegetables, fruits, nuts, beans, lean meat, fish, and whole grains. Limit sodium, alcohol, and sugar. Protein is a teresa nutrient in helping to repair damaged tissue and promote new tissue growth. Good sources of protein are milk, yogurt, cheese, fish, lean meat, and beans. If you are a diabetic, having diabetes can make it hard for wounds to heal. So try to keep your blood sugar in its target range.  __________________________________________________________________________________________________     ACTIVITY:   Activity as tolerated  ________________________________________________________________________________________________________________     PAIN:     Please note, A small amount of pain, drainage and/or bleeding from this process might be expected, and is NORMAL. Elevate the affected limb. Use over-the-counter medications you would normally use for pain as permitted by your family doctor. For persistent pain not relieved by the above interventions, please call your family doctor.  ________________________________________________________________________________  Call your doctor now or seek immediate medical care if:    You have symptoms of infection, such as: Increased pain, swelling, warmth, or redness. Red streaks leading from the area. Pus draining from the area. A fever.       _______________________________________________________________________     Return Appointment:  DME/Wound Dressing Supplies Provided by:    [employer] distribute one month supply at a time. Please call them directly to reorder supplies when you run out)     ECF or Home Healthcare: Your Home Care Agency is responsible to order your supplies. Return Appointment: With Tatiana Jane CNP  in  [address] [place] [address])                   [x] Orders placed during your visit:    culture obtained 9/7/22 antibiotics ordered                         [address]: Should you experience any significant changes in your wound(s) or have questions about your wound care, please contact the [address] [place] [address] at [address] 8:30 am - 4:30 pm and Friday 8:30 am - 1:00 pm.  If you need help with your wound outside these hours and cannot wait until we are again available, contact your PCP or go to the hospital emergency room. PLEASE NOTE: IF YOU ARE UNABLE TO OBTAIN WOUND SUPPLIES, CONTINUE TO USE THE SUPPLIES YOU HAVE AVAILABLE UNTIL YOU ARE ABLE TO REACH US. KEEP THE WOUND COVERED AT ALL TIMES.            Physician Signature:_______________________     Date: ___________ Time:  ____________                 [x] Tatiana Jane CNP             Electronically signed by MADISYN Reese CNP on 9/14/2022 at 1:38 PM

## 2022-09-20 ENCOUNTER — TELEPHONE (OUTPATIENT)
Dept: PRIMARY CARE CLINIC | Age: 67
End: 2022-09-20

## 2022-09-20 DIAGNOSIS — G25.2 ACTION TREMOR: Primary | ICD-10-CM

## 2022-09-20 RX ORDER — PRIMIDONE 50 MG/1
12.5 TABLET ORAL 2 TIMES DAILY
Qty: 30 TABLET | Refills: 1 | Status: SHIPPED | OUTPATIENT
Start: 2022-09-20 | End: 2022-09-23 | Stop reason: SDUPTHER

## 2022-09-20 NOTE — PATIENT INSTRUCTIONS
Everything looks good today  Continue your medications  Follow up in 1 year, call if you have any concerns prior

## 2022-09-20 NOTE — TELEPHONE ENCOUNTER
Left patient a message informing patient that a prescription for medication was sent an order to mail order pharmacy today for 12.5 mg BID. Want to see if they are able to fill or will they call me because the smallest tablet was a 50 mg and they would need to cut into fourths.

## 2022-09-20 NOTE — PROGRESS NOTES
[address]   Cardiac Evaluation      Patient: Junaid Barnard  YOB: 1955         Chief Complaint   Patient presents with    Hypertension    Hyperlipidemia    Coronary Artery Disease    1 Year Follow Up          Referring provider: MADISYN Morgan CNP    History of Present Illness:  Junaid Barnard is a 79 y.o. male presenting for annual follow up on CAD, Hypertension, hyperlipidemia. He also has a history of Hemorrhagic CVA with tremors (from htn was off plavix at the time of the stroke), ambulates with a cane and goes to  at [facility]. Today he reports he is doing well from a cardiac standpoint, no chest pain or SOB. He does have tremors that keep him from sleeping well at times. It also caused him to fall and break his leg and now has a pressure wound on his foot from his hospital stay. He is here in a wheelchair today. With regard to medication therapy he/she has been compliant with prescribed regimen and has tolerated therapy to date. Past Medical History:   has a past medical history of CAD (coronary artery disease), HTN (hypertension), Hyperlipemia, Left hemiparesis (HCC), MRSA (methicillin resistant staph aureus) culture positive, Pressure injury of left ankle, stage 2 (HCC), Pressure ulcer of heel, left, unstageable ([address] S/P PTCA (percutaneous transluminal coronary angioplasty), Spasticity, and Stroke ([facility] Surgical History:   has a past surgical history that includes Inguinal hernia repair; Cardiac surgery; Intracapsular cataract extraction (Right, 08/30/2019); Intracapsular cataract extraction (Left, 09/06/2019); and hip surgery (Left).      Current Outpatient Medications   Medication Sig Dispense Refill    senna-docusate (PERICOLACE) 8.6-50 MG per tablet Take 1 tablet by mouth 2 times daily      atorvastatin (LIPITOR) 40 MG tablet Take 1 tablet by mouth daily 90 tablet 3    primidone (MYSOLINE) 50 MG tablet Take 0.25 tablets by mouth in the morning and at bedtime 90 tablet 1    amLODIPine (NORVASC) 10 MG tablet Take 1 tablet by mouth daily 90 tablet 3    gabapentin (NEURONTIN) 100 MG capsule Take 1 capsule by mouth 4 times daily for 180 days. 360 capsule 1    baclofen (LIORESAL) 10 MG tablet Take 1 tablet by mouth 2 times daily 120 tablet 1    hydrALAZINE (APRESOLINE) 50 MG tablet Take 1 tablet by mouth 3 times daily 270 tablet 1    lisinopril (PRINIVIL;ZESTRIL) 20 MG tablet Take 1 tablet by mouth daily 90 tablet 1    loratadine (CLARITIN) 10 MG tablet Take 1 tablet by mouth daily Pt needs to come in and be seen before more rx's are given. 90 tablet 1    aspirin 81 MG tablet Take 81 mg by mouth daily      Sennosides 17.2 MG TABS Take 17.2 mg by mouth       No current facility-administered medications for this visit. Allergies:  Patient has no known allergies. Social History:  Social History     Socioeconomic History    Marital status:      Spouse name: Not on file    Number of children: 3    Years of education: Not on file    Highest education level: Not on file   Occupational History    Occupation: remodeling   Tobacco Use    Smoking status: Never    Smokeless tobacco: Never   Vaping Use    Vaping Use: Never used   Substance and Sexual Activity    Alcohol use: No    Drug use: No    Sexual activity: Not on file   Other Topics Concern    Not on file   Social History Narrative    Recently relocated from [facility]. Lives alone.      Social Determinants of Health     Financial Resource Strain: Not on file   Food Insecurity: Not on file   Transportation Needs: Not on file   Physical Activity: Inactive    Days of Exercise per Week: 0 days    Minutes of Exercise per Session: 0 min   Stress: Not on file   Social Connections: Not on file   Intimate Partner Violence: Not on file   Housing Stability: Not on file       Family History:   Family History   Problem Relation Age of Onset    Hypertension Mother     Heart Disease Father     Breast Cancer Sister gallop. No edema. Radial pulses present and equal  CHEST: No scar or masses  ABDOMEN: Normal bowel sounds. No masses or tenderness. No bruit  MUSCULOSKELETAL: No clubbing or cyanosis. Moves all extremities well. Normal gait  SKIN:  Warm and dry. No rashes  NEUROLOGIC: Cranial nerves intact. Alert and oriented. L sided paraplegia  PSYCHIATRIC: Calm affect. Appears to have normal judgement and insight    All testing and labs listed below were personally reviewed by myself. Assessment/Plan  1. Coronary artery disease involving native coronary artery of native heart without angina pectoris    2. Essential hypertension    3. Mixed hyperlipidemia      Coronary artery disease  Angina ~ none   CCS class 1  Intervention  Stress~2/2/17 myoview stable  LHC~ 2009 APRIL to prox circ and mid right  Echo~ 2012 EF 45-50%  Current meds~ asa  Plan~ stable     Essential hypertension  /74 (Site: Right Upper Arm, Position: Sitting, Cuff Size: Large Adult)   Pulse 73   Ht 5' 10\" (1.778 m)   Wt 228 lb 14.4 oz (103.8 kg)   SpO2 98%   BMI 32.84 kg/m²    Meds~ amlodipine / hydralazine / lisinopril  Plan~ stable     Hyperlipidemia  3/2022      TG 59 LDL  63   HDL 35  Meds~ Lipitor   Plan~ stable     Stroke [facility])  Hemorrhagic CVA 2012  He was off plavix at time of Stroke  Has tremors and works with PT at [facility]      No orders of the defined types were placed in this encounter. Follow up in 1 year    Anahi Lange MD      Thank you for allowing to me to participate in the care of [facility]. Scribe's Attestation: This note was scribed in the presence of Dr. Nat Hedrick MD by Bennie Samano RN.    I, Dr. Nat Hedrick, personally performed the services described in this documentation, as scribed by the above signed scribe in my presence. It is both accurate and complete to my knowledge.  I agree with the details independently gathered by the clinical support staff, while the remaining scribed note accurately describes my personal service to the patient.

## 2022-09-20 NOTE — TELEPHONE ENCOUNTER
----- Message from Nahid Madrid sent at 9/20/2022  9:11 AM EDT -----  Subject: Message to Provider    QUESTIONS  Information for Provider? Patient would like to follow up on requested   medication he spoke with provider Juvencio Vizcarra about at last appt, LOUIE anti   seizure medication/ for shree. Please contact patient to discuss course   of care.   ---------------------------------------------------------------------------  --------------  Chandrakant Alanis [facility]  [phone]; OK to leave message on voicemail  ---------------------------------------------------------------------------  --------------  SCRIPT ANSWERS  Relationship to Patient?  Self

## 2022-09-20 NOTE — TELEPHONE ENCOUNTER
----- Message from MADISYN Erickson - CNP sent at 9/20/2022 11:25 AM EDT -----  Regarding: Primidone  Let him know I sent an order to mail order pharmacy today for 12.5 mg BID. Want to see if they are able to fill or will they call me because the smallest tablet was a 50 mg and they would need to cut into fourths. 32 35

## 2022-09-21 NOTE — DISCHARGE INSTRUCTIONS
[address] Physician Orders and Discharge Instructions  [address], [address] [place] [address]  Telephone: [phone]  [address] 8:30 am - 4:30 pm and Friday 8:30 am - 1:00 pm.          NAME:  Junaid Barnard  YOB: 1955  MEDICAL RECORD NUMBER:  [id number]  TODAYS DATE:  9/28/22         Please wash hands with soap and water prior to and right after  every dressing change        Vashe wound cleanser:                                [] Vashe Soak  5 minutes in clinic today . DO NOT rinse after Vashe. WOUND LOCATION   Left Heel      May rinse wounds with 0.9% saline  Do NOT SCRUB WOUND. Keep wounds dry in the shower unless otherwise instructed by the physician. Topical Treatments:              [x] Apply around the wound:   [x] moisturizing lotion  [] Antifungal ointment      [] No-Sting barrier film   [] Zinc paste      PRIMARY DRESSING:                                [x] Betadine directly to wound bed and a few centimeters around isabel wound, let dry BEFORE DRESSING IS PLACED                      SECONDARY DRESSING:                [x] Gauze   ( Light guaze)         NO TELFA                                       [x] Rolled Gauze, spandage to help hold on dressing please give extra for home                                                       try no ace bandage- to decrease pressure to the area      [address]:            [x] Daily                              COMPRESSION IS A SIGNIFICANT FACTOR TO HEALING YOUR WOUNDS. Elevate leg(s) above the level of the heart when sitting. Avoid prolonged standing in one place.                (X)   LOW SPANDAGRIP spandagrip to left leg.  _________________________________________________________________  PRESSURE RELIEF AND OFF-LOADING:     Off-Loading:   With heel protector  [x] Off-loading when       [x] in bed         [x] sitting Specialty equipment ordered:       [] Wheelchair cushion    [] Specialty Bed/Mattress     [x] Assistive Device: please continue to use any assistive devices advised by the provider discussed during your visit   [x] Surgical shoe    [] Podus Boot(s)   [] Foam Boot(s)    [] Moriah Liner     _____________________________________________________________________________________________     Dietary:  Continue your diet as tolerated. Eat heart-healthy foods. These foods include vegetables, fruits, nuts, beans, lean meat, fish, and whole grains. Limit sodium, alcohol, and sugar. Protein is a teresa nutrient in helping to repair damaged tissue and promote new tissue growth. Good sources of protein are milk, yogurt, cheese, fish, lean meat, and beans. If you are a diabetic, having diabetes can make it hard for wounds to heal. So try to keep your blood sugar in its target range.  __________________________________________________________________________________________________     ACTIVITY:   Activity as tolerated  ________________________________________________________________________________________________________________     PAIN:     Please note, A small amount of pain, drainage and/or bleeding from this process might be expected, and is NORMAL. Elevate the affected limb. Use over-the-counter medications you would normally use for pain as permitted by your family doctor. For persistent pain not relieved by the above interventions, please call your family doctor.  ________________________________________________________________________________  Call your doctor now or seek immediate medical care if:    You have symptoms of infection, such as: Increased pain, swelling, warmth, or redness. Red streaks leading from the area. Pus draining from the area. A fever.       _______________________________________________________________________     Return Appointment:  DME/Wound Dressing Supplies Provided by:    (Supply companies distribute one month supply at a time. Please call them directly to reorder supplies when you run out)     ECF or Home Healthcare: Your Home Care Agency is responsible to order your supplies. Return Appointment: With Binh Sanderson CNP  in [address] [place] [address])                   [x] Orders placed during your visit:    culture obtained 9/7/22 antibiotics ordered            Electronically signed by Tito Zambrano RN on 9/28/2022 at 8:52 AM                [address] Information: Should you experience any significant changes in your wound(s) or have questions about your wound care, please contact the [address] [place] [address] at [address] 8:30 am - 4:30 pm and Friday 8:30 am - 1:00 pm.  If you need help with your wound outside these hours and cannot wait until we are again available, contact your PCP or go to the hospital emergency room. PLEASE NOTE: IF YOU ARE UNABLE TO OBTAIN WOUND SUPPLIES, CONTINUE TO USE THE SUPPLIES YOU HAVE AVAILABLE UNTIL YOU ARE ABLE TO REACH US. KEEP THE WOUND COVERED AT ALL TIMES.            Physician Signature:_______________________     Date: ___________ Time:  ____________                 [x] Binh Sanderson CNP

## 2022-09-22 RX ORDER — AMLODIPINE BESYLATE 10 MG/1
10 TABLET ORAL DAILY
Qty: 90 TABLET | Refills: 3 | Status: SHIPPED | OUTPATIENT
Start: 2022-09-22

## 2022-09-23 DIAGNOSIS — G25.2 ACTION TREMOR: ICD-10-CM

## 2022-09-23 RX ORDER — PRIMIDONE 50 MG/1
12.5 TABLET ORAL 2 TIMES DAILY
Qty: 90 TABLET | Refills: 1 | Status: SHIPPED | OUTPATIENT
Start: 2022-09-23

## 2022-09-23 NOTE — TELEPHONE ENCOUNTER
Medication:   Requested Prescriptions     Pending Prescriptions Disp Refills    primidone (MYSOLINE) 50 MG tablet 90 tablet 1     Sig: Take 0.25 tablets by mouth in the morning and at bedtime        Last Filled:      Patient Phone Number: [phone] (home)     Last appt: 9/1/2022   Next appt: Visit date not found    Last OARRS: No flowsheet data found.

## 2022-09-28 ENCOUNTER — HOSPITAL ENCOUNTER (OUTPATIENT)
Dept: WOUND CARE | Age: 67
Discharge: HOME | End: 2022-09-28
Payer: MEDICARE

## 2022-09-28 VITALS
RESPIRATION RATE: 20 BRPM | DIASTOLIC BLOOD PRESSURE: 81 MMHG | SYSTOLIC BLOOD PRESSURE: 136 MMHG | TEMPERATURE: 97.5 F | HEART RATE: 56 BPM

## 2022-09-28 DIAGNOSIS — L89.620 PRESSURE ULCER OF HEEL, LEFT, UNSTAGEABLE (HCC): ICD-10-CM

## 2022-09-28 DIAGNOSIS — I63.9 CEREBROVASCULAR ACCIDENT (CVA), UNSPECIFIED MECHANISM (HCC): ICD-10-CM

## 2022-09-28 DIAGNOSIS — G81.94 LEFT HEMIPARESIS (HCC): ICD-10-CM

## 2022-09-28 DIAGNOSIS — R25.2 SPASTICITY: ICD-10-CM

## 2022-09-28 DIAGNOSIS — Z22.322 MRSA (METHICILLIN RESISTANT STAPH AUREUS) CULTURE POSITIVE: Primary | ICD-10-CM

## 2022-09-28 PROCEDURE — 11042 DBRDMT SUBQ TIS 1ST 20SQCM/<: CPT

## 2022-09-28 PROCEDURE — 11042 DBRDMT SUBQ TIS 1ST 20SQCM/<: CPT | Performed by: NURSE PRACTITIONER

## 2022-09-28 RX ORDER — CLOBETASOL PROPIONATE 0.5 MG/G
OINTMENT TOPICAL ONCE
Status: CANCELLED | OUTPATIENT
Start: 2022-09-28 | End: 2022-09-28

## 2022-09-28 RX ORDER — LIDOCAINE HYDROCHLORIDE 40 MG/ML
SOLUTION TOPICAL ONCE
Status: CANCELLED | OUTPATIENT
Start: 2022-09-28 | End: 2022-09-28

## 2022-09-28 RX ORDER — LIDOCAINE 50 MG/G
OINTMENT TOPICAL ONCE
Status: COMPLETED | OUTPATIENT
Start: 2022-09-28 | End: 2022-09-28

## 2022-09-28 RX ORDER — GINSENG 100 MG
CAPSULE ORAL ONCE
Status: CANCELLED | OUTPATIENT
Start: 2022-09-28 | End: 2022-09-28

## 2022-09-28 RX ORDER — BETAMETHASONE DIPROPIONATE 0.05 %
OINTMENT (GRAM) TOPICAL ONCE
Status: CANCELLED | OUTPATIENT
Start: 2022-09-28 | End: 2022-09-28

## 2022-09-28 RX ORDER — BACITRACIN ZINC AND POLYMYXIN B SULFATE 500; 1000 [USP'U]/G; [USP'U]/G
OINTMENT TOPICAL ONCE
Status: CANCELLED | OUTPATIENT
Start: 2022-09-28 | End: 2022-09-28

## 2022-09-28 RX ORDER — LIDOCAINE HYDROCHLORIDE 20 MG/ML
JELLY TOPICAL ONCE
Status: CANCELLED | OUTPATIENT
Start: 2022-09-28 | End: 2022-09-28

## 2022-09-28 RX ORDER — GENTAMICIN SULFATE 1 MG/G
OINTMENT TOPICAL ONCE
Status: CANCELLED | OUTPATIENT
Start: 2022-09-28 | End: 2022-09-28

## 2022-09-28 RX ORDER — BACITRACIN, NEOMYCIN, POLYMYXIN B 400; 3.5; 5 [USP'U]/G; MG/G; [USP'U]/G
OINTMENT TOPICAL ONCE
Status: CANCELLED | OUTPATIENT
Start: 2022-09-28 | End: 2022-09-28

## 2022-09-28 RX ORDER — LIDOCAINE 40 MG/G
CREAM TOPICAL ONCE
Status: CANCELLED | OUTPATIENT
Start: 2022-09-28 | End: 2022-09-28

## 2022-09-28 RX ORDER — LIDOCAINE 50 MG/G
OINTMENT TOPICAL ONCE
Status: CANCELLED | OUTPATIENT
Start: 2022-09-28 | End: 2022-09-28

## 2022-09-28 RX ADMIN — LIDOCAINE: 50 OINTMENT TOPICAL at 08:45

## 2022-09-28 ASSESSMENT — PAIN DESCRIPTION - ORIENTATION: ORIENTATION: LEFT

## 2022-09-28 ASSESSMENT — PAIN DESCRIPTION - LOCATION: LOCATION: FOOT

## 2022-09-28 ASSESSMENT — PAIN SCALES - GENERAL
PAINLEVEL_OUTOF10: 1
PAINLEVEL_OUTOF10: 0

## 2022-09-28 ASSESSMENT — PAIN - FUNCTIONAL ASSESSMENT: PAIN_FUNCTIONAL_ASSESSMENT: ACTIVITIES ARE NOT PREVENTED

## 2022-09-28 ASSESSMENT — PAIN DESCRIPTION - DESCRIPTORS: DESCRIPTORS: DISCOMFORT

## 2022-09-28 ASSESSMENT — PAIN DESCRIPTION - PAIN TYPE: TYPE: CHRONIC PAIN

## 2022-09-28 ASSESSMENT — PAIN DESCRIPTION - FREQUENCY: FREQUENCY: INTERMITTENT

## 2022-09-29 ENCOUNTER — OFFICE VISIT (OUTPATIENT)
Dept: CARDIOLOGY CLINIC | Age: 67
End: 2022-09-29
Payer: MEDICARE

## 2022-09-29 VITALS
SYSTOLIC BLOOD PRESSURE: 118 MMHG | DIASTOLIC BLOOD PRESSURE: 74 MMHG | OXYGEN SATURATION: 98 % | HEIGHT: 70 IN | WEIGHT: 228.9 LBS | BODY MASS INDEX: 32.77 KG/M2 | HEART RATE: 73 BPM

## 2022-09-29 DIAGNOSIS — E78.2 MIXED HYPERLIPIDEMIA: ICD-10-CM

## 2022-09-29 DIAGNOSIS — I10 ESSENTIAL HYPERTENSION: ICD-10-CM

## 2022-09-29 DIAGNOSIS — I25.10 CORONARY ARTERY DISEASE INVOLVING NATIVE CORONARY ARTERY OF NATIVE HEART WITHOUT ANGINA PECTORIS: Primary | ICD-10-CM

## 2022-09-29 PROCEDURE — G8427 DOCREV CUR MEDS BY ELIG CLIN: HCPCS | Performed by: INTERNAL MEDICINE

## 2022-09-29 PROCEDURE — 3017F COLORECTAL CA SCREEN DOC REV: CPT | Performed by: INTERNAL MEDICINE

## 2022-09-29 PROCEDURE — 1123F ACP DISCUSS/DSCN MKR DOCD: CPT | Performed by: INTERNAL MEDICINE

## 2022-09-29 PROCEDURE — G8417 CALC BMI ABV UP PARAM F/U: HCPCS | Performed by: INTERNAL MEDICINE

## 2022-09-29 PROCEDURE — 1036F TOBACCO NON-USER: CPT | Performed by: INTERNAL MEDICINE

## 2022-09-29 PROCEDURE — 99214 OFFICE O/P EST MOD 30 MIN: CPT | Performed by: INTERNAL MEDICINE

## 2022-09-29 RX ORDER — AMOXICILLIN 250 MG
1 CAPSULE ORAL 2 TIMES DAILY
COMMUNITY
Start: 2022-07-08

## 2022-09-29 RX ORDER — ATORVASTATIN CALCIUM 40 MG/1
40 TABLET, FILM COATED ORAL DAILY
Qty: 90 TABLET | Refills: 3 | Status: SHIPPED | OUTPATIENT
Start: 2022-09-29

## 2022-09-29 NOTE — PROGRESS NOTES
[address]   Progress Note and Procedure Note      Talita Schultz  MEDICAL RECORD NUMBER:  [id number]  AGE: 79 y.o. GENDER: male  : 1955  EPISODE DATE:  2022    Subjective:     Chief Complaint   Patient presents with    Wound Check     Follow up for left heel. History of Present Illness:  79 yr male presents today for wound left heel assessment and evaluation . Recent displaced fracture of base of neck of left femur. Unstageable pressure injury to left heel. with hard black stable eschar. Dry and adherent. Offload boot applied when in bed. PT and DP pulse strong with Doppler. No overt signs of infection. No fluctuance or purulent drainage from around eschar. Periwound still pink, but non tender. Reports getting protein supplements. Pt transferring only no walking with walker still too unstable. Noting intermittent spastic movements legs during visit. Concern about friction on left heel with this movement. Pt wears post-op shoe on left foot. His son Fuad Su is changing dressings daily and is aware of MRSA and taking appropriate precautions. Pt just starting on ordered antibiotics. New left lower leg swelling noted and low compression added to plan of care. Denies constitutional issues  Displaced fracture of base of neck of left femur, history of CVA, hemiplegia and hemiparesis following CVA affecting left nondominant side, with intermittent spasms of both lower extremities. PAST MEDICAL HISTORY        Diagnosis Date    CAD (coronary artery disease)     HTN (hypertension)     Hyperlipemia     Left hemiparesis ([address] 2020    MRSA (methicillin resistant staph aureus) culture positive 2022    heel    Pressure injury of left ankle, stage 2 ([address] 2021    Pressure ulcer of heel, left, unstageable ([address] 2022    Stable eschar covered.     S/P PTCA (percutaneous transluminal coronary angioplasty)     two stents place    Spasticity 2019 Stroke [facility]) 10/2012    left-sided weakness       PAST SURGICAL HISTORY    Past Surgical History:   Procedure Laterality Date    CARDIAC SURGERY      HIP SURGERY Left     INGUINAL HERNIA REPAIR      right side    INTRACAPSULAR CATARACT EXTRACTION Right 08/30/2019    PHACOEMULSIFICATION WITH INTRAOCULAR LENS IMPLANT performed by Esteban Macias MD at [address] EXTRACTION Left 09/06/2019    PHACOEMULSIFICATION WITH INTRAOCULAR LENS IMPLANT performed by Esteban Macias MD at [facility]    Family History   Problem Relation Age of Onset    Hypertension Mother     Heart Disease Father     Breast Cancer Sister     Heart Attack Brother        SOCIAL HISTORY    Social History     Tobacco Use    Smoking status: Never    Smokeless tobacco: Never   Vaping Use    Vaping Use: Never used   Substance Use Topics    Alcohol use: No    Drug use: No       ALLERGIES    No Known Allergies    MEDICATIONS    Current Outpatient Medications on File Prior to Encounter   Medication Sig Dispense Refill    primidone (MYSOLINE) 50 MG tablet Take 0.25 tablets by mouth in the morning and at bedtime 90 tablet 1    amLODIPine (NORVASC) 10 MG tablet Take 1 tablet by mouth daily 90 tablet 3    gabapentin (NEURONTIN) 100 MG capsule Take 1 capsule by mouth 4 times daily for 180 days. 360 capsule 1    baclofen (LIORESAL) 10 MG tablet Take 1 tablet by mouth 2 times daily 120 tablet 1    hydrALAZINE (APRESOLINE) 50 MG tablet Take 1 tablet by mouth 3 times daily 270 tablet 1    lisinopril (PRINIVIL;ZESTRIL) 20 MG tablet Take 1 tablet by mouth daily 90 tablet 1    loratadine (CLARITIN) 10 MG tablet Take 1 tablet by mouth daily Pt needs to come in and be seen before more rx's are given. 90 tablet 1    aspirin 81 MG tablet Take 81 mg by mouth daily      Sennosides 17.2 MG TABS Take 17.2 mg by mouth       No current facility-administered medications on file prior to encounter.        REVIEW OF SYSTEMS  Review of Systems    Pertinent items are noted in HPI. Objective:      /81   Pulse 56   Temp 97.5 °F (36.4 °C) (Infrared)   Resp 20     Wt Readings from Last 3 Encounters:   09/29/22 228 lb 14.4 oz (103.8 kg)   09/01/22 230 lb (104.3 kg)   08/08/22 230 lb (104.3 kg)       PHYSICAL EXAM  Physical Exam    General Appearance: alert and oriented to person, place and time, well-developed and well-nourished, in no acute distress  Skin: warm and dry, no rash or erythema  Head: normocephalic and atraumatic  Eyes: pupils equal, round, and reactive to light  Pulmonary/Chest: normal air movement, no respiratory distress  Cardiovascular: normal rate, regular rhythm, and intact distal pulses  Extremities: no cyanosis, no clubbing, and no edema      Assessment:        Problem List Items Addressed This Visit       Pressure ulcer of heel, left, unstageable (HCC)    MRSA (methicillin resistant staph aureus) culture positive - Primary    Stroke ([address] (Chronic)    Spasticity    Left hemiparesis ([address]        Procedure Note  Indications:  Based on my examination of this patient's wound(s)/ulcer(s) today, debridement is required to promote healing and evaluate the wound base. Performed by: MADISYN Jordan CNP    Consent obtained:  Yes    Time out taken:  Yes    Pain Control: Anesthetic  Anesthetic: 5% Lidocaine Ointment Topical       Debridement: Excisional Debridement    Using curette and forceps the wound(s)/ulcer(s) was/were debrided down through and including the removal of epidermis, dermis, and subcutaneous tissue.         Devitalized Tissue Debrided:  fibrin, biofilm, and slough    Pre Debridement Measurements:  Are located in the [place]  Documentation Flow Sheet    Diabetic/Pressure/Non Pressure Ulcers only:  Ulcer: Pressure ulcer, unstageable\"     Wound/Ulcer #: 1    Post Debridement Measurements:  Wound/Ulcer Descriptions are Pre Debridement except measurements:    Wound 08/08/22 Heel Left #1 ( states since about 6/24/2022) (Active)   Wound Image   09/07/22 0829   Wound Etiology Pressure Unstageable 08/08/22 1404   Wound Cleansed Vashe 09/14/22 0919   Dressing/Treatment Betadine swabs/povidone iodine;Gauze dressing/dressing sponge;Roll gauze 09/28/22 0916   Offloading for Diabetic Foot Ulcers Post op shoe 09/28/22 0916   Wound Length (cm) 3.2 cm 09/28/22 0838   Wound Width (cm) 3.3 cm 09/28/22 0838   Wound Depth (cm) 0.1 cm 09/28/22 0838   Wound Surface Area (cm^2) 10.56 cm^2 09/28/22 0838   Change in Wound Size % (l*w) 38.25 09/28/22 0838   Wound Volume (cm^3) 1. 056 cm^3 09/28/22 0838   Wound Healing % 38 09/28/22 0838   Post-Procedure Length (cm) 3.3 cm 09/28/22 0850   Post-Procedure Width (cm) 3.4 cm 09/28/22 0850   Post-Procedure Depth (cm) 0.2 cm 09/28/22 0850   Post-Procedure Surface Area (cm^2) 11.22 cm^2 09/28/22 0850   Post-Procedure Volume (cm^3) 2.244 cm^3 09/28/22 0850   Wound Assessment Eschar dry 09/28/22 0838   Drainage Amount Small 09/28/22 0838   Drainage Description Brown;Yellow 09/28/22 0838   Odor None 09/28/22 0838   Anahy-wound Assessment Dry/flaky; Hyperkeratosis (callous) 09/28/22 0838   Margins Undefined edges 09/28/22 0838   Wound Thickness Description not for Pressure Injury Full thickness 09/28/22 0838   Number of days: 51          Total Surface Area Debrided:  3.40 sq cm     Estimated Blood Loss:  Minimal    Hemostasis Achieved:  by pressure    Procedural Pain:  0  / 10     Post Procedural Pain:  0 / 10     Response to treatment:  Well tolerated by patient. Plan:   Pt education per provider related to plan of care and status of wound. Healing is slow but doing well. Questions answered. Treatment Note please see attached Discharge Instructions    Written patient dismissal instructions given to patient and signed by patient or POA.          Discharge [address] [facility] Physician Orders and Discharge Instructions  [address] [place] [address] Mendy Sotomayor   Telephone: [phone]  [address] 8:30 am - 4:30 pm and Friday 8:30 am - 1:00 pm.          NAME:  Simon Osorio  YOB: 1955  MEDICAL RECORD NUMBER:  [id number]  TODAYS DATE:  9/28/22         Please wash hands with soap and water prior to and right after  every dressing change        Vashe wound cleanser:                                [] Vashe Soak  5 minutes in clinic today . DO NOT rinse after Vashe. WOUND LOCATION   Left Heel      May rinse wounds with 0.9% saline  Do NOT SCRUB WOUND. Keep wounds dry in the shower unless otherwise instructed by the physician. Topical Treatments:              [x] Apply around the wound:   [x] moisturizing lotion  [] Antifungal ointment      [] No-Sting barrier film   [] Zinc paste      PRIMARY DRESSING:                                [x] Betadine directly to wound bed and a few centimeters around isabel wound, let dry BEFORE DRESSING IS PLACED                      SECONDARY DRESSING:                [x] Gauze   ( Light guaze)         NO TELFA                                       [x] Rolled Gauze, spandage to help hold on dressing please give extra for home                                                       try no ace bandage- to decrease pressure to the area      [address]:            [x] Daily                              COMPRESSION IS A SIGNIFICANT FACTOR TO HEALING YOUR WOUNDS. Elevate leg(s) above the level of the heart when sitting. Avoid prolonged standing in one place.                (X)   LOW SPANDAGRIP spandagrip to left leg.  _________________________________________________________________  PRESSURE RELIEF AND OFF-LOADING:     Off-Loading:   With heel protector  [x] Off-loading when       [x] in bed         [x] sitting                             Specialty equipment ordered:       [] Wheelchair cushion    [] Specialty Bed/Mattress     [x] Assistive Device: please continue to use any assistive devices advised by the provider discussed during your visit   [x] Surgical shoe    [] Podus Boot(s)   [] Foam Boot(s)    [] Shawna Hoffmann     _____________________________________________________________________________________________     Dietary:  Continue your diet as tolerated. Eat heart-healthy foods. These foods include vegetables, fruits, nuts, beans, lean meat, fish, and whole grains. Limit sodium, alcohol, and sugar. Protein is a teresa nutrient in helping to repair damaged tissue and promote new tissue growth. Good sources of protein are milk, yogurt, cheese, fish, lean meat, and beans. If you are a diabetic, having diabetes can make it hard for wounds to heal. So try to keep your blood sugar in its target range.  __________________________________________________________________________________________________     ACTIVITY:   Activity as tolerated  ________________________________________________________________________________________________________________     PAIN:     Please note, A small amount of pain, drainage and/or bleeding from this process might be expected, and is NORMAL. Elevate the affected limb. Use over-the-counter medications you would normally use for pain as permitted by your family doctor. For persistent pain not relieved by the above interventions, please call your family doctor.  ________________________________________________________________________________  Call your doctor now or seek immediate medical care if:    You have symptoms of infection, such as: Increased pain, swelling, warmth, or redness. Red streaks leading from the area. Pus draining from the area. A fever. _______________________________________________________________________     Return Appointment:  DME/Wound Dressing Supplies Provided by:    (Supply companies distribute one month supply at a time.   Please call them directly to reorder supplies when you run out)     ECF or Home Healthcare: Your Home Care Agency is responsible to order your supplies. Return Appointment: With Mamie Nance CNP  in [facility])                   [x] Orders placed during your visit:    culture obtained 9/7/22 antibiotics ordered            Electronically signed by Jean Patino RN on 9/28/2022 at 8:52 AM                [address] [facility] Information: Should you experience any significant changes in your wound(s) or have questions about your wound care, please contact the [address] [place] [address] at [address] 8:30 am - 4:30 pm and Friday 8:30 am - 1:00 pm.  If you need help with your wound outside these hours and cannot wait until we are again available, contact your PCP or go to the hospital emergency room. PLEASE NOTE: IF YOU ARE UNABLE TO OBTAIN WOUND SUPPLIES, CONTINUE TO USE THE SUPPLIES YOU HAVE AVAILABLE UNTIL YOU ARE ABLE TO REACH US. KEEP THE WOUND COVERED AT ALL TIMES.            Physician Signature:_______________________     Date: ___________ Time:  ____________                 [x] Mamie Nance CNP             Electronically signed by MADISYN Mcelroy CNP on 9/29/2022 at 9:47 AM

## 2022-10-12 ENCOUNTER — HOSPITAL ENCOUNTER (OUTPATIENT)
Dept: WOUND CARE | Age: 67
Discharge: HOME | End: 2022-10-12
Payer: MEDICARE

## 2022-10-12 VITALS
SYSTOLIC BLOOD PRESSURE: 132 MMHG | HEART RATE: 63 BPM | DIASTOLIC BLOOD PRESSURE: 73 MMHG | TEMPERATURE: 98.2 F | RESPIRATION RATE: 20 BRPM

## 2022-10-12 DIAGNOSIS — I63.9 CEREBROVASCULAR ACCIDENT (CVA), UNSPECIFIED MECHANISM (HCC): ICD-10-CM

## 2022-10-12 DIAGNOSIS — L89.620 PRESSURE ULCER OF HEEL, LEFT, UNSTAGEABLE (HCC): ICD-10-CM

## 2022-10-12 DIAGNOSIS — G81.94 LEFT HEMIPARESIS (HCC): ICD-10-CM

## 2022-10-12 DIAGNOSIS — R25.2 SPASTICITY: ICD-10-CM

## 2022-10-12 DIAGNOSIS — S91.302A NON-HEALING OPEN WOUND OF LEFT HEEL: ICD-10-CM

## 2022-10-12 DIAGNOSIS — Z22.322 MRSA (METHICILLIN RESISTANT STAPH AUREUS) CULTURE POSITIVE: Primary | ICD-10-CM

## 2022-10-12 PROCEDURE — 11042 DBRDMT SUBQ TIS 1ST 20SQCM/<: CPT

## 2022-10-12 PROCEDURE — 11042 DBRDMT SUBQ TIS 1ST 20SQCM/<: CPT | Performed by: NURSE PRACTITIONER

## 2022-10-12 RX ORDER — LIDOCAINE 50 MG/G
OINTMENT TOPICAL ONCE
Status: CANCELLED | OUTPATIENT
Start: 2022-10-12 | End: 2022-10-12

## 2022-10-12 RX ORDER — LIDOCAINE HYDROCHLORIDE 40 MG/ML
SOLUTION TOPICAL ONCE
Status: COMPLETED | OUTPATIENT
Start: 2022-10-12 | End: 2022-10-12

## 2022-10-12 RX ORDER — LIDOCAINE HYDROCHLORIDE 40 MG/ML
SOLUTION TOPICAL ONCE
Status: CANCELLED | OUTPATIENT
Start: 2022-10-12 | End: 2022-10-12

## 2022-10-12 RX ORDER — BACITRACIN ZINC AND POLYMYXIN B SULFATE 500; 1000 [USP'U]/G; [USP'U]/G
OINTMENT TOPICAL ONCE
Status: CANCELLED | OUTPATIENT
Start: 2022-10-12 | End: 2022-10-12

## 2022-10-12 RX ORDER — GINSENG 100 MG
CAPSULE ORAL ONCE
Status: CANCELLED | OUTPATIENT
Start: 2022-10-12 | End: 2022-10-12

## 2022-10-12 RX ORDER — BACITRACIN, NEOMYCIN, POLYMYXIN B 400; 3.5; 5 [USP'U]/G; MG/G; [USP'U]/G
OINTMENT TOPICAL ONCE
Status: CANCELLED | OUTPATIENT
Start: 2022-10-12 | End: 2022-10-12

## 2022-10-12 RX ORDER — CLOBETASOL PROPIONATE 0.5 MG/G
OINTMENT TOPICAL ONCE
Status: CANCELLED | OUTPATIENT
Start: 2022-10-12 | End: 2022-10-12

## 2022-10-12 RX ORDER — LIDOCAINE HYDROCHLORIDE 20 MG/ML
JELLY TOPICAL ONCE
Status: CANCELLED | OUTPATIENT
Start: 2022-10-12 | End: 2022-10-12

## 2022-10-12 RX ORDER — SULFAMETHOXAZOLE AND TRIMETHOPRIM 800; 160 MG/1; MG/1
1 TABLET ORAL EVERY 12 HOURS SCHEDULED
Status: DISCONTINUED | OUTPATIENT
Start: 2022-10-12 | End: 2022-10-12 | Stop reason: CLARIF

## 2022-10-12 RX ORDER — GENTAMICIN SULFATE 1 MG/G
OINTMENT TOPICAL ONCE
Status: CANCELLED | OUTPATIENT
Start: 2022-10-12 | End: 2022-10-12

## 2022-10-12 RX ORDER — BETAMETHASONE DIPROPIONATE 0.05 %
OINTMENT (GRAM) TOPICAL ONCE
Status: CANCELLED | OUTPATIENT
Start: 2022-10-12 | End: 2022-10-12

## 2022-10-12 RX ORDER — SULFAMETHOXAZOLE AND TRIMETHOPRIM 800; 160 MG/1; MG/1
1 TABLET ORAL 2 TIMES DAILY
Qty: 14 TABLET | Refills: 0 | Status: SHIPPED | OUTPATIENT
Start: 2022-10-12 | End: 2022-10-18

## 2022-10-12 RX ORDER — LIDOCAINE 40 MG/G
CREAM TOPICAL ONCE
Status: CANCELLED | OUTPATIENT
Start: 2022-10-12 | End: 2022-10-12

## 2022-10-12 RX ADMIN — LIDOCAINE HYDROCHLORIDE 5 ML: 40 SOLUTION TOPICAL at 09:12

## 2022-10-12 ASSESSMENT — PAIN SCALES - GENERAL
PAINLEVEL_OUTOF10: 3
PAINLEVEL_OUTOF10: 3

## 2022-10-12 ASSESSMENT — PAIN DESCRIPTION - ONSET
ONSET: ON-GOING
ONSET: ON-GOING

## 2022-10-12 ASSESSMENT — PAIN DESCRIPTION - ORIENTATION
ORIENTATION: LEFT
ORIENTATION: LEFT

## 2022-10-12 ASSESSMENT — PAIN DESCRIPTION - LOCATION
LOCATION: FOOT
LOCATION: FOOT

## 2022-10-12 ASSESSMENT — PAIN DESCRIPTION - FREQUENCY
FREQUENCY: CONTINUOUS
FREQUENCY: CONTINUOUS

## 2022-10-12 ASSESSMENT — PAIN DESCRIPTION - PAIN TYPE
TYPE: CHRONIC PAIN
TYPE: CHRONIC PAIN

## 2022-10-12 ASSESSMENT — PAIN DESCRIPTION - DESCRIPTORS
DESCRIPTORS: ACHING;BURNING
DESCRIPTORS: ACHING;BURNING

## 2022-10-12 NOTE — DISCHARGE INSTRUCTIONS
[address] Physician Orders and Discharge Instructions  [address] [facility], [address] [place] [address]  Telephone: [phone]  [address] 8:30 am - 4:30 pm and Friday 8:30 am - 1:00 pm.          NAME:  Sophia Begum  YOB: 1955  MEDICAL RECORD NUMBER:  [id number]  TODAYS DATE:  10/12/22         Please wash hands with soap and water prior to and right after  every dressing change        Vashe wound cleanser:                                [x] Vashe Soak  5 minutes in clinic today . DO NOT rinse after Vashe. WOUND LOCATION   Left Heel      May rinse wounds with 0.9% saline  Do NOT SCRUB WOUND. Keep wounds dry in the shower unless otherwise instructed by the physician. Topical Treatments:              [x] Apply around the wound:   [x] moisturizing lotion  [] Antifungal ointment      [] No-Sting barrier film   [] Zinc paste      PRIMARY DRESSING:                                [x] Santyl    (Nickel thick)               SECONDARY DRESSING:                [x] Adaptic        NO TELFA                                       [x] Rolled Gauze (light)                                                  try no ace bandage- to decrease pressure to the area      HOW OFTEN TO CHANGE DRESSINGS:            [x] Daily                              COMPRESSION IS A SIGNIFICANT FACTOR TO HEALING YOUR WOUNDS. Elevate leg(s) above the level of the heart when sitting. Avoid prolonged standing in one place.                (X)   LOW SPANDAGRIP spandagrip to left leg.  _________________________________________________________________  PRESSURE RELIEF AND OFF-LOADING:     Off-Loading:   With heel protector  [x] Off-loading when       [x] in bed         [x] sitting                             Specialty equipment ordered:       [] Wheelchair cushion    [] Specialty Bed/Mattress     [x] Assistive Device: please continue to use any assistive devices advised by the provider discussed during your visit   [x] Surgical shoe    [] Podus Boot(s)   [] Foam Boot(s)    [] Deirdre Krishna     _____________________________________________________________________________________________     Dietary:  Continue your diet as tolerated. Eat heart-healthy foods. These foods include vegetables, fruits, nuts, beans, lean meat, fish, and whole grains. Limit sodium, alcohol, and sugar. Protein is a teresa nutrient in helping to repair damaged tissue and promote new tissue growth. Good sources of protein are milk, yogurt, cheese, fish, lean meat, and beans. If you are a diabetic, having diabetes can make it hard for wounds to heal. So try to keep your blood sugar in its target range.  __________________________________________________________________________________________________     ACTIVITY:   Activity as tolerated  ________________________________________________________________________________________________________________     PAIN:     Please note, A small amount of pain, drainage and/or bleeding from this process might be expected, and is NORMAL. Elevate the affected limb. Use over-the-counter medications you would normally use for pain as permitted by your family doctor. For persistent pain not relieved by the above interventions, please call your family doctor.  ________________________________________________________________________________  Call your doctor now or seek immediate medical care if:    You have symptoms of infection, such as: Increased pain, swelling, warmth, or redness. Red streaks leading from the area. Pus draining from the area. A fever. _______________________________________________________________________     Return Appointment:  DME/Wound Dressing Supplies Provided by:    (Supply companies distribute one month supply at a time. Please call them directly to reorder supplies when you run out)     ECF or Home Healthcare:    Your Home Care Agency is responsible to order your supplies. Return Appointment: With Mateo Au CNP  in [facility])                   [x] Orders placed during your visit:    Antibiotics ordered : Santyl ointment ordered  10/12/22 Please  at [address]: Should you experience any significant changes in your wound(s) or have questions about your wound care, please contact the [address] [place] [address] at [address] 8:30 am - 4:30 pm and Friday 8:30 am - 1:00 pm.  If you need help with your wound outside these hours and cannot wait until we are again available, contact your PCP or go to the hospital emergency room. PLEASE NOTE: IF YOU ARE UNABLE TO OBTAIN WOUND SUPPLIES, CONTINUE TO USE THE SUPPLIES YOU HAVE AVAILABLE UNTIL YOU ARE ABLE TO REACH US. KEEP THE WOUND COVERED AT ALL TIMES.            Physician Signature:_______________________     Date: ___________ Time:  ____________                 [x] Mateo Au CNP

## 2022-10-12 NOTE — PROGRESS NOTES
[address]   Progress Note and Procedure Note      Nanette Hubbard  MEDICAL RECORD NUMBER:  [id number]  AGE: 79 y.o. GENDER: male  : 1955  EPISODE DATE:  10/12/2022    Subjective:     Chief Complaint   Patient presents with    Wound Check     Follow up visit left heel pain         HISTORY of PRESENT ILLNESS HPI  History of Present Illness:  79 yr male presents today for wound left heel assessment and evaluation . Recent displaced fracture of base of neck of left femur. Unstageable pressure injury to left heel. with hard what was stable eschar now has odorous drainage, edges of wound soft. No periwound redness. Offload boot applied when in bed. PT and DP pulse strong with Doppler. Periwound still pink, but non tender. Reports getting protein supplements. Pt transferring only, no walking with walker still too unstable. Noting intermittent spastic movements legs during visit. Concern about friction on left heel with this movement. Pt wears post-op shoe on left foot. His son Naz Engel and daughter Katiuska Ta are changing dressings daily and is aware of MRSA and taking appropriate precautions. Left lower leg swelling noted and low compression added to plan of care. Denies constitutional issues  Displaced fracture of base of neck of left femur, history of CVA, hemiplegia and hemiparesis following CVA affecting left nondominant side, with intermittent spasms of both lower extremities. PAST MEDICAL HISTORY        Diagnosis Date    CAD (coronary artery disease)     HTN (hypertension)     Hyperlipemia     Left hemiparesis ([address] 2020    MRSA (methicillin resistant staph aureus) culture positive 2022    heel    Non-healing open wound of left heel 10/12/2022    Pressure injury of left ankle, stage 2 ([address] 2021    Pressure ulcer of heel, left, unstageable ([address] 2022    Stable eschar covered.     S/P PTCA (percutaneous transluminal coronary angioplasty)     two stents place    Spasticity 7/9/2019    Stroke ([facility] 10/2012    left-sided weakness       PAST SURGICAL HISTORY    Past Surgical History:   Procedure Laterality Date    CARDIAC SURGERY      HIP SURGERY Left     INGUINAL HERNIA REPAIR      right side    INTRACAPSULAR CATARACT EXTRACTION Right 08/30/2019    PHACOEMULSIFICATION WITH INTRAOCULAR LENS IMPLANT performed by Palak Coto MD at [facility] [address] EXTRACTION Left 09/06/2019    PHACOEMULSIFICATION WITH INTRAOCULAR LENS IMPLANT performed by Palak Coto MD at [facility]    Family History   Problem Relation Age of Onset    Hypertension Mother     Heart Disease Father     Breast Cancer Sister     Heart Attack Brother        SOCIAL HISTORY    Social History     Tobacco Use    Smoking status: Never    Smokeless tobacco: Never   Vaping Use    Vaping Use: Never used   Substance Use Topics    Alcohol use: No    Drug use: No       ALLERGIES    No Known Allergies    MEDICATIONS    Current Outpatient Medications on File Prior to Encounter   Medication Sig Dispense Refill    senna-docusate (PERICOLACE) 8.6-50 MG per tablet Take 1 tablet by mouth 2 times daily      atorvastatin (LIPITOR) 40 MG tablet Take 1 tablet by mouth daily 90 tablet 3    primidone (MYSOLINE) 50 MG tablet Take 0.25 tablets by mouth in the morning and at bedtime 90 tablet 1    amLODIPine (NORVASC) 10 MG tablet Take 1 tablet by mouth daily 90 tablet 3    gabapentin (NEURONTIN) 100 MG capsule Take 1 capsule by mouth 4 times daily for 180 days. 360 capsule 1    baclofen (LIORESAL) 10 MG tablet Take 1 tablet by mouth 2 times daily 120 tablet 1    hydrALAZINE (APRESOLINE) 50 MG tablet Take 1 tablet by mouth 3 times daily 270 tablet 1    lisinopril (PRINIVIL;ZESTRIL) 20 MG tablet Take 1 tablet by mouth daily 90 tablet 1    loratadine (CLARITIN) 10 MG tablet Take 1 tablet by mouth daily Pt needs to come in and be seen before more rx's are given.  90 tablet 1    aspirin 81 MG tablet Take 81 mg by mouth daily      Sennosides 17.2 MG TABS Take 17.2 mg by mouth       No current facility-administered medications on file prior to encounter. REVIEW OF SYSTEMS  Review of Systems    Pertinent items are noted in HPI. Objective:      /73   Pulse 63   Temp 98.2 °F (36.8 °C) (Infrared)   Resp 20     Wt Readings from Last 3 Encounters:   09/29/22 228 lb 14.4 oz (103.8 kg)   09/01/22 230 lb (104.3 kg)   08/08/22 230 lb (104.3 kg)       PHYSICAL EXAM  Physical Exam    General Appearance: alert and oriented to person, place and time, well-developed and well-nourished, in no acute distress  Skin: warm and dry  Head: normocephalic and atraumatic  Eyes: pupils equal, round, and reactive to light  Pulmonary/Chest: normal air movement, no respiratory distress  Cardiovascular: normal rate, regular rhythm, and distal pulses diminished  Extremities: no cyanosis, no clubbing, and 1-2 + edema-  left lower leg      Assessment:        Problem List Items Addressed This Visit       Pressure ulcer of heel, left, unstageable (HCC)    Relevant Orders    Initiate Outpatient Wound Care Protocol    MRSA (methicillin resistant staph aureus) culture positive - Primary    Relevant Orders    Initiate Outpatient Wound Care Protocol    Non-healing open wound of left heel    Stroke ([address] (Chronic)    Relevant Orders    Initiate Outpatient Wound Care Protocol    Spasticity    Relevant Orders    Initiate Outpatient Wound Care Protocol    Left hemiparesis [facility])    Relevant Orders    Initiate Outpatient Wound Care Protocol        Procedure Note  Indications:  Based on my examination of this patient's wound(s)/ulcer(s) today, debridement is required to promote healing and evaluate the wound base.     Performed by: MADISYN Driscoll - CNP    Consent obtained:  Yes    Time out taken:  Yes    Pain Control: Anesthetic  Anesthetic: 4% Lidocaine Liquid Topical       Debridement: Excisional Debridement    Using curette, forceps, and # 10 blade scalpel the wound(s)/ulcer(s) was/were debrided down through and including the removal of epidermis, dermis, and subcutaneous tissue. Devitalized Tissue Debrided:  fibrin, biofilm, slough, and necrotic/eschar    Pre Debridement Measurements:  Are located in the [place]  Documentation Flow Sheet    Diabetic/Pressure/Non Pressure Ulcers only:  Ulcer: Pressure ulcer, Stage 3     Wound/Ulcer #: 1    Post Debridement Measurements:  Wound/Ulcer Descriptions are Pre Debridement except measurements:    Wound 08/08/22 Heel Left #1 ( states since about 6/24/2022) (Active)   Wound Image   09/07/22 0829   Wound Etiology Pressure Unstageable 08/08/22 1404   Wound Cleansed Vashe 09/14/22 0919   Dressing/Treatment Pharmaceutical agent (see MAR); Gauze dressing/dressing sponge;Roll gauze 10/12/22 0953   Offloading for Diabetic Foot Ulcers Post op shoe 10/12/22 0953   Wound Length (cm) 3 cm 10/12/22 0906   Wound Width (cm) 3.4 cm 10/12/22 0906   Wound Depth (cm) 1.2 cm 10/12/22 0906   Wound Surface Area (cm^2) 10.2 cm^2 10/12/22 0906   Change in Wound Size % (l*w) 40.35 10/12/22 0906   Wound Volume (cm^3) 12.24 cm^3 10/12/22 0906   Wound Healing % -616 10/12/22 0906   Post-Procedure Length (cm) 3.5 cm 10/12/22 0922   Post-Procedure Width (cm) 4 cm 10/12/22 4504   Post-Procedure Depth (cm) 1.2 cm 10/12/22 0922   Post-Procedure Surface Area (cm^2) 14 cm^2 10/12/22 0922   Post-Procedure Volume (cm^3) 16.8 cm^3 10/12/22 0922   Wound Assessment Eschar dry 10/12/22 0906   Drainage Amount Small 10/12/22 0906   Drainage Description Serosanguinous 10/12/22 0906   Odor Moderate 10/12/22 0906   Anahy-wound Assessment Dry/flaky; Hyperkeratosis (callous) 10/12/22 0906   Margins Undefined edges 10/12/22 0906   Wound Thickness Description not for Pressure Injury Full thickness 10/12/22 0906   Number of days: 65          Total Surface Area Debrided:  14 sq cm     Estimated Blood Loss: Estimated amount of blood loss is 3 ml. Hemostasis Achieved:  by pressure    Procedural Pain:  1  / 10     Post Procedural Pain:  0 / 10     Response to treatment:  Well tolerated by patient. Plan:   Pt education per provider related to plan of care and changes in heel wound which necessitated debridement of eschar and base of wound for now unstable eschar over heel wound. Discussed new plan of care and pt is in agreement. Treatment Note please see attached Discharge Instructions    Written patient dismissal instructions given to patient and signed by patient or POA. Discharge [address] [facility] Physician Orders and Discharge Instructions  [address] [place] [address], [facility]  Telephone: [phone] [address] 8:30 am - 4:30 pm and Friday 8:30 am - 1:00 pm.          NAME:  Kimberly Turner  YOB: 1955  MEDICAL RECORD NUMBER:  [id number]  TODAYS DATE:  10/12/22         Please wash hands with soap and water prior to and right after  every dressing change        Vashe wound cleanser:                                [x] Vashe Soak  5 minutes in clinic today . DO NOT rinse after Vashe. WOUND LOCATION   Left Heel      May rinse wounds with 0.9% saline  Do NOT SCRUB WOUND. Keep wounds dry in the shower unless otherwise instructed by the physician.      Topical Treatments:              [x] Apply around the wound:   [x] moisturizing lotion  [] Antifungal ointment      [] No-Sting barrier film   [] Zinc paste      PRIMARY DRESSING:                                [x] Santyl    (Nickel thick)               SECONDARY DRESSING:                [x] Adaptic        NO TELFA                                       [x] Rolled Gauze (light)                                                  try no ace bandage- to decrease pressure to the area      HOW OFTEN TO CHANGE DRESSINGS:            [x] Daily COMPRESSION IS A SIGNIFICANT FACTOR TO HEALING YOUR WOUNDS. Elevate leg(s) above the level of the heart when sitting. Avoid prolonged standing in one place. (X)   LOW SPANDAGRIP spandagrip to left leg.  _________________________________________________________________  PRESSURE RELIEF AND OFF-LOADING:     Off-Loading:   With heel protector  [x] Off-loading when       [x] in bed         [x] sitting                             Specialty equipment ordered:       [] Wheelchair cushion    [] Specialty Bed/Mattress     [x] Assistive Device: please continue to use any assistive devices advised by the provider discussed during your visit   [x] Surgical shoe    [] Podus Boot(s)   [] Foam Boot(s)    [] CROW Boot     _____________________________________________________________________________________________     Dietary:  Continue your diet as tolerated. Eat heart-healthy foods. These foods include vegetables, fruits, nuts, beans, lean meat, fish, and whole grains. Limit sodium, alcohol, and sugar. Protein is a teresa nutrient in helping to repair damaged tissue and promote new tissue growth. Good sources of protein are milk, yogurt, cheese, fish, lean meat, and beans. If you are a diabetic, having diabetes can make it hard for wounds to heal. So try to keep your blood sugar in its target range.  __________________________________________________________________________________________________     ACTIVITY:   Activity as tolerated  ________________________________________________________________________________________________________________     PAIN:     Please note, A small amount of pain, drainage and/or bleeding from this process might be expected, and is NORMAL. Elevate the affected limb. Use over-the-counter medications you would normally use for pain as permitted by your family doctor.   For persistent pain not relieved by the above interventions, please call your family doctor.  ________________________________________________________________________________  Call your doctor now or seek immediate medical care if:    You have symptoms of infection, such as: Increased pain, swelling, warmth, or redness. Red streaks leading from the area. Pus draining from the area. A fever. _______________________________________________________________________     Return Appointment:  DME/Wound Dressing Supplies Provided by:    (Supply companies distribute one month supply at a time. Please call them directly to reorder supplies when you run out)     ECF or Home Healthcare: Your Home Care Agency is responsible to order your supplies. Return Appointment: With Jarrett Dominguez CNP  in [address] [place] [address])                   [x] Orders placed during your visit:    Antibiotics ordered : Santyl ointment ordered  10/12/22 Please  at [address]: Should you experience any significant changes in your wound(s) or have questions about your wound care, please contact the [address] [place] [address] at [address] 8:30 am - 4:30 pm and Friday 8:30 am - 1:00 pm.  If you need help with your wound outside these hours and cannot wait until we are again available, contact your PCP or go to the hospital emergency room. PLEASE NOTE: IF YOU ARE UNABLE TO OBTAIN WOUND SUPPLIES, CONTINUE TO USE THE SUPPLIES YOU HAVE AVAILABLE UNTIL YOU ARE ABLE TO REACH US. KEEP THE WOUND COVERED AT ALL TIMES.            Physician Signature:_______________________     Date: ___________ Time:  ____________                 [x] Jarrett Dominguez CNP          Electronically signed by MADISYN Eller CNP on 10/12/2022 at 2:40 PM

## 2022-10-17 NOTE — DISCHARGE INSTRUCTIONS
[address] Physician Orders and Discharge Instructions  [address] [place] [address], [address], [address]  Telephone: [phone]  [address] 8:30 am - 4:30 pm and Friday 8:30 am - 1:00 pm.          NAME:  Giana Wynn  YOB: 1955  MEDICAL RECORD NUMBER:  [id number]  TODAYS DATE:  10/19/22         Please wash hands with soap and water prior to and right after  every dressing change        Vashe wound cleanser:                                [x] Vashe Soak  5 minutes in clinic today . DO NOT rinse after Vashe. WOUND LOCATION   Left Heel      May rinse wounds with 0.9% saline  Do NOT SCRUB WOUND. Keep wounds dry in the shower unless otherwise instructed by the physician. Topical Treatments:              [x] Apply around the wound:   [x] moisturizing lotion  [] Antifungal ointment      [] No-Sting barrier film   [] Zinc paste      PRIMARY DRESSING:                                [x] [address] HOME                                          (x) ALGINATE ON INSIDE EDGES OF WOUND AND TO UNDERMINING    SECONDARY DRESSING:                [x] GUAZE     NO TELFA                                       [x] Rolled Gauze (light)                                                  try no ace bandage- to decrease pressure to the area      HOW OFTEN TO CHANGE DRESSINGS:            [x] Daily                              COMPRESSION IS A SIGNIFICANT FACTOR TO HEALING YOUR WOUNDS. Elevate leg(s) above the level of the heart when sitting. Avoid prolonged standing in one place.                (X)   LOW SPANDAGRIP spandagrip to left leg.  _________________________________________________________________  PRESSURE RELIEF AND OFF-LOADING:     Off-Loading:   With heel protector  [x] Off-loading when       [x] in bed         [x] sitting                             Specialty equipment ordered:       [] Wheelchair cushion    [] Specialty Bed/Mattress [x] Assistive Device: please continue to use any assistive devices advised by the provider discussed during your visit   [x] Surgical shoe    [] Podus Boot(s)   [] Foam Boot(s)    [] Eugena Forget     _____________________________________________________________________________________________     Dietary:  Continue your diet as tolerated. Eat heart-healthy foods. These foods include vegetables, fruits, nuts, beans, lean meat, fish, and whole grains. Limit sodium, alcohol, and sugar. Protein is a teresa nutrient in helping to repair damaged tissue and promote new tissue growth. Good sources of protein are milk, yogurt, cheese, fish, lean meat, and beans. If you are a diabetic, having diabetes can make it hard for wounds to heal. So try to keep your blood sugar in its target range.  __________________________________________________________________________________________________     ACTIVITY:   Activity as tolerated  ________________________________________________________________________________________________________________     PAIN:     Please note, A small amount of pain, drainage and/or bleeding from this process might be expected, and is NORMAL. Elevate the affected limb. Use over-the-counter medications you would normally use for pain as permitted by your family doctor. For persistent pain not relieved by the above interventions, please call your family doctor.  ________________________________________________________________________________  Call your doctor now or seek immediate medical care if:    You have symptoms of infection, such as: Increased pain, swelling, warmth, or redness. Red streaks leading from the area. Pus draining from the area. A fever. _______________________________________________________________________     Return Appointment:  DME/Wound Dressing Supplies Provided by:    (Supply companies distribute one month supply at a time.   Please call them directly to reorder supplies when you run out)     ECF or Home Healthcare: Your Home Care Agency is responsible to order your supplies. Return Appointment: With Arun Haynes CNP  in 1 Week(s) (NEXT WEEK WILL SEE Gila Johnson CNP)                   [] Orders placed during your visit:             Electronically signed by Esvin Chang RN on 10/19/2022 at 10:01 AM               [facility] Information: Should you experience any significant changes in your wound(s) or have questions about your wound care, please contact the [address] [place] [address] at [address] 8:30 am - 4:30 pm and Friday 8:30 am - 1:00 pm.  If you need help with your wound outside these hours and cannot wait until we are again available, contact your PCP or go to the hospital emergency room. PLEASE NOTE: IF YOU ARE UNABLE TO OBTAIN WOUND SUPPLIES, CONTINUE TO USE THE SUPPLIES YOU HAVE AVAILABLE UNTIL YOU ARE ABLE TO REACH US. KEEP THE WOUND COVERED AT ALL TIMES.            Physician Signature:_______________________     Date: ___________ Time:  ____________                 [x] Arun Haynes CNP

## 2022-10-18 ENCOUNTER — TELEPHONE (OUTPATIENT)
Dept: WOUND CARE | Age: 67
End: 2022-10-18

## 2022-10-18 RX ORDER — SULFAMETHOXAZOLE AND TRIMETHOPRIM 800; 160 MG/1; MG/1
1 TABLET ORAL 2 TIMES DAILY
Qty: 20 TABLET | Refills: 0 | Status: SHIPPED | OUTPATIENT
Start: 2022-10-18 | End: 2022-10-28

## 2022-10-18 NOTE — TELEPHONE ENCOUNTER
RN called patient in regards to latest message. Patient was attempted to find where the prescription for his antibiotic was ordered to. After looking through chart, provider ordered Bactrim DS to his mail order pharmacy. Because this was prescribed a week ago, RN reached out to Mail order Pharmacy to check if had been mailed. According to [facility] [employer], they attempted to reach out to patient but never got in tough so order was cancelled. RN notified  Belle Guerra CNP and placed new prescription orders, see orders. Patient made aware that order was placed to their preferred pharmacy and is to  prescription as soon as possible. Patient instructed on name of medication and frequency of dosage. RN asked if any other questions at this time, patient denies and will call if any other concerns arise. Patient aware of next appointment date and time.   Electronically signed by Ronalee Lennox, RN on 10/18/2022 at 2:09 PM

## 2022-10-19 ENCOUNTER — HOSPITAL ENCOUNTER (OUTPATIENT)
Dept: WOUND CARE | Age: 67
Discharge: HOME | End: 2022-10-19
Payer: MEDICARE

## 2022-10-19 VITALS
DIASTOLIC BLOOD PRESSURE: 77 MMHG | RESPIRATION RATE: 18 BRPM | SYSTOLIC BLOOD PRESSURE: 135 MMHG | TEMPERATURE: 97.5 F | HEART RATE: 60 BPM

## 2022-10-19 DIAGNOSIS — L89.623 PRESSURE ULCER OF LEFT HEEL, STAGE 3 (HCC): ICD-10-CM

## 2022-10-19 DIAGNOSIS — G81.94 LEFT HEMIPARESIS (HCC): ICD-10-CM

## 2022-10-19 DIAGNOSIS — I63.9 CEREBROVASCULAR ACCIDENT (CVA), UNSPECIFIED MECHANISM (HCC): ICD-10-CM

## 2022-10-19 DIAGNOSIS — Z22.322 MRSA (METHICILLIN RESISTANT STAPH AUREUS) CULTURE POSITIVE: Primary | ICD-10-CM

## 2022-10-19 DIAGNOSIS — S91.302A NON-HEALING OPEN WOUND OF LEFT HEEL: ICD-10-CM

## 2022-10-19 DIAGNOSIS — R25.2 SPASTICITY: ICD-10-CM

## 2022-10-19 DIAGNOSIS — L89.620 PRESSURE ULCER OF HEEL, LEFT, UNSTAGEABLE (HCC): ICD-10-CM

## 2022-10-19 PROCEDURE — 11042 DBRDMT SUBQ TIS 1ST 20SQCM/<: CPT | Performed by: NURSE PRACTITIONER

## 2022-10-19 PROCEDURE — 11042 DBRDMT SUBQ TIS 1ST 20SQCM/<: CPT

## 2022-10-19 RX ORDER — GINSENG 100 MG
CAPSULE ORAL ONCE
Status: CANCELLED | OUTPATIENT
Start: 2022-10-19 | End: 2022-10-19

## 2022-10-19 RX ORDER — BETAMETHASONE DIPROPIONATE 0.05 %
OINTMENT (GRAM) TOPICAL ONCE
Status: CANCELLED | OUTPATIENT
Start: 2022-10-19 | End: 2022-10-19

## 2022-10-19 RX ORDER — GENTAMICIN SULFATE 1 MG/G
OINTMENT TOPICAL ONCE
Status: CANCELLED | OUTPATIENT
Start: 2022-10-19 | End: 2022-10-19

## 2022-10-19 RX ORDER — LIDOCAINE 40 MG/G
CREAM TOPICAL ONCE
Status: CANCELLED | OUTPATIENT
Start: 2022-10-19 | End: 2022-10-19

## 2022-10-19 RX ORDER — LIDOCAINE HYDROCHLORIDE 40 MG/ML
SOLUTION TOPICAL ONCE
Status: CANCELLED | OUTPATIENT
Start: 2022-10-19 | End: 2022-10-19

## 2022-10-19 RX ORDER — CLOBETASOL PROPIONATE 0.5 MG/G
OINTMENT TOPICAL ONCE
Status: CANCELLED | OUTPATIENT
Start: 2022-10-19 | End: 2022-10-19

## 2022-10-19 RX ORDER — LIDOCAINE 50 MG/G
OINTMENT TOPICAL ONCE
Status: CANCELLED | OUTPATIENT
Start: 2022-10-19 | End: 2022-10-19

## 2022-10-19 RX ORDER — BACITRACIN ZINC AND POLYMYXIN B SULFATE 500; 1000 [USP'U]/G; [USP'U]/G
OINTMENT TOPICAL ONCE
Status: CANCELLED | OUTPATIENT
Start: 2022-10-19 | End: 2022-10-19

## 2022-10-19 RX ORDER — BACITRACIN, NEOMYCIN, POLYMYXIN B 400; 3.5; 5 [USP'U]/G; MG/G; [USP'U]/G
OINTMENT TOPICAL ONCE
Status: CANCELLED | OUTPATIENT
Start: 2022-10-19 | End: 2022-10-19

## 2022-10-19 RX ORDER — LIDOCAINE HYDROCHLORIDE 40 MG/ML
SOLUTION TOPICAL ONCE
Status: COMPLETED | OUTPATIENT
Start: 2022-10-19 | End: 2022-10-19

## 2022-10-19 RX ORDER — LIDOCAINE HYDROCHLORIDE 20 MG/ML
JELLY TOPICAL ONCE
Status: CANCELLED | OUTPATIENT
Start: 2022-10-19 | End: 2022-10-19

## 2022-10-19 RX ADMIN — LIDOCAINE HYDROCHLORIDE: 40 SOLUTION TOPICAL at 09:41

## 2022-10-19 ASSESSMENT — PAIN DESCRIPTION - LOCATION: LOCATION: FOOT

## 2022-10-19 ASSESSMENT — PAIN DESCRIPTION - FREQUENCY: FREQUENCY: CONTINUOUS

## 2022-10-19 ASSESSMENT — PAIN DESCRIPTION - DESCRIPTORS: DESCRIPTORS: ACHING;BURNING

## 2022-10-19 ASSESSMENT — PAIN SCALES - GENERAL: PAINLEVEL_OUTOF10: 2

## 2022-10-19 ASSESSMENT — PAIN DESCRIPTION - ONSET: ONSET: ON-GOING

## 2022-10-19 ASSESSMENT — PAIN DESCRIPTION - ORIENTATION: ORIENTATION: LEFT

## 2022-10-19 ASSESSMENT — PAIN - FUNCTIONAL ASSESSMENT: PAIN_FUNCTIONAL_ASSESSMENT: ACTIVITIES ARE NOT PREVENTED

## 2022-10-20 PROBLEM — L89.623 PRESSURE ULCER OF LEFT HEEL, STAGE 3 (HCC): Status: ACTIVE | Noted: 2022-10-20

## 2022-10-20 NOTE — PROGRESS NOTES
[address]   Progress Note and Procedure Note      Anali Dooley  MEDICAL RECORD NUMBER:  [id number]  AGE: 79 y.o. GENDER: male  : 1955  EPISODE DATE:  10/19/2022    Subjective:     Chief Complaint   Patient presents with    Wound Check     Follow up on Left Heel         HISTORY of PRESENT ILLNESS HPI  History of Present Illness:  79 yr male presents today for wound left heel assessment and evaluation . Recent displaced fracture of base of neck of left femur. Pressure ulcer left heel now designated as a Stage 3 pressure ulcer since unstable eschar removed. No periwound redness. Offload boot applied when in bed. PT and DP pulse strong with Doppler. Periwound still pink, but non tender. Reports getting protein supplements. Pt transferring only, no walking with walker still too unstable. Noting intermittent spastic movements legs during visit. Concern about friction on left heel with this movement. Pt wears post-op shoe on left foot. His son Darvin Wade and daughter Deshaun Medina are changing dressings daily and is aware of MRSA and taking appropriate precautions. Left lower leg less swelling noted and low compression added to plan of care. Denies constitutional issues  Displaced fracture of base of neck of left femur, history of CVA, hemiplegia and hemiparesis following CVA affecting left nondominant side, with intermittent spasms of both lower extremities. PAST MEDICAL HISTORY        Diagnosis Date    CAD (coronary artery disease)     HTN (hypertension)     Hyperlipemia     Left hemiparesis ([address] 2020    MRSA (methicillin resistant staph aureus) culture positive 2022    heel    Non-healing open wound of left heel 10/12/2022    Pressure injury of left ankle, stage 2 ([address] 2021    Pressure ulcer of heel, left, unstageable ([address] 2022    Stable eschar covered.     Pressure ulcer of left heel, stage 3 ([address] 10/20/2022    S/P PTCA (percutaneous transluminal coronary angioplasty) 2010    two stents place    Spasticity 7/9/2019    Stroke ([address] 10/2012    left-sided weakness       PAST SURGICAL HISTORY    Past Surgical History:   Procedure Laterality Date    CARDIAC SURGERY      HIP SURGERY Left     INGUINAL HERNIA REPAIR      right side    INTRACAPSULAR CATARACT EXTRACTION Right 08/30/2019    PHACOEMULSIFICATION WITH INTRAOCULAR LENS IMPLANT performed by Crystal Jasso MD at [facility] EXTRACTION Left 09/06/2019    PHACOEMULSIFICATION WITH INTRAOCULAR LENS IMPLANT performed by Crystal Jasso MD at [facility]    Family History   Problem Relation Age of Onset    Hypertension Mother     Heart Disease Father     Breast Cancer Sister     Heart Attack Brother        SOCIAL HISTORY    Social History     Tobacco Use    Smoking status: Never    Smokeless tobacco: Never   Vaping Use    Vaping Use: Never used   Substance Use Topics    Alcohol use: No    Drug use: No       ALLERGIES    No Known Allergies    MEDICATIONS    Current Outpatient Medications on File Prior to Encounter   Medication Sig Dispense Refill    sulfamethoxazole-trimethoprim (BACTRIM DS;SEPTRA DS) 800-160 MG per tablet Take 1 tablet by mouth 2 times daily for 10 days 20 tablet 0    Wound Dressings (MEDIHONEY WOUND/BURN DRESSING) GEL gel Apply 1 each topically daily 1 each 1    Wound Dressings (MEDIHONEY) GEL gel Use topically on wound daily. 1 each 0    collagenase (SANTYL) 250 UNIT/GM ointment Apply topically daily.  1 each 1    senna-docusate (PERICOLACE) 8.6-50 MG per tablet Take 1 tablet by mouth 2 times daily      atorvastatin (LIPITOR) 40 MG tablet Take 1 tablet by mouth daily 90 tablet 3    primidone (MYSOLINE) 50 MG tablet Take 0.25 tablets by mouth in the morning and at bedtime 90 tablet 1    amLODIPine (NORVASC) 10 MG tablet Take 1 tablet by mouth daily 90 tablet 3    gabapentin (NEURONTIN) 100 MG capsule Take 1 capsule by mouth 4 times daily for 180 days. 360 capsule 1    baclofen (LIORESAL) 10 MG tablet Take 1 tablet by mouth 2 times daily 120 tablet 1    hydrALAZINE (APRESOLINE) 50 MG tablet Take 1 tablet by mouth 3 times daily 270 tablet 1    lisinopril (PRINIVIL;ZESTRIL) 20 MG tablet Take 1 tablet by mouth daily 90 tablet 1    loratadine (CLARITIN) 10 MG tablet Take 1 tablet by mouth daily Pt needs to come in and be seen before more rx's are given. 90 tablet 1    aspirin 81 MG tablet Take 81 mg by mouth daily      Sennosides 17.2 MG TABS Take 17.2 mg by mouth       No current facility-administered medications on file prior to encounter. REVIEW OF SYSTEMS  Review of Systems    Pertinent items are noted in HPI. Objective:      /77   Pulse 60   Temp 97.5 °F (36.4 °C) (Temporal)   Resp 18     Wt Readings from Last 3 Encounters:   09/29/22 228 lb 14.4 oz (103.8 kg)   09/01/22 230 lb (104.3 kg)   08/08/22 230 lb (104.3 kg)       PHYSICAL EXAM  Physical Exam    General Appearance: alert and oriented to person, place and time, well-developed and well-nourished, in no acute distress  Skin: warm and dry, no rash or erythema  Head: normocephalic and atraumatic  Eyes: pupils equal, round, and reactive to light  Pulmonary/Chest: clear to auscultation bilaterally- no wheezes, rales or rhonchi, normal air movement, no respiratory distress  Cardiovascular: normal rate, regular rhythm, and intact distal pulses  Wound:  Base of wound has mixed features of wet and dry wound with moistness in area of undermining medial edge of wound and dryness over center of wound. Wound does not probe to bone and only mild swelling and periwound pinkness.     Assessment:        Problem List Items Addressed This Visit       Pressure ulcer of heel, left, unstageable (HCC)    MRSA (methicillin resistant staph aureus) culture positive - Primary    Non-healing open wound of left heel    Pressure ulcer of left heel, stage 3 (HCC)    Stroke ([facility]) (Chronic)    Spasticity    Left hemiparesis ([facility]        Procedure Note  Indications:  Based on my examination of this patient's wound(s)/ulcer(s) today, debridement is required to promote healing and evaluate the wound base. Performed by: MADISYN Garnett CNP    Consent obtained:  Yes    Time out taken:  Yes    Pain Control: Anesthetic  Anesthetic: 4% Lidocaine Liquid Topical       Debridement: Excisional Debridement    Using curette, forceps, and # 10 blade scalpel the wound(s)/ulcer(s) was/were debrided down through and including the removal of epidermis, dermis, and subcutaneous tissue.         Devitalized Tissue Debrided:  fibrin, biofilm, slough, and necrotic/eschar    Pre Debridement Measurements:  Are located in the [place]  Documentation Flow Sheet    Diabetic/Pressure/Non Pressure Ulcers only:  Ulcer: Pressure ulcer, Stage 3     Wound/Ulcer #: 1    Post Debridement Measurements:  Wound/Ulcer Descriptions are Pre Debridement except measurements:    Wound 08/08/22 Heel Left #1 ( states since about 6/24/2022) (Active)   Wound Image   09/07/22 0829   Wound Etiology Pressure Unstageable 08/08/22 1404   Dressing Status Old drainage noted 10/19/22 0941   Wound Cleansed Vashe 10/19/22 1010   Dressing/Treatment Hydrating gel;Alginate;Gauze dressing/dressing sponge;Roll gauze 10/19/22 1010   Offloading for Diabetic Foot Ulcers Post op shoe 10/12/22 0953   Wound Length (cm) 3 cm 10/19/22 0941   Wound Width (cm) 3.5 cm 10/19/22 0941   Wound Depth (cm) 1.5 cm 10/19/22 0941   Wound Surface Area (cm^2) 10.5 cm^2 10/19/22 0941   Change in Wound Size % (l*w) 38.6 10/19/22 0941   Wound Volume (cm^3) 15.75 cm^3 10/19/22 0941   Wound Healing % -821 10/19/22 0941   Post-Procedure Length (cm) 3.1 cm 10/19/22 0957   Post-Procedure Width (cm) 3.6 cm 10/19/22 0957   Post-Procedure Depth (cm) 1.6 cm 10/19/22 0957   Post-Procedure Surface Area (cm^2) 11.16 cm^2 10/19/22 0957   Post-Procedure Volume (cm^3) 17.856 cm^3 10/19/22 0957   Wound Assessment Granulation tissue;Slough 10/19/22 0941   Drainage Amount Small 10/19/22 0941   Drainage Description Serosanguinous 10/19/22 0941   Odor Mild 10/19/22 0941   Anahy-wound Assessment Dry/flaky; Hyperkeratosis (callous) 10/19/22 0941   Margins Undefined edges 10/19/22 0941   Wound Thickness Description not for Pressure Injury Full thickness 10/19/22 0941   Number of days: 72          Total Surface Area Debrided:  11.16 sq cm     Estimated Blood Loss:  Minimal    Hemostasis Achieved:  by pressure    Procedural Pain:  0  / 10     Post Procedural Pain:  0 / 10     Response to treatment:  Well tolerated by patient. Plan:   Pt education per provider related to current wound status and changes in plan of care. Pt asked that video made on his phone regarding dressing change procedure for his son and daughter. Pt is in agreement with plan of care and questions answered. Treatment Note please see attached Discharge Instructions    Written patient dismissal instructions given to patient and signed by patient or POA. Discharge [id number] [facility] Physician Orders and Discharge Instructions  [address] [place] [address] [place] [address]  Telephone: [phone]  [address] 8:30 am - 4:30 pm and Friday 8:30 am - 1:00 pm.          NAME:  Dona Thompson  YOB: 1955  MEDICAL RECORD NUMBER:  [id number]  TODAYS DATE:  10/19/22         Please wash hands with soap and water prior to and right after  every dressing change        Vashe wound cleanser:                                [x] Vashe Soak  5 minutes in clinic today . DO NOT rinse after Vashe. WOUND LOCATION   Left Heel      May rinse wounds with 0.9% saline  Do NOT SCRUB WOUND. Keep wounds dry in the shower unless otherwise instructed by the physician.      Topical Treatments:              [x] Apply around the wound:   [x] moisturizing lotion  [] Antifungal ointment      [] range.  __________________________________________________________________________________________________     ACTIVITY:   Activity as tolerated  ________________________________________________________________________________________________________________     PAIN:     Please note, A small amount of pain, drainage and/or bleeding from this process might be expected, and is NORMAL. Elevate the affected limb. Use over-the-counter medications you would normally use for pain as permitted by your family doctor. For persistent pain not relieved by the above interventions, please call your family doctor.  ________________________________________________________________________________  Call your doctor now or seek immediate medical care if:    You have symptoms of infection, such as: Increased pain, swelling, warmth, or redness. Red streaks leading from the area. Pus draining from the area. A fever. _______________________________________________________________________     Return Appointment:  DME/Wound Dressing Supplies Provided by:    (Supply companies distribute one month supply at a time. Please call them directly to reorder supplies when you run out)     ECF or Home Healthcare: Your Home Care Agency is responsible to order your supplies. Return Appointment: With Hernan Zafar CNP  in 1 Week(s) (NEXT WEEK WILL SEE Shalom Wisdom CNP)                   [] Orders placed during your visit:             Electronically signed by Zac Fitzgerald RN on 10/19/2022 at 10:01 AM               [address] [facility] Information: Should you experience any significant changes in your wound(s) or have questions about your wound care, please contact the [address] [place] [address] at [address] 8:30 am - 4:30 pm and Friday 8:30 am - 1:00 pm.  If you need help with your wound outside these hours and cannot wait until we are again available, contact your PCP or go to the hospital emergency room. PLEASE NOTE: IF YOU ARE UNABLE TO OBTAIN WOUND SUPPLIES, CONTINUE TO USE THE SUPPLIES YOU HAVE AVAILABLE UNTIL YOU ARE ABLE TO REACH US. KEEP THE WOUND COVERED AT ALL TIMES.            Physician Signature:_______________________     Date: ___________ Time:  ____________                 [x] Jessica Moses CNP          Electronically signed by MADISYN Dugan CNP on 10/20/2022 at 10:11 AM

## 2022-10-26 ENCOUNTER — HOSPITAL ENCOUNTER (OUTPATIENT)
Dept: WOUND CARE | Age: 67
Discharge: HOME | End: 2022-10-26
Payer: MEDICARE

## 2022-10-26 VITALS
TEMPERATURE: 98.1 F | SYSTOLIC BLOOD PRESSURE: 120 MMHG | HEART RATE: 65 BPM | DIASTOLIC BLOOD PRESSURE: 72 MMHG | RESPIRATION RATE: 18 BRPM

## 2022-10-26 DIAGNOSIS — L89.620 PRESSURE ULCER OF HEEL, LEFT, UNSTAGEABLE (HCC): ICD-10-CM

## 2022-10-26 DIAGNOSIS — Z22.322 MRSA (METHICILLIN RESISTANT STAPH AUREUS) CULTURE POSITIVE: ICD-10-CM

## 2022-10-26 DIAGNOSIS — S91.302A NON-HEALING OPEN WOUND OF LEFT HEEL: ICD-10-CM

## 2022-10-26 DIAGNOSIS — L89.623 PRESSURE ULCER OF LEFT HEEL, STAGE 3 (HCC): Primary | ICD-10-CM

## 2022-10-26 DIAGNOSIS — G81.94 LEFT HEMIPARESIS (HCC): ICD-10-CM

## 2022-10-26 DIAGNOSIS — I63.9 CEREBROVASCULAR ACCIDENT (CVA), UNSPECIFIED MECHANISM (HCC): ICD-10-CM

## 2022-10-26 DIAGNOSIS — R25.2 SPASTICITY: ICD-10-CM

## 2022-10-26 PROCEDURE — 11042 DBRDMT SUBQ TIS 1ST 20SQCM/<: CPT | Performed by: NURSE PRACTITIONER

## 2022-10-26 PROCEDURE — 11042 DBRDMT SUBQ TIS 1ST 20SQCM/<: CPT

## 2022-10-26 RX ORDER — LIDOCAINE HYDROCHLORIDE 20 MG/ML
JELLY TOPICAL ONCE
Status: CANCELLED | OUTPATIENT
Start: 2022-10-26 | End: 2022-10-26

## 2022-10-26 RX ORDER — CLOBETASOL PROPIONATE 0.5 MG/G
OINTMENT TOPICAL ONCE
Status: CANCELLED | OUTPATIENT
Start: 2022-10-26 | End: 2022-10-26

## 2022-10-26 RX ORDER — BETAMETHASONE DIPROPIONATE 0.05 %
OINTMENT (GRAM) TOPICAL ONCE
Status: CANCELLED | OUTPATIENT
Start: 2022-10-26 | End: 2022-10-26

## 2022-10-26 RX ORDER — BACITRACIN ZINC AND POLYMYXIN B SULFATE 500; 1000 [USP'U]/G; [USP'U]/G
OINTMENT TOPICAL ONCE
Status: CANCELLED | OUTPATIENT
Start: 2022-10-26 | End: 2022-10-26

## 2022-10-26 RX ORDER — LIDOCAINE 50 MG/G
OINTMENT TOPICAL ONCE
Status: CANCELLED | OUTPATIENT
Start: 2022-10-26 | End: 2022-10-26

## 2022-10-26 RX ORDER — LIDOCAINE 40 MG/G
CREAM TOPICAL ONCE
Status: CANCELLED | OUTPATIENT
Start: 2022-10-26 | End: 2022-10-26

## 2022-10-26 RX ORDER — GENTAMICIN SULFATE 1 MG/G
OINTMENT TOPICAL ONCE
Status: CANCELLED | OUTPATIENT
Start: 2022-10-26 | End: 2022-10-26

## 2022-10-26 RX ORDER — LIDOCAINE HYDROCHLORIDE 40 MG/ML
SOLUTION TOPICAL ONCE
Status: COMPLETED | OUTPATIENT
Start: 2022-10-26 | End: 2022-10-26

## 2022-10-26 RX ORDER — GINSENG 100 MG
CAPSULE ORAL ONCE
Status: CANCELLED | OUTPATIENT
Start: 2022-10-26 | End: 2022-10-26

## 2022-10-26 RX ORDER — BACITRACIN, NEOMYCIN, POLYMYXIN B 400; 3.5; 5 [USP'U]/G; MG/G; [USP'U]/G
OINTMENT TOPICAL ONCE
Status: CANCELLED | OUTPATIENT
Start: 2022-10-26 | End: 2022-10-26

## 2022-10-26 RX ORDER — LIDOCAINE HYDROCHLORIDE 40 MG/ML
SOLUTION TOPICAL ONCE
Status: CANCELLED | OUTPATIENT
Start: 2022-10-26 | End: 2022-10-26

## 2022-10-26 RX ADMIN — LIDOCAINE HYDROCHLORIDE 5 ML: 40 SOLUTION TOPICAL at 09:23

## 2022-10-26 ASSESSMENT — PAIN SCALES - GENERAL
PAINLEVEL_OUTOF10: 3
PAINLEVEL_OUTOF10: 3

## 2022-10-26 ASSESSMENT — PAIN DESCRIPTION - PAIN TYPE
TYPE: CHRONIC PAIN
TYPE: CHRONIC PAIN

## 2022-10-26 ASSESSMENT — PAIN DESCRIPTION - ORIENTATION
ORIENTATION: LEFT
ORIENTATION: LEFT

## 2022-10-26 ASSESSMENT — PAIN DESCRIPTION - ONSET
ONSET: ON-GOING
ONSET: ON-GOING

## 2022-10-26 ASSESSMENT — PAIN DESCRIPTION - DESCRIPTORS
DESCRIPTORS: ACHING;BURNING
DESCRIPTORS: OTHER (COMMENT)

## 2022-10-26 ASSESSMENT — PAIN DESCRIPTION - LOCATION
LOCATION: FOOT
LOCATION: FOOT

## 2022-10-26 ASSESSMENT — PAIN DESCRIPTION - FREQUENCY
FREQUENCY: CONTINUOUS
FREQUENCY: CONTINUOUS

## 2022-10-26 NOTE — PLAN OF CARE
Wound Care Supplies     **ADDITIONAL PRODUCT FOR EXCESS DRAINAGE NEEDED**  Supply Company:     [employer] [id number] [address] p: [phone] f: [phone]     Ordering Center:      [address]  [address] [facility] [address]  [place] [address]  [phone]  WOUND CARE Dept: [phone]   FAX NUMBER [unfilled]    Patient Information:      Carol Cano  [address] [facility]   [phone]   : 1955  AGE: 79 y.o.      GENDER: male   TODAYS DATE:  10/26/2022    Insurance:      PRIMARY INSURANCE:  Plan: MEDICARE PART A AND B  Coverage: MEDICARE  Effective Date: 2020  [id number] - (Medicare)    SECONDARY INSURANCE:  Plan:   Coverage:   Effective Date:   [unfilled]    [unfilled]   [unfilled]     Patient Wound Information:      Problem List Items Addressed This Visit          Circulatory    Stroke ([address] (Chronic)    Relevant Orders    Initiate Outpatient Wound Care Protocol       Other    Non-healing open wound of left heel    Relevant Orders    Initiate Outpatient Wound Care Protocol    Pressure ulcer of left heel, stage 3 (HCC) - Primary    Relevant Orders    Initiate Outpatient Wound Care Protocol    Spasticity    Relevant Orders    Initiate Outpatient Wound Care Protocol    Left hemiparesis ([address]    Relevant Orders    Initiate Outpatient Wound Care Protocol    Pressure ulcer of heel, left, unstageable ([address]    Relevant Orders    Initiate Outpatient Wound Care Protocol    MRSA (methicillin resistant staph aureus) culture positive    Relevant Orders    Initiate Outpatient Wound Care Protocol     ICD-10 codes:  L89.620    WOUNDS REQUIRING DRESSING SUPPLIES:     Wound 22 Heel Left #1 ( states since about 2022) (Active)   Wound Image   22 0829   Wound Etiology Pressure Unstageable 22 1404   Dressing Status Old drainage noted 10/19/22 0941   Wound Cleansed Vashe 10/19/22 1010   Dressing/Treatment Honey gel/honey paste;ABD;Roll gauze 10/26/22 1013   Offloading for Diabetic Foot Ulcers Post op shoe 10/26/22 1013   Wound Length (cm) 2.8 cm 10/26/22 0916   Wound Width (cm) 3.3 cm 10/26/22 0916   Wound Depth (cm) 1.1 cm 10/26/22 0916   Wound Surface Area (cm^2) 9.24 cm^2 10/26/22 0916   Change in Wound Size % (l*w) 45.96 10/26/22 0916   Wound Volume (cm^3) 10.164 cm^3 10/26/22 0916   Wound Healing % -494 10/26/22 0916   Post-Procedure Length (cm) 2.9 cm 10/26/22 0930   Post-Procedure Width (cm) 3.4 cm 10/26/22 0930   Post-Procedure Depth (cm) 1.1 cm 10/26/22 0930   Post-Procedure Surface Area (cm^2) 9.86 cm^2 10/26/22 0930   Post-Procedure Volume (cm^3) 10.846 cm^3 10/26/22 0930   Wound Assessment Granulation tissue;Slough 10/26/22 0916   Drainage Amount Large 10/26/22 0916   Drainage Description Serosanguinous 10/26/22 0916   Odor None 10/26/22 0916   Anahy-wound Assessment Dry/flaky 10/26/22 0916   Margins Undefined edges 10/26/22 0916   Wound Thickness Description not for Pressure Injury Full thickness 10/26/22 0916   Number of days: 78          Supplies Requested :      WOUND #: 1   PRIMARY DRESSING:  Honey gel ( PATIENT SUPPLIED)   Cover and Secure with: ABD pad   ROLLED GUAZE     FREQUENCY OF DRESSING CHANGES:  Daily             Patient Wound(s) Debrided: [x] Yes   [] No    Debribement Type: subcutaneous tissue    Debridement Date: 10/26/22    Patient currently being seen by Home Health: [] Yes   [x] No    Duration for needed supplies:  []15  []30  []60  [x]90 Days    Provider Information:      PROVIDER'S NAME: MADISYN Hernandez - STEFFANY     NPI: Tenisha FAJARDO  [id number]

## 2022-10-26 NOTE — PROGRESS NOTES
[address]   Progress Note and Procedure Note      Cassy Hoffmann  MEDICAL RECORD NUMBER:  [id number]  AGE: 79 y.o. GENDER: male  : 1955  EPISODE DATE:  10/26/2022    Subjective:     Chief Complaint   Patient presents with    Wound Check     Follow up visit left heel         HISTORY of PRESENT ILLNESS HPI     Cassy Hoffmann is a 79 y.o. male who presents today for wound/ulcer evaluation. History of Wound Context: History of Present Illness:  79 yr male presents today for wound left heel assessment and evaluation . Recent displaced fracture of base of neck of left femur. Pressure ulcer left heel now designated as a Stage 3 pressure ulcer since unstable eschar removed. No periwound redness. Offload boot applied when in bed. PT and DP pulse strong with Doppler. Periwound still pink, but non tender. Reports getting protein supplements. Pt transferring only, no walking with walker still too unstable. Noting intermittent spastic movements legs during visit. Concern about friction on left heel with this movement. Pt wears post-op shoe on left foot. His son Maria Esther Burton and daughter Jonatan Choe are changing dressings daily and is aware of MRSA and taking appropriate precautions. Left lower leg less swelling noted and low compression added to plan of care. Denies constitutional issues  Displaced fracture of base of neck of left femur, history of CVA, hemiplegia and hemiparesis following CVA affecting left nondominant side, with intermittent spasms of both lower extremities.      Wound/Ulcer Pain Timing/Severity: constant  Quality of pain: tender  Severity:  3 / 10   Modifying Factors: Pain is relieved/improved with rest, repositioning  Associated Signs/Symptoms: edema and drainage    Ulcer Identification:  Ulcer Type: pressure ulcer    Contributing Factors: edema; while hospitalized developed wound from chronic pressure and decreased mobility    Acute Wound: N/A not an acute wound    PAST MEDICAL HISTORY        Diagnosis Date    CAD (coronary artery disease)     HTN (hypertension)     Hyperlipemia     Left hemiparesis ([address] 8/11/2020    MRSA (methicillin resistant staph aureus) culture positive 9/14/2022    heel    Non-healing open wound of left heel 10/12/2022    Pressure injury of left ankle, stage 2 ([address] 9/13/2021    Pressure ulcer of heel, left, unstageable ([address] 8/8/2022    Stable eschar covered.     Pressure ulcer of left heel, stage 3 ([address] 10/20/2022    S/P PTCA (percutaneous transluminal coronary angioplasty) 2010    two stents place    Spasticity 7/9/2019    Stroke ([address] 10/2012    left-sided weakness       PAST SURGICAL HISTORY    Past Surgical History:   Procedure Laterality Date    CARDIAC SURGERY      HIP SURGERY Left     INGUINAL HERNIA REPAIR      right side    INTRACAPSULAR CATARACT EXTRACTION Right 08/30/2019    PHACOEMULSIFICATION WITH INTRAOCULAR LENS IMPLANT performed by Louann Saavedra MD at [facility] [address] EXTRACTION Left 09/06/2019    PHACOEMULSIFICATION WITH INTRAOCULAR LENS IMPLANT performed by Louann Saavedra MD at [facility], [place]    Family History   Problem Relation Age of Onset    Hypertension Mother     Heart Disease Father     Breast Cancer Sister     Heart Attack Brother        SOCIAL HISTORY    Social History     Tobacco Use    Smoking status: Never    Smokeless tobacco: Never   Vaping Use    Vaping Use: Never used   Substance Use Topics    Alcohol use: No    Drug use: No       ALLERGIES    No Known Allergies    MEDICATIONS    Current Outpatient Medications on File Prior to Encounter   Medication Sig Dispense Refill    sulfamethoxazole-trimethoprim (BACTRIM DS;SEPTRA DS) 800-160 MG per tablet Take 1 tablet by mouth 2 times daily for 10 days 20 tablet 0    Wound Dressings (MEDIHONEY WOUND/BURN DRESSING) GEL gel Apply 1 each topically daily 1 each 1    Wound Dressings (MEDIHONEY) GEL gel Use topically on wound daily. 1 each 0    senna-docusate (PERICOLACE) 8.6-50 MG per tablet Take 1 tablet by mouth 2 times daily      atorvastatin (LIPITOR) 40 MG tablet Take 1 tablet by mouth daily 90 tablet 3    primidone (MYSOLINE) 50 MG tablet Take 0.25 tablets by mouth in the morning and at bedtime 90 tablet 1    amLODIPine (NORVASC) 10 MG tablet Take 1 tablet by mouth daily 90 tablet 3    gabapentin (NEURONTIN) 100 MG capsule Take 1 capsule by mouth 4 times daily for 180 days. 360 capsule 1    baclofen (LIORESAL) 10 MG tablet Take 1 tablet by mouth 2 times daily 120 tablet 1    hydrALAZINE (APRESOLINE) 50 MG tablet Take 1 tablet by mouth 3 times daily 270 tablet 1    lisinopril (PRINIVIL;ZESTRIL) 20 MG tablet Take 1 tablet by mouth daily 90 tablet 1    loratadine (CLARITIN) 10 MG tablet Take 1 tablet by mouth daily Pt needs to come in and be seen before more rx's are given. 90 tablet 1    aspirin 81 MG tablet Take 81 mg by mouth daily      Sennosides 17.2 MG TABS Take 17.2 mg by mouth       No current facility-administered medications on file prior to encounter. REVIEW OF SYSTEMS    Pertinent items are noted in HPI. Objective:      /72   Pulse 65   Temp 98.1 °F (36.7 °C) (Infrared)   Resp 18     Wt Readings from Last 3 Encounters:   09/29/22 228 lb 14.4 oz (103.8 kg)   09/01/22 230 lb (104.3 kg)   08/08/22 230 lb (104.3 kg)       PHYSICAL EXAM    General Appearance: alert and oriented to person, place and time, well-developed and well-nourished, in no acute distress  Skin: warm and dry, no rash or erythema  Head: normocephalic and atraumatic  Eyes: pupils equal, round, and reactive to light  Pulmonary/Chest: normal air movement, no respiratory distress  Cardiovascular: left DP +1  Extremities:  LLE +3 pitting edema  Wound:  Moist, loose slough in wound bed.   Area of undermining is questionable for possible exposed bone (ordered X-ray)      Assessment:        Problem List Items Addressed This Visit Non-healing open wound of left heel    Relevant Orders    Initiate Outpatient Wound Care Protocol    Pressure ulcer of left heel, stage 3 (HCC) - Primary    Relevant Orders    Initiate Outpatient Wound Care Protocol    Stroke [facility]) (Chronic)    Relevant Orders    Initiate Outpatient Wound Care Protocol    Spasticity    Relevant Orders    Initiate Outpatient Wound Care Protocol    Left hemiparesis ([address]    Relevant Orders    Initiate Outpatient Wound Care Protocol    Pressure ulcer of heel, left, unstageable (HCC)    Relevant Orders    Initiate Outpatient Wound Care Protocol    MRSA (methicillin resistant staph aureus) culture positive    Relevant Orders    Initiate Outpatient Wound Care Protocol        Procedure Note  Indications:  Based on my examination of this patient's wound(s)/ulcer(s) today, debridement is required to promote healing and evaluate the wound base. Performed by: MADISYN Ruiz - CNP    Consent obtained:  Yes    Time out taken:  Yes    Pain Control: Anesthetic  Anesthetic: 4% Lidocaine Liquid Topical       Debridement: Excisional Debridement    Using curette, scissors, and forceps the wound(s)/ulcer(s) was/were debrided down through and including the removal of epidermis, dermis, and subcutaneous tissue.         Devitalized Tissue Debrided:  moist slough; eschar was removed 10/12/22    Pre Debridement Measurements:  Are located in the [facility]  Documentation Flow Sheet    Diabetic/Pressure/Non Pressure Ulcers only:  Ulcer: Pressure ulcer, Stage 3     Wound/Ulcer #: 1    Post Debridement Measurements:  Wound/Ulcer Descriptions are Pre Debridement except measurements:    Wound 08/08/22 Heel Left #1 ( states since about 6/24/2022) (Active)   Wound Image   09/07/22 0829   Wound Etiology Pressure Unstageable 08/08/22 1404   Dressing Status Old drainage noted 10/19/22 0941   Wound Cleansed Vashe 10/19/22 1010   Dressing/Treatment Honey gel/honey paste;ABD;Roll gauze 10/26/22 1013   Offloading for Diabetic Foot Ulcers Post op shoe 10/26/22 1013   Wound Length (cm) 2.8 cm 10/26/22 0916   Wound Width (cm) 3.3 cm 10/26/22 0916   Wound Depth (cm) 1.1 cm 10/26/22 0916   Wound Surface Area (cm^2) 9.24 cm^2 10/26/22 0916   Change in Wound Size % (l*w) 45.96 10/26/22 0916   Wound Volume (cm^3) 10.164 cm^3 10/26/22 0916   Wound Healing % -494 10/26/22 0916   Post-Procedure Length (cm) 2.9 cm 10/26/22 0930   Post-Procedure Width (cm) 3.4 cm 10/26/22 0930   Post-Procedure Depth (cm) 1.5 cm 10/26/22 0930   Post-Procedure Surface Area (cm^2) 9.86 cm^2 10/26/22 0930   Post-Procedure Volume (cm^3) 14.79 cm^3 10/26/22 0930   Wound Assessment Granulation tissue;Slough; Exposed structure bone 10/26/22 0916   Drainage Amount Large 10/26/22 0916   Drainage Description Serosanguinous 10/26/22 0916   Odor None 10/26/22 0916   Anahy-wound Assessment Dry/flaky 10/26/22 0916   Margins Undefined edges 10/26/22 0916   Wound Thickness Description not for Pressure Injury Full thickness 10/26/22 0916   Number of days: 78          Total Surface Area Debrided:  9.86 sq cm     Estimated Blood Loss:  Minimal    Hemostasis Achieved:  by pressure    Procedural Pain:  5  / 10     Post Procedural Pain:  3 / 10     Response to treatment:  Well tolerated by patient. X-ray of left heel to RO osteomyelitis is negative. Plan:     Treatment Note please see attached Discharge Instructions    Written patient dismissal instructions given to patient and signed by patient or POA.          Discharge [address] [facility] Physician Orders and Discharge Instructions  [address] [place] [address] 15, [facility] 24  Telephone: [phone]  [address] 8:30 am - 4:30 pm and Friday 8:30 am - 1:00 pm.          NAME:  Cassy Hoffmann  YOB: 1955  MEDICAL RECORD NUMBER:  [id number]  TODAYS DATE:  10/26/22         Please wash hands with soap and water prior to and right after  every dressing change        Vashe wound cleanser:                                [x] Vashe Soak  5 minutes in clinic today . DO NOT rinse after Vashe. WOUND LOCATION   Left Heel      May rinse wounds with 0.9% saline  Do NOT SCRUB WOUND. Keep wounds dry in the shower unless otherwise instructed by the physician. Topical Treatments:              [x] Apply around the wound:   [x] moisturizing lotion  [] Antifungal ointment      [] No-Sting barrier film   [] Zinc paste      PRIMARY DRESSING:                                [x] [facility] @ HOME                                                SECONDARY DRESSING:                [x] ABD PAD    NO TELFA                                       [x] Rolled Gauze (light)                                                  try no ace bandage- to decrease pressure to the area      HOW OFTEN TO CHANGE DRESSINGS:            [x] Daily                              COMPRESSION IS A SIGNIFICANT FACTOR TO HEALING YOUR WOUNDS. Elevate leg(s) above the level of the heart when sitting. Avoid prolonged standing in one place. (X)   LOW SPANDAGRIP spandagrip to left leg.  _________________________________________________________________  PRESSURE RELIEF AND OFF-LOADING:     Off-Loading:   With heel protector  [x] Off-loading when       [x] in bed         [x] sitting                             Specialty equipment ordered:       [] Wheelchair cushion    [] Specialty Bed/Mattress     [x] Assistive Device: please continue to use any assistive devices advised by the provider discussed during your visit   [x] Surgical shoe    [] Podus Boot(s)   [] Foam Boot(s)    [] CROW Boot     _____________________________________________________________________________________________     Dietary:  Continue your diet as tolerated. Eat heart-healthy foods. These foods include vegetables, fruits, nuts, beans, lean meat, fish, and whole grains.  Limit sodium, alcohol, and sugar.  Protein is a key nutrient in helping to repair damaged tissue and promote new tissue growth. Good sources of protein are milk, yogurt, cheese, fish, lean meat, and beans. If you are a diabetic, having diabetes can make it hard for wounds to heal. So try to keep your blood sugar in its target range.  __________________________________________________________________________________________________     ACTIVITY:   Activity as tolerated  ________________________________________________________________________________________________________________     PAIN:     Please note, A small amount of pain, drainage and/or bleeding from this process might be expected, and is NORMAL. Elevate the affected limb. Use over-the-counter medications you would normally use for pain as permitted by your family doctor. For persistent pain not relieved by the above interventions, please call your family doctor.  ________________________________________________________________________________  Call your doctor now or seek immediate medical care if:    You have symptoms of infection, such as: Increased pain, swelling, warmth, or redness. Red streaks leading from the area. Pus draining from the area. A fever. _______________________________________________________________________     Return Appointment:  DME/Wound Dressing Supplies Provided by:    (Supply companies distribute one month supply at a time. Please call them directly to reorder supplies when you run out)     ECF or Home Healthcare: Your Home Care Agency is responsible to order your supplies.      Return Appointment: With Iram Lopez CNP  in [address] [place] [address])                  [x] Orders placed during your visit:    [facility] ARE ABLE        :  [facility]      Electronically signed by Lee Ann La RN on 10/26/2022 at 9:34 AM      [facility] Information: Should you experience any significant changes in your wound(s) or have questions about your wound care, please contact the [address] [place] [address] at [address] 8:30 am - 4:30 pm and Friday 8:30 am - 1:00 pm.  If you need help with your wound outside these hours and cannot wait until we are again available, contact your PCP or go to the hospital emergency room. PLEASE NOTE: IF YOU ARE UNABLE TO OBTAIN WOUND SUPPLIES, CONTINUE TO USE THE SUPPLIES YOU HAVE AVAILABLE UNTIL YOU ARE ABLE TO REACH US. KEEP THE WOUND COVERED AT ALL TIMES.            Physician Signature:_______________________     Date: ___________ Time:  ____________                 [] Jarrett Dominguez CNP     [x] Nicholas Olvera          Electronically signed by MADISYN Louis CNP on 10/26/2022 at 1:51 PM

## 2022-10-26 NOTE — DISCHARGE INSTRUCTIONS
[address] Physician Orders and Discharge Instructions  [address] [facility], [address]  Telephone: [phone]  [address] 8:30 am - 4:30 pm and Friday 8:30 am - 1:00 pm.          NAME:  Kanika Barnett  YOB: 1955  MEDICAL RECORD NUMBER:  [id number]  TODAYS DATE:  10/26/22         Please wash hands with soap and water prior to and right after  every dressing change        Vashe wound cleanser:                                [x] Vashe Soak  5 minutes in clinic today . DO NOT rinse after Vashe. WOUND LOCATION   Left Heel      May rinse wounds with 0.9% saline  Do NOT SCRUB WOUND. Keep wounds dry in the shower unless otherwise instructed by the physician. Topical Treatments:              [x] Apply around the wound:   [x] moisturizing lotion  [] Antifungal ointment      [] No-Sting barrier film   [] Zinc paste      PRIMARY DRESSING:                                [x] [facility] @ HOME                                                SECONDARY DRESSING:                [x] ABD PAD    NO TELFA                                       [x] Rolled Gauze (light)                                                  try no ace bandage- to decrease pressure to the area      HOW OFTEN TO CHANGE DRESSINGS:            [x] Daily                              COMPRESSION IS A SIGNIFICANT FACTOR TO HEALING YOUR WOUNDS. Elevate leg(s) above the level of the heart when sitting. Avoid prolonged standing in one place.                (X)   LOW SPANDAGRIP spandagrip to left leg.  _________________________________________________________________  PRESSURE RELIEF AND OFF-LOADING:     Off-Loading:   With heel protector  [x] Off-loading when       [x] in bed         [x] sitting                             Specialty equipment ordered:       [] Wheelchair cushion    [] Specialty Bed/Mattress     [x] Assistive Device: please continue to use any assistive devices advised by the provider discussed during your visit   [x] Surgical shoe    [] Podus Boot(s)   [] Foam Boot(s)    [] Eugena Forget     _____________________________________________________________________________________________     Dietary:  Continue your diet as tolerated. Eat heart-healthy foods. These foods include vegetables, fruits, nuts, beans, lean meat, fish, and whole grains. Limit sodium, alcohol, and sugar. Protein is a teresa nutrient in helping to repair damaged tissue and promote new tissue growth. Good sources of protein are milk, yogurt, cheese, fish, lean meat, and beans. If you are a diabetic, having diabetes can make it hard for wounds to heal. So try to keep your blood sugar in its target range.  __________________________________________________________________________________________________     ACTIVITY:   Activity as tolerated  ________________________________________________________________________________________________________________     PAIN:     Please note, A small amount of pain, drainage and/or bleeding from this process might be expected, and is NORMAL. Elevate the affected limb. Use over-the-counter medications you would normally use for pain as permitted by your family doctor. For persistent pain not relieved by the above interventions, please call your family doctor.  ________________________________________________________________________________  Call your doctor now or seek immediate medical care if:    You have symptoms of infection, such as: Increased pain, swelling, warmth, or redness. Red streaks leading from the area. Pus draining from the area. A fever. _______________________________________________________________________     Return Appointment:  DME/Wound Dressing Supplies Provided by:    (Supply companies distribute one month supply at a time.   Please call them directly to reorder supplies when you run out)     ECF or Home Healthcare: Your Home Care Agency is responsible to order your supplies. Return Appointment: With Morgan Jones CNP  in [facility])                  [x] Orders placed during your visit:    [facility] ARE ABLE        :  [facility]      Electronically signed by Rodney Brown RN on 10/26/2022 at 9:34 AM      [address] [facility] [address] Information: Should you experience any significant changes in your wound(s) or have questions about your wound care, please contact the [address] [place] [address] at [address] 8:30 am - 4:30 pm and Friday 8:30 am - 1:00 pm.  If you need help with your wound outside these hours and cannot wait until we are again available, contact your PCP or go to the hospital emergency room. PLEASE NOTE: IF YOU ARE UNABLE TO OBTAIN WOUND SUPPLIES, CONTINUE TO USE THE SUPPLIES YOU HAVE AVAILABLE UNTIL YOU ARE ABLE TO REACH US. KEEP THE WOUND COVERED AT ALL TIMES.            Physician Signature:_______________________     Date: ___________ Time:  ____________                 [] Morgan Jones CNP     [x] Amarjit Shafer

## 2022-10-27 ENCOUNTER — HOSPITAL ENCOUNTER (OUTPATIENT)
Age: 67
Discharge: HOME | End: 2022-10-27
Payer: MEDICARE

## 2022-10-27 ENCOUNTER — HOSPITAL ENCOUNTER (OUTPATIENT)
Dept: GENERAL RADIOLOGY | Age: 67
Discharge: HOME | End: 2022-10-27
Payer: MEDICARE

## 2022-10-27 PROCEDURE — 73650 X-RAY EXAM OF HEEL: CPT

## 2022-11-01 NOTE — DISCHARGE INSTRUCTIONS
[address] [place] [address] Physician Orders and Discharge Instructions  [address] [place] [address] Drive, [address], [address]  Telephone: [phone]  [address] 8:30 am - 4:30 pm and Friday 8:30 am - 1:00 pm.          NAME:  Lynda Kohler  YOB: 1955  MEDICAL RECORD NUMBER:  [id number]  TODAYS DATE:  11/2/22         Please wash hands with soap and water prior to and right after  every dressing change        Vashe wound cleanser:                                [] Vashe Soak  5 minutes in clinic today . DO NOT rinse after Vashe. WOUND LOCATION   Left Heel **PURAPLY #1 APPLIED 11/2/2022**     May rinse wounds with 0.9% saline  Do NOT SCRUB WOUND. Keep wounds dry in the shower unless otherwise instructed by the physician. Topical Treatments:              [x] Apply around the wound:   [x] moisturizing lotion  [] Antifungal ointment      [] No-Sting barrier film   [] Zinc paste      PRIMARY DRESSING:                                [x] PURAPLY, MEPITEL AND STERI STRIPS APPLIED PER CASSIE AMAYA CNP                                           SECONDARY DRESSING:                [x] BOLSTER DRESSING OVER MEPITEL                                      [x]   GA[address], [address] ( LIGHT FOOTBALL DRESSING)                                                [address]:            [x] DO NOT CHANGE           YOU HAVE HAD A  PURAPLY AM  PLACED ON YOUR WOUND TODAY. FOR BEST RESULTS, WE RECOMMEND YOU LIMIT YOUR ACTIVITY FOR THE NEXT WEEK AS MUCH AS POSSIBLE. AVOID LONG PERIODS OF TIME ON YOUR FEET. THIS ALSO INCLUDES ELEVATING YOUR LEGS AND FEET 3-4 TIMES DAILY FOR AT LEAST 20-30 MINUTES ON PILLOWS AND AT NIGHT SO THAT THEY ARE HIGHER THAN THE LEVEL OF YOUR HEART     Electronically signed by Kayleigh Diaz RN on 11/2/2022 at 9:53 AM                              COMPRESSION IS A SIGNIFICANT FACTOR TO HEALING YOUR WOUNDS.       Elevate leg(s) above the level of the heart when sitting. Avoid prolonged standing in one place. (X)   LOW SPANDAGRIP spandagrip to left leg FROM COBAN TO BELOW KNEE.  _________________________________________________________________  PRESSURE RELIEF AND OFF-LOADING:     Off-Loading:   With heel protector  [x] Off-loading when       [x] in bed         [x] sitting                             Specialty equipment ordered:       [] Wheelchair cushion    [] Specialty Bed/Mattress     [x] Assistive Device: please continue to use any assistive devices advised by the provider discussed during your visit   [x] Surgical shoe    [] Podus Boot(s)   [] Foam Boot(s)    [] CROW Boot     _____________________________________________________________________________________________     Dietary:  Continue your diet as tolerated. Eat heart-healthy foods. These foods include vegetables, fruits, nuts, beans, lean meat, fish, and whole grains. Limit sodium, alcohol, and sugar. Protein is a teresa nutrient in helping to repair damaged tissue and promote new tissue growth. Good sources of protein are milk, yogurt, cheese, fish, lean meat, and beans. If you are a diabetic, having diabetes can make it hard for wounds to heal. So try to keep your blood sugar in its target range.  __________________________________________________________________________________________________     ACTIVITY:   Activity as tolerated  ________________________________________________________________________________________________________________     PAIN:     Please note, A small amount of pain, drainage and/or bleeding from this process might be expected, and is NORMAL. Elevate the affected limb. Use over-the-counter medications you would normally use for pain as permitted by your family doctor.   For persistent pain not relieved by the above interventions, please call your family doctor.  ________________________________________________________________________________  Call your doctor now or seek immediate medical care if:    You have symptoms of infection, such as: Increased pain, swelling, warmth, or redness. Red streaks leading from the area. Pus draining from the area. A fever. _______________________________________________________________________     Return Appointment:  DME/Wound Dressing Supplies Provided by:    (Supply companies distribute one month supply at a time. Please call them directly to reorder supplies when you run out)     ECF or Home Healthcare: Your Home Care Agency is responsible to order your supplies. Return Appointment: With Niru Victoria CNP  in 1 Week(s)                  [x] Orders placed during your visit:   culture of left heel obtained today 11/2/22         :  Raul Arrington Information: Should you experience any significant chnges in your wound(s) or have questions about your wound care, please contact the [address] [place] [address] at [address] 8:30 am - 4:30 pm and Friday 8:30 am - 1:00 pm.  If you need help with your wound outside these hours and cannot wait until we are again available, contact your PCP or go to the hospital emergency room. PLEASE NOTE: IF YOU ARE UNABLE TO OBTAIN WOUND SUPPLIES, CONTINUE TO USE THE SUPPLIES YOU HAVE AVAILABLE UNTIL YOU ARE ABLE TO REACH US. KEEP THE WOUND COVERED AT ALL TIMES.            Physician Signature:_______________________     Date: ___________ Time:  ____________                 [x] Gerardo Hernandez CNP     [] Niru Victoria

## 2022-11-02 ENCOUNTER — HOSPITAL ENCOUNTER (OUTPATIENT)
Dept: WOUND CARE | Age: 67
Discharge: HOME | End: 2022-11-02
Payer: MEDICARE

## 2022-11-02 VITALS
DIASTOLIC BLOOD PRESSURE: 74 MMHG | SYSTOLIC BLOOD PRESSURE: 145 MMHG | RESPIRATION RATE: 18 BRPM | TEMPERATURE: 96.8 F | HEART RATE: 108 BPM

## 2022-11-02 DIAGNOSIS — G81.94 LEFT HEMIPARESIS (HCC): ICD-10-CM

## 2022-11-02 DIAGNOSIS — I63.9 CEREBROVASCULAR ACCIDENT (CVA), UNSPECIFIED MECHANISM (HCC): ICD-10-CM

## 2022-11-02 DIAGNOSIS — R25.2 SPASTICITY: ICD-10-CM

## 2022-11-02 DIAGNOSIS — Z22.322 MRSA (METHICILLIN RESISTANT STAPH AUREUS) CULTURE POSITIVE: ICD-10-CM

## 2022-11-02 DIAGNOSIS — L89.620 PRESSURE ULCER OF HEEL, LEFT, UNSTAGEABLE (HCC): ICD-10-CM

## 2022-11-02 DIAGNOSIS — S91.302A NON-HEALING OPEN WOUND OF LEFT HEEL: ICD-10-CM

## 2022-11-02 DIAGNOSIS — L89.623 PRESSURE ULCER OF LEFT HEEL, STAGE 3 (HCC): Primary | ICD-10-CM

## 2022-11-02 PROCEDURE — 15275 SKIN SUB GRAFT FACE/NK/HF/G: CPT | Performed by: NURSE PRACTITIONER

## 2022-11-02 PROCEDURE — 15275 SKIN SUB GRAFT FACE/NK/HF/G: CPT

## 2022-11-02 PROCEDURE — 87077 CULTURE AEROBIC IDENTIFY: CPT

## 2022-11-02 PROCEDURE — 87070 CULTURE OTHR SPECIMN AEROBIC: CPT

## 2022-11-02 PROCEDURE — 87205 SMEAR GRAM STAIN: CPT

## 2022-11-02 PROCEDURE — 87186 SC STD MICRODIL/AGAR DIL: CPT

## 2022-11-02 PROCEDURE — 87075 CULTR BACTERIA EXCEPT BLOOD: CPT

## 2022-11-02 RX ORDER — LIDOCAINE HYDROCHLORIDE 40 MG/ML
SOLUTION TOPICAL ONCE
Status: CANCELLED | OUTPATIENT
Start: 2022-11-02 | End: 2022-11-02

## 2022-11-02 RX ORDER — CLOBETASOL PROPIONATE 0.5 MG/G
OINTMENT TOPICAL ONCE
Status: CANCELLED | OUTPATIENT
Start: 2022-11-02 | End: 2022-11-02

## 2022-11-02 RX ORDER — LIDOCAINE 40 MG/G
CREAM TOPICAL ONCE
Status: CANCELLED | OUTPATIENT
Start: 2022-11-02 | End: 2022-11-02

## 2022-11-02 RX ORDER — LIDOCAINE 50 MG/G
OINTMENT TOPICAL ONCE
Status: CANCELLED | OUTPATIENT
Start: 2022-11-02 | End: 2022-11-02

## 2022-11-02 RX ORDER — LIDOCAINE HYDROCHLORIDE 20 MG/ML
JELLY TOPICAL ONCE
Status: CANCELLED | OUTPATIENT
Start: 2022-11-02 | End: 2022-11-02

## 2022-11-02 RX ORDER — BACITRACIN, NEOMYCIN, POLYMYXIN B 400; 3.5; 5 [USP'U]/G; MG/G; [USP'U]/G
OINTMENT TOPICAL ONCE
Status: CANCELLED | OUTPATIENT
Start: 2022-11-02 | End: 2022-11-02

## 2022-11-02 RX ORDER — BACITRACIN ZINC AND POLYMYXIN B SULFATE 500; 1000 [USP'U]/G; [USP'U]/G
OINTMENT TOPICAL ONCE
Status: CANCELLED | OUTPATIENT
Start: 2022-11-02 | End: 2022-11-02

## 2022-11-02 RX ORDER — GINSENG 100 MG
CAPSULE ORAL ONCE
Status: CANCELLED | OUTPATIENT
Start: 2022-11-02 | End: 2022-11-02

## 2022-11-02 RX ORDER — LIDOCAINE HYDROCHLORIDE 40 MG/ML
SOLUTION TOPICAL ONCE
Status: COMPLETED | OUTPATIENT
Start: 2022-11-02 | End: 2022-11-02

## 2022-11-02 RX ORDER — GENTAMICIN SULFATE 1 MG/G
OINTMENT TOPICAL ONCE
Status: CANCELLED | OUTPATIENT
Start: 2022-11-02 | End: 2022-11-02

## 2022-11-02 RX ORDER — BETAMETHASONE DIPROPIONATE 0.05 %
OINTMENT (GRAM) TOPICAL ONCE
Status: CANCELLED | OUTPATIENT
Start: 2022-11-02 | End: 2022-11-02

## 2022-11-02 RX ADMIN — LIDOCAINE HYDROCHLORIDE 5 ML: 40 SOLUTION TOPICAL at 09:22

## 2022-11-02 ASSESSMENT — PAIN - FUNCTIONAL ASSESSMENT: PAIN_FUNCTIONAL_ASSESSMENT: ACTIVITIES ARE NOT PREVENTED

## 2022-11-02 ASSESSMENT — PAIN SCALES - GENERAL
PAINLEVEL_OUTOF10: 1
PAINLEVEL_OUTOF10: 3

## 2022-11-02 ASSESSMENT — PAIN DESCRIPTION - ONSET
ONSET: ON-GOING
ONSET: ON-GOING

## 2022-11-02 ASSESSMENT — PAIN DESCRIPTION - PAIN TYPE
TYPE: CHRONIC PAIN
TYPE: CHRONIC PAIN

## 2022-11-02 ASSESSMENT — PAIN DESCRIPTION - LOCATION
LOCATION: FOOT
LOCATION: FOOT

## 2022-11-02 ASSESSMENT — PAIN DESCRIPTION - FREQUENCY
FREQUENCY: CONTINUOUS
FREQUENCY: CONTINUOUS

## 2022-11-02 ASSESSMENT — PAIN DESCRIPTION - ORIENTATION
ORIENTATION: LEFT
ORIENTATION: LEFT

## 2022-11-02 ASSESSMENT — PAIN DESCRIPTION - DESCRIPTORS
DESCRIPTORS: OTHER (COMMENT)
DESCRIPTORS: DULL;GNAWING

## 2022-11-02 NOTE — PLAN OF CARE
PuraPly AMTreatment Note    NAME:  Anitha Jim Sit OF BIRTH:  1955  MEDICAL RECORD NUMBER:  [id number]  DATE:  11/2/2022    Goal:  Patient will receive safe and proper application of skin substitute. Patient will comply with caring for dressing, and reporting complications. Expiration date checked immediately prior to use. Package intact prior to use and no damage noted. Transport temperature controlled and acceptable. PuraPly AM was removed from protective sterile packaging by provider and applied to prepared ulcer bed. PuraPly AM was applied to LEFT HEEL and affixed with steri-strips by the provider. PuraPly AM was covered with non-adherent ulcer dressing. Applied 2X2 BOLSTER over non-adherent. Applied dry gauze and/or roll gauze. Patient/caregiver was instructed not to remove dressing and to keep it clean and dry. Pt/family/caregiver was instructed on signs and symptoms of complications to report such as draining through dressing, dressing falling down/slipping, getting wet, or severe pain or tingling. PuraPly AM may be applied a total of 10 times per wound over a 12 week period. Date of first application of PuraPly for this current wound is November 2, 2022.    Guidelines followed    Electronically signed by Leobardo Easley RN on 11/2/2022 at 12:03 PM

## 2022-11-02 NOTE — LETTER
[facility]  [address] [id number] OFFICE [facility] [address] [phone]  Dept: Rishi Gomez RECORD NUMBER: [id number]   AGE: 79 y.o.  GENDER: male : 1955   TODAYS DATE: 2022       Mr. Mary Junior was seen in the [address] [place] [address] Road on 2022     No work for 2 day(s) - OFF WORK 2022 AND 11/3/2022    Electronically signed by Zaire Johnston RN on 2022 at 10:04 AM    Sincerely,            Reagan Stinson [facility]

## 2022-11-03 NOTE — PROGRESS NOTES
[address]   Progress Note and Procedure Note      Yane Cain  MEDICAL RECORD NUMBER:  [id number]  AGE: 79 y.o. GENDER: male  : 1955  EPISODE DATE:  2022    Subjective:     Chief Complaint   Patient presents with    Wound Check     Follow up visit left heel wound         HISTORY of PRESENT ILLNESS HPI   Abbey Stephenson is a 79 y.o. male who presents today for wound/ulcer evaluation. Today presents with small amount of drainage on old dressing. Repeat culture of wound obtained. Will apply skin substitute today to be replaced weekly. Reviewed xray results with pt and he has scheduled MRI for 11/10/22. Less pain and erythema noted. History of Wound Context: Recent displaced fracture of base of neck of left femur. Pressure ulcer left heel now designated as a Stage 3 pressure ulcer since unstable eschar removed. No periwound redness. Offload boot applied when in bed. PT and DP pulse strong with Doppler. Periwound still pink, but non tender. Reports getting protein supplements. Pt transferring only, no walking with walker still too unstable. Noting intermittent spastic movements legs during visit. Concern about friction on left heel with this movement. Pt wears post-op shoe on left foot. His son Dominique French and daughter Tatyana Tracey are changing dressings daily and is aware of MRSA and taking appropriate precautions. Left lower leg less swelling noted and low compression added to plan of care. Denies constitutional issues  Displaced fracture of base of neck of left femur, history of CVA, hemiplegia and hemiparesis following CVA affecting left nondominant side, with intermittent spasms of both lower extremities.      Wound/Ulcer Pain Timing/Severity: constant  Quality of pain: tender  Severity:   / 10   Modifying Factors: Pain is relieved/improved with rest, repositioning  Associated Signs/Symptoms: edema and drainage     Ulcer Identification:  Ulcer Type: pressure ulcer Contributing Factors: edema; while hospitalized developed wound from chronic pressure and decreased mobility     Acute Wound: N/A not an acute wound     PAST MEDICAL HISTORY        Diagnosis Date    CAD (coronary artery disease)     HTN (hypertension)     Hyperlipemia     Left hemiparesis ([address] 8/11/2020    MRSA (methicillin resistant staph aureus) culture positive 9/14/2022    heel    Non-healing open wound of left heel 10/12/2022    Pressure injury of left ankle, stage 2 ([address] 9/13/2021    Pressure ulcer of heel, left, unstageable ([address] 8/8/2022    Stable eschar covered.     Pressure ulcer of left heel, stage 3 ([address] 10/20/2022    S/P PTCA (percutaneous transluminal coronary angioplasty) 2010    two stents place    Spasticity 7/9/2019    Stroke ([address] 10/2012    left-sided weakness       PAST SURGICAL HISTORY    Past Surgical History:   Procedure Laterality Date    CARDIAC SURGERY      HIP SURGERY Left     INGUINAL HERNIA REPAIR      right side    INTRACAPSULAR CATARACT EXTRACTION Right 08/30/2019    PHACOEMULSIFICATION WITH INTRAOCULAR LENS IMPLANT performed by Chhaya Summers MD at [address] [place] [address] EXTRACTION Left 09/06/2019    PHACOEMULSIFICATION WITH INTRAOCULAR LENS IMPLANT performed by Chhaya Summers MD at [facility]    Family History   Problem Relation Age of Onset    Hypertension Mother     Heart Disease Father     Breast Cancer Sister     Heart Attack Brother        SOCIAL HISTORY    Social History     Tobacco Use    Smoking status: Never    Smokeless tobacco: Never   Vaping Use    Vaping Use: Never used   Substance Use Topics    Alcohol use: No    Drug use: No       ALLERGIES    No Known Allergies    MEDICATIONS    Current Outpatient Medications on File Prior to Encounter   Medication Sig Dispense Refill    Wound Dressings (MEDIHONEY WOUND/BURN DRESSING) GEL gel Apply 1 each topically daily 1 each 1    Wound Dressings (MEDIHONEY) GEL gel Use topically on wound daily. 1 each 0    senna-docusate (PERICOLACE) 8.6-50 MG per tablet Take 1 tablet by mouth 2 times daily      atorvastatin (LIPITOR) 40 MG tablet Take 1 tablet by mouth daily 90 tablet 3    primidone (MYSOLINE) 50 MG tablet Take 0.25 tablets by mouth in the morning and at bedtime 90 tablet 1    amLODIPine (NORVASC) 10 MG tablet Take 1 tablet by mouth daily 90 tablet 3    gabapentin (NEURONTIN) 100 MG capsule Take 1 capsule by mouth 4 times daily for 180 days. 360 capsule 1    baclofen (LIORESAL) 10 MG tablet Take 1 tablet by mouth 2 times daily 120 tablet 1    hydrALAZINE (APRESOLINE) 50 MG tablet Take 1 tablet by mouth 3 times daily 270 tablet 1    lisinopril (PRINIVIL;ZESTRIL) 20 MG tablet Take 1 tablet by mouth daily 90 tablet 1    loratadine (CLARITIN) 10 MG tablet Take 1 tablet by mouth daily Pt needs to come in and be seen before more rx's are given. 90 tablet 1    aspirin 81 MG tablet Take 81 mg by mouth daily      Sennosides 17.2 MG TABS Take 17.2 mg by mouth       No current facility-administered medications on file prior to encounter. REVIEW OF SYSTEMS  Review of Systems    Pertinent items are noted in HPI.     Objective:      BP (!) 145/74   Pulse (!) 108   Temp 96.8 °F (36 °C) (Infrared)   Resp 18     Wt Readings from Last 3 Encounters:   09/29/22 228 lb 14.4 oz (103.8 kg)   09/01/22 230 lb (104.3 kg)   08/08/22 230 lb (104.3 kg)       PHYSICAL EXAM  Physical Exam    General Appearance: alert and oriented to person, place and time, well-developed and well-nourished, in no acute distress  Skin: warm and dry, no rash or erythema  Head: normocephalic and atraumatic  Eyes: pupils equal, round, and reactive to light  Pulmonary/Chest: clear to auscultation bilaterally- no wheezes, rales or rhonchi, normal air movement, no respiratory distress  Cardiovascular: normal rate, regular rhythm, and intact distal pulses  Extremities: no cyanosis, no clubbing, and less edema      Assessment: Problem List Items Addressed This Visit       Pressure ulcer of heel, left, unstageable (HCC)    MRSA (methicillin resistant staph aureus) culture positive    Non-healing open wound of left heel    Pressure ulcer of left heel, stage 3 ([address] - Primary    Stroke ([facility] (Chronic)    Spasticity    Left hemiparesis ([facility]        Procedure Note  Indications:  Based on my examination of this patient's wound(s)/ulcer(s) today, debridement is required to promote healing and evaluate the wound base. Performed by: MADISYN Viera CNP    Consent obtained:  Yes    Time out taken:  Yes    Pain Control: Anesthetic  Anesthetic: 4% Lidocaine Liquid Topical       Debridement: Excisional Debridement    Using curette and forceps the wound(s)/ulcer(s) was/were debrided down through and including the removal of epidermis, dermis, and subcutaneous tissue. Devitalized Tissue Debrided:  fibrin, biofilm, and slough    Pre Debridement Measurements:  Are located in the [place]  Documentation Flow Sheet    Diabetic/Pressure/Non Pressure Ulcers only:  Ulcer: Pressure ulcer, Stage 3     Wound/Ulcer #: 1    Skin Substitute Applied:       Puraply 8 sq/cm            Performed by: MADISYN Viera CNP    Wound Type:pressure    Consent obtained: Yes    Time out taken: Yes     Fenestrated: Yes    Instrument(s) curette      [] Mesher Utilized    All  Guidelines Followed    Skin Substitute was Applied to Wound Number(s): Wound #: 1      Wound 08/08/22 Heel Left #1 ( states since about 6/24/2022) (Active)   Wound Image   11/02/22 0907   Wound Etiology Pressure Unstageable 08/08/22 1404   Dressing Status Old drainage noted 10/19/22 0941   Wound Cleansed Vashe 10/19/22 1010   Dressing/Treatment Other (comment); Silicone pad;Steri-strips;Gauze dressing/dressing sponge;Roll gauze;Coban/self-adherent bandages 11/02/22 0950   Offloading for Diabetic Foot Ulcers Post op shoe 10/26/22 1013   Wound Length (cm) 2.5 cm 11/02/22 0907   Wound Width (cm) 3.5 cm 11/02/22 0907   Wound Depth (cm) 1.1 cm 11/02/22 0907   Wound Surface Area (cm^2) 8.75 cm^2 11/02/22 0907   Change in Wound Size % (l*w) 48.83 11/02/22 0907   Wound Volume (cm^3) 9.625 cm^3 11/02/22 0907   Wound Healing % -463 11/02/22 0907   Post-Procedure Length (cm) 2.6 cm 11/02/22 0932   Post-Procedure Width (cm) 3.6 cm 11/02/22 0932   Post-Procedure Depth (cm) 1.1 cm 11/02/22 0932   Post-Procedure Surface Area (cm^2) 9.36 cm^2 11/02/22 0932   Post-Procedure Volume (cm^3) 10.296 cm^3 11/02/22 0932   Undermining Starts ___ O'Clock 9 11/02/22 0907   Undermining Ends___ O'Clock 12 11/02/22 0907   Undermining Maxium Distance (cm) 0.7 11/02/22 0907   Wound Assessment Granulation tissue;Slough 11/02/22 0907   Drainage Amount Moderate 11/02/22 0907   Drainage Description Serosanguinous 11/02/22 0907   Odor None 11/02/22 0907   Anahy-wound Assessment Dry/flaky 11/02/22 0907   Margins Undefined edges 11/02/22 0907   Wound Thickness Description not for Pressure Injury Full thickness 11/02/22 0907   Number of days: 86         Total Surface Area Covered 9.36 sq/cm     Amount Wasted 0 sq/cm    Reason for Waste n/a      Was the Product Layered  No     Secured: Yes    Secured With: Mepitel  [x]Steri Strips    []Sutures     []Staples []Other    Procedural Pain: 1/10     Post Procedural Pain: 0 / 10    Response to Treatment:  Well tolerated by patient. Post Debridement Measurements:  Wound/Ulcer Descriptions are Pre Debridement except measurements:    Wound 08/08/22 Heel Left #1 ( states since about 6/24/2022) (Active)   Wound Image   11/02/22 0907   Wound Etiology Pressure Unstageable 08/08/22 1404   Dressing Status Old drainage noted 10/19/22 0941   Wound Cleansed Vashe 10/19/22 1010   Dressing/Treatment Other (comment); Silicone pad;Steri-strips;Gauze dressing/dressing sponge;Roll gauze;Coban/self-adherent bandages 11/02/22 0950   Offloading for Diabetic Foot Ulcers Post op shoe 10/26/22 1013   Wound Length (cm) 2.5 cm 11/02/22 0907   Wound Width (cm) 3.5 cm 11/02/22 0907   Wound Depth (cm) 1.1 cm 11/02/22 0907   Wound Surface Area (cm^2) 8.75 cm^2 11/02/22 0907   Change in Wound Size % (l*w) 48.83 11/02/22 0907   Wound Volume (cm^3) 9.625 cm^3 11/02/22 0907   Wound Healing % -463 11/02/22 0907   Post-Procedure Length (cm) 2.6 cm 11/02/22 0932   Post-Procedure Width (cm) 3.6 cm 11/02/22 0932   Post-Procedure Depth (cm) 1.1 cm 11/02/22 0932   Post-Procedure Surface Area (cm^2) 9.36 cm^2 11/02/22 0932   Post-Procedure Volume (cm^3) 10.296 cm^3 11/02/22 0932   Undermining Starts ___ O'Clock 9 11/02/22 0907   Undermining Ends___ O'Clock 12 11/02/22 0907   Undermining Maxium Distance (cm) 0.7 11/02/22 0907   Wound Assessment Granulation tissue;Slough 11/02/22 0907   Drainage Amount Moderate 11/02/22 0907   Drainage Description Serosanguinous 11/02/22 0907   Odor None 11/02/22 0907   Anahy-wound Assessment Dry/flaky 11/02/22 0907   Margins Undefined edges 11/02/22 0907   Wound Thickness Description not for Pressure Injury Full thickness 11/02/22 0907   Number of days: 86          Total Surface Area Debrided:  9.36 sq cm     Estimated Blood Loss:  Minimal    Hemostasis Achieved:  by pressure    Procedural Pain:  0  / 10     Post Procedural Pain:  0 / 10     Response to treatment:  Well tolerated by patient. Plan:   Pt education per provider related to plan of care; small amount of drainage past few days. Pt is agreeable to the use of skin substitutes to help close wound. Pt agreeable to limit activity next 24-48 hours to get better adhesion of graft to wound bed. Questions answered. Treatment Note please see attached Discharge Instructions    Written patient dismissal instructions given to patient and signed by patient or POA.          Discharge [id number] [facility] Physician Orders and Discharge Instructions  [address] [place] [address], [facility]. [address]  Telephone: [phone]  [address] 8:30 am - 4:30 pm and Friday 8:30 am - 1:00 pm.          NAME:  Erick Granados  YOB: 1955  MEDICAL RECORD NUMBER:  [id number]  TODAYS DATE:  11/2/22         Please wash hands with soap and water prior to and right after  every dressing change        Vashe wound cleanser:                                [] Vashe Soak  5 minutes in clinic today . DO NOT rinse after Vashe. WOUND LOCATION   Left Heel **PURAPLY #1 APPLIED 11/2/2022**     May rinse wounds with 0.9% saline  Do NOT SCRUB WOUND. Keep wounds dry in the shower unless otherwise instructed by the physician. Topical Treatments:              [x] Apply around the wound:   [x] moisturizing lotion  [] Antifungal ointment      [] No-Sting barrier film   [] Zinc paste      PRIMARY DRESSING:                                [x] PURAPLY, MEPITEL AND STERI STRIPS APPLIED PER CASSIE AMAYA CNP                                           SECONDARY DRESSING:                [x] BOLSTER DRESSING OVER MEPITEL                                      [x]   GAUZE, [address] ( LIGHT FOOTBALL DRESSING)                                                [facility] [address]:            [x] DO NOT CHANGE           YOU HAVE HAD A  PURAPLY AM  PLACED ON YOUR WOUND TODAY. FOR BEST RESULTS, WE RECOMMEND YOU LIMIT YOUR ACTIVITY FOR THE NEXT WEEK AS MUCH AS POSSIBLE. AVOID LONG PERIODS OF TIME ON YOUR FEET. THIS ALSO INCLUDES ELEVATING YOUR LEGS AND FEET 3-4 TIMES DAILY FOR AT LEAST 20-30 MINUTES ON PILLOWS AND AT NIGHT SO THAT THEY ARE HIGHER THAN THE LEVEL OF YOUR HEART     Electronically signed by Arjun Jimenez RN on 11/2/2022 at 9:53 AM                              COMPRESSION IS A SIGNIFICANT FACTOR TO HEALING YOUR WOUNDS. Elevate leg(s) above the level of the heart when sitting. Avoid prolonged standing in one place.                (X)   LOW SPANDAGRIP spandagrip to left leg FROM COBAN TO BELOW KNEE.  _________________________________________________________________  PRESSURE RELIEF AND OFF-LOADING:     Off-Loading:   With heel protector  [x] Off-loading when       [x] in bed         [x] sitting                             Specialty equipment ordered:       [] Wheelchair cushion    [] Specialty Bed/Mattress     [x] Assistive Device: please continue to use any assistive devices advised by the provider discussed during your visit   [x] Surgical shoe    [] Podus Boot(s)   [] Foam Boot(s)    [] CROW Boot     _____________________________________________________________________________________________     Dietary:  Continue your diet as tolerated. Eat heart-healthy foods. These foods include vegetables, fruits, nuts, beans, lean meat, fish, and whole grains. Limit sodium, alcohol, and sugar. Protein is a teresa nutrient in helping to repair damaged tissue and promote new tissue growth. Good sources of protein are milk, yogurt, cheese, fish, lean meat, and beans. If you are a diabetic, having diabetes can make it hard for wounds to heal. So try to keep your blood sugar in its target range.  __________________________________________________________________________________________________     ACTIVITY:   Activity as tolerated  ________________________________________________________________________________________________________________     PAIN:     Please note, A small amount of pain, drainage and/or bleeding from this process might be expected, and is NORMAL. Elevate the affected limb. Use over-the-counter medications you would normally use for pain as permitted by your family doctor. For persistent pain not relieved by the above interventions, please call your family doctor.  ________________________________________________________________________________  Call your doctor now or seek immediate medical care if:    You have symptoms of infection, such as:   Increased pain, swelling, warmth, or redness. Red streaks leading from the area. Pus draining from the area. A fever. _______________________________________________________________________     Return Appointment:  DME/Wound Dressing Supplies Provided by:    (Supply companies distribute one month supply at a time. Please call them directly to reorder supplies when you run out)     ECF or Home Healthcare: Your Home Care Agency is responsible to order your supplies. Return Appointment: With Umang Chavis CNP  in 1 Week(s)                  [x] Orders placed during your visit:   culture of left heel obtained today 11/2/22         :  Raul Arrington Information: Should you experience any significant chnges in your wound(s) or have questions about your wound care, please contact the [address] [place] [address] at [address] 8:30 am - 4:30 pm and Friday 8:30 am - 1:00 pm.  If you need help with your wound outside these hours and cannot wait until we are again available, contact your PCP or go to the hospital emergency room. PLEASE NOTE: IF YOU ARE UNABLE TO OBTAIN WOUND SUPPLIES, CONTINUE TO USE THE SUPPLIES YOU HAVE AVAILABLE UNTIL YOU ARE ABLE TO REACH US. KEEP THE WOUND COVERED AT ALL TIMES.            Physician Signature:_______________________     Date: ___________ Time:  ____________                 [x] Teto Thomas CNP     [] Umang Chavis                Electronically signed by MADISYN Smith CNP on 11/3/2022 at 9:32 AM

## 2022-11-03 NOTE — DISCHARGE INSTRUCTIONS
[address] Physician Orders and Discharge Instructions  [address], [address] [address]  Telephone: [phone] [address] 8:30 am - 4:30 pm and Friday 8:30 am - 1:00 pm.          NAME:  Abbey Stephenson  YOB: 1955  MEDICAL RECORD NUMBER:  [id number]  TODAYS DATE:  11/2/22         Please wash hands with soap and water prior to and right after  every dressing change        Vashe wound cleanser:                                [x] Vashe Soak  5 minutes in clinic today . DO NOT rinse after Vashe. WOUND LOCATION   Left Heel **PURAPLY #1 APPLIED 11/2/2022**     May rinse wounds with 0.9% saline  Do NOT SCRUB WOUND. Keep wounds dry in the shower unless otherwise instructed by the physician. Topical Treatments:              [x] Apply around the wound:   [x] moisturizing lotion  [] Antifungal ointment      [] No-Sting barrier film   [] Zinc paste      PRIMARY DRESSING:                                [x] SILVER ALGINATE                                           SECONDARY DRESSING:  PLEASE APPLY GUAZE TO DORSAL FOOT FOR PROTECTION              [x]   ABD PAD                                   [x]  ROLL GUAZE                             HOW OFTEN TO CHANGE DRESSINGS:            [x]EVERY OTHER DAY           YOU HAVE HAD A  PURAPLY AM  PLACED ON YOUR WOUND TODAY. FOR BEST RESULTS, WE RECOMMEND YOU LIMIT YOUR ACTIVITY FOR THE NEXT WEEK AS MUCH AS POSSIBLE. AVOID LONG PERIODS OF TIME ON YOUR FEET. THIS ALSO INCLUDES ELEVATING YOUR LEGS AND FEET 3-4 TIMES DAILY FOR AT LEAST 20-30 MINUTES ON PILLOWS AND AT NIGHT SO THAT THEY ARE HIGHER THAN THE LEVEL OF YOUR HEART      Electronically signed by Zac Fitzgerald RN on 11/2/2022 at 9:53 AM                              COMPRESSION IS A SIGNIFICANT FACTOR TO HEALING YOUR WOUNDS. Elevate leg(s) above the level of the heart when sitting. Avoid prolonged standing in one place. (X)   LOW SPANDAGRIP  to left leg   _________________________________________________________________  PRESSURE RELIEF AND OFF-LOADING:     Off-Loading:   With heel protector  [x] Off-loading when       [x] in bed         [x] sitting                             Specialty equipment ordered:       [] Wheelchair cushion    [] Specialty Bed/Mattress     [x] Assistive Device: please continue to use any assistive devices advised by the provider discussed during your visit   [x] Surgical shoe    [] Podus Boot(s)   [] Foam Boot(s)    [] CROW Boot     _____________________________________________________________________________________________     Dietary:  Continue your diet as tolerated. Eat heart-healthy foods. These foods include vegetables, fruits, nuts, beans, lean meat, fish, and whole grains. Limit sodium, alcohol, and sugar. Protein is a teresa nutrient in helping to repair damaged tissue and promote new tissue growth. Good sources of protein are milk, yogurt, cheese, fish, lean meat, and beans. If you are a diabetic, having diabetes can make it hard for wounds to heal. So try to keep your blood sugar in its target range.  __________________________________________________________________________________________________     ACTIVITY:   Activity as tolerated  ________________________________________________________________________________________________________________     PAIN:     Please note, A small amount of pain, drainage and/or bleeding from this process might be expected, and is NORMAL. Elevate the affected limb. Use over-the-counter medications you would normally use for pain as permitted by your family doctor.   For persistent pain not relieved by the above interventions, please call your family doctor.  ________________________________________________________________________________  Call your doctor now or seek immediate medical care if:    You have symptoms of infection, such as:  Increased pain, swelling, warmth, or redness. Red streaks leading from the area. Pus draining from the area. A fever. _______________________________________________________________________     Return Appointment:  DME/Wound Dressing Supplies Provided by:    (Supply companies distribute one month supply at a time. Please call them directly to reorder supplies when you run out)     ECF or Home Healthcare: Your Home Care Agency is responsible to order your supplies. Return Appointment: With Tim Mead CNP  in 1 Week(s)                  [x] Orders placed during your visit:   culture of left heel obtained 11/2/22, RESULTS REVIEWED WITH PATIENT         :  [address] [place] [address]      Electronically signed by Felisha Burleson RN on 11/9/2022 at 9:[address] [place] [address] Information: Should you experience any significant chnges in your wound(s) or have questions about your wound care, please contact the [address] [place] [address] at [address] 8:30 am - 4:30 pm and Friday 8:30 am - 1:00 pm.  If you need help with your wound outside these hours and cannot wait until we are again available, contact your PCP or go to the hospital emergency room. PLEASE NOTE: IF YOU ARE UNABLE TO OBTAIN WOUND SUPPLIES, CONTINUE TO USE THE SUPPLIES YOU HAVE AVAILABLE UNTIL YOU ARE ABLE TO REACH US. KEEP THE WOUND COVERED AT ALL TIMES.            Physician Signature:_______________________     Date: ___________ Time:  ____________                 [] Lindsey Ritchie CNP     [x] Nica Lebron

## 2022-11-06 LAB
ANAEROBIC CULTURE: ABNORMAL
GRAM STAIN RESULT: ABNORMAL
ORGANISM: ABNORMAL
WOUND/ABSCESS: ABNORMAL

## 2022-11-07 ENCOUNTER — TELEPHONE (OUTPATIENT)
Dept: WOUND CARE | Age: 67
End: 2022-11-07

## 2022-11-07 RX ORDER — CLINDAMYCIN HYDROCHLORIDE 300 MG/1
300 CAPSULE ORAL 2 TIMES DAILY
Qty: 14 CAPSULE | Refills: 0 | Status: SHIPPED | OUTPATIENT
Start: 2022-11-07 | End: 2022-11-14

## 2022-11-07 NOTE — TELEPHONE ENCOUNTER
RN called patient in regards to latest wound culture results. Radha Noonan CNP reviewed results and placed new prescription orders, see orders. Patient made aware that order was placed to their preferred pharmacy and is to  prescription as soon as possible. Patient instructed on name of medication and frequency of dosage. RN asked if any other questions at this time, patient denies and will call if any other concerns arise. Patient aware of next appointment date and time.   Electronically signed by Solange Villeda RN on 11/7/2022 at 5:05 PM

## 2022-11-09 ENCOUNTER — HOSPITAL ENCOUNTER (OUTPATIENT)
Dept: WOUND CARE | Age: 67
Discharge: HOME | End: 2022-11-09
Payer: MEDICARE

## 2022-11-09 VITALS
DIASTOLIC BLOOD PRESSURE: 67 MMHG | SYSTOLIC BLOOD PRESSURE: 119 MMHG | RESPIRATION RATE: 18 BRPM | TEMPERATURE: 97 F | HEART RATE: 63 BPM

## 2022-11-09 DIAGNOSIS — L89.623 PRESSURE ULCER OF LEFT HEEL, STAGE 3 (HCC): Primary | ICD-10-CM

## 2022-11-09 DIAGNOSIS — L89.620 PRESSURE ULCER OF HEEL, LEFT, UNSTAGEABLE (HCC): ICD-10-CM

## 2022-11-09 DIAGNOSIS — G81.94 LEFT HEMIPARESIS (HCC): ICD-10-CM

## 2022-11-09 DIAGNOSIS — I63.9 CEREBROVASCULAR ACCIDENT (CVA), UNSPECIFIED MECHANISM (HCC): ICD-10-CM

## 2022-11-09 DIAGNOSIS — S91.302A NON-HEALING OPEN WOUND OF LEFT HEEL: ICD-10-CM

## 2022-11-09 DIAGNOSIS — Z22.322 MRSA (METHICILLIN RESISTANT STAPH AUREUS) CULTURE POSITIVE: ICD-10-CM

## 2022-11-09 DIAGNOSIS — R25.2 SPASTICITY: ICD-10-CM

## 2022-11-09 PROCEDURE — 11042 DBRDMT SUBQ TIS 1ST 20SQCM/<: CPT

## 2022-11-09 PROCEDURE — 11042 DBRDMT SUBQ TIS 1ST 20SQCM/<: CPT | Performed by: NURSE PRACTITIONER

## 2022-11-09 RX ORDER — LIDOCAINE 40 MG/G
CREAM TOPICAL ONCE
Status: CANCELLED | OUTPATIENT
Start: 2022-11-09 | End: 2022-11-09

## 2022-11-09 RX ORDER — LIDOCAINE HYDROCHLORIDE 20 MG/ML
JELLY TOPICAL ONCE
Status: CANCELLED | OUTPATIENT
Start: 2022-11-09 | End: 2022-11-09

## 2022-11-09 RX ORDER — LIDOCAINE HYDROCHLORIDE 40 MG/ML
SOLUTION TOPICAL ONCE
Status: CANCELLED | OUTPATIENT
Start: 2022-11-09 | End: 2022-11-09

## 2022-11-09 RX ORDER — BETAMETHASONE DIPROPIONATE 0.05 %
OINTMENT (GRAM) TOPICAL ONCE
Status: CANCELLED | OUTPATIENT
Start: 2022-11-09 | End: 2022-11-09

## 2022-11-09 RX ORDER — CLOBETASOL PROPIONATE 0.5 MG/G
OINTMENT TOPICAL ONCE
Status: CANCELLED | OUTPATIENT
Start: 2022-11-09 | End: 2022-11-09

## 2022-11-09 RX ORDER — GENTAMICIN SULFATE 1 MG/G
OINTMENT TOPICAL ONCE
Status: CANCELLED | OUTPATIENT
Start: 2022-11-09 | End: 2022-11-09

## 2022-11-09 RX ORDER — LIDOCAINE HYDROCHLORIDE 40 MG/ML
SOLUTION TOPICAL ONCE
Status: COMPLETED | OUTPATIENT
Start: 2022-11-09 | End: 2022-11-09

## 2022-11-09 RX ORDER — BACITRACIN, NEOMYCIN, POLYMYXIN B 400; 3.5; 5 [USP'U]/G; MG/G; [USP'U]/G
OINTMENT TOPICAL ONCE
Status: CANCELLED | OUTPATIENT
Start: 2022-11-09 | End: 2022-11-09

## 2022-11-09 RX ORDER — LIDOCAINE 50 MG/G
OINTMENT TOPICAL ONCE
Status: CANCELLED | OUTPATIENT
Start: 2022-11-09 | End: 2022-11-09

## 2022-11-09 RX ORDER — BACITRACIN ZINC AND POLYMYXIN B SULFATE 500; 1000 [USP'U]/G; [USP'U]/G
OINTMENT TOPICAL ONCE
Status: CANCELLED | OUTPATIENT
Start: 2022-11-09 | End: 2022-11-09

## 2022-11-09 RX ORDER — GINSENG 100 MG
CAPSULE ORAL ONCE
Status: CANCELLED | OUTPATIENT
Start: 2022-11-09 | End: 2022-11-09

## 2022-11-09 RX ADMIN — LIDOCAINE HYDROCHLORIDE: 40 SOLUTION TOPICAL at 09:15

## 2022-11-09 ASSESSMENT — PAIN DESCRIPTION - LOCATION: LOCATION: FOOT

## 2022-11-09 ASSESSMENT — PAIN DESCRIPTION - PAIN TYPE: TYPE: CHRONIC PAIN

## 2022-11-09 ASSESSMENT — PAIN DESCRIPTION - ONSET: ONSET: ON-GOING

## 2022-11-09 ASSESSMENT — PAIN DESCRIPTION - ORIENTATION: ORIENTATION: LEFT

## 2022-11-09 ASSESSMENT — PAIN - FUNCTIONAL ASSESSMENT: PAIN_FUNCTIONAL_ASSESSMENT: PREVENTS OR INTERFERES SOME ACTIVE ACTIVITIES AND ADLS

## 2022-11-09 ASSESSMENT — PAIN DESCRIPTION - FREQUENCY: FREQUENCY: CONTINUOUS

## 2022-11-09 ASSESSMENT — PAIN DESCRIPTION - DESCRIPTORS: DESCRIPTORS: SHARP;THROBBING

## 2022-11-09 ASSESSMENT — PAIN SCALES - GENERAL: PAINLEVEL_OUTOF10: 2

## 2022-11-09 NOTE — PROGRESS NOTES
[address]   Progress Note and Procedure Note      Kayode Freeman  MEDICAL RECORD NUMBER:  [id number]  AGE: 79 y.o. GENDER: male  : 1955  EPISODE DATE:  2022    Subjective:     Chief Complaint   Patient presents with    Wound Check     Follow up for left heel wound. HISTORY of PRESENT ILLNESS MYRNA Cole is a 79 y.o. male who presents today for wound/ulcer evaluation. Today presents with small amount of drainage on old dressing. Repeat culture of wound obtained last week. Patient is on Clindamycin 300 mg PO BID X7 days to treat MRSA. Skin substitute is on hold until infection is resolved. Patient reports that he has an MRI scheduled tomorrow. History of Wound Context: Recent displaced fracture of base of neck of left femur. Pressure ulcer left heel now designated as a Stage 3 pressure ulcer since unstable eschar removed. No periwound redness. Offload boot applied when in bed. PT and DP pulse strong with Doppler. Periwound still pink, but non tender. Reports getting protein supplements. Pt transferring only, no walking with walker still too unstable. Noting intermittent spastic movements legs during visit. Concern about friction on left heel with this movement. Pt wears post-op shoe on left foot. His son Brittany Mims and daughter Rebecca Jimenez are changing dressings daily and is aware of MRSA and taking appropriate precautions. Left lower leg less swelling noted and low compression added to plan of care. Denies constitutional issues  Displaced fracture of base of neck of left femur, history of CVA, hemiplegia and hemiparesis following CVA affecting left nondominant side, with intermittent spasms of both lower extremities.      Wound/Ulcer Pain Timing/Severity: continuous  Quality of pain: tender  Severity:  2 / 10   Modifying Factors: Pain is relieved/improved with rest, repositioning  Associated Signs/Symptoms: edema and drainage     Ulcer Identification:  Ulcer Type: pressure ulcer     Contributing Factors: edema; while hospitalized developed wound from chronic pressure and decreased mobility     Acute Wound: N/A not an acute wound    PAST MEDICAL HISTORY        Diagnosis Date    CAD (coronary artery disease)     HTN (hypertension)     Hyperlipemia     Left hemiparesis ([address] 8/11/2020    MRSA (methicillin resistant staph aureus) culture positive 9/14/2022    heel    Non-healing open wound of left heel 10/12/2022    Pressure injury of left ankle, stage 2 ([address] 9/13/2021    Pressure ulcer of heel, left, unstageable ([address] 8/8/2022    Stable eschar covered.     Pressure ulcer of left heel, stage 3 ([address] 10/20/2022    S/P PTCA (percutaneous transluminal coronary angioplasty) 2010    two stents place    Spasticity 7/9/2019    Stroke ([address] 10/2012    left-sided weakness       PAST SURGICAL HISTORY    Past Surgical History:   Procedure Laterality Date    CARDIAC SURGERY      HIP SURGERY Left     INGUINAL HERNIA REPAIR      right side    INTRACAPSULAR CATARACT EXTRACTION Right 08/30/2019    PHACOEMULSIFICATION WITH INTRAOCULAR LENS IMPLANT performed by Fina Aleman MD at [address] [place], [place] [address]vd EXTRACTION Left 09/06/2019    PHACOEMULSIFICATION WITH INTRAOCULAR LENS IMPLANT performed by Fina Aleman MD at [facility]    Family History   Problem Relation Age of Onset    Hypertension Mother     Heart Disease Father     Breast Cancer Sister     Heart Attack Brother        SOCIAL HISTORY    Social History     Tobacco Use    Smoking status: Never    Smokeless tobacco: Never   Vaping Use    Vaping Use: Never used   Substance Use Topics    Alcohol use: No    Drug use: No       ALLERGIES    No Known Allergies    MEDICATIONS    Current Outpatient Medications on File Prior to Encounter   Medication Sig Dispense Refill    clindamycin (CLEOCIN) 300 MG capsule Take 1 capsule by mouth 2 times daily for 7 days 14 capsule 0    Wound Dressings (MEDIHONEY WOUND/BURN DRESSING) GEL gel Apply 1 each topically daily 1 each 1    Wound Dressings (MEDIHONEY) GEL gel Use topically on wound daily. 1 each 0    senna-docusate (PERICOLACE) 8.6-50 MG per tablet Take 1 tablet by mouth 2 times daily      atorvastatin (LIPITOR) 40 MG tablet Take 1 tablet by mouth daily 90 tablet 3    primidone (MYSOLINE) 50 MG tablet Take 0.25 tablets by mouth in the morning and at bedtime 90 tablet 1    amLODIPine (NORVASC) 10 MG tablet Take 1 tablet by mouth daily 90 tablet 3    gabapentin (NEURONTIN) 100 MG capsule Take 1 capsule by mouth 4 times daily for 180 days. 360 capsule 1    baclofen (LIORESAL) 10 MG tablet Take 1 tablet by mouth 2 times daily 120 tablet 1    hydrALAZINE (APRESOLINE) 50 MG tablet Take 1 tablet by mouth 3 times daily 270 tablet 1    lisinopril (PRINIVIL;ZESTRIL) 20 MG tablet Take 1 tablet by mouth daily 90 tablet 1    loratadine (CLARITIN) 10 MG tablet Take 1 tablet by mouth daily Pt needs to come in and be seen before more rx's are given. 90 tablet 1    aspirin 81 MG tablet Take 81 mg by mouth daily      Sennosides 17.2 MG TABS Take 17.2 mg by mouth       No current facility-administered medications on file prior to encounter. REVIEW OF SYSTEMS    Pertinent items are noted in HPI. Objective:      /67   Pulse 63   Temp 97 °F (36.1 °C) (Infrared)   Resp 18     Wt Readings from Last 3 Encounters:   09/29/22 228 lb 14.4 oz (103.8 kg)   09/01/22 230 lb (104.3 kg)   08/08/22 230 lb (104.3 kg)       PHYSICAL EXAM    General Appearance: alert and oriented to person, place and time, well-developed and well-nourished, in no acute distress  Skin: warm and dry; left heel pressure ulcer with large amount of slough pre-debridement.   Was able to debride to some pink tissue  Head: normocephalic and atraumatic  Eyes: pupils equal, round, and reactive to light  Pulmonary/Chest: normal air movement, no respiratory distress  Cardiovascular: left DP +2  Extremities: no cyanosis or clubbing; LLE with +2 pitting edema       Assessment:        Problem List Items Addressed This Visit       Non-healing open wound of left heel    Relevant Orders    Initiate Outpatient Wound Care Protocol    Pressure ulcer of left heel, stage 3 (HCC) - Primary    Relevant Orders    Initiate Outpatient Wound Care Protocol    Stroke [facility]) (Chronic)    Relevant Orders    Initiate Outpatient Wound Care Protocol    Spasticity    Relevant Orders    Initiate Outpatient Wound Care Protocol    Left hemiparesis ([address]    Relevant Orders    Initiate Outpatient Wound Care Protocol    Pressure ulcer of heel, left, unstageable (HCC)    Relevant Orders    Initiate Outpatient Wound Care Protocol    MRSA (methicillin resistant staph aureus) culture positive    Relevant Orders    Initiate Outpatient Wound Care Protocol        Procedure Note  Indications:  Based on my examination of this patient's wound(s)/ulcer(s) today, debridement is required to promote healing and evaluate the wound base. Performed by: MADISYN Barrois - CNP    Consent obtained:  Yes    Time out taken:  Yes    Pain Control: Anesthetic  Anesthetic: 4% Lidocaine Liquid Topical       Debridement: Excisional Debridement    Using curette, scissors, and forceps the wound(s)/ulcer(s) was/were debrided down through and including the removal of epidermis, dermis, and subcutaneous tissue.         Devitalized Tissue Debrided:  fibrin, biofilm, slough, and necrotic/eschar    Pre Debridement Measurements:  Are located in the [place]  Documentation Flow Sheet    Diabetic/Pressure/Non Pressure Ulcers only:  Ulcer: Pressure ulcer, unstageable\"     Wound/Ulcer #: 1    Post Debridement Measurements:  Wound/Ulcer Descriptions are Pre Debridement except measurements:    Wound 08/08/22 Heel Left #1 ( states since about 6/24/2022) (Active)   Wound Image   11/02/22 0907   Wound Etiology Pressure Unstageable 08/08/22 1404   Dressing Status Old drainage noted 10/19/22 0941   Wound Cleansed Vashe 11/09/22 1014   Dressing/Treatment Alginate with Ag;ABD;Roll gauze 11/09/22 1014   Offloading for Diabetic Foot Ulcers Post op shoe 11/09/22 1014   Wound Length (cm) 2.5 cm 11/09/22 0913   Wound Width (cm) 3 cm 11/09/22 0913   Wound Depth (cm) 1 cm 11/09/22 0913   Wound Surface Area (cm^2) 7.5 cm^2 11/09/22 0913   Change in Wound Size % (l*w) 56.14 11/09/22 0913   Wound Volume (cm^3) 7.5 cm^3 11/09/22 0913   Wound Healing % -339 11/09/22 0913   Post-Procedure Length (cm) 2.6 cm 11/09/22 0930   Post-Procedure Width (cm) 3.1 cm 11/09/22 0930   Post-Procedure Depth (cm) 1 cm 11/09/22 0930   Post-Procedure Surface Area (cm^2) 8.06 cm^2 11/09/22 0930   Post-Procedure Volume (cm^3) 8.06 cm^3 11/09/22 0930   Undermining Starts ___ O'Clock 9 11/02/22 0907   Undermining Ends___ O'Clock 12 11/02/22 0907   Undermining Maxium Distance (cm) 0.7 11/02/22 0907   Wound Assessment Granulation tissue;Slough 11/09/22 0913   Drainage Amount Moderate 11/09/22 0913   Drainage Description Yellow; Thick 11/09/22 0913   Odor Mild 11/09/22 0913   Anahy-wound Assessment Maceration 11/09/22 0913   Margins Attached edges 11/09/22 0913   Wound Thickness Description not for Pressure Injury Full thickness 11/09/22 0913   Number of days: 92       Total Surface Area Debrided:  8.06 cm    10 Meter Walk Test         Interpretation of Score: Normative values for health adults (Men and Women): Age (24s): 1.36-1.34 m/s; Age (35s): 1.43-1.34 m/s; Age (38s): 1.43-1.39 m/s; Age (54s): 1.43-1.31 m/s; Age (61s): 1.34-1.24 m/s; Age (66s): 1.26-1.13 m/s; Age (80-90s): 0.97-0.94 m/s. Cut-off scores (Stroke) < 0.4 m/s for household ambulation; 0.4-0.8 m/s limited community ambulators; > 0.8 m/s community ambulators;   Reference Values for 10-metre Walk Test and 6-Minute Walk Test Available from: https://www. physicaltherapy. [facility]. ca/wp-content/uploads/2018/03/14_Quick-Look-Up-Sheet-Reference-Values-for-10mWT-6MWT-FINAL-.pdf sq cm     Estimated Blood Loss:  Minimal    Hemostasis Achieved:  by pressure    Procedural Pain:  2  / 10     Post Procedural Pain:  0 / 10     Response to treatment:  Well tolerated by patient. Plan:     Treatment Note please see attached Discharge Instructions    Written patient dismissal instructions given to patient and signed by patient or POA. Discharge [facility] Physician Orders and Discharge Instructions  [address], [address]  Telephone: [phone] [address] 8:30 am - 4:30 pm and Friday 8:30 am - 1:00 pm.          NAME:  Erick Granados  YOB: 1955  MEDICAL RECORD NUMBER:  [id number]  TODAYS DATE:  11/2/22         Please wash hands with soap and water prior to and right after  every dressing change        Vashe wound cleanser:                                [x] Vashe Soak  5 minutes in clinic today . DO NOT rinse after Vashe. WOUND LOCATION   Left Heel **PURAPLY #1 APPLIED 11/2/2022**     May rinse wounds with 0.9% saline  Do NOT SCRUB WOUND. Keep wounds dry in the shower unless otherwise instructed by the physician. Topical Treatments:              [x] Apply around the wound:   [x] moisturizing lotion  [] Antifungal ointment      [] No-Sting barrier film   [] Zinc paste      PRIMARY DRESSING:                                [x] SILVER ALGINATE                                           SECONDARY DRESSING:  PLEASE APPLY GUAZE TO DORSAL FOOT FOR PROTECTION              [x]   ABD PAD                                   [x]  ROLL GUAZE                             HOW OFTEN TO CHANGE DRESSINGS:            [x]EVERY OTHER DAY           YOU HAVE HAD A  PURAPLY AM  PLACED ON YOUR WOUND TODAY.  FOR BEST RESULTS, WE RECOMMEND YOU LIMIT YOUR ACTIVITY FOR THE NEXT WEEK AS MUCH AS POSSIBLE. AVOID LONG PERIODS OF TIME ON YOUR FEET. THIS ALSO INCLUDES ELEVATING YOUR LEGS AND FEET 3-4 TIMES DAILY FOR AT LEAST 20-30 MINUTES ON PILLOWS AND AT NIGHT SO THAT THEY ARE HIGHER THAN THE LEVEL OF YOUR HEART      Electronically signed by Tiara Elder RN on 11/2/2022 at 9:53 AM                              COMPRESSION IS A SIGNIFICANT FACTOR TO HEALING YOUR WOUNDS. Elevate leg(s) above the level of the heart when sitting. Avoid prolonged standing in one place. (X)   LOW SPANDAGRIP  to left leg   _________________________________________________________________  PRESSURE RELIEF AND OFF-LOADING:     Off-Loading:   With heel protector  [x] Off-loading when       [x] in bed         [x] sitting                             Specialty equipment ordered:       [] Wheelchair cushion    [] Specialty Bed/Mattress     [x] Assistive Device: please continue to use any assistive devices advised by the provider discussed during your visit   [x] Surgical shoe    [] Podus Boot(s)   [] Foam Boot(s)    [] CROW Boot     _____________________________________________________________________________________________     Dietary:  Continue your diet as tolerated. Eat heart-healthy foods. These foods include vegetables, fruits, nuts, beans, lean meat, fish, and whole grains. Limit sodium, alcohol, and sugar. Protein is a teresa nutrient in helping to repair damaged tissue and promote new tissue growth. Good sources of protein are milk, yogurt, cheese, fish, lean meat, and beans.   If you are a diabetic, having diabetes can make it hard for wounds to heal. So try to keep your blood sugar in its target range.  __________________________________________________________________________________________________     ACTIVITY:   Activity as tolerated  ________________________________________________________________________________________________________________ PAIN:     Please note, A small amount of pain, drainage and/or bleeding from this process might be expected, and is NORMAL. Elevate the affected limb. Use over-the-counter medications you would normally use for pain as permitted by your family doctor. For persistent pain not relieved by the above interventions, please call your family doctor.  ________________________________________________________________________________  Call your doctor now or seek immediate medical care if:    You have symptoms of infection, such as: Increased pain, swelling, warmth, or redness. Red streaks leading from the area. Pus draining from the area. A fever. _______________________________________________________________________     Return Appointment:  DME/Wound Dressing Supplies Provided by:    (Supply companies distribute one month supply at a time. Please call them directly to reorder supplies when you run out)     ECF or Home Healthcare: Your Home Care Agency is responsible to order your supplies. Return Appointment: With Kathleen Anthony CNP  in 1 Week(s)                  [x] Orders placed during your visit:   culture of left heel obtained 11/2/22, RESULTS REVIEWED WITH PATIENT         :  [address] [place] [address]      Electronically signed by Kay Rogel RN on 11/9/2022 at 9:27 [facility]  Information: Should you experience any significant chnges in your wound(s) or have questions about your wound care, please contact the [address] [place] [address] at [address] 8:30 am - 4:30 pm and Friday 8:30 am - 1:00 pm.  If you need help with your wound outside these hours and cannot wait until we are again available, contact your PCP or go to the hospital emergency room. PLEASE NOTE: IF YOU ARE UNABLE TO OBTAIN WOUND SUPPLIES, CONTINUE TO USE THE SUPPLIES YOU HAVE AVAILABLE UNTIL YOU ARE ABLE TO REACH US. KEEP THE WOUND COVERED AT ALL TIMES.            Physician Signature:_______________________     Date: ___________ Time:  ____________                 [] Chuckie Shaw CNP     [x] Aisha Sharifa             Electronically signed by MADISYN Mcclendon CNP on 11/9/2022 at 12:22 PM

## 2022-11-10 ENCOUNTER — HOSPITAL ENCOUNTER (OUTPATIENT)
Dept: MRI IMAGING | Age: 67
Discharge: HOME | End: 2022-11-10
Payer: MEDICARE

## 2022-11-10 DIAGNOSIS — L89.623 PRESSURE ULCER OF LEFT HEEL, STAGE 3 (HCC): ICD-10-CM

## 2022-11-10 PROCEDURE — 73718 MRI LOWER EXTREMITY W/O DYE: CPT

## 2022-11-10 NOTE — DISCHARGE INSTRUCTIONS
[facility] Physician Orders and Discharge Instructions  [address] [place] [address], [address] [place] [address], [place] [address]  Telephone: [phone]  [address] 8:30 am - 4:30 pm and Friday 8:30 am - 1:00 pm.          NAME:  Raciel Metzger  YOB: 1955  MEDICAL RECORD NUMBER:  [id number]  TODAYS DATE:  11/16/22         Please wash hands with soap and water prior to and right after  every dressing change        Vashe wound cleanser:                                [x] Vashe Soak  5 minutes in clinic today . DO NOT rinse after Vashe. WOUND LOCATION   Left Heel **PURAPLY #1 APPLIED 11/2/2022**     May rinse wounds with 0.9% saline  Do NOT SCRUB WOUND. Keep wounds dry in the shower unless otherwise instructed by the physician. Topical Treatments:              [x] Apply around the wound:   [x] moisturizing lotion  [] Antifungal ointment      [] No-Sting barrier film   [] Zinc paste      PRIMARY DRESSING:                                [x] SILVER ALGINATE                                           SECONDARY DRESSING:  PLEASE APPLY GUAZE TO DORSAL FOOT FOR PROTECTION              [x]   ABD PAD                                   [x]  ROLL GUAZE                             HOW OFTEN TO CHANGE DRESSINGS:            [x]EVERY OTHER DAY           YOU HAVE HAD A  PURAPLY AM  PLACED ON YOUR WOUND TODAY. FOR BEST RESULTS, WE RECOMMEND YOU LIMIT YOUR ACTIVITY FOR THE NEXT WEEK AS MUCH AS POSSIBLE. AVOID LONG PERIODS OF TIME ON YOUR FEET. THIS ALSO INCLUDES ELEVATING YOUR LEGS AND FEET 3-4 TIMES DAILY FOR AT LEAST 20-30 MINUTES ON PILLOWS AND AT NIGHT SO THAT THEY ARE HIGHER THAN THE LEVEL OF YOUR HEART                            COMPRESSION IS A SIGNIFICANT FACTOR TO HEALING YOUR WOUNDS. Elevate leg(s) above the level of the heart when sitting. Avoid prolonged standing in one place.                (X)   LOW SPANDAGRIP  to left leg _________________________________________________________________  PRESSURE RELIEF AND OFF-LOADING:     Off-Loading:   With heel protector  [x] Off-loading when       [x] in bed         [x] sitting                             Specialty equipment ordered:       [] Wheelchair cushion    [] Specialty Bed/Mattress     [x] Assistive Device: please continue to use any assistive devices advised by the provider discussed during your visit   [x] Surgical shoe    [] Podus Boot(s)   [] Foam Boot(s)    [] [place] Rook     _____________________________________________________________________________________________     Dietary:  Continue your diet as tolerated. Eat heart-healthy foods. These foods include vegetables, fruits, nuts, beans, lean meat, fish, and whole grains. Limit sodium, alcohol, and sugar. Protein is a teresa nutrient in helping to repair damaged tissue and promote new tissue growth. Good sources of protein are milk, yogurt, cheese, fish, lean meat, and beans. If you are a diabetic, having diabetes can make it hard for wounds to heal. So try to keep your blood sugar in its target range.  __________________________________________________________________________________________________     ACTIVITY:   Activity as tolerated  ________________________________________________________________________________________________________________     PAIN:     Please note, A small amount of pain, drainage and/or bleeding from this process might be expected, and is NORMAL. Elevate the affected limb. Use over-the-counter medications you would normally use for pain as permitted by your family doctor. For persistent pain not relieved by the above interventions, please call your family doctor.  ________________________________________________________________________________  Call your doctor now or seek immediate medical care if:    You have symptoms of infection, such as: Increased pain, swelling, warmth, or redness.   Red streaks leading from the area. Pus draining from the area. A fever. _______________________________________________________________________     Return Appointment:  DME/Wound Dressing Supplies Provided by:  [facility]  (Supply companies distribute one month supply at a time. Please call them directly to reorder supplies when you run out)     ECF or Home Healthcare: Your Home Care Agency is responsible to order your supplies. Return Appointment: With Matthieu To CNP  in 1 Week(s)                  [x] Orders placed during your visit:    NEW RX FOR CLINDAMYCIN AT [place] [address]      :  [facility]    Electronically signed by Fany Cunha RN on 11/16/2022 at 9:23 AM        [facility] Information: Should you experience any significant chnges in your wound(s) or have questions about your wound care, please contact the [address] [place] [address] at [address] 8:30 am - 4:30 pm and Friday 8:30 am - 1:00 pm.  If you need help with your wound outside these hours and cannot wait until we are again available, contact your PCP or go to the hospital emergency room. PLEASE NOTE: IF YOU ARE UNABLE TO OBTAIN WOUND SUPPLIES, CONTINUE TO USE THE SUPPLIES YOU HAVE AVAILABLE UNTIL YOU ARE ABLE TO REACH US. KEEP THE WOUND COVERED AT ALL TIMES.

## 2022-11-16 ENCOUNTER — HOSPITAL ENCOUNTER (OUTPATIENT)
Dept: WOUND CARE | Age: 67
Discharge: HOME | End: 2022-11-16
Payer: MEDICARE

## 2022-11-16 VITALS
DIASTOLIC BLOOD PRESSURE: 77 MMHG | TEMPERATURE: 98.1 F | HEART RATE: 62 BPM | SYSTOLIC BLOOD PRESSURE: 123 MMHG | RESPIRATION RATE: 18 BRPM

## 2022-11-16 DIAGNOSIS — L89.620 PRESSURE ULCER OF HEEL, LEFT, UNSTAGEABLE (HCC): ICD-10-CM

## 2022-11-16 DIAGNOSIS — L89.623 PRESSURE ULCER OF LEFT HEEL, STAGE 3 (HCC): Primary | ICD-10-CM

## 2022-11-16 DIAGNOSIS — Z22.322 MRSA (METHICILLIN RESISTANT STAPH AUREUS) CULTURE POSITIVE: ICD-10-CM

## 2022-11-16 DIAGNOSIS — R25.2 SPASTICITY: ICD-10-CM

## 2022-11-16 DIAGNOSIS — I63.9 CEREBROVASCULAR ACCIDENT (CVA), UNSPECIFIED MECHANISM (HCC): ICD-10-CM

## 2022-11-16 DIAGNOSIS — G81.94 LEFT HEMIPARESIS (HCC): ICD-10-CM

## 2022-11-16 DIAGNOSIS — S91.302A NON-HEALING OPEN WOUND OF LEFT HEEL: ICD-10-CM

## 2022-11-16 PROCEDURE — 11042 DBRDMT SUBQ TIS 1ST 20SQCM/<: CPT | Performed by: NURSE PRACTITIONER

## 2022-11-16 PROCEDURE — 11042 DBRDMT SUBQ TIS 1ST 20SQCM/<: CPT

## 2022-11-16 RX ORDER — BETAMETHASONE DIPROPIONATE 0.05 %
OINTMENT (GRAM) TOPICAL ONCE
OUTPATIENT
Start: 2022-11-16 | End: 2022-11-16

## 2022-11-16 RX ORDER — LIDOCAINE 40 MG/G
CREAM TOPICAL ONCE
OUTPATIENT
Start: 2022-11-16 | End: 2022-11-16

## 2022-11-16 RX ORDER — CLINDAMYCIN HYDROCHLORIDE 300 MG/1
300 CAPSULE ORAL 2 TIMES DAILY
Qty: 14 CAPSULE | Refills: 0 | Status: SHIPPED | OUTPATIENT
Start: 2022-11-16 | End: 2022-11-23

## 2022-11-16 RX ORDER — BACITRACIN, NEOMYCIN, POLYMYXIN B 400; 3.5; 5 [USP'U]/G; MG/G; [USP'U]/G
OINTMENT TOPICAL ONCE
OUTPATIENT
Start: 2022-11-16 | End: 2022-11-16

## 2022-11-16 RX ORDER — CLOBETASOL PROPIONATE 0.5 MG/G
OINTMENT TOPICAL ONCE
OUTPATIENT
Start: 2022-11-16 | End: 2022-11-16

## 2022-11-16 RX ORDER — LIDOCAINE HYDROCHLORIDE 40 MG/ML
SOLUTION TOPICAL ONCE
OUTPATIENT
Start: 2022-11-16 | End: 2022-11-16

## 2022-11-16 RX ORDER — LIDOCAINE HYDROCHLORIDE 20 MG/ML
JELLY TOPICAL ONCE
OUTPATIENT
Start: 2022-11-16 | End: 2022-11-16

## 2022-11-16 RX ORDER — LIDOCAINE 50 MG/G
OINTMENT TOPICAL ONCE
Status: CANCELLED | OUTPATIENT
Start: 2022-11-16 | End: 2022-11-16

## 2022-11-16 RX ORDER — BACITRACIN ZINC AND POLYMYXIN B SULFATE 500; 1000 [USP'U]/G; [USP'U]/G
OINTMENT TOPICAL ONCE
OUTPATIENT
Start: 2022-11-16 | End: 2022-11-16

## 2022-11-16 RX ORDER — LIDOCAINE HYDROCHLORIDE 40 MG/ML
SOLUTION TOPICAL ONCE
Status: COMPLETED | OUTPATIENT
Start: 2022-11-16 | End: 2022-11-16

## 2022-11-16 RX ORDER — GENTAMICIN SULFATE 1 MG/G
OINTMENT TOPICAL ONCE
OUTPATIENT
Start: 2022-11-16 | End: 2022-11-16

## 2022-11-16 RX ORDER — GINSENG 100 MG
CAPSULE ORAL ONCE
OUTPATIENT
Start: 2022-11-16 | End: 2022-11-16

## 2022-11-16 RX ADMIN — LIDOCAINE HYDROCHLORIDE: 40 SOLUTION TOPICAL at 09:15

## 2022-11-16 ASSESSMENT — PAIN SCALES - GENERAL
PAINLEVEL_OUTOF10: 0
PAINLEVEL_OUTOF10: 0

## 2022-11-16 NOTE — PLAN OF CARE
[address]:     [employer] [id number] [address] p: [phone] f: [phone]     Ordering Center:     [facility]  [address] OFFICE [facility] 2 [facility] 110  [facility] [address]  [phone]  WOUND CARE Dept: [phone]   FAX NUMBER [unfilled]    Patient Information:      Sandro Amin  [address] [facility]    [phone]   : 1955  AGE: 79 y.o.      GENDER: male   TODAYS DATE:  2022    Insurance:      PRIMARY INSURANCE:  Plan: MEDICARE PART A AND B  Coverage: MEDICARE  Effective Date: 2020  [id number] - (Medicare)    SECONDARY INSURANCE:  Plan:   Coverage:   Effective Date:   [unfilled]    [unfilled]   [unfilled]     Patient Wound Information:      Problem List Items Addressed This Visit          Circulatory    Stroke ([address] (Chronic)    Relevant Orders    Initiate Outpatient Wound Care Protocol       Other    Non-healing open wound of left heel    Relevant Orders    Initiate Outpatient Wound Care Protocol    Pressure ulcer of left heel, stage 3 (HCC) - Primary    Relevant Orders    Initiate Outpatient Wound Care Protocol    Spasticity    Relevant Orders    Initiate Outpatient Wound Care Protocol    Left hemiparesis ([address]    Relevant Orders    Initiate Outpatient Wound Care Protocol    Pressure ulcer of heel, left, unstageable ([address]    Relevant Orders    Initiate Outpatient Wound Care Protocol    MRSA (methicillin resistant staph aureus) culture positive    Relevant Orders    Initiate Outpatient Wound Care Protocol     ICD-10 codes:    L72.194G       P82.926          WOUNDS REQUIRING DRESSING SUPPLIES:     Wound 22 Heel Left #1 ( states since about 2022) (Active)   Wound Image   22 0907   Wound Etiology Pressure Unstageable 22 1404   Dressing Status Old drainage noted 10/19/22 0941   Wound Cleansed Vashe 22 1014 Dressing/Treatment Alginate with Ag;ABD;Roll gauze 11/09/22 1014   Offloading for Diabetic Foot Ulcers Post op shoe 11/09/22 1014   Wound Length (cm) 2.5 cm 11/16/22 0912   Wound Width (cm) 3 cm 11/16/22 0912   Wound Depth (cm) 0.6 cm 11/16/22 0912   Wound Surface Area (cm^2) 7.5 cm^2 11/16/22 0912   Change in Wound Size % (l*w) 56.14 11/16/22 0912   Wound Volume (cm^3) 4.5 cm^3 11/16/22 0912   Wound Healing % -163 11/16/22 0912   Post-Procedure Length (cm) 2.6 cm 11/16/22 0920   Post-Procedure Width (cm) 3.1 cm 11/16/22 0920   Post-Procedure Depth (cm) 0.6 cm 11/16/22 0920   Post-Procedure Surface Area (cm^2) 8.06 cm^2 11/16/22 0920   Post-Procedure Volume (cm^3) 4.836 cm^3 11/16/22 0920   Undermining Starts ___ O'Clock 9 11/02/22 0907   Undermining Ends___ O'Clock 12 11/02/22 0907   Undermining Maxium Distance (cm) 0.7 11/02/22 0907   Wound Assessment Granulation tissue;Slough; Hyper granulation tissue 11/16/22 0912   Drainage Amount Moderate 11/16/22 0912   Drainage Description Yellow; Thick 11/16/22 0912   Odor None 11/16/22 0912   Anahy-wound Assessment Dry/flaky 11/16/22 0912   Margins Attached edges 11/16/22 0912   Wound Thickness Description not for Pressure Injury Full thickness 11/16/22 0912   Number of days: 99          Supplies Requested :      WOUND #: 1   PRIMARY DRESSING:  Alginate with silver pad   Cover and Secure with: 4X4 gauze pad  Bulky roll gauze     FREQUENCY OF DRESSING CHANGES:  Every other day           ADDITIONAL ITEMS:  [] Gloves Small  [] Gloves Medium [x] Gloves Large [] Gloves XLarge  [x] Tape 1\" [x] Tape 2\" [] Tape 3\"  [] Medipore Tape  [x] Saline  [] Skin Prep   [] Adhesive Remover   [] Cotton Tip Applicators   [x] Other: VASHE    Patient Wound(s) Debrided: [x] Yes   [] No    Debribement Type: subcutaneous tissue    Debridement Date: 11/16/22    Patient currently being seen by Home Health: [] Yes   [x] No    Duration for needed supplies:  []15  []30  []60  [x]90 Days    Provider Information:      PROVIDER'S NAME: MADISYN Byers CNP     NPI: CHRISSIE - NPI  [id number]

## 2022-11-16 NOTE — PROGRESS NOTES
[address]   Progress Note and Procedure Note      Kelsey Seay  MEDICAL RECORD NUMBER:  [id number]  AGE: 79 y.o. GENDER: male  : 1955  EPISODE DATE:  2022    Subjective:     Chief Complaint   Patient presents with    Wound Check     Follow up for left foot wound. HISTORY of PRESENT ILLNESS HPI     Deepika Patten is a 79 y.o. male who presents today for wound/ulcer evaluation. Today presents with moderate amount of drainage on old dressing. Reordered additional week of Clindamycin 300 mg PO BID X7 days to treat MRSA. Skin substitute is on hold until infection is resolved. Pt misunderstood dressing changes and so did not use silver alginate this past week. MRI left heel:    Large plantar hindfoot soft tissue ulceration measuring approximately 4.1 x   3.7 cm at the skin surface with underlying cellulitis of the plantar   posterior hindfoot soft tissues extending into the ankle and foot. No   evidence of drainable abscess. Findings suggesting subtle subcortical osteomyelitis of the plantar aspect of   the calcaneus without evidence of cortical destruction. Chronic Achilles tendinosis. No evidence of Achilles tendon tear. History of Wound Context: Recent displaced fracture of base of neck of left femur. Pressure ulcer left heel now designated as a Stage 3 pressure ulcer since unstable eschar removed. No periwound redness. Offload boot applied when in bed. PT and DP pulse strong with Doppler. Periwound still pink, but non tender. Reports getting protein supplements. Pt transferring only, no walking with walker still too unstable. Noting intermittent spastic movements legs during visit. Concern about friction on left heel with this movement. Pt wears post-op shoe on left foot. His son Cj Garcia and daughter Sasha Bee are changing dressings daily and is aware of MRSA and taking appropriate precautions.  Left lower leg less swelling noted and low compression added to plan of care. Denies constitutional issues  Displaced fracture of base of neck of left femur, history of CVA, hemiplegia and hemiparesis following CVA affecting left nondominant side, with intermittent spasms of both lower extremities. Wound/Ulcer Pain Timing/Severity: continuous  Quality of pain: tender  Severity:  2 / 10   Modifying Factors: Pain is relieved/improved with rest, repositioning  Associated Signs/Symptoms: edema and drainage     Ulcer Identification:  Ulcer Type: pressure ulcer     Contributing Factors: edema; while hospitalized developed wound from chronic pressure and decreased mobility     Acute Wound: N/A not an acute wound     PAST MEDICAL HISTORY        Diagnosis Date    CAD (coronary artery disease)     HTN (hypertension)     Hyperlipemia     Left hemiparesis ([address] 8/11/2020    MRSA (methicillin resistant staph aureus) culture positive 9/14/2022    heel    Non-healing open wound of left heel 10/12/2022    Pressure injury of left ankle, stage 2 ([address] 9/13/2021    Pressure ulcer of heel, left, unstageable ([address] 8/8/2022    Stable eschar covered.     Pressure ulcer of left heel, stage 3 ([address] 10/20/2022    S/P PTCA (percutaneous transluminal coronary angioplasty) 2010    two stents place    Spasticity 7/9/2019    Stroke ([address] 10/2012    left-sided weakness       PAST SURGICAL HISTORY    Past Surgical History:   Procedure Laterality Date    CARDIAC SURGERY      HIP SURGERY Left     INGUINAL HERNIA REPAIR      right side    INTRACAPSULAR CATARACT EXTRACTION Right 08/30/2019    PHACOEMULSIFICATION WITH INTRAOCULAR LENS IMPLANT performed by Louann Saavedra MD at [address] [place] [address] EXTRACTION Left 09/06/2019    PHACOEMULSIFICATION WITH INTRAOCULAR LENS IMPLANT performed by Louann Saavedra MD at [facility]    Family History   Problem Relation Age of Onset    Hypertension Mother     Heart Disease Father     Breast Cancer Sister     Heart Attack Brother        SOCIAL HISTORY    Social History     Tobacco Use    Smoking status: Never    Smokeless tobacco: Never   Vaping Use    Vaping Use: Never used   Substance Use Topics    Alcohol use: No    Drug use: No       ALLERGIES    No Known Allergies    MEDICATIONS    Current Outpatient Medications on File Prior to Encounter   Medication Sig Dispense Refill    Wound Dressings (MEDIHONEY WOUND/BURN DRESSING) GEL gel Apply 1 each topically daily 1 each 1    Wound Dressings (MEDIHONEY) GEL gel Use topically on wound daily. 1 each 0    senna-docusate (PERICOLACE) 8.6-50 MG per tablet Take 1 tablet by mouth 2 times daily      atorvastatin (LIPITOR) 40 MG tablet Take 1 tablet by mouth daily 90 tablet 3    primidone (MYSOLINE) 50 MG tablet Take 0.25 tablets by mouth in the morning and at bedtime 90 tablet 1    amLODIPine (NORVASC) 10 MG tablet Take 1 tablet by mouth daily 90 tablet 3    gabapentin (NEURONTIN) 100 MG capsule Take 1 capsule by mouth 4 times daily for 180 days. 360 capsule 1    baclofen (LIORESAL) 10 MG tablet Take 1 tablet by mouth 2 times daily 120 tablet 1    hydrALAZINE (APRESOLINE) 50 MG tablet Take 1 tablet by mouth 3 times daily 270 tablet 1    lisinopril (PRINIVIL;ZESTRIL) 20 MG tablet Take 1 tablet by mouth daily 90 tablet 1    loratadine (CLARITIN) 10 MG tablet Take 1 tablet by mouth daily Pt needs to come in and be seen before more rx's are given. 90 tablet 1    aspirin 81 MG tablet Take 81 mg by mouth daily      Sennosides 17.2 MG TABS Take 17.2 mg by mouth       No current facility-administered medications on file prior to encounter. REVIEW OF SYSTEMS  Review of Systems    Pertinent items are noted in HPI.     Objective:      /77   Pulse 62   Temp 98.1 °F (36.7 °C) (Infrared)   Resp 18     Wt Readings from Last 3 Encounters:   09/29/22 228 lb 14.4 oz (103.8 kg)   09/01/22 230 lb (104.3 kg)   08/08/22 230 lb (104.3 kg)       PHYSICAL EXAM  Physical Exam    General Appearance: alert and oriented to person, place and time, well-developed and well-nourished, in no acute distress  Skin: warm and dry, no rash or erythema  Head: normocephalic and atraumatic  Eyes: pupils equal, round, and reactive to light  Pulmonary/Chest:  normal air movement, no respiratory distress  Cardiovascular: normal rate and regular rhythm  Wound:  Wound base slowly filling in with less undermining on edges. Large amount of slough in wound base, debrided to a granular base. No overt signs of infection. Assessment:        Problem List Items Addressed This Visit       Pressure ulcer of heel, left, unstageable (HCC)    Relevant Orders    Initiate Outpatient Wound Care Protocol    MRSA (methicillin resistant staph aureus) culture positive    Relevant Orders    Initiate Outpatient Wound Care Protocol    Non-healing open wound of left heel    Relevant Orders    Initiate Outpatient Wound Care Protocol    Pressure ulcer of left heel, stage 3 (HCC) - Primary    Relevant Orders    Initiate Outpatient Wound Care Protocol    Stroke [facility]) (Chronic)    Relevant Orders    Initiate Outpatient Wound Care Protocol    Spasticity    Relevant Orders    Initiate Outpatient Wound Care Protocol    Left hemiparesis [facility])    Relevant Orders    Initiate Outpatient Wound Care Protocol        Procedure Note  Indications:  Based on my examination of this patient's wound(s)/ulcer(s) today, debridement is required to promote healing and evaluate the wound base. Performed by: MADISYN Dasilva - CNP    Consent obtained:  Yes    Time out taken:  Yes    Pain Control: Anesthetic  Anesthetic: 4% Lidocaine Liquid Topical       Debridement: Excisional Debridement    Using curette the wound(s)/ulcer(s) was/were debrided down through and including the removal of epidermis, dermis, and subcutaneous tissue.         Devitalized Tissue Debrided:  fibrin, biofilm, and slough    Pre Debridement Measurements:  Are located in the Wound/Ulcer Documentation Flow Sheet    Diabetic/Pressure/Non Pressure Ulcers only:  Ulcer: Non-Pressure ulcer, fat layer exposed     Wound/Ulcer #: 1    Post Debridement Measurements:  Wound/Ulcer Descriptions are Pre Debridement except measurements:    Wound 08/08/22 Heel Left #1 ( states since about 6/24/2022) (Active)   Wound Image   11/02/22 0907   Wound Etiology Pressure Unstageable 08/08/22 1404   Dressing Status Old drainage noted 10/19/22 0941   Wound Cleansed Vashe 11/09/22 1014   Dressing/Treatment Alginate with Ag;ABD;Roll gauze 11/09/22 1014   Offloading for Diabetic Foot Ulcers Post op shoe 11/09/22 1014   Wound Length (cm) 2.5 cm 11/16/22 0912   Wound Width (cm) 3 cm 11/16/22 0912   Wound Depth (cm) 0.6 cm 11/16/22 0912   Wound Surface Area (cm^2) 7.5 cm^2 11/16/22 0912   Change in Wound Size % (l*w) 56.14 11/16/22 0912   Wound Volume (cm^3) 4.5 cm^3 11/16/22 0912   Wound Healing % -163 11/16/22 0912   Post-Procedure Length (cm) 2.6 cm 11/16/22 0920   Post-Procedure Width (cm) 3.1 cm 11/16/22 0920   Post-Procedure Depth (cm) 0.6 cm 11/16/22 0920   Post-Procedure Surface Area (cm^2) 8.06 cm^2 11/16/22 0920   Post-Procedure Volume (cm^3) 4.836 cm^3 11/16/22 0920   Undermining Starts ___ O'Clock 9 11/02/22 0907   Undermining Ends___ O'Clock 12 11/02/22 0907   Undermining Maxium Distance (cm) 0.7 11/02/22 0907   Wound Assessment Granulation tissue;Slough; Hyper granulation tissue 11/16/22 0912   Drainage Amount Moderate 11/16/22 0912   Drainage Description Yellow; Thick 11/16/22 0912   Odor None 11/16/22 0912   Anahy-wound Assessment Dry/flaky 11/16/22 0912   Margins Attached edges 11/16/22 0912   Wound Thickness Description not for Pressure Injury Full thickness 11/16/22 0912   Number of days: 99          Total Surface Area Debrided:  8.06 sq cm     Estimated Blood Loss:  Minimal    Hemostasis Achieved:  not needed    Procedural Pain:  1  / 10     Post Procedural Pain:  0 / 10     Response to treatment: Well tolerated by patient. Plan:   Pt education per provider to clarify plans and rationale of products used in wound care. Will use Vashe soak for 5 minutes in wound before appluying new dressing. Use Silver alginate in wound bed and change every other day. Treatment Note please see attached Discharge Instructions    Written patient dismissal instructions given to patient and signed by patient or POA. Discharge [address] Physician Orders and Discharge Instructions  [address]  Telephone: [phone]  [address] 8:30 am - 4:30 pm and Friday 8:30 am - 1:00 pm.          NAME:  Mary Laura  YOB: 1955  MEDICAL RECORD NUMBER:  [id number]  TODAYS DATE:  11/16/22         Please wash hands with soap and water prior to and right after  every dressing change        Vashe wound cleanser:                                [x] Vashe Soak  5 minutes in clinic today . DO NOT rinse after Vashe. WOUND LOCATION   Left Heel **PURAPLY #1 APPLIED 11/2/2022**     May rinse wounds with 0.9% saline  Do NOT SCRUB WOUND. Keep wounds dry in the shower unless otherwise instructed by the physician. Topical Treatments:              [x] Apply around the wound:   [x] moisturizing lotion  [] Antifungal ointment      [] No-Sting barrier film   [] Zinc paste      PRIMARY DRESSING:                                [x] SILVER ALGINATE                                           SECONDARY DRESSING:  PLEASE APPLY GUAZE TO DORSAL FOOT FOR PROTECTION              [x]   ABD PAD                                   [x]  ROLL GUAZE                             HOW OFTEN TO CHANGE DRESSINGS:            [x]EVERY OTHER DAY           YOU HAVE HAD A  PURAPLY AM  PLACED ON YOUR WOUND TODAY. FOR BEST RESULTS, WE RECOMMEND YOU LIMIT YOUR ACTIVITY FOR THE NEXT WEEK AS MUCH AS POSSIBLE.  AVOID LONG PERIODS limb.  Use over-the-counter medications you would normally use for pain as permitted by your family doctor. For persistent pain not relieved by the above interventions, please call your family doctor.  ________________________________________________________________________________  Call your doctor now or seek immediate medical care if:    You have symptoms of infection, such as: Increased pain, swelling, warmth, or redness. Red streaks leading from the area. Pus draining from the area. A fever. _______________________________________________________________________     Return Appointment:  DME/Wound Dressing Supplies Provided by:  [facility]  (Supply companies distribute one month supply at a time. Please call them directly to reorder supplies when you run out)     ECF or Home Healthcare: Your Home Care Agency is responsible to order your supplies. Return Appointment: With Benjamin Varma CNP  in 1 Week(s)                  [x] Orders placed during your visit:    NEW [facility]      :  [address] [place] [address]    Electronically signed by Chandrika Gómez RN on 11/16/2022 at 9:23 AM        [facility] Information: Should you experience any significant chnges in your wound(s) or have questions about your wound care, please contact the [address] [place] [address] at [address] 8:30 am - 4:30 pm and Friday 8:30 am - 1:00 pm.  If you need help with your wound outside these hours and cannot wait until we are again available, contact your PCP or go to the hospital emergency room. PLEASE NOTE: IF YOU ARE UNABLE TO OBTAIN WOUND SUPPLIES, CONTINUE TO USE THE SUPPLIES YOU HAVE AVAILABLE UNTIL YOU ARE ABLE TO REACH US. KEEP THE WOUND COVERED AT ALL TIMES.                 Electronically signed by MADISYN Smith CNP on 11/16/2022 at 9:35 AM

## 2022-11-18 ENCOUNTER — TELEPHONE (OUTPATIENT)
Dept: PRIMARY CARE CLINIC | Age: 67
End: 2022-11-18

## 2022-11-18 RX ORDER — LISINOPRIL 20 MG/1
20 TABLET ORAL DAILY
Qty: 90 TABLET | Refills: 0 | Status: SHIPPED | OUTPATIENT
Start: 2022-11-18

## 2022-11-18 RX ORDER — HYDRALAZINE HYDROCHLORIDE 50 MG/1
50 TABLET, FILM COATED ORAL 3 TIMES DAILY
Qty: 270 TABLET | Refills: 0 | Status: SHIPPED | OUTPATIENT
Start: 2022-11-18

## 2022-11-18 NOTE — TELEPHONE ENCOUNTER
Patient would refill on medication  lisinopril (PRINIVIL;ZESTRIL) 20 MG & hydrALAZINE (APRESOLINE) 50 MG please send to [facility] order pharmacy

## 2022-11-21 DIAGNOSIS — L89.623 PRESSURE ULCER OF LEFT HEEL, STAGE 3 (HCC): ICD-10-CM

## 2022-11-21 DIAGNOSIS — S91.302A NON-HEALING OPEN WOUND OF LEFT HEEL: ICD-10-CM

## 2022-11-21 LAB
C-REACTIVE PROTEIN: 5.7 MG/L (ref 0–5.1)
SEDIMENTATION RATE, ERYTHROCYTE: 56 MM/HR (ref 0–20)

## 2022-11-28 NOTE — DISCHARGE INSTRUCTIONS
[address] [place] [address] Physician Orders and Discharge Instructions  [address] [place] [address] Drive, [address], [address]  Telephone: [phone]  [address] 8:30 am - 4:30 pm and Friday 8:30 am - 1:00 pm.          NAME:  Lynda Kohler  YOB: 1955  MEDICAL RECORD NUMBER:  [id number]  TODAYS DATE:  11/30/22         Please wash hands with soap and water prior to and right after  every dressing change        Vashe wound cleanser:                                [] Vashe Soak  5 minutes in clinic today . DO NOT rinse after Vashe. WOUND LOCATION   Left Heel **PURAPLY #1 APPLIED 11/2/2022**     May rinse wounds with 0.9% saline  Do NOT SCRUB WOUND. Keep wounds dry in the shower unless otherwise instructed by the physician. Topical Treatments:              [x] Apply around the wound:   [x] moisturizing lotion  [] Antifungal ointment      [] No-Sting barrier film   [] Zinc paste      PRIMARY DRESSING:                                [x] endoform antimicrobial  (PLEASE LAYER TO FILL THE OPEN AREA)                                       LIGHTLY MOISTENED WITH SALINE                                           SECONDARY DRESSING:  PLEASE APPLY GUAZE TO DORSAL FOOT FOR PROTECTION              [x]   BOLSTER GAUZE TO THE DIVIT, GUAZE PAD OVER WOUND                              [x]  ROLL GUAZE                             HOW OFTEN TO CHANGE DRESSINGS:            [x]EVERY OTHER DAY           YOU HAVE HAD A  PURAPLY AM  PLACED ON YOUR WOUND TODAY. FOR BEST RESULTS, WE RECOMMEND YOU LIMIT YOUR ACTIVITY FOR THE NEXT WEEK AS MUCH AS POSSIBLE. AVOID LONG PERIODS OF TIME ON YOUR FEET. THIS ALSO INCLUDES ELEVATING YOUR LEGS AND FEET 3-4 TIMES DAILY FOR AT LEAST 20-30 MINUTES ON PILLOWS AND AT NIGHT SO THAT THEY ARE HIGHER THAN THE LEVEL OF YOUR HEART                            COMPRESSION IS A SIGNIFICANT FACTOR TO HEALING YOUR WOUNDS.       Elevate leg(s) above the level of the heart when sitting. Avoid prolonged standing in one place. (X)   MEDIUM  SPANDAGRIP  to left leg   _________________________________________________________________  PRESSURE RELIEF AND OFF-LOADING:     Off-Loading:   With heel protector  [x] Off-loading when       [x] in bed         [x] sitting                             Specialty equipment ordered:       [] Wheelchair cushion    [] Specialty Bed/Mattress     [x] Assistive Device: please continue to use any assistive devices advised by the provider discussed during your visit   [x] Surgical shoe    [] Podus Boot(s)   [] Foam Boot(s)    [] CROW Boot     _____________________________________________________________________________________________     Dietary:  Continue your diet as tolerated. Eat heart-healthy foods. These foods include vegetables, fruits, nuts, beans, lean meat, fish, and whole grains. Limit sodium, alcohol, and sugar. Protein is a teresa nutrient in helping to repair damaged tissue and promote new tissue growth. Good sources of protein are milk, yogurt, cheese, fish, lean meat, and beans. If you are a diabetic, having diabetes can make it hard for wounds to heal. So try to keep your blood sugar in its target range.  __________________________________________________________________________________________________     ACTIVITY:   Activity as tolerated  ________________________________________________________________________________________________________________     PAIN:     Please note, A small amount of pain, drainage and/or bleeding from this process might be expected, and is NORMAL. Elevate the affected limb. Use over-the-counter medications you would normally use for pain as permitted by your family doctor.   For persistent pain not relieved by the above interventions, please call your family doctor.  ________________________________________________________________________________  Call your doctor now or seek immediate medical care if:    You have symptoms of infection, such as: Increased pain, swelling, warmth, or redness. Red streaks leading from the area. Pus draining from the area. A fever. _______________________________________________________________________     Return Appointment:  DME/Wound Dressing Supplies Provided by:  [facility]  (Supply companies distribute one month supply at a time. Please call them directly to reorder supplies when you run out)     ECF or Home Healthcare: Your Home Care Agency is responsible to order your supplies. Return Appointment: With Sherry Marte CNP  in 1 Week(s)                  [x] Orders placed during your visit:   CULTURE OBTAINED 11/30/22      :  Thais Lee        Electronically signed by Nancy Marsh RN on 11/30/2022 at 9:29 AM      [facility] Information: Should you experience any significant chnges in your wound(s) or have questions about your wound care, please contact the [address] [place] [address] at [address] 8:30 am - 4:30 pm and Friday 8:30 am - 1:00 pm.  If you need help with your wound outside these hours and cannot wait until we are again available, contact your PCP or go to the hospital emergency room. PLEASE NOTE: IF YOU ARE UNABLE TO OBTAIN WOUND SUPPLIES, CONTINUE TO USE THE SUPPLIES YOU HAVE AVAILABLE UNTIL YOU ARE ABLE TO REACH US.  KEEP THE WOUND COVERED AT ALL TIMES.                                  sally Almanzar

## 2022-11-30 ENCOUNTER — HOSPITAL ENCOUNTER (OUTPATIENT)
Dept: WOUND CARE | Age: 67
Discharge: HOME | End: 2022-11-30
Payer: MEDICARE

## 2022-11-30 VITALS
SYSTOLIC BLOOD PRESSURE: 127 MMHG | DIASTOLIC BLOOD PRESSURE: 68 MMHG | HEART RATE: 67 BPM | TEMPERATURE: 98.8 F | RESPIRATION RATE: 18 BRPM

## 2022-11-30 DIAGNOSIS — Z22.322 MRSA (METHICILLIN RESISTANT STAPH AUREUS) CULTURE POSITIVE: ICD-10-CM

## 2022-11-30 DIAGNOSIS — R25.2 SPASTICITY: ICD-10-CM

## 2022-11-30 DIAGNOSIS — G81.94 LEFT HEMIPARESIS (HCC): ICD-10-CM

## 2022-11-30 DIAGNOSIS — L89.623 PRESSURE ULCER OF LEFT HEEL, STAGE 3 (HCC): Primary | ICD-10-CM

## 2022-11-30 DIAGNOSIS — I63.9 CEREBROVASCULAR ACCIDENT (CVA), UNSPECIFIED MECHANISM (HCC): ICD-10-CM

## 2022-11-30 DIAGNOSIS — S91.302A NON-HEALING OPEN WOUND OF LEFT HEEL: ICD-10-CM

## 2022-11-30 DIAGNOSIS — L89.620 PRESSURE ULCER OF HEEL, LEFT, UNSTAGEABLE (HCC): ICD-10-CM

## 2022-11-30 PROCEDURE — 87205 SMEAR GRAM STAIN: CPT

## 2022-11-30 PROCEDURE — 87075 CULTR BACTERIA EXCEPT BLOOD: CPT

## 2022-11-30 PROCEDURE — 87070 CULTURE OTHR SPECIMN AEROBIC: CPT

## 2022-11-30 PROCEDURE — 87077 CULTURE AEROBIC IDENTIFY: CPT

## 2022-11-30 PROCEDURE — 11042 DBRDMT SUBQ TIS 1ST 20SQCM/<: CPT

## 2022-11-30 RX ORDER — LIDOCAINE HYDROCHLORIDE 20 MG/ML
JELLY TOPICAL ONCE
OUTPATIENT
Start: 2022-11-30 | End: 2022-11-30

## 2022-11-30 RX ORDER — GINSENG 100 MG
CAPSULE ORAL ONCE
OUTPATIENT
Start: 2022-11-30 | End: 2022-11-30

## 2022-11-30 RX ORDER — LIDOCAINE HYDROCHLORIDE 40 MG/ML
SOLUTION TOPICAL ONCE
OUTPATIENT
Start: 2022-11-30 | End: 2022-11-30

## 2022-11-30 RX ORDER — LIDOCAINE 50 MG/G
OINTMENT TOPICAL ONCE
Status: COMPLETED | OUTPATIENT
Start: 2022-11-30 | End: 2022-11-30

## 2022-11-30 RX ORDER — LIDOCAINE 50 MG/G
OINTMENT TOPICAL ONCE
OUTPATIENT
Start: 2022-11-30 | End: 2022-11-30

## 2022-11-30 RX ORDER — BETAMETHASONE DIPROPIONATE 0.05 %
OINTMENT (GRAM) TOPICAL ONCE
OUTPATIENT
Start: 2022-11-30 | End: 2022-11-30

## 2022-11-30 RX ORDER — BACITRACIN ZINC AND POLYMYXIN B SULFATE 500; 1000 [USP'U]/G; [USP'U]/G
OINTMENT TOPICAL ONCE
OUTPATIENT
Start: 2022-11-30 | End: 2022-11-30

## 2022-11-30 RX ORDER — BACITRACIN, NEOMYCIN, POLYMYXIN B 400; 3.5; 5 [USP'U]/G; MG/G; [USP'U]/G
OINTMENT TOPICAL ONCE
OUTPATIENT
Start: 2022-11-30 | End: 2022-11-30

## 2022-11-30 RX ORDER — CLOBETASOL PROPIONATE 0.5 MG/G
OINTMENT TOPICAL ONCE
OUTPATIENT
Start: 2022-11-30 | End: 2022-11-30

## 2022-11-30 RX ORDER — GENTAMICIN SULFATE 1 MG/G
OINTMENT TOPICAL ONCE
OUTPATIENT
Start: 2022-11-30 | End: 2022-11-30

## 2022-11-30 RX ORDER — LIDOCAINE 40 MG/G
CREAM TOPICAL ONCE
OUTPATIENT
Start: 2022-11-30 | End: 2022-11-30

## 2022-11-30 RX ADMIN — LIDOCAINE: 50 OINTMENT TOPICAL at 09:17

## 2022-11-30 ASSESSMENT — PAIN DESCRIPTION - DESCRIPTORS: DESCRIPTORS: SHARP;SPASM

## 2022-11-30 ASSESSMENT — PAIN DESCRIPTION - FREQUENCY: FREQUENCY: CONTINUOUS

## 2022-11-30 ASSESSMENT — PAIN DESCRIPTION - ONSET: ONSET: ON-GOING

## 2022-11-30 ASSESSMENT — PAIN DESCRIPTION - LOCATION: LOCATION: FOOT

## 2022-11-30 ASSESSMENT — PAIN DESCRIPTION - ORIENTATION: ORIENTATION: LEFT

## 2022-11-30 ASSESSMENT — PAIN - FUNCTIONAL ASSESSMENT: PAIN_FUNCTIONAL_ASSESSMENT: PREVENTS OR INTERFERES SOME ACTIVE ACTIVITIES AND ADLS

## 2022-11-30 ASSESSMENT — PAIN SCALES - GENERAL: PAINLEVEL_OUTOF10: 1

## 2022-11-30 ASSESSMENT — PAIN DESCRIPTION - PAIN TYPE: TYPE: CHRONIC PAIN

## 2022-11-30 NOTE — PROGRESS NOTES
Factors: edema; while hospitalized developed wound from chronic pressure and decreased mobility     Acute Wound: N/A not an acute wound     PAST MEDICAL HISTORY        Diagnosis Date    CAD (coronary artery disease)     HTN (hypertension)     Hyperlipemia     Left hemiparesis ([address] 8/11/2020    MRSA (methicillin resistant staph aureus) culture positive 9/14/2022    heel    Non-healing open wound of left heel 10/12/2022    Pressure injury of left ankle, stage 2 ([address] 9/13/2021    Pressure ulcer of heel, left, unstageable ([address] 8/8/2022    Stable eschar covered.     Pressure ulcer of left heel, stage 3 ([address] 10/20/2022    S/P PTCA (percutaneous transluminal coronary angioplasty) 2010    two stents place    Spasticity 7/9/2019    Stroke ([address] 10/2012    left-sided weakness       PAST SURGICAL HISTORY    Past Surgical History:   Procedure Laterality Date    CARDIAC SURGERY      HIP SURGERY Left     INGUINAL HERNIA REPAIR      right side    INTRACAPSULAR CATARACT EXTRACTION Right 08/30/2019    PHACOEMULSIFICATION WITH INTRAOCULAR LENS IMPLANT performed by Hue Toure MD at [address] [place] [address] EXTRACTION Left 09/06/2019    PHACOEMULSIFICATION WITH INTRAOCULAR LENS IMPLANT performed by Hue Toure MD at [facility]    Family History   Problem Relation Age of Onset    Hypertension Mother     Heart Disease Father     Breast Cancer Sister     Heart Attack Brother        SOCIAL HISTORY    Social History     Tobacco Use    Smoking status: Never    Smokeless tobacco: Never   Vaping Use    Vaping Use: Never used   Substance Use Topics    Alcohol use: No    Drug use: No       ALLERGIES    No Known Allergies    MEDICATIONS    Current Outpatient Medications on File Prior to Encounter   Medication Sig Dispense Refill    lisinopril (PRINIVIL;ZESTRIL) 20 MG tablet Take 1 tablet by mouth daily 90 tablet 0    hydrALAZINE (APRESOLINE) 50 MG tablet Take 1 tablet by mouth 3 times daily 270 tablet 0    Wound Dressings (MEDIHONEY WOUND/BURN DRESSING) GEL gel Apply 1 each topically daily 1 each 1    Wound Dressings (MEDIHONEY) GEL gel Use topically on wound daily. 1 each 0    senna-docusate (PERICOLACE) 8.6-50 MG per tablet Take 1 tablet by mouth 2 times daily      atorvastatin (LIPITOR) 40 MG tablet Take 1 tablet by mouth daily 90 tablet 3    primidone (MYSOLINE) 50 MG tablet Take 0.25 tablets by mouth in the morning and at bedtime 90 tablet 1    amLODIPine (NORVASC) 10 MG tablet Take 1 tablet by mouth daily 90 tablet 3    gabapentin (NEURONTIN) 100 MG capsule Take 1 capsule by mouth 4 times daily for 180 days. 360 capsule 1    baclofen (LIORESAL) 10 MG tablet Take 1 tablet by mouth 2 times daily (Patient taking differently: Take 20 mg by mouth 2 times daily) 120 tablet 1    loratadine (CLARITIN) 10 MG tablet Take 1 tablet by mouth daily Pt needs to come in and be seen before more rx's are given. 90 tablet 1    aspirin 81 MG tablet Take 81 mg by mouth daily      Sennosides 17.2 MG TABS Take 17.2 mg by mouth       No current facility-administered medications on file prior to encounter. REVIEW OF SYSTEMS  Review of Systems    Pertinent items are noted in HPI. Objective:      /68   Pulse 67   Temp 98.8 °F (37.1 °C) (Infrared)   Resp 18     Wt Readings from Last 3 Encounters:   09/29/22 228 lb 14.4 oz (103.8 kg)   09/01/22 230 lb (104.3 kg)   08/08/22 230 lb (104.3 kg)       PHYSICAL EXAM  Physical Exam    General Appearance: alert and oriented to person, place and time, well-developed and well-nourished, in no acute distress  Skin: warm and dry, no rash or erythema  Head: normocephalic and atraumatic  Eyes: pupils equal, round, and reactive to light  Pulmonary/Chest: normal air movement, no respiratory distress  Cardiovascular: normal rate, regular rhythm, and intact distal pulses  Extremities: no cyanosis, no clubbing, and 2 + edema-  left lower leg.   Spandigrip had fallen down and he will need someone to pull up his Spandigrip if falls down again. Assessment:        Problem List Items Addressed This Visit       Pressure ulcer of heel, left, unstageable (HCC)    Relevant Orders    Initiate Outpatient Wound Care Protocol    MRSA (methicillin resistant staph aureus) culture positive    Relevant Orders    Initiate Outpatient Wound Care Protocol    Non-healing open wound of left heel    Relevant Orders    Initiate Outpatient Wound Care Protocol    Pressure ulcer of left heel, stage 3 (HCC) - Primary    Relevant Orders    Initiate Outpatient Wound Care Protocol    Stroke [facility]) (Chronic)    Relevant Orders    Initiate Outpatient Wound Care Protocol    Spasticity    Relevant Orders    Initiate Outpatient Wound Care Protocol    Left hemiparesis [facility])    Relevant Orders    Initiate Outpatient Wound Care Protocol        Procedure Note  Indications:  Based on my examination of this patient's wound(s)/ulcer(s) today, debridement is required to promote healing and evaluate the wound base. Performed by: MADISYN Momin - CNP    Consent obtained:  Yes    Time out taken:  Yes    Pain Control: Anesthetic  Anesthetic: 5% Lidocaine Ointment Topical       Debridement: Excisional Debridement    Using curette the wound(s)/ulcer(s) was/were debrided down through and including the removal of epidermis, dermis, and subcutaneous tissue.         Devitalized Tissue Debrided:  fibrin, biofilm, and slough    Pre Debridement Measurements:  Are located in the [place]  Documentation Flow Sheet    Diabetic/Pressure/Non Pressure Ulcers only:  Ulcer: Pressure ulcer, Stage 3     Wound/Ulcer #: 1    Post Debridement Measurements:  Wound/Ulcer Descriptions are Pre Debridement except measurements:    Wound 08/08/22 Heel Left #1 ( states since about 6/24/2022) (Active)   Wound Image   11/02/22 0907   Wound Etiology Pressure Unstageable 08/08/22 1404   Dressing Status Old drainage noted 10/19/22 0941   Wound Cleansed Vashe 11/16/22 0958   Dressing/Treatment Alginate with Ag;ABD;Roll gauze 11/16/22 0958   Offloading for Diabetic Foot Ulcers Post op shoe 11/16/22 0958   Wound Length (cm) 2.3 cm 11/30/22 0912   Wound Width (cm) 3 cm 11/30/22 0912   Wound Depth (cm) 0.6 cm 11/30/22 0912   Wound Surface Area (cm^2) 6.9 cm^2 11/30/22 0912   Change in Wound Size % (l*w) 59.65 11/30/22 0912   Wound Volume (cm^3) 4.14 cm^3 11/30/22 0912   Wound Healing % -142 11/30/22 0912   Post-Procedure Length (cm) 2.4 cm 11/30/22 0920   Post-Procedure Width (cm) 3.1 cm 11/30/22 0920   Post-Procedure Depth (cm) 0.6 cm 11/30/22 0920   Post-Procedure Surface Area (cm^2) 7.44 cm^2 11/30/22 0920   Post-Procedure Volume (cm^3) 4.464 cm^3 11/30/22 0920   Undermining Starts ___ O'Clock 9 11/02/22 0907   Undermining Ends___ O'Clock 12 11/02/22 0907   Undermining Maxium Distance (cm) 0.7 11/02/22 0907   Wound Assessment Granulation tissue;Slough; Hyper granulation tissue 11/30/22 0912   Drainage Amount Moderate 11/30/22 0912   Drainage Description Yellow; Thick; Serosanguinous 11/30/22 0912   Odor None 11/30/22 0912   Anahy-wound Assessment Dry/flaky; Blanchable erythema 11/30/22 0912   Margins Attached edges 11/30/22 0912   Wound Thickness Description not for Pressure Injury Full thickness 11/30/22 0912   Number of days: 113          Total Surface Area Debrided:  7.44 sq cm     Estimated Blood Loss:  Minimal    Hemostasis Achieved:  by pressure    Procedural Pain:  0  / 10     Post Procedural Pain:  0 / 10     Response to treatment:  Well tolerated by patient. Plan:     Treatment Note please see attached Discharge Instructions    Written patient dismissal instructions given to patient and signed by patient or POA.          Discharge [facility] Wound Care Physician Orders and Discharge Instructions  [address] [facility], [facility]    [place]  Telephone: [phone]  [facility] THURSDAY 8:30 am - 4:30 pm and Friday 8:30 am - 1:00 pm.          NAME:  Osiris Donnelly  YOB: 1955  MEDICAL RECORD NUMBER:  [id number]  TODAYS DATE:  11/30/22         Please wash hands with soap and water prior to and right after  every dressing change        Vashe wound cleanser:                                [] Vashe Soak  5 minutes in clinic today . DO NOT rinse after Vashe. WOUND LOCATION   Left Heel **PURAPLY #1 APPLIED 11/2/2022**     May rinse wounds with 0.9% saline  Do NOT SCRUB WOUND. Keep wounds dry in the shower unless otherwise instructed by the physician. Topical Treatments:              [x] Apply around the wound:   [x] moisturizing lotion  [] Antifungal ointment      [] No-Sting barrier film   [] Zinc paste      PRIMARY DRESSING:                                [x] endoform antimicrobial  (PLEASE LAYER TO FILL THE OPEN AREA)                                       LIGHTLY MOISTENED WITH SALINE                                           SECONDARY DRESSING:  PLEASE APPLY GUAZE TO DORSAL FOOT FOR PROTECTION              [x]   BOLSTER GAUZE TO THE DIVIT, GUAZE PAD OVER WOUND                              [x]  ROLL GUAZE                             HOW OFTEN TO CHANGE DRESSINGS:            [x]EVERY OTHER DAY           YOU HAVE HAD A  PURAPLY AM  PLACED ON YOUR WOUND TODAY. FOR BEST RESULTS, WE RECOMMEND YOU LIMIT YOUR ACTIVITY FOR THE NEXT WEEK AS MUCH AS POSSIBLE. AVOID LONG PERIODS OF TIME ON YOUR FEET. THIS ALSO INCLUDES ELEVATING YOUR LEGS AND FEET 3-4 TIMES DAILY FOR AT LEAST 20-30 MINUTES ON PILLOWS AND AT NIGHT SO THAT THEY ARE HIGHER THAN THE LEVEL OF YOUR HEART                            COMPRESSION IS A SIGNIFICANT FACTOR TO HEALING YOUR WOUNDS. Elevate leg(s) above the level of the heart when sitting. Avoid prolonged standing in one place.                (X)   LOW SPANDAGRIP  to left leg _________________________________________________________________  PRESSURE RELIEF AND OFF-LOADING:     Off-Loading:   With heel protector  [x] Off-loading when       [x] in bed         [x] sitting                             Specialty equipment ordered:       [] Wheelchair cushion    [] Specialty Bed/Mattress     [x] Assistive Device: please continue to use any assistive devices advised by the provider discussed during your visit   [x] Surgical shoe    [] Podus Boot(s)   [] Foam Boot(s)    [] Joaquin Galvez     _____________________________________________________________________________________________     Dietary:  Continue your diet as tolerated. Eat heart-healthy foods. These foods include vegetables, fruits, nuts, beans, lean meat, fish, and whole grains. Limit sodium, alcohol, and sugar. Protein is a teresa nutrient in helping to repair damaged tissue and promote new tissue growth. Good sources of protein are milk, yogurt, cheese, fish, lean meat, and beans. If you are a diabetic, having diabetes can make it hard for wounds to heal. So try to keep your blood sugar in its target range.  __________________________________________________________________________________________________     ACTIVITY:   Activity as tolerated  ________________________________________________________________________________________________________________     PAIN:     Please note, A small amount of pain, drainage and/or bleeding from this process might be expected, and is NORMAL. Elevate the affected limb. Use over-the-counter medications you would normally use for pain as permitted by your family doctor. For persistent pain not relieved by the above interventions, please call your family doctor.  ________________________________________________________________________________  Call your doctor now or seek immediate medical care if:    You have symptoms of infection, such as: Increased pain, swelling, warmth, or redness.   Red streaks leading from the area. Pus draining from the area. A fever. _______________________________________________________________________     Return Appointment:  DME/Wound Dressing Supplies Provided by:  [facility]  (Supply companies distribute one month supply at a time. Please call them directly to reorder supplies when you run out)     ECF or Home Healthcare: Your Home Care Agency is responsible to order your supplies. Return Appointment: With Lit Botello CNP  in 1 Week(s)                  [x] Orders placed during your visit:   CULTURE OBTAINED 11/30/22      :  Wilson Jara        Electronically signed by Nathaniel Thapa RN on 11/30/2022 at 9:29 AM      [address] [place] [address] Information: Should you experience any significant chnges in your wound(s) or have questions about your wound care, please contact the [address] [place] [address] at [address] 8:30 am - 4:30 pm and Friday 8:30 am - 1:00 pm.  If you need help with your wound outside these hours and cannot wait until we are again available, contact your PCP or go to the hospital emergency room. PLEASE NOTE: IF YOU ARE UNABLE TO OBTAIN WOUND SUPPLIES, CONTINUE TO USE THE SUPPLIES YOU HAVE AVAILABLE UNTIL YOU ARE ABLE TO REACH US.  KEEP THE WOUND COVERED AT ALL TIMES.                                  x    CASSIE AMAYA CNP          Electronically signed by MADISYN Villar CNP on 11/30/2022 at 9:45 AM

## 2022-12-01 NOTE — DISCHARGE INSTRUCTIONS
[address] Physician Orders and Discharge Instructions  [address] [facility], [address] [place] [address]  Telephone: [phone]  [address] 8:30 am - 4:30 pm and Friday 8:30 am - 1:00 pm.          NAME:  Lynda Kohler  YOB: 1955  MEDICAL RECORD NUMBER:  [id number]  TODAYS DATE:  12/7/22         Please wash hands with soap and water prior to and right after  every dressing change        Vashe wound cleanser:                                [] Vashe Soak  5 minutes in clinic today . DO NOT rinse after Vashe. WOUND LOCATION   Left Heel **NUSHEILD #1 ( PURAPLY X 1 = TOTAL 2 GRAFTS) APPLIED 12/7/2022**     May rinse wounds with 0.9% saline  Do NOT SCRUB WOUND. Keep wounds dry in the shower unless otherwise instructed by the physician. Topical Treatments:              [x] Apply around the wound:   [x] moisturizing lotion  [] Antifungal ointment      [] No-Sting barrier film   [] Zinc paste      PRIMARY DRESSING:                                [x]     NU SHIELD AND CUTIMED SECURED WITH STERI STRIPS APPLIED PER CASSIE AMAYA CNP - DO NOT REMOVE                                      THEN FLUFFED GAUZE SECURED WITH STERI STRIPS - ONLY REMOVE IF DRAINING THRU GAUZE                                      SECONDARY DRESSING:  PLEASE APPLY GUAZE TO DORSAL FOOT FOR PROTECTION              [x]   [place] PAD OVER FLUFFED GAUZE                              [x]  CONFORMING ROLL GUAZE                             HOW OFTEN TO CHANGE DRESSINGS:            [x]EVERY OTHER DAY - ONLY CHANGE FLUFFED GAUZE IF DRAINAGE NOTED           YOU HAVE HAD A NUSHIELD PLACED ON YOUR WOUND TODAY. FOR BEST RESULTS, WE RECOMMEND YOU LIMIT YOUR ACTIVITY FOR THE NEXT WEEK AS MUCH AS POSSIBLE. AVOID LONG PERIODS OF TIME ON YOUR FEET.  THIS ALSO INCLUDES ELEVATING YOUR LEGS AND FEET 3-4 TIMES DAILY FOR AT LEAST 20-30 MINUTES ON PILLOWS AND AT NIGHT SO THAT THEY ARE HIGHER THAN THE LEVEL OF YOUR HEART    Electronically signed by Ezio Lombardi RN on 12/7/2022 at 8:59 AM                              COMPRESSION IS A SIGNIFICANT FACTOR TO HEALING YOUR WOUNDS. Elevate leg(s) above the level of the heart when sitting. Avoid prolonged standing in one place. (X)     MEDIUM SPANDAGRIP  to left leg   _________________________________________________________________  PRESSURE RELIEF AND OFF-LOADING:     Off-Loading:   With heel protector  [x] Off-loading when       [x] in bed         [x] sitting                             Specialty equipment ordered:       [] Wheelchair cushion    [] Specialty Bed/Mattress     [x] Assistive Device: please continue to use any assistive devices advised by the provider discussed during your visit   [x] Surgical shoe    [] Podus Boot(s)   [] Foam Boot(s)    [] CROW Boot     _____________________________________________________________________________________________     Dietary:  Continue your diet as tolerated. Eat heart-healthy foods. These foods include vegetables, fruits, nuts, beans, lean meat, fish, and whole grains. Limit sodium, alcohol, and sugar. Protein is a teresa nutrient in helping to repair damaged tissue and promote new tissue growth. Good sources of protein are milk, yogurt, cheese, fish, lean meat, and beans. If you are a diabetic, having diabetes can make it hard for wounds to heal. So try to keep your blood sugar in its target range.  __________________________________________________________________________________________________     ACTIVITY:   Activity as tolerated  ________________________________________________________________________________________________________________     PAIN:     Please note, A small amount of pain, drainage and/or bleeding from this process might be expected, and is NORMAL. Elevate the affected limb.   Use over-the-counter medications you would normally use for pain as permitted by your family doctor. For persistent pain not relieved by the above interventions, please call your family doctor.  ________________________________________________________________________________  Call your doctor now or seek immediate medical care if:    You have symptoms of infection, such as: Increased pain, swelling, warmth, or redness. Red streaks leading from the area. Pus draining from the area. A fever. _______________________________________________________________________     Return Appointment:  DME/Wound Dressing Supplies Provided by:  [facility]  (Supply companies distribute one month supply at a time. Please call them directly to reorder supplies when you run out)     ECF or Home Healthcare: Your Home Care Agency is responsible to order your supplies. Return Appointment: With Estefani Burnett CNP  in 1 Week(s)                  [x] Orders placed during your visit:   CULTURE OBTAINED 11/30/22, results reviewed with patient      :  [address] [place] [address]       Electronically signed by Jcarlos Del Cid RN on 12/7/2022 at 8:37 AM       [address] [facility] Information: Should you experience any significant chnges in your wound(s) or have questions about your wound care, please contact the [address] [place] [address] at [address] 8:30 am - 4:30 pm and Friday 8:30 am - 1:00 pm.  If you need help with your wound outside these hours and cannot wait until we are again available, contact your PCP or go to the hospital emergency room. PLEASE NOTE: IF YOU ARE UNABLE TO OBTAIN WOUND SUPPLIES, CONTINUE TO USE THE SUPPLIES YOU HAVE AVAILABLE UNTIL YOU ARE ABLE TO REACH US.  KEEP THE WOUND COVERED AT ALL TIMES.                                  sally Maradiaga

## 2022-12-02 LAB
ANAEROBIC CULTURE: ABNORMAL
GRAM STAIN RESULT: ABNORMAL
ORGANISM: ABNORMAL
ORGANISM: ABNORMAL
WOUND/ABSCESS: ABNORMAL
WOUND/ABSCESS: ABNORMAL

## 2022-12-07 ENCOUNTER — HOSPITAL ENCOUNTER (OUTPATIENT)
Dept: WOUND CARE | Age: 67
Discharge: HOME | End: 2022-12-07
Payer: MEDICARE

## 2022-12-07 VITALS
HEART RATE: 64 BPM | DIASTOLIC BLOOD PRESSURE: 60 MMHG | TEMPERATURE: 97.9 F | SYSTOLIC BLOOD PRESSURE: 103 MMHG | RESPIRATION RATE: 18 BRPM

## 2022-12-07 DIAGNOSIS — G81.94 LEFT HEMIPARESIS (HCC): ICD-10-CM

## 2022-12-07 DIAGNOSIS — Z22.322 MRSA (METHICILLIN RESISTANT STAPH AUREUS) CULTURE POSITIVE: ICD-10-CM

## 2022-12-07 DIAGNOSIS — S91.302A NON-HEALING OPEN WOUND OF LEFT HEEL: ICD-10-CM

## 2022-12-07 DIAGNOSIS — I63.9 CEREBROVASCULAR ACCIDENT (CVA), UNSPECIFIED MECHANISM (HCC): ICD-10-CM

## 2022-12-07 DIAGNOSIS — L89.620 PRESSURE ULCER OF HEEL, LEFT, UNSTAGEABLE (HCC): ICD-10-CM

## 2022-12-07 DIAGNOSIS — L89.623 PRESSURE ULCER OF LEFT HEEL, STAGE 3 (HCC): Primary | ICD-10-CM

## 2022-12-07 DIAGNOSIS — R25.2 SPASTICITY: ICD-10-CM

## 2022-12-07 PROCEDURE — 15275 SKIN SUB GRAFT FACE/NK/HF/G: CPT

## 2022-12-07 PROCEDURE — 15275 SKIN SUB GRAFT FACE/NK/HF/G: CPT | Performed by: NURSE PRACTITIONER

## 2022-12-07 RX ORDER — LIDOCAINE 40 MG/G
CREAM TOPICAL ONCE
OUTPATIENT
Start: 2022-12-07 | End: 2022-12-07

## 2022-12-07 RX ORDER — LIDOCAINE 50 MG/G
OINTMENT TOPICAL ONCE
Status: COMPLETED | OUTPATIENT
Start: 2022-12-07 | End: 2022-12-07

## 2022-12-07 RX ORDER — LIDOCAINE 50 MG/G
OINTMENT TOPICAL ONCE
OUTPATIENT
Start: 2022-12-07 | End: 2022-12-07

## 2022-12-07 RX ORDER — LIDOCAINE HYDROCHLORIDE 20 MG/ML
JELLY TOPICAL ONCE
OUTPATIENT
Start: 2022-12-07 | End: 2022-12-07

## 2022-12-07 RX ORDER — CLOBETASOL PROPIONATE 0.5 MG/G
OINTMENT TOPICAL ONCE
OUTPATIENT
Start: 2022-12-07 | End: 2022-12-07

## 2022-12-07 RX ORDER — BETAMETHASONE DIPROPIONATE 0.05 %
OINTMENT (GRAM) TOPICAL ONCE
OUTPATIENT
Start: 2022-12-07 | End: 2022-12-07

## 2022-12-07 RX ORDER — LIDOCAINE HYDROCHLORIDE 40 MG/ML
SOLUTION TOPICAL ONCE
OUTPATIENT
Start: 2022-12-07 | End: 2022-12-07

## 2022-12-07 RX ORDER — BACITRACIN ZINC AND POLYMYXIN B SULFATE 500; 1000 [USP'U]/G; [USP'U]/G
OINTMENT TOPICAL ONCE
OUTPATIENT
Start: 2022-12-07 | End: 2022-12-07

## 2022-12-07 RX ORDER — GINSENG 100 MG
CAPSULE ORAL ONCE
OUTPATIENT
Start: 2022-12-07 | End: 2022-12-07

## 2022-12-07 RX ORDER — GENTAMICIN SULFATE 1 MG/G
OINTMENT TOPICAL ONCE
OUTPATIENT
Start: 2022-12-07 | End: 2022-12-07

## 2022-12-07 RX ORDER — BACITRACIN, NEOMYCIN, POLYMYXIN B 400; 3.5; 5 [USP'U]/G; MG/G; [USP'U]/G
OINTMENT TOPICAL ONCE
OUTPATIENT
Start: 2022-12-07 | End: 2022-12-07

## 2022-12-07 RX ADMIN — LIDOCAINE: 50 OINTMENT TOPICAL at 08:36

## 2022-12-07 ASSESSMENT — PAIN DESCRIPTION - FREQUENCY
FREQUENCY: CONTINUOUS
FREQUENCY: CONTINUOUS

## 2022-12-07 ASSESSMENT — PAIN DESCRIPTION - LOCATION
LOCATION: FOOT
LOCATION: FOOT

## 2022-12-07 ASSESSMENT — PAIN - FUNCTIONAL ASSESSMENT
PAIN_FUNCTIONAL_ASSESSMENT: PREVENTS OR INTERFERES SOME ACTIVE ACTIVITIES AND ADLS
PAIN_FUNCTIONAL_ASSESSMENT: PREVENTS OR INTERFERES SOME ACTIVE ACTIVITIES AND ADLS

## 2022-12-07 ASSESSMENT — PAIN DESCRIPTION - PAIN TYPE
TYPE: CHRONIC PAIN
TYPE: CHRONIC PAIN

## 2022-12-07 ASSESSMENT — PAIN DESCRIPTION - DESCRIPTORS
DESCRIPTORS: DISCOMFORT
DESCRIPTORS: SHARP;SPASM

## 2022-12-07 ASSESSMENT — PAIN DESCRIPTION - ORIENTATION
ORIENTATION: LEFT
ORIENTATION: LEFT

## 2022-12-07 ASSESSMENT — PAIN DESCRIPTION - ONSET
ONSET: ON-GOING
ONSET: ON-GOING

## 2022-12-07 ASSESSMENT — PAIN SCALES - GENERAL
PAINLEVEL_OUTOF10: 1
PAINLEVEL_OUTOF10: 1

## 2022-12-07 NOTE — DISCHARGE INSTRUCTIONS
[facility] - [facility] Wound Care Physician Orders and Discharge Instructions  [address] [place] [address], [address] [place] [address]  Telephone: [phone]  [address] 8:30 am - 4:30 pm and Friday 8:30 am - 1:00 pm.          NAME:  Raciel Metzger  YOB: 1955  MEDICAL RECORD NUMBER:  [id number]  TODAYS DATE:  12/14/22         Please wash hands with soap and water prior to and right after  every dressing change          Topical Treatments:              [x] Apply around the wound:   [x] moisturizing lotion TO LEFT LEG AND FOOT THAT IS NOT CLOSE TO WOUND       WOUND LOCATION   Left Heel **NUSHEILD #2 ( PURAPLY X 1 = TOTAL 3 GRAFTS) FIRST APPLIED 11/2/2022**     May rinse wounds with 0.9% saline TO CLARE WOUND ONLY- DO NOT [address] GRAFT, AKA THE GREEN SHEET   Do NOT SCRUB WOUND. Keep wounds dry in the shower unless otherwise instructed by the physician. PRIMARY DRESSING:                                [x]     NUSHIELD AND CUTIMED SECURED WITH STERI STRIPS APPLIED PER Freada Osgood, CNP - DO NOT REMOVE                                      THEN FLUFFED GAUZE SECURED WITH STERI STRIPS - ONLY REMOVE IF DRAINING THRU GAUZE                                      SECONDARY DRESSING:                   [X] PLEASE APPLY GUAZE OR CUT ABD PAD TO DORSAL FOOT FOR PROTECTION              [x]   GUAZE PAD OVER FLUFFED GAUZE                              [x]  CONFORMING ROLL GUAZE                                                    (X)     MEDIUM SPANDAGRIP  to left leg- SHOULD REACH TO THE BASE OF YOUR KNEE     HOW OFTEN TO CHANGE DRESSINGS:                 [x]EVERY OTHER DAY - ONLY CHANGE FLUFFED GAUZE IF DRAINAGE NOTED- STOP AT THE GREEN (DO NOT REMOVE GREEN)           YOU HAVE HAD A NUSHIELD PLACED ON YOUR WOUND TODAY. FOR BEST RESULTS, WE RECOMMEND YOU LIMIT YOUR ACTIVITY FOR THE NEXT WEEK AS MUCH AS POSSIBLE. AVOID LONG PERIODS OF TIME ON YOUR FEET.  THIS ALSO INCLUDES ELEVATING YOUR LEGS AND FEET 3-4 TIMES DAILY FOR AT LEAST 20-30 MINUTES ON PILLOWS AND AT NIGHT SO THAT THEY ARE HIGHER THAN THE LEVEL OF YOUR HEART     Electronically signed by : Colton Moritz. on 12/14/2022 at 10:00 AM     _________________________________________________________________  PRESSURE RELIEF AND OFF-LOADING:     Off-Loading:   With heel protector  [x] Off-loading when       [x] in bed         [x] sitting                             Specialty equipment ordered:       [] Wheelchair cushion    [] Specialty Bed/Mattress     [x] Assistive Device: please continue to use any assistive devices advised by the provider discussed during your visit   [x] Surgical shoe    [] Podus Boot(s)   [] Foam Boot(s)    [] CROW Boot     _____________________________________________________________________________________________     Dietary:  Continue your diet as tolerated. Eat heart-healthy foods. These foods include vegetables, fruits, nuts, beans, lean meat, fish, and whole grains. Limit sodium, alcohol, and sugar. Protein is a teresa nutrient in helping to repair damaged tissue and promote new tissue growth. Good sources of protein are milk, yogurt, cheese, fish, lean meat, and beans. If you are a diabetic, having diabetes can make it hard for wounds to heal. So try to keep your blood sugar in its target range.  __________________________________________________________________________________________________     ACTIVITY:   Activity as tolerated  ________________________________________________________________________________________________________________     PAIN:     Please note, A small amount of pain, drainage and/or bleeding from this process might be expected, and is NORMAL. Elevate the affected limb. Use over-the-counter medications you would normally use for pain as permitted by your family doctor.   For persistent pain not relieved by the above interventions, please call your family doctor.  ________________________________________________________________________________  Call your doctor now or seek immediate medical care if:    You have symptoms of infection, such as: Increased pain, swelling, warmth, or redness. Red streaks leading from the area. Pus draining from the area. A fever. _______________________________________________________________________     Return Appointment:  DME/Wound Dressing Supplies Provided by:  [facility]  (Supply companies distribute one month supply at a time. Please call them directly to reorder supplies when you run out)     ECF or Home Healthcare: Your Home Care Agency is responsible to order your supplies. Return Appointment: With Sherry Marte CNP  in 1 Week(s)                  [x] Orders placed during your visit:   CULTURE OBTAINED 11/30/22, results reviewed with patient      :  [address] [place] [address]         Electronically signed by : Maximo Graf. on 12/14/2022 at 9:57 AM           Jill Reyes 281: Should you experience any significant chnges in your wound(s) or have questions about your wound care, please contact the [address] [place] [address] at [address] 8:30 am - 4:30 pm and Friday 8:30 am - 1:00 pm.  If you need help with your wound outside these hours and cannot wait until we are again available, contact your PCP or go to the hospital emergency room. PLEASE NOTE: IF YOU ARE UNABLE TO OBTAIN WOUND SUPPLIES, CONTINUE TO USE THE SUPPLIES YOU HAVE AVAILABLE UNTIL YOU ARE ABLE TO REACH US.  KEEP THE WOUND COVERED AT ALL TIMES.                                  x    Alaina Manual

## 2022-12-07 NOTE — PLAN OF CARE
NuShield Treatment Note    NAME:  Gerald Santoyo OF BIRTH:  1955  MEDICAL RECORD NUMBER:  [id number]  DATE:  12/7/2022    Goal:  Patient will receive safe and proper application of skin substitute. Patient will comply with caring for dressing, offloading and reporting complications. Expiration date checked immediately prior to use. Package intact prior to use and no damage noted. Nushield was removed from protective sterile packaging by provider and applied to prepared ulcer bed. NuShield applied with notch to Top Upper Right Corner. NuShield was applied to left heel; and affixed with steri-strips by the provider. Applied nonadherent and fluff guaze secured with steri strips (Secondary Dressing)    Patient/caregiver was instructed not to remove dressing and to keep it clean and dry. NuShield may be applied a total of 10 times per wound over a 12 week period. Additionally NuShield may only be used every 12 months per wound. Date of first application of NuShield for this current wound is December 7, 2022.       Application # 1 out of 9   Puraply x1, NuSheild x1           Guidelines followed    Electronically signed by Laz Madrigal RN on 12/7/2022 at 9:37 AM

## 2022-12-07 NOTE — PROGRESS NOTES
[address]   Progress Note and Procedure Note      Kaela Brice  MEDICAL RECORD NUMBER:  [id number]  AGE: 79 y.o. GENDER: male  : 1955  EPISODE DATE:  2022    Subjective:     Chief Complaint   Patient presents with    Wound Check     Follow up for left heel foot wound         HISTORY of PRESENT ILLNESS HPI     Meghana Galicia is a 79 y.o. male who presents today for wound/ulcer evaluation. Today presents with moderate amount of drainage on old dressing. Reordered additional week of Clindamycin 300 mg PO BID X7 days to treat MRSA. Using skin substitute this week and no infection noted in wound on culture. History of Wound Context: Recent displaced fracture of base of neck of left femur. Pressure ulcer left heel now designated as a Stage 3 pressure ulcer since unstable eschar removed. No periwound redness. Offload boot applied when in bed. PT and DP pulse strong with Doppler. Periwound still pink, but non tender. Reports getting protein supplements. Pt transferring only, no walking with walker still too unstable. Noting intermittent spastic movements legs during visit. Concern about friction on left heel with this movement. Pt wears post-op shoe on left foot. His son Shreyas Bradshaw and daughter Anastasiya Huddleston are monitoring. Left lower leg less swelling noted and low compression added to plan of care. Denies constitutional issues  Displaced fracture of base of neck of left femur, history of CVA, hemiplegia and hemiparesis following CVA affecting left nondominant side, with intermittent spasms of both lower extremities.      Wound/Ulcer Pain Timing/Severity: continuous  Quality of pain: tender  Severity:  2 / 10   Modifying Factors: Pain is relieved/improved with rest, repositioning  Associated Signs/Symptoms: edema and drainage     Ulcer Identification:  Ulcer Type: pressure ulcer     Contributing Factors: edema; while hospitalized developed wound from chronic pressure and decreased mobility     Acute Wound: N/A not an acute wound       PAST MEDICAL HISTORY        Diagnosis Date    CAD (coronary artery disease)     HTN (hypertension)     Hyperlipemia     Left hemiparesis ([address] 8/11/2020    MRSA (methicillin resistant staph aureus) culture positive 9/14/2022    heel    Non-healing open wound of left heel 10/12/2022    Pressure injury of left ankle, stage 2 ([address] 9/13/2021    Pressure ulcer of heel, left, unstageable ([address] 8/8/2022    Stable eschar covered.     Pressure ulcer of left heel, stage 3 ([address] 10/20/2022    S/P PTCA (percutaneous transluminal coronary angioplasty) 2010    two stents place    Spasticity 7/9/2019    Stroke ([address] 10/2012    left-sided weakness       PAST SURGICAL HISTORY    Past Surgical History:   Procedure Laterality Date    CARDIAC SURGERY      HIP SURGERY Left     INGUINAL HERNIA REPAIR      right side    INTRACAPSULAR CATARACT EXTRACTION Right 08/30/2019    PHACOEMULSIFICATION WITH INTRAOCULAR LENS IMPLANT performed by Hayde Hernández MD at [address] [place], [place] [address]vd EXTRACTION Left 09/06/2019    PHACOEMULSIFICATION WITH INTRAOCULAR LENS IMPLANT performed by Hayde Hernández MD at [facility]    Family History   Problem Relation Age of Onset    Hypertension Mother     Heart Disease Father     Breast Cancer Sister     Heart Attack Brother        SOCIAL HISTORY    Social History     Tobacco Use    Smoking status: Never    Smokeless tobacco: Never   Vaping Use    Vaping Use: Never used   Substance Use Topics    Alcohol use: No    Drug use: No       ALLERGIES    No Known Allergies    MEDICATIONS    Current Outpatient Medications on File Prior to Encounter   Medication Sig Dispense Refill    lisinopril (PRINIVIL;ZESTRIL) 20 MG tablet Take 1 tablet by mouth daily 90 tablet 0    hydrALAZINE (APRESOLINE) 50 MG tablet Take 1 tablet by mouth 3 times daily 270 tablet 0    Wound Dressings (MEDIHONEY WOUND/BURN DRESSING) GEL gel Apply 1 each topically daily 1 each 1    Wound Dressings (MEDIHONEY) GEL gel Use topically on wound daily. 1 each 0    senna-docusate (PERICOLACE) 8.6-50 MG per tablet Take 1 tablet by mouth 2 times daily      atorvastatin (LIPITOR) 40 MG tablet Take 1 tablet by mouth daily 90 tablet 3    primidone (MYSOLINE) 50 MG tablet Take 0.25 tablets by mouth in the morning and at bedtime 90 tablet 1    amLODIPine (NORVASC) 10 MG tablet Take 1 tablet by mouth daily 90 tablet 3    gabapentin (NEURONTIN) 100 MG capsule Take 1 capsule by mouth 4 times daily for 180 days. 360 capsule 1    baclofen (LIORESAL) 10 MG tablet Take 1 tablet by mouth 2 times daily (Patient taking differently: Take 20 mg by mouth 2 times daily) 120 tablet 1    loratadine (CLARITIN) 10 MG tablet Take 1 tablet by mouth daily Pt needs to come in and be seen before more rx's are given. 90 tablet 1    aspirin 81 MG tablet Take 81 mg by mouth daily      Sennosides 17.2 MG TABS Take 17.2 mg by mouth       No current facility-administered medications on file prior to encounter. REVIEW OF SYSTEMS  Review of Systems    Pertinent items are noted in HPI.     Objective:      /60   Pulse 64   Temp 97.9 °F (36.6 °C) (Temporal)   Resp 18     Wt Readings from Last 3 Encounters:   09/29/22 228 lb 14.4 oz (103.8 kg)   09/01/22 230 lb (104.3 kg)   08/08/22 230 lb (104.3 kg)       PHYSICAL EXAM  Physical Exam    General Appearance: alert and oriented to person, place and time  Skin: warm and dry, no rash or erythema  Head: normocephalic and atraumatic  Eyes: pupils equal, round, and reactive to light  Pulmonary/Chest: clear to auscultation bilaterally- no wheezes, rales or rhonchi, normal air movement, no respiratory distress  Cardiovascular: normal rate, regular rhythm, and intact distal pulses  Extremities: no cyanosis, no clubbing, and 1 + edema-  left lower leg      Assessment:        Problem List Items Addressed This Visit       Pressure ulcer of heel, left, unstageable ([facility]    Relevant Orders    Initiate Outpatient Wound Care Protocol    MRSA (methicillin resistant staph aureus) culture positive    Relevant Orders    Initiate Outpatient Wound Care Protocol    Non-healing open wound of left heel    Relevant Orders    Initiate Outpatient Wound Care Protocol    Pressure ulcer of left heel, stage 3 (HCC) - Primary    Relevant Orders    Initiate Outpatient Wound Care Protocol    Stroke [facility]) (Chronic)    Relevant Orders    Initiate Outpatient Wound Care Protocol    Spasticity    Relevant Orders    Initiate Outpatient Wound Care Protocol    Left hemiparesis [facility])    Relevant Orders    Initiate Outpatient Wound Care Protocol        Procedure Note  Indications:  Based on my examination of this patient's wound(s)/ulcer(s) today, debridement is required to promote healing and evaluate the wound base. Performed by: MADISYN Leahy - CNP    Consent obtained:  Yes    Time out taken:  Yes    Pain Control:   4% Lidocaine      Debridement: Excisional Debridement    Using curette and forceps the wound(s)/ulcer(s) was/were debrided down through and including the removal of epidermis, dermis, and subcutaneous tissue. Devitalized Tissue Debrided:  fibrin, biofilm, and slough    Pre Debridement Measurements:  Are located in the [facility]  Documentation Flow Sheet    Diabetic/Pressure/Non Pressure Ulcers only:  Ulcer: Pressure ulcer, Stage 3     Wound/Ulcer #: 1    Post Debridement Measurements:  Wound/Ulcer Descriptions are Pre Debridement except measurements:    Wound 08/08/22 Heel Left #1 ( states since about 6/24/2022) (Active)   Wound Image   12/07/22 0813   Wound Etiology Pressure Unstageable 08/08/22 1404   Dressing Status Old drainage noted 10/19/22 0941   Wound Cleansed Vashe 11/16/22 0958   Dressing/Treatment Gauze dressing/dressing sponge;Roll gauze; Other (comment) 12/07/22 0902   Offloading for Diabetic Foot Ulcers Post op shoe 12/07/22 0902   Wound Length (cm) 2 cm 12/07/22 0813   Wound Width (cm) 3 cm 12/07/22 0813   Wound Depth (cm) 0.7 cm 12/07/22 0813   Wound Surface Area (cm^2) 6 cm^2 12/07/22 0813   Change in Wound Size % (l*w) 64.91 12/07/22 0813   Wound Volume (cm^3) 4.2 cm^3 12/07/22 0813   Wound Healing % -146 12/07/22 0813   Post-Procedure Length (cm) 2.1 cm 12/07/22 0832   Post-Procedure Width (cm) 3.1 cm 12/07/22 0832   Post-Procedure Depth (cm) 0.6 cm 12/07/22 0832   Post-Procedure Surface Area (cm^2) 6.51 cm^2 12/07/22 0832   Post-Procedure Volume (cm^3) 3. 906 cm^3 12/07/22 0832   Undermining Starts ___ O'Clock 9 11/02/22 0907   Undermining Ends___ O'Clock 12 11/02/22 0907   Undermining Maxium Distance (cm) 0.7 11/02/22 0907   Wound Assessment Granulation tissue;Slough 12/07/22 0813   Drainage Amount Moderate 12/07/22 0813   Drainage Description Yellow; Thick; Serosanguinous;Purulent 12/07/22 0813   Odor None 12/07/22 0813   Anahy-wound Assessment Dry/flaky; Blanchable erythema 12/07/22 0813   Margins Attached edges 12/07/22 0813   Wound Thickness Description not for Pressure Injury Full thickness 12/07/22 0813   Number of days: 120          Total Surface Area Debrided:  6.51 sq cm     Estimated Blood Loss:  Minimal    Hemostasis Achieved:  by pressure    Procedural Pain:  0  / 10     Post Procedural Pain:  0 / 10     Response to treatment:  Well tolerated by patient.    Procedure:  Skin Substitute Application      Performed by: MADISYN Gentile CNP    Ulcer Type: pressure    Consent obtained: Yes    Time out taken: Yes    Product Utilized:    NuShAir Button 8 sq/cm     Fenestrated: No    Mesher Utilized: No    Instrument(s) curette, scissors, and forceps    Skin Substitute was Applied to Ulcer Number(s):    Ulcer #: 1    Wound 08/08/22 Heel Left #1 ( states since about 6/24/2022) (Active)   Wound Image   12/07/22 0813   Wound Etiology Pressure Unstageable 08/08/22 1404   Dressing Status Old drainage noted 10/19/22 0941   Wound Cleansed Vashe 11/16/22 0958 Dressing/Treatment Gauze dressing/dressing sponge;Roll gauze; Other (comment) 12/07/22 0902   Offloading for Diabetic Foot Ulcers Post op shoe 12/07/22 0902   Wound Length (cm) 2 cm 12/07/22 0813   Wound Width (cm) 3 cm 12/07/22 0813   Wound Depth (cm) 0.7 cm 12/07/22 0813   Wound Surface Area (cm^2) 6 cm^2 12/07/22 0813   Change in Wound Size % (l*w) 64.91 12/07/22 0813   Wound Volume (cm^3) 4.2 cm^3 12/07/22 0813   Wound Healing % -146 12/07/22 0813   Post-Procedure Length (cm) 2.1 cm 12/07/22 0832   Post-Procedure Width (cm) 3.1 cm 12/07/22 0832   Post-Procedure Depth (cm) 0.6 cm 12/07/22 0832   Post-Procedure Surface Area (cm^2) 6.51 cm^2 12/07/22 0832   Post-Procedure Volume (cm^3) 3. 906 cm^3 12/07/22 0832   Undermining Starts ___ O'Clock 9 11/02/22 0907   Undermining Ends___ O'Clock 12 11/02/22 0907   Undermining Maxium Distance (cm) 0.7 11/02/22 0907   Wound Assessment Granulation tissue;Slough 12/07/22 0813   Drainage Amount Moderate 12/07/22 0813   Drainage Description Yellow; Thick; Serosanguinous;Purulent 12/07/22 0813   Odor None 12/07/22 0813   Anahy-wound Assessment Dry/flaky; Blanchable erythema 12/07/22 0813   Margins Attached edges 12/07/22 0813   Wound Thickness Description not for Pressure Injury Full thickness 12/07/22 0813   Number of days: 120          Diabetic/Pressure/Non Pressure Ulcers:  Ulcer:   Pressure ulcer, Stage 3      Total Surface Area of Ulcer(s) Covered 6.51 sq/cm    Was the Product Layered  No     Amount of Product Applied 8 sq/cm     Amount of Product Wasted 0 sq/cm     Reason for Waste: N/A     Surgically Fixated: No:     Secured With: Other: Cutimed Sorbact, steri strips and gauze bolster. Procedural Pain: 0/10     Post Procedural Pain: 0 / 10    Response to Treatment: Well tolerated by patient. Plan:     Treatment Note please see attached Discharge Instructions    Written patient dismissal instructions given to patient and signed by patient or POA.          Discharge [address] [facility] Wound [facility] Physician Orders and Discharge Instructions  [address] [facility], [address], [address]  Telephone: [phone]  [address] 8:30 am - 4:30 pm and Friday 8:30 am - 1:00 pm.          NAME:  Elisa Friend  YOB: 1955  MEDICAL RECORD NUMBER:  [id number]  TODAYS DATE:  12/7/22         Please wash hands with soap and water prior to and right after  every dressing change        Vashe wound cleanser:                                [] Vashe Soak  5 minutes in clinic today . DO NOT rinse after Vashe. WOUND LOCATION   Left Heel **NUSHEILD #1 ( PURAPLY X 1 = TOTAL 2 GRAFTS) APPLIED 12/7/2022**     May rinse wounds with 0.9% saline  Do NOT SCRUB WOUND. Keep wounds dry in the shower unless otherwise instructed by the physician. Topical Treatments:              [x] Apply around the wound:   [x] moisturizing lotion  [] Antifungal ointment      [] No-Sting barrier film   [] Zinc paste      PRIMARY DRESSING:                                [x]     NU SHIELD AND CUTIMED SECURED WITH STERI STRIPS APPLIED PER CASSIE AMAYA CNP - DO NOT REMOVE                                      THEN FLUFFED GAUZE SECURED WITH STERI STRIPS - ONLY REMOVE IF DRAINING THRU GAUZE                                      SECONDARY DRESSING:  PLEASE APPLY GUAZE TO DORSAL FOOT FOR PROTECTION              [x]   [employer] PAD OVER FLUFFED GAUZE                              [x]  CONFORMING ROLL GUAZE                             HOW OFTEN TO CHANGE DRESSINGS:            [x]EVERY OTHER DAY - ONLY CHANGE FLUFFED GAUZE IF DRAINAGE NOTED           YOU HAVE HAD A NUSHIELD PLACED ON YOUR WOUND TODAY. FOR BEST RESULTS, WE RECOMMEND YOU LIMIT YOUR ACTIVITY FOR THE NEXT WEEK AS MUCH AS POSSIBLE. AVOID LONG PERIODS OF TIME ON YOUR FEET.  THIS ALSO INCLUDES ELEVATING YOUR LEGS AND FEET 3-4 TIMES DAILY FOR AT LEAST 20-30 MINUTES ON PILLOWS AND AT NIGHT SO THAT THEY ARE HIGHER THAN THE LEVEL OF YOUR HEART    Electronically signed by Laz Madrigal RN on 12/7/2022 at 8:59 AM                              COMPRESSION IS A SIGNIFICANT FACTOR TO HEALING YOUR WOUNDS. Elevate leg(s) above the level of the heart when sitting. Avoid prolonged standing in one place. (X)     MEDIUM SPANDAGRIP  to left leg   _________________________________________________________________  PRESSURE RELIEF AND OFF-LOADING:     Off-Loading:   With heel protector  [x] Off-loading when       [x] in bed         [x] sitting                             Specialty equipment ordered:       [] Wheelchair cushion    [] Specialty Bed/Mattress     [x] Assistive Device: please continue to use any assistive devices advised by the provider discussed during your visit   [x] Surgical shoe    [] Podus Boot(s)   [] Foam Boot(s)    [] CROW Boot     _____________________________________________________________________________________________     Dietary:  Continue your diet as tolerated. Eat heart-healthy foods. These foods include vegetables, fruits, nuts, beans, lean meat, fish, and whole grains. Limit sodium, alcohol, and sugar. Protein is a teresa nutrient in helping to repair damaged tissue and promote new tissue growth. Good sources of protein are milk, yogurt, cheese, fish, lean meat, and beans. If you are a diabetic, having diabetes can make it hard for wounds to heal. So try to keep your blood sugar in its target range.  __________________________________________________________________________________________________     ACTIVITY:   Activity as tolerated  ________________________________________________________________________________________________________________     PAIN:     Please note, A small amount of pain, drainage and/or bleeding from this process might be expected, and is NORMAL. Elevate the affected limb.   Use over-the-counter medications you would normally use for pain as permitted by your family doctor. For persistent pain not relieved by the above interventions, please call your family doctor.  ________________________________________________________________________________  Call your doctor now or seek immediate medical care if:    You have symptoms of infection, such as: Increased pain, swelling, warmth, or redness. Red streaks leading from the area. Pus draining from the area. A fever. _______________________________________________________________________     Return Appointment:  DME/Wound Dressing Supplies Provided by:  [facility]  (Supply companies distribute one month supply at a time. Please call them directly to reorder supplies when you run out)     ECF or Home Healthcare: Your Home Care Agency is responsible to order your supplies. Return Appointment: With Kavita Jonas CNP  in 1 Week(s)                  [x] Orders placed during your visit:   CULTURE OBTAINED 11/30/22, results reviewed with patient      :  [address] [place] [address]       Electronically signed by Arjun Jimenez RN on 12/7/2022 at 8:37 AM       [address] [place] [address] Information: Should you experience any significant chnges in your wound(s) or have questions about your wound care, please contact the [address] [place] [address] at [address] 8:30 am - 4:30 pm and Friday 8:30 am - 1:00 pm.  If you need help with your wound outside these hours and cannot wait until we are again available, contact your PCP or go to the hospital emergency room. PLEASE NOTE: IF YOU ARE UNABLE TO OBTAIN WOUND SUPPLIES, CONTINUE TO USE THE SUPPLIES YOU HAVE AVAILABLE UNTIL YOU ARE ABLE TO REACH US.  KEEP THE WOUND COVERED AT ALL TIMES.                                  x    CASSIE AMAYA CNP                Electronically signed by MADISYN Velazco CNP on 12/7/2022 at 10:31 AM

## 2022-12-09 RX ORDER — BACLOFEN 10 MG/1
10 TABLET ORAL 2 TIMES DAILY
Qty: 180 TABLET | Refills: 1 | Status: SHIPPED | OUTPATIENT
Start: 2022-12-09

## 2022-12-14 ENCOUNTER — HOSPITAL ENCOUNTER (OUTPATIENT)
Dept: WOUND CARE | Age: 67
Discharge: HOME | End: 2022-12-14
Payer: MEDICARE

## 2022-12-14 VITALS
HEART RATE: 61 BPM | SYSTOLIC BLOOD PRESSURE: 128 MMHG | TEMPERATURE: 98.2 F | DIASTOLIC BLOOD PRESSURE: 69 MMHG | RESPIRATION RATE: 18 BRPM

## 2022-12-14 DIAGNOSIS — L89.623 PRESSURE ULCER OF LEFT HEEL, STAGE 3 (HCC): Primary | ICD-10-CM

## 2022-12-14 DIAGNOSIS — G81.94 LEFT HEMIPARESIS (HCC): ICD-10-CM

## 2022-12-14 DIAGNOSIS — R25.2 SPASTICITY: ICD-10-CM

## 2022-12-14 DIAGNOSIS — S91.302A NON-HEALING OPEN WOUND OF LEFT HEEL: ICD-10-CM

## 2022-12-14 DIAGNOSIS — Z22.322 MRSA (METHICILLIN RESISTANT STAPH AUREUS) CULTURE POSITIVE: ICD-10-CM

## 2022-12-14 DIAGNOSIS — I63.9 CEREBROVASCULAR ACCIDENT (CVA), UNSPECIFIED MECHANISM (HCC): ICD-10-CM

## 2022-12-14 DIAGNOSIS — L89.620 PRESSURE ULCER OF HEEL, LEFT, UNSTAGEABLE (HCC): ICD-10-CM

## 2022-12-14 PROCEDURE — 15275 SKIN SUB GRAFT FACE/NK/HF/G: CPT

## 2022-12-14 RX ORDER — GENTAMICIN SULFATE 1 MG/G
OINTMENT TOPICAL ONCE
OUTPATIENT
Start: 2022-12-14 | End: 2022-12-14

## 2022-12-14 RX ORDER — LIDOCAINE HYDROCHLORIDE 20 MG/ML
JELLY TOPICAL ONCE
OUTPATIENT
Start: 2022-12-14 | End: 2022-12-14

## 2022-12-14 RX ORDER — BACITRACIN ZINC AND POLYMYXIN B SULFATE 500; 1000 [USP'U]/G; [USP'U]/G
OINTMENT TOPICAL ONCE
OUTPATIENT
Start: 2022-12-14 | End: 2022-12-14

## 2022-12-14 RX ORDER — BETAMETHASONE DIPROPIONATE 0.05 %
OINTMENT (GRAM) TOPICAL ONCE
OUTPATIENT
Start: 2022-12-14 | End: 2022-12-14

## 2022-12-14 RX ORDER — LIDOCAINE HYDROCHLORIDE 40 MG/ML
SOLUTION TOPICAL ONCE
Status: COMPLETED | OUTPATIENT
Start: 2022-12-14 | End: 2022-12-14

## 2022-12-14 RX ORDER — CLOBETASOL PROPIONATE 0.5 MG/G
OINTMENT TOPICAL ONCE
OUTPATIENT
Start: 2022-12-14 | End: 2022-12-14

## 2022-12-14 RX ORDER — LIDOCAINE HYDROCHLORIDE 40 MG/ML
SOLUTION TOPICAL ONCE
OUTPATIENT
Start: 2022-12-14 | End: 2022-12-14

## 2022-12-14 RX ORDER — BACITRACIN, NEOMYCIN, POLYMYXIN B 400; 3.5; 5 [USP'U]/G; MG/G; [USP'U]/G
OINTMENT TOPICAL ONCE
OUTPATIENT
Start: 2022-12-14 | End: 2022-12-14

## 2022-12-14 RX ORDER — LIDOCAINE 40 MG/G
CREAM TOPICAL ONCE
OUTPATIENT
Start: 2022-12-14 | End: 2022-12-14

## 2022-12-14 RX ORDER — GINSENG 100 MG
CAPSULE ORAL ONCE
OUTPATIENT
Start: 2022-12-14 | End: 2022-12-14

## 2022-12-14 RX ORDER — LIDOCAINE 50 MG/G
OINTMENT TOPICAL ONCE
OUTPATIENT
Start: 2022-12-14 | End: 2022-12-14

## 2022-12-14 RX ADMIN — LIDOCAINE HYDROCHLORIDE 5 ML: 40 SOLUTION TOPICAL at 09:01

## 2022-12-14 ASSESSMENT — PAIN DESCRIPTION - FREQUENCY: FREQUENCY: CONTINUOUS

## 2022-12-14 ASSESSMENT — PAIN SCALES - GENERAL
PAINLEVEL_OUTOF10: 0
PAINLEVEL_OUTOF10: 1

## 2022-12-14 ASSESSMENT — PAIN DESCRIPTION - DESCRIPTORS: DESCRIPTORS: DISCOMFORT

## 2022-12-14 ASSESSMENT — PAIN DESCRIPTION - ORIENTATION: ORIENTATION: LEFT

## 2022-12-14 ASSESSMENT — PAIN DESCRIPTION - LOCATION: LOCATION: FOOT

## 2022-12-14 ASSESSMENT — PAIN DESCRIPTION - PAIN TYPE: TYPE: CHRONIC PAIN

## 2022-12-14 ASSESSMENT — PAIN DESCRIPTION - ONSET: ONSET: ON-GOING

## 2022-12-14 ASSESSMENT — PAIN - FUNCTIONAL ASSESSMENT: PAIN_FUNCTIONAL_ASSESSMENT: PREVENTS OR INTERFERES SOME ACTIVE ACTIVITIES AND ADLS

## 2022-12-14 NOTE — PROGRESS NOTES
[address]   Progress Note and Procedure Note      Spike Marietta  MEDICAL RECORD NUMBER:  [id number]  AGE: 79 y.o. GENDER: male  : 1955  EPISODE DATE:  2022    Subjective:     Chief Complaint   Patient presents with    Wound Check     Follow up visit left heel wound         HISTORY of PRESENT ILLNESS HPI    Robbi Haq is a 79 y.o. male who presents today for wound/ulcer evaluation. Using skin substitute this week and no infection noted in wound on culture. History of Wound Context: Recent displaced fracture of base of neck of left femur. Pressure ulcer left heel now designated as a Stage 3 pressure ulcer since unstable eschar removed. No periwound redness. Offload boot applied when in bed. PT and DP pulse strong with Doppler. Periwound still pink, but non-tender. Reports getting protein supplements. Pt transferring only, no walking with walker still too unstable. Noting intermittent spastic movements legs during visit. Concern about friction on left heel with this movement. Pt wears post-op shoe on left foot. His son Sonal Olivarez and daughter Judith Chavez are monitoring. Left lower leg swelling noted; low compression added to plan of care. Denies constitutional issues. Displaced fracture of base of neck of left femur, history of CVA, hemiplegia and hemiparesis following CVA affecting left nondominant side, with intermittent spasms of both lower extremities. Applying #2 NuShield today, PuraPly was applied first for a total of 3 grafts including today.      Wound/Ulcer Pain Timing/Severity: continuous  Quality of pain: tender  Severity:  0 / 10   Modifying Factors: Pain is relieved/improved with rest, repositioning  Associated Signs/Symptoms: edema and drainage     Ulcer Identification:  Ulcer Type: pressure ulcer     Contributing Factors: edema; while hospitalized developed wound from chronic pressure and decreased mobility     Acute Wound: N/A not an acute wound    PAST MEDICAL HISTORY        Diagnosis Date    CAD (coronary artery disease)     HTN (hypertension)     Hyperlipemia     Left hemiparesis ([address] 8/11/2020    MRSA (methicillin resistant staph aureus) culture positive 9/14/2022    heel    Non-healing open wound of left heel 10/12/2022    Pressure injury of left ankle, stage 2 ([address] 9/13/2021    Pressure ulcer of heel, left, unstageable ([address] 8/8/2022    Stable eschar covered.     Pressure ulcer of left heel, stage 3 ([address] 10/20/2022    S/P PTCA (percutaneous transluminal coronary angioplasty) 2010    two stents place    Spasticity 7/9/2019    Stroke ([address] 10/2012    left-sided weakness       PAST SURGICAL HISTORY    Past Surgical History:   Procedure Laterality Date    CARDIAC SURGERY      HIP SURGERY Left     INGUINAL HERNIA REPAIR      right side    INTRACAPSULAR CATARACT EXTRACTION Right 08/30/2019    PHACOEMULSIFICATION WITH INTRAOCULAR LENS IMPLANT performed by Xin Vazquez MD at [address] [place], [place] [address]vd EXTRACTION Left 09/06/2019    PHACOEMULSIFICATION WITH INTRAOCULAR LENS IMPLANT performed by Xin Vazquez MD at [facility] [place] [facility]    Family History   Problem Relation Age of Onset    Hypertension Mother     Heart Disease Father     Breast Cancer Sister     Heart Attack Brother        SOCIAL HISTORY    Social History     Tobacco Use    Smoking status: Never    Smokeless tobacco: Never   Vaping Use    Vaping Use: Never used   Substance Use Topics    Alcohol use: No    Drug use: No       ALLERGIES    No Known Allergies    MEDICATIONS    Current Outpatient Medications on File Prior to Encounter   Medication Sig Dispense Refill    baclofen (LIORESAL) 10 MG tablet Take 1 tablet by mouth 2 times daily 180 tablet 1    lisinopril (PRINIVIL;ZESTRIL) 20 MG tablet Take 1 tablet by mouth daily 90 tablet 0    hydrALAZINE (APRESOLINE) 50 MG tablet Take 1 tablet by mouth 3 times daily 270 tablet 0    Wound Dressings ([facility] WOUND/BURN DRESSING) GEL gel Apply 1 each topically daily 1 each 1    Wound Dressings (MEDIHONEY) GEL gel Use topically on wound daily. 1 each 0    senna-docusate (PERICOLACE) 8.6-50 MG per tablet Take 1 tablet by mouth 2 times daily      atorvastatin (LIPITOR) 40 MG tablet Take 1 tablet by mouth daily 90 tablet 3    primidone (MYSOLINE) 50 MG tablet Take 0.25 tablets by mouth in the morning and at bedtime 90 tablet 1    amLODIPine (NORVASC) 10 MG tablet Take 1 tablet by mouth daily 90 tablet 3    gabapentin (NEURONTIN) 100 MG capsule Take 1 capsule by mouth 4 times daily for 180 days. 360 capsule 1    loratadine (CLARITIN) 10 MG tablet Take 1 tablet by mouth daily Pt needs to come in and be seen before more rx's are given. 90 tablet 1    aspirin 81 MG tablet Take 81 mg by mouth daily      Sennosides 17.2 MG TABS Take 17.2 mg by mouth       No current facility-administered medications on file prior to encounter. REVIEW OF SYSTEMS    Pertinent items are noted in HPI.       Objective:      /69   Pulse 61   Temp 98.2 °F (36.8 °C) (Infrared)   Resp 18     Wt Readings from Last 3 Encounters:   09/29/22 228 lb 14.4 oz (103.8 kg)   09/01/22 230 lb (104.3 kg)   08/08/22 230 lb (104.3 kg)       PHYSICAL EXAM    General Appearance: alert and oriented to person, place and time  Skin: warm and dry, no rash or erythema  Head: normocephalic and atraumatic  Eyes: pupils equal, round, and reactive to light  Pulmonary/Chest:  normal air movement, no respiratory distress  Cardiovascular: Left DP +1  Extremities: no cyanosis or clubbing; LLE +2 pitting edema    Assessment:        Problem List Items Addressed This Visit       Non-healing open wound of left heel    Relevant Orders    Initiate Outpatient Wound Care Protocol    Pressure ulcer of left heel, stage 3 ([facility] - Primary    Relevant Orders    Initiate Outpatient Wound Care Protocol    Stroke [facility]) (Chronic)    Relevant Orders    Initiate Outpatient Wound Care Protocol    Spasticity    Relevant Orders    Initiate Outpatient Wound Care Protocol    Left hemiparesis [facility])    Relevant Orders    Initiate Outpatient Wound Care Protocol    Pressure ulcer of heel, left, unstageable ([address]    Relevant Orders    Initiate Outpatient Wound Care Protocol    MRSA (methicillin resistant staph aureus) culture positive    Relevant Orders    Initiate Outpatient Wound Care Protocol        Procedure Note  Indications:  Based on my examination of this patient's wound(s)/ulcer(s) today, debridement is required to promote healing and evaluate the wound base. Performed by: MADISYN Ruiz CNP    Consent obtained:  Yes    Time out taken:  Yes    Pain Control: Anesthetic  Anesthetic: 4% Lidocaine Liquid Topical       Debridement: Excisional Debridement    Using curette, scissors, and forceps the wound(s)/ulcer(s) was/were debrided down through and including the removal of epidermis, dermis, and subcutaneous tissue. Devitalized Tissue Debrided:  fibrin, biofilm, slough, and necrotic tissue    Pre Debridement Measurements:  Are located in the [place]  Documentation Flow Sheet    Diabetic/Pressure/Non Pressure Ulcers only:  Ulcer: Pressure ulcer, Stage 3     Wound/Ulcer #: 1    Post Debridement Measurements:  Wound/Ulcer Descriptions are Pre Debridement except measurements:    Wound 08/08/22 Heel Left #1 ( states since about 6/24/2022) (Active)   Wound Image   12/07/22 0813   Wound Etiology Pressure Unstageable 08/08/22 1404   Dressing Status Old drainage noted 10/19/22 0941   Wound Cleansed Vashe 11/16/22 0958   Dressing/Treatment Gauze dressing/dressing sponge;Roll gauze; Other (comment) 12/07/22 0902   Offloading for Diabetic Foot Ulcers Post op shoe 12/07/22 0902   Wound Length (cm) 1.9 cm 12/14/22 0901   Wound Width (cm) 2.6 cm 12/14/22 0901   Wound Depth (cm) 0.6 cm 12/14/22 0901   Wound Surface Area (cm^2) 4.94 cm^2 12/14/22 0901   Change in Wound Size % (l*w) 71.11 12/14/22 0901   Wound Volume (cm^3) 2.964 cm^3 12/14/22 0901   Wound Healing % -73 12/14/22 0901   Post-Procedure Length (cm) 2 cm 12/14/22 0956   Post-Procedure Width (cm) 2.7 cm 12/14/22 0956   Post-Procedure Depth (cm) 0.6 cm 12/14/22 0956   Post-Procedure Surface Area (cm^2) 5.4 cm^2 12/14/22 0956   Post-Procedure Volume (cm^3) 3.24 cm^3 12/14/22 0956   Undermining Starts ___ O'Clock 9 11/02/22 0907   Undermining Ends___ O'Clock 12 11/02/22 0907   Undermining Maxium Distance (cm) 0.7 11/02/22 0907   Wound Assessment Granulation tissue;Slough 12/14/22 0901   Drainage Amount Moderate 12/14/22 0901   Drainage Description Serosanguinous; Yellow 12/14/22 0901   Odor None 12/14/22 0901   Anahy-wound Assessment Dry/flaky; Blanchable erythema 12/14/22 0901   Margins Attached edges 12/14/22 0901   Wound Thickness Description not for Pressure Injury Full thickness 12/14/22 0901   Number of days: 127          Total Surface Area Debrided:  5.4 sq cm     Estimated Blood Loss:  Minimal    Hemostasis Achieved:  by pressure    Procedural Pain:  0  / 10     Post Procedural Pain:  0 / 10     Response to treatment:  Well tolerated by patient. Procedure:  Skin Substitute Application    Performed by: MADISYN Corey CNP    Ulcer Type: pressure    Consent obtained: Yes    Time out taken: Yes    Product Utilized:    NuShield 8 sq/cm     Fenestrated: No    Mesher Utilized: No    Instrument(s) curette, scissors, and forceps    Skin Substitute was Applied to Ulcer Number(s):    Ulcer #: 1    Wound 08/08/22 Heel Left #1 ( states since about 6/24/2022) (Active)   Wound Image   12/07/22 0813   Wound Etiology Pressure Unstageable 08/08/22 1404   Dressing Status Old drainage noted 10/19/22 0941   Wound Cleansed Vashe 11/16/22 0958   Dressing/Treatment Gauze dressing/dressing sponge;Roll gauze; Other (comment) 12/14/22 1013   Offloading for Diabetic Foot Ulcers Post op shoe 12/14/22 1013   Wound Length (cm) 1.9 cm 12/14/22 0901   Wound Width (cm) 2.6 cm 12/14/22 0901   Wound Depth (cm) 0.6 cm 12/14/22 0901   Wound Surface Area (cm^2) 4.94 cm^2 12/14/22 0901   Change in Wound Size % (l*w) 71.11 12/14/22 0901   Wound Volume (cm^3) 2.964 cm^3 12/14/22 0901   Wound Healing % -73 12/14/22 0901   Post-Procedure Length (cm) 2 cm 12/14/22 0956   Post-Procedure Width (cm) 2.7 cm 12/14/22 0956   Post-Procedure Depth (cm) 0.6 cm 12/14/22 0956   Post-Procedure Surface Area (cm^2) 5.4 cm^2 12/14/22 0956   Post-Procedure Volume (cm^3) 3.24 cm^3 12/14/22 0956   Undermining Starts ___ O'Clock 9 11/02/22 0907   Undermining Ends___ O'Clock 12 11/02/22 0907   Undermining Maxium Distance (cm) 0.7 11/02/22 0907   Wound Assessment Granulation tissue;Slough 12/14/22 0901   Drainage Amount Moderate 12/14/22 0901   Drainage Description Serosanguinous; Yellow 12/14/22 0901   Odor None 12/14/22 0901   Anahy-wound Assessment Dry/flaky; Blanchable erythema 12/14/22 0901   Margins Attached edges 12/14/22 0901   Wound Thickness Description not for Pressure Injury Full thickness 12/14/22 0901   Number of days: 128          Diabetic/Pressure/Non Pressure Ulcers:  Ulcer:   Pressure ulcer, Stage 3      Total Surface Area of Ulcer(s) Covered 5.4 sq/cm    Was the Product Layered  No     Amount of Product Applied 8 sq/cm     Amount of Product Wasted 0 sq/cm     Reason for Waste: N/A     Surgically Fixated: No    Secured With: Other: Cutimed Sorbact, steri-strips and gauze bolster    Procedural Pain: 0/10     Post Procedural Pain: 0 / 10    Response to Treatment: Well tolerated by patient. Plan:     Treatment Note please see attached Discharge Instructions    Written patient dismissal instructions given to patient and signed by patient or POA.          Discharge [address] [facility] Physician Orders and Discharge Instructions  [address] [facility]    [place]  Telephone: [phone] [facility] - THURSDAY 8:30 am - 4:30 pm and Friday 8:30 am - 1:00 pm.          NAME:  Winthrop Riedel  YOB: 1955  MEDICAL RECORD NUMBER:  [id number]  TODAYS DATE:  12/14/22         Please wash hands with soap and water prior to and right after  every dressing change          Topical Treatments:              [x] Apply around the wound:   [x] moisturizing lotion TO LEFT LEG AND FOOT THAT IS NOT CLOSE TO WOUND       WOUND LOCATION   Left Heel **NUSHEILD #2 ( PURAPLY X 1 = TOTAL 3 GRAFTS) FIRST APPLIED 11/2/2022**     May rinse wounds with 0.9% saline TO CLARE WOUND ONLY- DO NOT [address] GRAFT, AKA THE GREEN SHEET   Do NOT SCRUB WOUND. Keep wounds dry in the shower unless otherwise instructed by the physician. PRIMARY DRESSING:                                [x]     NUSHIELD AND CUTIMED SECURED WITH STERI STRIPS APPLIED PER Rivas Aguilar CNP - DO NOT REMOVE                                      THEN FLUFFED GAUZE SECURED WITH STERI STRIPS - ONLY REMOVE IF DRAINING THRU GAUZE                                      SECONDARY DRESSING:                   [X] PLEASE APPLY GUAZE OR CUT ABD PAD TO DORSAL FOOT FOR PROTECTION              [x]   GUAZE PAD OVER FLUFFED GAUZE                              [x]  CONFORMING ROLL GUAZE                                                    (X)     MEDIUM SPANDAGRIP  to left leg- SHOULD REACH TO THE BASE OF YOUR KNEE     HOW OFTEN TO CHANGE DRESSINGS:                 [x]EVERY OTHER DAY - ONLY CHANGE FLUFFED GAUZE IF DRAINAGE NOTED- STOP AT THE GREEN (DO NOT REMOVE GREEN)           YOU HAVE HAD A NUSHIELD PLACED ON YOUR WOUND TODAY. FOR BEST RESULTS, WE RECOMMEND YOU LIMIT YOUR ACTIVITY FOR THE NEXT WEEK AS MUCH AS POSSIBLE. AVOID LONG PERIODS OF TIME ON YOUR FEET.  THIS ALSO INCLUDES ELEVATING YOUR LEGS AND FEET 3-4 TIMES DAILY FOR AT LEAST 20-30 MINUTES ON PILLOWS AND AT NIGHT SO THAT THEY ARE HIGHER THAN THE LEVEL OF YOUR HEART     Electronically signed by : Raza Grullon. on 12/14/2022 at 10:00 AM     _________________________________________________________________  PRESSURE RELIEF AND OFF-LOADING:     Off-Loading:   With heel protector  [x] Off-loading when       [x] in bed         [x] sitting                             Specialty equipment ordered:       [] Wheelchair cushion    [] Specialty Bed/Mattress     [x] Assistive Device: please continue to use any assistive devices advised by the provider discussed during your visit   [x] Surgical shoe    [] Podus Boot(s)   [] Foam Boot(s)    [] Faviola Ignacio     _____________________________________________________________________________________________     Dietary:  Continue your diet as tolerated. Eat heart-healthy foods. These foods include vegetables, fruits, nuts, beans, lean meat, fish, and whole grains. Limit sodium, alcohol, and sugar. Protein is a teresa nutrient in helping to repair damaged tissue and promote new tissue growth. Good sources of protein are milk, yogurt, cheese, fish, lean meat, and beans. If you are a diabetic, having diabetes can make it hard for wounds to heal. So try to keep your blood sugar in its target range.  __________________________________________________________________________________________________     ACTIVITY:   Activity as tolerated  ________________________________________________________________________________________________________________     PAIN:     Please note, A small amount of pain, drainage and/or bleeding from this process might be expected, and is NORMAL. Elevate the affected limb. Use over-the-counter medications you would normally use for pain as permitted by your family doctor. For persistent pain not relieved by the above interventions, please call your family doctor.  ________________________________________________________________________________  Call your doctor now or seek immediate medical care if:    You have symptoms of infection, such as:   Increased pain, swelling, warmth, or redness. Red streaks leading from the area. Pus draining from the area. A fever. _______________________________________________________________________     Return Appointment:  DME/Wound Dressing Supplies Provided by:  [facility]  (Supply companies distribute one month supply at a time. Please call them directly to reorder supplies when you run out)     ECF or Home Healthcare: Your Home Care Agency is responsible to order your supplies. Return Appointment: With Matthieu To CNP  in 1 Week(s)                  [x] Orders placed during your visit:   CULTURE OBTAINED 11/30/22, results reviewed with patient      :  [address] [place] [address]         Electronically signed by : Keyur Bah. on 12/14/2022 at 9:57 AM           [address]: Should you experience any significant chnges in your wound(s) or have questions about your wound care, please contact the [address] [place] [address] at [address] 8:30 am - 4:30 pm and Friday 8:30 am - 1:00 pm.  If you need help with your wound outside these hours and cannot wait until we are again available, contact your PCP or go to the hospital emergency room. PLEASE NOTE: IF YOU ARE UNABLE TO OBTAIN WOUND SUPPLIES, CONTINUE TO USE THE SUPPLIES YOU HAVE AVAILABLE UNTIL YOU ARE ABLE TO REACH US.  KEEP THE WOUND COVERED AT ALL TIMES.                                  sally Marie                    Electronically signed by MADISYN Thurman CNP on 12/14/2022 at 12:16 PM

## 2022-12-14 NOTE — PLAN OF CARE
NuShield Treatment Note     NAME:  James Castro OF BIRTH:  1955  MEDICAL RECORD NUMBER:  [id number]  DATE:  12/14/22      Goal:  Patient will receive safe and proper application of skin substitute. Patient will comply with caring for dressing, offloading and reporting complications. Expiration date checked immediately prior to use. Package intact prior to use and no damage noted. Nushield was removed from protective sterile packaging by provider and applied to prepared ulcer bed. NuShield applied with notch to Top Upper Right Corner. NuShield was applied to left heel; and affixed with steri-strips by the provider. Applied nonadherent and fluff guaze secured with steri strips (Secondary Dressing)    Patient/caregiver was instructed not to remove dressing and to keep it clean and dry. NuShield may be applied a total of 10 times per wound over a 12 week period. Additionally NuShield may only be used every 12 months per wound. Date of first application of NuShield for this current wound is December 7, 2022.        Application # 2 out of 9   Puraply x1, NuElder x2            Guidelines followed       Electronically signed by : Kelsy Mathias on 12/14/2022 at 2:11 PM

## 2022-12-21 ENCOUNTER — HOSPITAL ENCOUNTER (OUTPATIENT)
Dept: WOUND CARE | Age: 67
Discharge: HOME | End: 2022-12-21
Payer: MEDICARE

## 2022-12-21 VITALS
TEMPERATURE: 98.2 F | SYSTOLIC BLOOD PRESSURE: 128 MMHG | HEART RATE: 58 BPM | RESPIRATION RATE: 18 BRPM | DIASTOLIC BLOOD PRESSURE: 69 MMHG

## 2022-12-21 DIAGNOSIS — Z22.322 MRSA (METHICILLIN RESISTANT STAPH AUREUS) CULTURE POSITIVE: ICD-10-CM

## 2022-12-21 DIAGNOSIS — G81.94 LEFT HEMIPARESIS (HCC): ICD-10-CM

## 2022-12-21 DIAGNOSIS — I63.9 CEREBROVASCULAR ACCIDENT (CVA), UNSPECIFIED MECHANISM (HCC): ICD-10-CM

## 2022-12-21 DIAGNOSIS — S91.302A NON-HEALING OPEN WOUND OF LEFT HEEL: ICD-10-CM

## 2022-12-21 DIAGNOSIS — L89.620 PRESSURE ULCER OF HEEL, LEFT, UNSTAGEABLE (HCC): ICD-10-CM

## 2022-12-21 DIAGNOSIS — R25.2 SPASTICITY: ICD-10-CM

## 2022-12-21 DIAGNOSIS — L89.623 PRESSURE ULCER OF LEFT HEEL, STAGE 3 (HCC): Primary | ICD-10-CM

## 2022-12-21 PROCEDURE — 11042 DBRDMT SUBQ TIS 1ST 20SQCM/<: CPT

## 2022-12-21 RX ORDER — LIDOCAINE HYDROCHLORIDE 40 MG/ML
SOLUTION TOPICAL ONCE
OUTPATIENT
Start: 2022-12-21 | End: 2022-12-21

## 2022-12-21 RX ORDER — BACITRACIN, NEOMYCIN, POLYMYXIN B 400; 3.5; 5 [USP'U]/G; MG/G; [USP'U]/G
OINTMENT TOPICAL ONCE
OUTPATIENT
Start: 2022-12-21 | End: 2022-12-21

## 2022-12-21 RX ORDER — LIDOCAINE HYDROCHLORIDE 20 MG/ML
JELLY TOPICAL ONCE
OUTPATIENT
Start: 2022-12-21 | End: 2022-12-21

## 2022-12-21 RX ORDER — LIDOCAINE 50 MG/G
OINTMENT TOPICAL ONCE
Status: COMPLETED | OUTPATIENT
Start: 2022-12-21 | End: 2022-12-21

## 2022-12-21 RX ORDER — BACITRACIN ZINC AND POLYMYXIN B SULFATE 500; 1000 [USP'U]/G; [USP'U]/G
OINTMENT TOPICAL ONCE
OUTPATIENT
Start: 2022-12-21 | End: 2022-12-21

## 2022-12-21 RX ORDER — LIDOCAINE 50 MG/G
OINTMENT TOPICAL ONCE
OUTPATIENT
Start: 2022-12-21 | End: 2022-12-21

## 2022-12-21 RX ORDER — CLOBETASOL PROPIONATE 0.5 MG/G
OINTMENT TOPICAL ONCE
OUTPATIENT
Start: 2022-12-21 | End: 2022-12-21

## 2022-12-21 RX ORDER — BETAMETHASONE DIPROPIONATE 0.05 %
OINTMENT (GRAM) TOPICAL ONCE
OUTPATIENT
Start: 2022-12-21 | End: 2022-12-21

## 2022-12-21 RX ORDER — GENTAMICIN SULFATE 1 MG/G
OINTMENT TOPICAL ONCE
OUTPATIENT
Start: 2022-12-21 | End: 2022-12-21

## 2022-12-21 RX ORDER — LIDOCAINE 40 MG/G
CREAM TOPICAL ONCE
OUTPATIENT
Start: 2022-12-21 | End: 2022-12-21

## 2022-12-21 RX ORDER — GINSENG 100 MG
CAPSULE ORAL ONCE
OUTPATIENT
Start: 2022-12-21 | End: 2022-12-21

## 2022-12-21 RX ADMIN — LIDOCAINE 1 G: 50 OINTMENT TOPICAL at 09:22

## 2022-12-21 ASSESSMENT — PAIN DESCRIPTION - LOCATION: LOCATION: FOOT

## 2022-12-21 ASSESSMENT — PAIN SCALES - GENERAL
PAINLEVEL_OUTOF10: 0
PAINLEVEL_OUTOF10: 1

## 2022-12-21 ASSESSMENT — PAIN DESCRIPTION - PAIN TYPE: TYPE: CHRONIC PAIN

## 2022-12-21 ASSESSMENT — PAIN DESCRIPTION - ORIENTATION: ORIENTATION: LEFT

## 2022-12-21 ASSESSMENT — PAIN DESCRIPTION - DESCRIPTORS: DESCRIPTORS: DISCOMFORT

## 2022-12-21 ASSESSMENT — PAIN - FUNCTIONAL ASSESSMENT: PAIN_FUNCTIONAL_ASSESSMENT: PREVENTS OR INTERFERES SOME ACTIVE ACTIVITIES AND ADLS

## 2022-12-21 ASSESSMENT — PAIN DESCRIPTION - ONSET: ONSET: ON-GOING

## 2022-12-21 ASSESSMENT — PAIN DESCRIPTION - FREQUENCY: FREQUENCY: CONTINUOUS

## 2022-12-21 NOTE — PLAN OF CARE
Multilayer Compression Wrap   (Not Unna) Below the Knee    NAME:  Mimi Rodarte OF BIRTH:  1955  MEDICAL RECORD NUMBER:  [id number]  DATE:  12/21/2022    Multilayer compression wrap: Removed old Multilayer wrap if indicated and wash leg with mild soap/water. Applied moisturizing agent to dry skin as needed. Applied primary and secondary dressing as ordered. Applied multilayered dressing below the knee to left lower leg. Instructed patient/caregiver not to remove dressing and to keep it clean and dry. Instructed patient/caregiver on complications to report to provider, such as pain, numbness in toes, heavy drainage, and slippage of dressing. Instructed patient on purpose of compression dressing and on activity and exercise recommendations.       Electronically signed by Diamond Wills RN on 12/21/2022 at 12:42 PM;ti

## 2022-12-21 NOTE — LETTER
[facility]  [address] [facility] [place] [address] OFFICE [facility] [address] [facility]  [phone]  Dept: Rishi Gomez RECORD NUMBER: [id number]   AGE: 79 y.o.  GENDER: male : 1955   TODAYS DATE: 2022       Mr. Juanito Meza was seen in the [address] [place] [address] on 2022     Other PATIENT NEEDS TO BE OFF EVERY [facility] FOR APPOINTMENT AT [facility]    Electronically signed by Jada Thompson RN on 2022 at 10:01 AM    Sincerely,            Frank Radford and [facility]

## 2022-12-21 NOTE — PROGRESS NOTES
[address]   Progress Note and Procedure Note      Christiano Christian  MEDICAL RECORD NUMBER:  [id number]  AGE: 79 y.o. GENDER: male  : 1955  EPISODE DATE:  2022    Subjective:     Chief Complaint   Patient presents with    Wound Check     Follow up visit left heel wound         HISTORY of PRESENT ILLNESS HPI  Mary Laura is a 79 y.o. male who presents today for wound/ulcer evaluation. Using skin substitute last week and no infection noted in wound on culture. History of Wound Context: Recent displaced fracture of base of neck of left femur. Pressure ulcer left heel now designated as a Stage 3 pressure ulcer since unstable eschar removed. No periwound redness. Offload boot applied when in bed. PT and DP pulse strong with Doppler. Periwound still pink, but non-tender. Reports getting protein supplements. Pt transferring only, no walking with walker still too unstable. Noting intermittent spastic movements legs during visit. Concern about friction on left heel with this movement. Pt wears post-op shoe on left foot. His son Rojas Oliveira and daughter Crow Fairchild are monitoring and changing outer dressings as needed. Pt states needed outer dressing changed 2-3 times this past week. Left lower leg swelling noted. Denies constitutional issues. Prior to surgery pt was wearing compression stockings on a routine basis but since the wound occurred has stopped wearing. We were using medium Spandigrip to left leg, but he had difficulty keeping it from sliding down leg. Displaced fracture of base of neck of left femur, history of CVA, hemiplegia and hemiparesis following CVA affecting left nondominant side, with intermittent spasms of both lower extremities.   Wound/Ulcer Pain Timing/Severity: intermittent  Quality of pain: tender  Severity:  0 / 10   Modifying Factors: Pain is relieved/improved with rest, repositioning  Associated Signs/Symptoms: edema and drainage     Ulcer Identification:  Ulcer Type: pressure ulcer     Contributing Factors: edema; while hospitalized developed wound from chronic pressure and decreased mobility     Acute Wound: N/A not an acute wound    PAST MEDICAL HISTORY        Diagnosis Date    CAD (coronary artery disease)     HTN (hypertension)     Hyperlipemia     Left hemiparesis ([address] 8/11/2020    MRSA (methicillin resistant staph aureus) culture positive 9/14/2022    heel    Non-healing open wound of left heel 10/12/2022    Pressure injury of left ankle, stage 2 ([address] 9/13/2021    Pressure ulcer of heel, left, unstageable ([address] 8/8/2022    Stable eschar covered.     Pressure ulcer of left heel, stage 3 ([address] 10/20/2022    S/P PTCA (percutaneous transluminal coronary angioplasty) 2010    two stents place    Spasticity 7/9/2019    Stroke ([address] 10/2012    left-sided weakness       PAST SURGICAL HISTORY    Past Surgical History:   Procedure Laterality Date    CARDIAC SURGERY      HIP SURGERY Left     INGUINAL HERNIA REPAIR      right side    INTRACAPSULAR CATARACT EXTRACTION Right 08/30/2019    PHACOEMULSIFICATION WITH INTRAOCULAR LENS IMPLANT performed by Mari Rubi MD at [address] [place] [address] EXTRACTION Left 09/06/2019    PHACOEMULSIFICATION WITH INTRAOCULAR LENS IMPLANT performed by Mari Rubi MD at [facility]    Family History   Problem Relation Age of Onset    Hypertension Mother     Heart Disease Father     Breast Cancer Sister     Heart Attack Brother        SOCIAL HISTORY    Social History     Tobacco Use    Smoking status: Never    Smokeless tobacco: Never   Vaping Use    Vaping Use: Never used   Substance Use Topics    Alcohol use: No    Drug use: No       ALLERGIES    No Known Allergies    MEDICATIONS    Current Outpatient Medications on File Prior to Encounter   Medication Sig Dispense Refill    baclofen (LIORESAL) 10 MG tablet Take 1 tablet by mouth 2 times daily 180 tablet 1 lisinopril (PRINIVIL;ZESTRIL) 20 MG tablet Take 1 tablet by mouth daily 90 tablet 0    hydrALAZINE (APRESOLINE) 50 MG tablet Take 1 tablet by mouth 3 times daily 270 tablet 0    Wound Dressings (MEDIHONEY WOUND/BURN DRESSING) GEL gel Apply 1 each topically daily 1 each 1    Wound Dressings (MEDIHONEY) GEL gel Use topically on wound daily. 1 each 0    senna-docusate (PERICOLACE) 8.6-50 MG per tablet Take 1 tablet by mouth 2 times daily      atorvastatin (LIPITOR) 40 MG tablet Take 1 tablet by mouth daily 90 tablet 3    primidone (MYSOLINE) 50 MG tablet Take 0.25 tablets by mouth in the morning and at bedtime 90 tablet 1    amLODIPine (NORVASC) 10 MG tablet Take 1 tablet by mouth daily 90 tablet 3    gabapentin (NEURONTIN) 100 MG capsule Take 1 capsule by mouth 4 times daily for 180 days. 360 capsule 1    loratadine (CLARITIN) 10 MG tablet Take 1 tablet by mouth daily Pt needs to come in and be seen before more rx's are given. 90 tablet 1    aspirin 81 MG tablet Take 81 mg by mouth daily      Sennosides 17.2 MG TABS Take 17.2 mg by mouth       No current facility-administered medications on file prior to encounter. REVIEW OF SYSTEMS  Review of Systems    Pertinent items are noted in HPI.     Objective:      /69   Pulse 58   Temp 98.2 °F (36.8 °C) (Infrared)   Resp 18     Wt Readings from Last 3 Encounters:   09/29/22 228 lb 14.4 oz (103.8 kg)   09/01/22 230 lb (104.3 kg)   08/08/22 230 lb (104.3 kg)       PHYSICAL EXAM  Physical Exam    General Appearance: alert and oriented to person, place and time, well-developed and well-nourished, in no acute distress  Skin: warm and dry, no rash or erythema  Head: normocephalic and atraumatic  Eyes: pupils equal, round, and reactive to light  Pulmonary/Chest: clear to auscultation bilaterally- no wheezes, rales or rhonchi, normal air movement, no respiratory distress  Cardiovascular: normal rate, regular rhythm, and distal pulses diminished  Extremities: no cyanosis, no clubbing, and 1-2 + edema-  left lower leg and ankle. Assessment:        Problem List Items Addressed This Visit       Pressure ulcer of heel, left, unstageable (HCC)    Relevant Orders    Initiate Outpatient Wound Care Protocol    MRSA (methicillin resistant staph aureus) culture positive    Relevant Orders    Initiate Outpatient Wound Care Protocol    Non-healing open wound of left heel    Relevant Orders    Initiate Outpatient Wound Care Protocol    Pressure ulcer of left heel, stage 3 (HCC) - Primary    Relevant Orders    Initiate Outpatient Wound Care Protocol    Stroke [facility]) (Chronic)    Relevant Orders    Initiate Outpatient Wound Care Protocol    Spasticity    Relevant Orders    Initiate Outpatient Wound Care Protocol    Left hemiparesis [facility])    Relevant Orders    Initiate Outpatient Wound Care Protocol        Procedure Note  Indications:  Based on my examination of this patient's wound(s)/ulcer(s) today, debridement is required to promote healing and evaluate the wound base. Performed by: MADISYN Ochoa CNP    Consent obtained:  Yes    Time out taken:  Yes    Pain Control: Anesthetic  Anesthetic: 5% Lidocaine Ointment Topical       Debridement: Excisional Debridement    Using curette the wound(s)/ulcer(s) was/were debrided down through and including the removal of epidermis, dermis, and subcutaneous tissue.         Devitalized Tissue Debrided:  fibrin, biofilm, and slough    Pre Debridement Measurements:  Are located in the [place]  Documentation Flow Sheet    Diabetic/Pressure/Non Pressure Ulcers only:  Ulcer: Pressure ulcer, Stage 3     Wound/Ulcer #: 1    Post Debridement Measurements:  Wound/Ulcer Descriptions are Pre Debridement except measurements:    Wound 08/08/22 Heel Left #1 ( states since about 6/24/2022) (Active)   Wound Image   12/07/22 0813   Wound Etiology Pressure Unstageable 08/08/22 1404   Dressing Status Old drainage noted 10/19/22 0941   Wound Cleansed Vashe 11/16/22 0958   Dressing/Treatment Gauze dressing/dressing sponge;Roll gauze; Other (comment) 12/14/22 1013   Offloading for Diabetic Foot Ulcers Post op shoe 12/14/22 1013   Wound Length (cm) 1.8 cm 12/21/22 0922   Wound Width (cm) 3.2 cm 12/21/22 0922   Wound Depth (cm) 0.9 cm 12/21/22 0922   Wound Surface Area (cm^2) 5.76 cm^2 12/21/22 0922   Change in Wound Size % (l*w) 66.32 12/21/22 0922   Wound Volume (cm^3) 5.184 cm^3 12/21/22 0922   Wound Healing % -203 12/21/22 0922   Post-Procedure Length (cm) 1.9 cm 12/21/22 0936   Post-Procedure Width (cm) 3.3 cm 12/21/22 0936   Post-Procedure Depth (cm) 0.9 cm 12/21/22 0936   Post-Procedure Surface Area (cm^2) 6.27 cm^2 12/21/22 0936   Post-Procedure Volume (cm^3) 5.643 cm^3 12/21/22 0936   Undermining Starts ___ O'Clock 9 11/02/22 0907   Undermining Ends___ O'Clock 12 11/02/22 0907   Undermining Maxium Distance (cm) 0.7 11/02/22 0907   Wound Assessment Granulation tissue;Slough 12/21/22 0922   Drainage Amount Moderate 12/21/22 0922   Drainage Description Serosanguinous 12/21/22 0922   Odor None 12/21/22 0922   Anahy-wound Assessment Dry/flaky; Blanchable erythema; Hyperkeratosis (callous) 12/21/22 0922   Margins Attached edges 12/21/22 1985   Wound Thickness Description not for Pressure Injury Full thickness 12/21/22 7628   Number of days: 134          Total Surface Area Debrided:  6.27 sq cm     Estimated Blood Loss:  Minimal    Hemostasis Achieved:  by pressure    Procedural Pain:  1  / 10     Post Procedural Pain:  0 / 10     Response to treatment:  Well tolerated by patient. Plan:   Pt education per provider related to plan of care. Discussed need to address all aspects of wound care for optimal results with skin substitutes; one of the only issues we still have is left leg and ankle edema which has not been sufficiently addressed.   Explained rationale to not using skin sub this week and instead with use a collagen product, alginate and 2 layer compression that will not be changed until next week visit. We also discussed his work schedule on Wednesday's. Explained the need for getting leg elevated and not dependent on days when skin sub is applied. Pt states he will arrange to change his schedule. Pt agreed to plan of care and questions answered. Treatment Note please see attached Discharge Instructions    Written patient dismissal instructions given to patient and signed by patient or POA. Discharge [id number] [facility] Physician Orders and Discharge Instructions  [address] [place] [address] Drive, [facility], [place] [address]  Telephone: [phone]  [address] 8:30 am - 4:30 pm and Friday 8:30 am - 1:00 pm.          NAME:  Curt Hubbard  YOB: 1955  MEDICAL RECORD NUMBER:  [id number]  TODAYS DATE:  12/21/22         Please wash hands with soap and water prior to and right after  every dressing change          Topical Treatments:              [x] Apply around the wound:   [x] moisturizing lotion TO LEFT LEG AND FOOT THAT IS NOT CLOSE TO WOUND         WOUND LOCATION   Left Heel **NUSHEILD #2 ( PURAPLY X 1 = TOTAL 3 GRAFTS) FIRST APPLIED 11/2/2022**     May rinse wounds with 0.9% saline TO CLARE WOUND ONLY- DO NOT [facility] GRAFT, AKA THE GREEN SHEET   Do NOT SCRUB WOUND. Keep wounds dry in the shower unless otherwise instructed by the physician.         PRIMARY DRESSING:                                [x]     ENDOFORM AM    SECONDARY DRESSING:                     [X] PLAIN ALGINATE              [x]   SMALL OPTILOCK                                COMPRESSION:                    (X)    2 LAYER WRAP     HOW OFTEN TO CHANGE DRESSINGS:                   [x] LEAVE DRESSING IN PLACE UNTIL NEXT WEEK          TRY AND WEAR COMPRESSION STOCKING TO RIGHT LEG     _________________________________________________________________  PRESSURE RELIEF AND OFF-LOADING:     Off-Loading:   With heel protector  [x] Off-loading when       [x] in bed         [x] sitting                             Specialty equipment ordered:       [] Wheelchair cushion    [] Specialty Bed/Mattress     [x] Assistive Device: please continue to use any assistive devices advised by the provider discussed during your visit   [x] Surgical shoe    [] Podus Boot(s)   [] Foam Boot(s)    [] Marily Varner     _____________________________________________________________________________________________     Dietary:  Continue your diet as tolerated. Eat heart-healthy foods. These foods include vegetables, fruits, nuts, beans, lean meat, fish, and whole grains. Limit sodium, alcohol, and sugar. Protein is a teresa nutrient in helping to repair damaged tissue and promote new tissue growth. Good sources of protein are milk, yogurt, cheese, fish, lean meat, and beans. If you are a diabetic, having diabetes can make it hard for wounds to heal. So try to keep your blood sugar in its target range.  __________________________________________________________________________________________________     ACTIVITY:   Activity as tolerated  ________________________________________________________________________________________________________________     PAIN:     Please note, A small amount of pain, drainage and/or bleeding from this process might be expected, and is NORMAL. Elevate the affected limb. Use over-the-counter medications you would normally use for pain as permitted by your family doctor. For persistent pain not relieved by the above interventions, please call your family doctor.  ________________________________________________________________________________  Call your doctor now or seek immediate medical care if:    You have symptoms of infection, such as: Increased pain, swelling, warmth, or redness. Red streaks leading from the area. Pus draining from the area. A fever. _______________________________________________________________________     Return Appointment:  DME/Wound Dressing Supplies Provided by:  [facility]  (Supply companies distribute one month supply at a time. Please call them directly to reorder supplies when you run out)     ECF or Home Healthcare: Your Home Care Agency is responsible to order your supplies. Return Appointment: With Maribel Westbrook CNP  in 1 Week(s)                  [x] Orders placed during your visit:   CULTURE OBTAINED 11/30/22, results reviewed with patient      :  [address] [place] [address]          Electronically signed by Governron Swenson RN on 12/21/2022 at 9:43 AM         [address] [place] [address] Information: Should you experience any significant chnges in your wound(s) or have questions about your wound care, please contact the [address] [place] [address] at [address] 8:30 am - 4:30 pm and Friday 8:30 am - 1:00 pm.  If you need help with your wound outside these hours and cannot wait until we are again available, contact your PCP or go to the hospital emergency room. PLEASE NOTE: IF YOU ARE UNABLE TO OBTAIN WOUND SUPPLIES, CONTINUE TO USE THE SUPPLIES YOU HAVE AVAILABLE UNTIL YOU ARE ABLE TO REACH US. KEEP THE WOUND COVERED AT ALL TIMES.           Electronically signed by MADISYN Knight CNP on 12/21/2022 at 11:26 AM

## 2022-12-22 NOTE — DISCHARGE INSTRUCTIONS
PHYSICIANS SURG[facility] Wound Care Physician Orders and Discharge Instructions  [address] [place] [address], [address], [address]  Telephone: [phone]  [address] 8:30 am - 4:30 pm and Friday 8:30 am - 1:00 pm.          NAME:  Prudencio Fox  YOB: 1955  MEDICAL RECORD NUMBER:  [id number]  TODAYS DATE:  12/28/22          VASHE ON IN CLINIC TODAY     Please wash hands with soap and water prior to and right after  every dressing change          Topical Treatments:              [x] Apply around the wound:   [x] moisturizing lotion TO LEFT LEG AND FOOT THAT IS NOT CLOSE TO WOUND         WOUND LOCATION   Left Heel **NUSHEILD #2 ( PURAPLY X 1 = TOTAL 3 GRAFTS) FIRST APPLIED 11/2/2022**     May rinse wounds with 0.9% saline TO CLARE WOUND ONLY- DO NOT [address] GRAFT, AKA THE GREEN SHEET   Do NOT SCRUB WOUND. Keep wounds dry in the shower unless otherwise instructed by the physician.         PRIMARY DRESSING:                                 [x]   CUTIMED SORBACT  FLUFF     SECONDARY DRESSING:                      [X] DRAWTEX              [x]   LARGE OPTILOCK                                 COMPRESSION:                    (X)    2 LAYER WRAP     HOW OFTEN TO CHANGE DRESSINGS:                    [x] LEAVE DRESSING IN PLACE UNTIL NEXT WEEK           TRY AND WEAR COMPRESSION STOCKING TO RIGHT LEG     _________________________________________________________________  PRESSURE RELIEF AND OFF-LOADING:     Off-Loading:   With heel protector  [x] Off-loading when       [x] in bed         [x] sitting                             Specialty equipment ordered:       [] Wheelchair cushion    [] Specialty Bed/Mattress     [x] Assistive Device: please continue to use any assistive devices advised by the provider discussed during your visit   [x] Surgical shoe    [] Podus Boot(s)   [] Foam Boot(s)    [] Sherry Cannon _____________________________________________________________________________________________     Dietary:  Continue your diet as tolerated. Eat heart-healthy foods. These foods include vegetables, fruits, nuts, beans, lean meat, fish, and whole grains. Limit sodium, alcohol, and sugar. Protein is a teresa nutrient in helping to repair damaged tissue and promote new tissue growth. Good sources of protein are milk, yogurt, cheese, fish, lean meat, and beans. If you are a diabetic, having diabetes can make it hard for wounds to heal. So try to keep your blood sugar in its target range.  __________________________________________________________________________________________________     ACTIVITY:   Activity as tolerated  ________________________________________________________________________________________________________________     PAIN:     Please note, A small amount of pain, drainage and/or bleeding from this process might be expected, and is NORMAL. Elevate the affected limb. Use over-the-counter medications you would normally use for pain as permitted by your family doctor. For persistent pain not relieved by the above interventions, please call your family doctor.  ________________________________________________________________________________  Call your doctor now or seek immediate medical care if:    You have symptoms of infection, such as: Increased pain, swelling, warmth, or redness. Red streaks leading from the area. Pus draining from the area. A fever. _______________________________________________________________________     Return Appointment:  DME/Wound Dressing Supplies Provided by:  [facility]  (Supply companies distribute one month supply at a time. Please call them directly to reorder supplies when you run out)     ECF or Home Healthcare: Your Home Care Agency is responsible to order your supplies.      Return Appointment: With Kera Tidwell CNP  in 1 Week(s)  ( IF YOU HAVE ULTRASOUND THIS WEEK GIVE US A CALL TO FIGURE OUT DRESSING-POST TEST)                [x] Orders placed during your visit:   CULTURE OBTAINED 11/30/22, results reviewed with patient      :  [address] [place] [address]         Electronically signed by Nathaniel Thapa RN on 12/28/2022 at 8:32 AM            [address] [facility] Information: Should you experience any significant chnges in your wound(s) or have questions about your wound care, please contact the [address] [place] [address] at [address] 8:30 am - 4:30 pm and Friday 8:30 am - 1:00 pm.  If you need help with your wound outside these hours and cannot wait until we are again available, contact your PCP or go to the hospital emergency room. PLEASE NOTE: IF YOU ARE UNABLE TO OBTAIN WOUND SUPPLIES, CONTINUE TO USE THE SUPPLIES YOU HAVE AVAILABLE UNTIL YOU ARE ABLE TO REACH US. KEEP THE WOUND COVERED AT ALL TIMES.

## 2022-12-27 ENCOUNTER — OFFICE VISIT (OUTPATIENT)
Dept: PRIMARY CARE CLINIC | Age: 67
End: 2022-12-27
Payer: MEDICARE

## 2022-12-27 ENCOUNTER — TELEPHONE (OUTPATIENT)
Dept: PRIMARY CARE CLINIC | Age: 67
End: 2022-12-27

## 2022-12-27 DIAGNOSIS — E78.2 MIXED HYPERLIPIDEMIA: ICD-10-CM

## 2022-12-27 DIAGNOSIS — L97.422 NON-PRESSURE CHRONIC ULCER OF LEFT HEEL AND MIDFOOT WITH FAT LAYER EXPOSED (HCC): ICD-10-CM

## 2022-12-27 DIAGNOSIS — L89.623 PRESSURE ULCER OF LEFT HEEL, STAGE 3 (HCC): ICD-10-CM

## 2022-12-27 DIAGNOSIS — I10 ESSENTIAL HYPERTENSION: ICD-10-CM

## 2022-12-27 DIAGNOSIS — I25.10 CORONARY ARTERY DISEASE INVOLVING NATIVE CORONARY ARTERY OF NATIVE HEART WITHOUT ANGINA PECTORIS: ICD-10-CM

## 2022-12-27 DIAGNOSIS — Z76.89 ENCOUNTER TO ESTABLISH CARE: Primary | ICD-10-CM

## 2022-12-27 DIAGNOSIS — I63.9 CEREBROVASCULAR ACCIDENT (CVA), UNSPECIFIED MECHANISM (HCC): Chronic | ICD-10-CM

## 2022-12-27 DIAGNOSIS — L89.620 PRESSURE ULCER OF HEEL, LEFT, UNSTAGEABLE (HCC): ICD-10-CM

## 2022-12-27 DIAGNOSIS — I25.10 CORONARY ARTERY DISEASE INVOLVING NATIVE CORONARY ARTERY OF NATIVE HEART WITHOUT ANGINA PECTORIS: Primary | ICD-10-CM

## 2022-12-27 DIAGNOSIS — I69.398 ABNORMALITY OF GAIT AS LATE EFFECT OF CEREBROVASCULAR ACCIDENT (CVA): ICD-10-CM

## 2022-12-27 DIAGNOSIS — S91.302A NON-HEALING OPEN WOUND OF LEFT HEEL: ICD-10-CM

## 2022-12-27 DIAGNOSIS — G81.94 LEFT HEMIPARESIS (HCC): ICD-10-CM

## 2022-12-27 DIAGNOSIS — R26.9 ABNORMALITY OF GAIT AS LATE EFFECT OF CEREBROVASCULAR ACCIDENT (CVA): ICD-10-CM

## 2022-12-27 PROCEDURE — 1036F TOBACCO NON-USER: CPT | Performed by: FAMILY MEDICINE

## 2022-12-27 PROCEDURE — 3017F COLORECTAL CA SCREEN DOC REV: CPT | Performed by: FAMILY MEDICINE

## 2022-12-27 PROCEDURE — G8484 FLU IMMUNIZE NO ADMIN: HCPCS | Performed by: FAMILY MEDICINE

## 2022-12-27 PROCEDURE — 1123F ACP DISCUSS/DSCN MKR DOCD: CPT | Performed by: FAMILY MEDICINE

## 2022-12-27 PROCEDURE — G8417 CALC BMI ABV UP PARAM F/U: HCPCS | Performed by: FAMILY MEDICINE

## 2022-12-27 PROCEDURE — G8427 DOCREV CUR MEDS BY ELIG CLIN: HCPCS | Performed by: FAMILY MEDICINE

## 2022-12-27 PROCEDURE — 99204 OFFICE O/P NEW MOD 45 MIN: CPT | Performed by: FAMILY MEDICINE

## 2022-12-27 ASSESSMENT — ENCOUNTER SYMPTOMS
BLOOD IN STOOL: 0
ABDOMINAL PAIN: 0
VOICE CHANGE: 0
TROUBLE SWALLOWING: 0
SHORTNESS OF BREATH: 0
DIARRHEA: 0
CONSTIPATION: 0

## 2022-12-27 NOTE — PROGRESS NOTES
2022    Enrique Baker (:  1955) is a 79 y.o. male, here for evaluation of the following medical concerns:    HPI  Patient presented to the office to establish care. He is previous PCP was Tamara Allison who moved to a different location. Patient's medical history was reviewed in epic. He has history of stroke in  with left hemiparesis, wheelchair-bound and has slow healing pressure ulcer of the left heel, goes to [facility] [place] for prolonged treatment, was told that he might need skin graft. He has involuntary movement of both upper and lower extremities intermittent and was told by neurologist at [facility] Dr. Xavier Calero that she has neurologic functional disorder. Takes gabapentin  100 mg 4 times a day. He has hyperlipidemia and reported to be compliant with a statin, takes Lipitor 40 mg daily. Also take aspirin 81 mg daily. He has hypertension controlled with lisinopril 20 mg daily, amlodipine 10 mg daily and hydralazine 50 mg 3 times a day. He has coronary artery disease with prior PTCA and stent placement in , goes to cardiologist at [facility] who is now retired and patient requested referral to cardiologist at [facility] which is along his transportation route. He denies headache nausea vomiting. Denies chest pain or shortness of breath. Denies bowel or urinary disturbance. Review of Systems   Constitutional:  Negative for activity change. HENT:  Negative for trouble swallowing and voice change. Eyes:  Negative for visual disturbance. Respiratory:  Negative for shortness of breath. Cardiovascular:  Negative for chest pain and leg swelling. Gastrointestinal:  Negative for abdominal pain, blood in stool, constipation and diarrhea. Genitourinary:  Negative for difficulty urinating, dysuria, frequency, hematuria and scrotal swelling. Musculoskeletal:  Negative for arthralgias and myalgias. Skin:  Negative for rash.    Neurological:  Negative for dizziness. Psychiatric/Behavioral:  Negative for behavioral problems. Prior to Visit Medications    Medication Sig Taking? Authorizing Provider   baclofen (LIORESAL) 10 MG tablet Take 1 tablet by mouth 2 times daily Yes Aquilino Guzmán MD   lisinopril (PRINIVIL;ZESTRIL) 20 MG tablet Take 1 tablet by mouth daily Yes Aquilino Guzmán MD   hydrALAZINE (APRESOLINE) 50 MG tablet Take 1 tablet by mouth 3 times daily Yes Aquilino Guzmán MD   senna-docusate (PERICOLACE) 8.6-50 MG per tablet Take 1 tablet by mouth 2 times daily Yes Historical Provider, MD   atorvastatin (LIPITOR) 40 MG tablet Take 1 tablet by mouth daily Yes Carlie Ludwig MD   amLODIPine (NORVASC) 10 MG tablet Take 1 tablet by mouth daily Yes MADISYN Reno CNP   gabapentin (NEURONTIN) 100 MG capsule Take 1 capsule by mouth 4 times daily for 180 days. Yes MADISYN Reno CNP   loratadine (CLARITIN) 10 MG tablet Take 1 tablet by mouth daily Pt needs to come in and be seen before more rx's are given. Yes MADISYN Reno CNP   aspirin 81 MG tablet Take 81 mg by mouth daily Yes Historical Provider, MD   Wound Dressings ([facility] WOUND/BURN DRESSING) GEL gel Apply 1 each topically daily  MADISYN Blum CNP   Sennosides 17.2 MG TABS Take 17.2 mg by mouth  Historical Provider, MD        No Known Allergies    Past Medical History:   Diagnosis Date    CAD (coronary artery disease)     HTN (hypertension)     Hyperlipemia     Left hemiparesis ([address] 8/11/2020    MRSA (methicillin resistant staph aureus) culture positive 9/14/2022    heel    Non-healing open wound of left heel 10/12/2022    Pressure injury of left ankle, stage 2 ([address] 9/13/2021    Pressure ulcer of heel, left, unstageable ([address] 8/8/2022    Stable eschar covered.     Pressure ulcer of left heel, stage 3 ([address] 10/20/2022    S/P PTCA (percutaneous transluminal coronary angioplasty) 2010    two stents place    Spasticity 7/9/2019    Stroke ([address] 10/2012 left-sided weakness       Past Surgical History:   Procedure Laterality Date    CARDIAC SURGERY      HIP SURGERY Left     INGUINAL HERNIA REPAIR      right side    INTRACAPSULAR CATARACT EXTRACTION Right 08/30/2019    PHACOEMULSIFICATION WITH INTRAOCULAR LENS IMPLANT performed by Edilberto Simeon MD at [address] Left 09/06/2019    PHACOEMULSIFICATION WITH INTRAOCULAR LENS IMPLANT performed by Edilberto Simeon MD at [address] History     Socioeconomic History    Marital status:      Spouse name: Not on file    Number of children: 3    Years of education: Not on file    Highest education level: Not on file   Occupational History    Occupation: remodeling   Tobacco Use    Smoking status: Never    Smokeless tobacco: Never   Vaping Use    Vaping Use: Never used   Substance and Sexual Activity    Alcohol use: No    Drug use: No    Sexual activity: Not on file   Other Topics Concern    Not on file   Social History Narrative    Recently relocated from [facility]. Lives alone. Social Determinants of Health     Financial Resource Strain: Not on file   Food Insecurity: Not on file   Transportation Needs: Not on file   Physical Activity: Inactive    Days of Exercise per Week: 0 days    Minutes of Exercise per Session: 0 min   Stress: Not on file   Social Connections: Not on file   Intimate Partner Violence: Not on file   Housing Stability: Not on file        Family History   Problem Relation Age of Onset    Hypertension Mother     Heart Disease Father     Breast Cancer Sister     Heart Attack Brother        There were no vitals filed for this visit. Estimated body mass index is 32.84 kg/m² as calculated from the following:    Height as of 9/29/22: 5' 10\" (1.778 m). Weight as of 9/29/22: 228 lb 14.4 oz (103.8 kg). Physical Exam  Constitutional:       Appearance: He is well-developed. HENT:      Head: Normocephalic.    Eyes: Conjunctiva/sclera: Conjunctivae normal.      Pupils: Pupils are equal, round, and reactive to light. Neck:      Thyroid: No thyromegaly. Cardiovascular:      Rate and Rhythm: Normal rate and regular rhythm. Heart sounds: Normal heart sounds. No murmur heard. Pulmonary:      Effort: Pulmonary effort is normal.      Breath sounds: Normal breath sounds. Abdominal:      General: Bowel sounds are normal.      Palpations: Abdomen is soft. There is no mass. Genitourinary:     Penis: Normal.       Prostate: Normal.   Musculoskeletal:         General: Normal range of motion. Cervical back: Normal range of motion and neck supple. Skin:     General: Skin is warm. Findings: No rash. Neurological:      Mental Status: He is alert. Psychiatric:         Behavior: Behavior normal.       ASSESSMENT/PLAN:  1. Encounter to establish care  He is due  for blood test March 2023. [employer] He is due for flu vaccine    2. Essential hypertension  Controlled. Continue amlodipine 10 mg daily, lisinopril 20 mg daily and hydralazine 50 mg 3 times a day    3. Mixed hyperlipidemia  Controlled. Continue Lipitor 40 mg daily    4. Left hemiparesis ([address] /5. Cerebrovascular accident (CVA), unspecified mechanism ([address]  Stable. He is wheelchair-bound. Continue statin and aspirin    6. Abnormality of gait as late effect of cerebrovascular accident (CVA)  Patient is wheelchair-bound with gait difficulty following stroke in 2012. He also has \" neurologic functional disorder\"  ( as per Dr Ankush Cuevas) with involuntary movement of extremities    7. Coronary artery disease involving native coronary artery of native heart without angina pectoris  S/p PTCA and 2 stent placement  2010. Stable. Continue statin, aspirin.   His cardiologist is at [facility] but somewhat out of the transportation line and requested referral to cardiologist at [facility]  Refer to cardiologist at [facility], Dr. Roverto Haro or Dr. Myesha Lugo or  striet      8. Pressure ulcer of heel, left, unstageable ([address]  He goes to [facility] . Will order arterial doppler to rule out PAD      RTC in 3 mos and PRN.  Blood test next visit    An  electronic signature was used to authenticate this note.    --Alice Ragnel MD on 12/27/2022 at 2:20 PM

## 2022-12-27 NOTE — TELEPHONE ENCOUNTER
Patient's order for US doppler arterial legs bilateral needs to be changed to VL doppler. Please advise.

## 2022-12-28 ENCOUNTER — HOSPITAL ENCOUNTER (OUTPATIENT)
Dept: WOUND CARE | Age: 67
Discharge: HOME | End: 2022-12-28
Payer: MEDICARE

## 2022-12-28 VITALS
HEART RATE: 63 BPM | TEMPERATURE: 97.2 F | SYSTOLIC BLOOD PRESSURE: 133 MMHG | DIASTOLIC BLOOD PRESSURE: 71 MMHG | RESPIRATION RATE: 18 BRPM

## 2022-12-28 DIAGNOSIS — L89.623 PRESSURE ULCER OF LEFT HEEL, STAGE 3 (HCC): Primary | ICD-10-CM

## 2022-12-28 DIAGNOSIS — L89.620 PRESSURE ULCER OF HEEL, LEFT, UNSTAGEABLE (HCC): ICD-10-CM

## 2022-12-28 DIAGNOSIS — R25.2 SPASTICITY: ICD-10-CM

## 2022-12-28 DIAGNOSIS — I63.9 CEREBROVASCULAR ACCIDENT (CVA), UNSPECIFIED MECHANISM (HCC): ICD-10-CM

## 2022-12-28 DIAGNOSIS — Z22.322 MRSA (METHICILLIN RESISTANT STAPH AUREUS) CULTURE POSITIVE: ICD-10-CM

## 2022-12-28 DIAGNOSIS — G81.94 LEFT HEMIPARESIS (HCC): ICD-10-CM

## 2022-12-28 DIAGNOSIS — S91.302A NON-HEALING OPEN WOUND OF LEFT HEEL: ICD-10-CM

## 2022-12-28 PROCEDURE — 11042 DBRDMT SUBQ TIS 1ST 20SQCM/<: CPT

## 2022-12-28 PROCEDURE — 11042 DBRDMT SUBQ TIS 1ST 20SQCM/<: CPT | Performed by: NURSE PRACTITIONER

## 2022-12-28 RX ORDER — CLOBETASOL PROPIONATE 0.5 MG/G
OINTMENT TOPICAL ONCE
OUTPATIENT
Start: 2022-12-28 | End: 2022-12-28

## 2022-12-28 RX ORDER — LIDOCAINE 50 MG/G
OINTMENT TOPICAL ONCE
OUTPATIENT
Start: 2022-12-28 | End: 2022-12-28

## 2022-12-28 RX ORDER — BACITRACIN, NEOMYCIN, POLYMYXIN B 400; 3.5; 5 [USP'U]/G; MG/G; [USP'U]/G
OINTMENT TOPICAL ONCE
OUTPATIENT
Start: 2022-12-28 | End: 2022-12-28

## 2022-12-28 RX ORDER — BETAMETHASONE DIPROPIONATE 0.05 %
OINTMENT (GRAM) TOPICAL ONCE
OUTPATIENT
Start: 2022-12-28 | End: 2022-12-28

## 2022-12-28 RX ORDER — LIDOCAINE 50 MG/G
OINTMENT TOPICAL ONCE
Status: COMPLETED | OUTPATIENT
Start: 2022-12-28 | End: 2022-12-28

## 2022-12-28 RX ORDER — LIDOCAINE HYDROCHLORIDE 40 MG/ML
SOLUTION TOPICAL ONCE
OUTPATIENT
Start: 2022-12-28 | End: 2022-12-28

## 2022-12-28 RX ORDER — LIDOCAINE HYDROCHLORIDE 20 MG/ML
JELLY TOPICAL ONCE
OUTPATIENT
Start: 2022-12-28 | End: 2022-12-28

## 2022-12-28 RX ORDER — BACITRACIN ZINC AND POLYMYXIN B SULFATE 500; 1000 [USP'U]/G; [USP'U]/G
OINTMENT TOPICAL ONCE
OUTPATIENT
Start: 2022-12-28 | End: 2022-12-28

## 2022-12-28 RX ORDER — GINSENG 100 MG
CAPSULE ORAL ONCE
OUTPATIENT
Start: 2022-12-28 | End: 2022-12-28

## 2022-12-28 RX ORDER — GENTAMICIN SULFATE 1 MG/G
OINTMENT TOPICAL ONCE
OUTPATIENT
Start: 2022-12-28 | End: 2022-12-28

## 2022-12-28 RX ORDER — LIDOCAINE 40 MG/G
CREAM TOPICAL ONCE
OUTPATIENT
Start: 2022-12-28 | End: 2022-12-28

## 2022-12-28 RX ADMIN — LIDOCAINE: 50 OINTMENT TOPICAL at 08:12

## 2022-12-28 ASSESSMENT — PAIN SCALES - GENERAL
PAINLEVEL_OUTOF10: 0
PAINLEVEL_OUTOF10: 0

## 2022-12-28 NOTE — PROGRESS NOTES
[address]   Progress Note and Procedure Note      Ben Dunlap  MEDICAL RECORD NUMBER:  [id number]  AGE: 79 y.o. GENDER: male  : 1955  EPISODE DATE:  2022    Subjective:     Chief Complaint   Patient presents with    Wound Check     Follow up for left foot wound. HISTORY of PRESENT ILLNESS HPI  Kimberly Turner is a 79 y.o. male who presents today for wound/ulcer evaluation. History of Wound Context: Recent displaced fracture of base of neck of left femur. Pressure ulcer left heel now designated as a Stage 3 pressure ulcer since unstable eschar removed. No periwound redness. Offload boot applied when in bed. PT and DP pulse strong with Doppler. Reports getting protein supplements. Pt transferring only, no walking with walker still too unstable. Noting intermittent spastic movements legs during visit. Concern about friction on left heel with this movement. Pt wears post-op shoe on left foot. His son Lary Hooks and daughter Royer Tejeda are monitoring and changing outer dressings as needed. Left lower leg swelling noted. Denies constitutional issues. Spoke with pt's son per pt request related to plan of care. Last week place 2 layer compression over heel dressing. When removed today, swelling decreased and moderate amount of drainage on old dressing. Prior to surgery pt was wearing compression stockings on a routine basis but since the wound occurred has stopped wearing. Displaced fracture of base of neck of left femur, history of CVA, hemiplegia and hemiparesis following CVA affecting left nondominant side, with intermittent spasms of both lower extremities.   Wound/Ulcer Pain Timing/Severity: intermittent  Quality of pain: tender  Severity:  0 / 10   Modifying Factors: Pain is relieved/improved with rest, repositioning  Associated Signs/Symptoms: edema and drainage     Ulcer Identification:  Ulcer Type: pressure ulcer     Contributing Factors: edema; while hospitalized developed wound from chronic pressure and decreased mobility     Acute Wound: N/A not an acute wound     PAST MEDICAL HISTORY        Diagnosis Date    CAD (coronary artery disease)     HTN (hypertension)     Hyperlipemia     Left hemiparesis ([address] 8/11/2020    MRSA (methicillin resistant staph aureus) culture positive 9/14/2022    heel    Non-healing open wound of left heel 10/12/2022    Pressure injury of left ankle, stage 2 ([address] 9/13/2021    Pressure ulcer of heel, left, unstageable ([address] 8/8/2022    Stable eschar covered.     Pressure ulcer of left heel, stage 3 ([address] 10/20/2022    S/P PTCA (percutaneous transluminal coronary angioplasty) 2010    two stents place    Spasticity 7/9/2019    Stroke ([address] 10/2012    left-sided weakness       PAST SURGICAL HISTORY    Past Surgical History:   Procedure Laterality Date    CARDIAC SURGERY      HIP SURGERY Left     INGUINAL HERNIA REPAIR      right side    INTRACAPSULAR CATARACT EXTRACTION Right 08/30/2019    PHACOEMULSIFICATION WITH INTRAOCULAR LENS IMPLANT performed by Louann Saavedra MD at [address] [place], [place] [address]vd EXTRACTION Left 09/06/2019    PHACOEMULSIFICATION WITH INTRAOCULAR LENS IMPLANT performed by Louann Saavedra MD at [facility]    Family History   Problem Relation Age of Onset    Hypertension Mother     Heart Disease Father     Breast Cancer Sister     Heart Attack Brother        SOCIAL HISTORY    Social History     Tobacco Use    Smoking status: Never    Smokeless tobacco: Never   Vaping Use    Vaping Use: Never used   Substance Use Topics    Alcohol use: No    Drug use: No       ALLERGIES    No Known Allergies    MEDICATIONS    Current Outpatient Medications on File Prior to Encounter   Medication Sig Dispense Refill    baclofen (LIORESAL) 10 MG tablet Take 1 tablet by mouth 2 times daily 180 tablet 1    lisinopril (PRINIVIL;ZESTRIL) 20 MG tablet Take 1 tablet by mouth daily 90 tablet 0 hydrALAZINE (APRESOLINE) 50 MG tablet Take 1 tablet by mouth 3 times daily 270 tablet 0    Wound Dressings ([facility] WOUND/BURN DRESSING) GEL gel Apply 1 each topically daily 1 each 1    senna-docusate (PERICOLACE) 8.6-50 MG per tablet Take 1 tablet by mouth 2 times daily      atorvastatin (LIPITOR) 40 MG tablet Take 1 tablet by mouth daily 90 tablet 3    amLODIPine (NORVASC) 10 MG tablet Take 1 tablet by mouth daily 90 tablet 3    gabapentin (NEURONTIN) 100 MG capsule Take 1 capsule by mouth 4 times daily for 180 days. 360 capsule 1    loratadine (CLARITIN) 10 MG tablet Take 1 tablet by mouth daily Pt needs to come in and be seen before more rx's are given. 90 tablet 1    aspirin 81 MG tablet Take 81 mg by mouth daily      Sennosides 17.2 MG TABS Take 17.2 mg by mouth       No current facility-administered medications on file prior to encounter. REVIEW OF SYSTEMS  Review of Systems    Pertinent items are noted in HPI. Objective:      /71   Pulse 63   Temp 97.2 °F (36.2 °C) (Infrared)   Resp 18     Wt Readings from Last 3 Encounters:   09/29/22 228 lb 14.4 oz (103.8 kg)   09/01/22 230 lb (104.3 kg)   08/08/22 230 lb (104.3 kg)       PHYSICAL EXAM  Physical Exam    General Appearance: alert and oriented to person, place and time  Skin: warm and dry, no rash or erythema  Head: normocephalic and atraumatic  Eyes: pupils equal, round, and reactive to light  Pulmonary/Chest: clear to auscultation bilaterally- no wheezes, rales or rhonchi, normal air movement, no respiratory distress  Cardiovascular: normal rate, regular rhythm, and intact distal pulses  Wound: depth of wound improved some periwound maceration noted.  Will add better absorption       Assessment:        Problem List Items Addressed This Visit       Pressure ulcer of heel, left, unstageable (HCC)    Relevant Orders    Initiate Outpatient Wound Care Protocol    MRSA (methicillin resistant staph aureus) culture positive    Relevant Orders Initiate Outpatient Wound Care Protocol    Non-healing open wound of left heel    Relevant Orders    Initiate Outpatient Wound Care Protocol    Pressure ulcer of left heel, stage 3 (HCC) - Primary    Relevant Orders    Initiate Outpatient Wound Care Protocol    Stroke [facility]) (Chronic)    Relevant Orders    Initiate Outpatient Wound Care Protocol    Spasticity    Relevant Orders    Initiate Outpatient Wound Care Protocol    Left hemiparesis [facility])    Relevant Orders    Initiate Outpatient Wound Care Protocol        Procedure Note  Indications:  Based on my examination of this patient's wound(s)/ulcer(s) today, debridement is required to promote healing and evaluate the wound base. Performed by: MADISYN Willams - CNP    Consent obtained:  Yes    Time out taken:  Yes    Pain Control: Anesthetic  Anesthetic: 5% Lidocaine Ointment Topical       Debridement: Excisional Debridement    Using curette the wound(s)/ulcer(s) was/were debrided down through and including the removal of epidermis, dermis, and subcutaneous tissue. Devitalized Tissue Debrided:  fibrin, biofilm, and slough    Pre Debridement Measurements:  Are located in the [place]  Documentation Flow Sheet    Diabetic/Pressure/Non Pressure Ulcers only:  Ulcer: Pressure ulcer, Stage 3     Wound/Ulcer #: 1    Post Debridement Measurements:  Wound/Ulcer Descriptions are Pre Debridement except measurements:    Wound 08/08/22 Heel Left #1 ( states since about 6/24/2022) (Active)   Wound Image   12/07/22 0813   Wound Etiology Pressure Unstageable 08/08/22 1404   Dressing Status Old drainage noted 10/19/22 0941   Wound Cleansed Vashe 12/28/22 0914   Dressing/Treatment Cutimed/Sorbact; Other (comment) 12/28/22 0914   Offloading for Diabetic Foot Ulcers Post op shoe 12/28/22 0914   Wound Length (cm) 2.5 cm 12/28/22 0815   Wound Width (cm) 3 cm 12/28/22 0815   Wound Depth (cm) 0.5 cm 12/28/22 0815   Wound Surface Area (cm^2) 7.5 cm^2 12/28/22 0815   Change wound:   [x] moisturizing lotion TO LEFT LEG AND FOOT THAT IS NOT CLOSE TO WOUND         WOUND LOCATION   Left Heel **MEGHAN #2 ( PURAPLY X 1 = TOTAL 3 GRAFTS) FIRST APPLIED 11/2/2022**     May rinse wounds with 0.9% saline TO CLARE WOUND ONLY- DO NOT [address] GRAFT, AKA THE GREEN SHEET   Do NOT SCRUB WOUND. Keep wounds dry in the shower unless otherwise instructed by the physician. PRIMARY DRESSING:                                 [x]   CUTIMED SORBACT  FLUFF     SECONDARY DRESSING:                      [X] DRAWTEX              [x]   LARGE OPTILOCK                                 COMPRESSION:                    (X)    2 LAYER WRAP     HOW OFTEN TO CHANGE DRESSINGS:                    [x] LEAVE DRESSING IN PLACE UNTIL NEXT WEEK           TRY AND WEAR COMPRESSION STOCKING TO RIGHT LEG     _________________________________________________________________  PRESSURE RELIEF AND OFF-LOADING:     Off-Loading:   With heel protector  [x] Off-loading when       [x] in bed         [x] sitting                             Specialty equipment ordered:       [] Wheelchair cushion    [] Specialty Bed/Mattress     [x] Assistive Device: please continue to use any assistive devices advised by the provider discussed during your visit   [x] Surgical shoe    [] Podus Boot(s)   [] Foam Boot(s)    [] CROW Boot     _____________________________________________________________________________________________     Dietary:  Continue your diet as tolerated. Eat heart-healthy foods. These foods include vegetables, fruits, nuts, beans, lean meat, fish, and whole grains. Limit sodium, alcohol, and sugar. Protein is a teresa nutrient in helping to repair damaged tissue and promote new tissue growth. Good sources of protein are milk, yogurt, cheese, fish, lean meat, and beans.   If you are a diabetic, having diabetes can make it hard for wounds to heal. So try to keep your blood sugar in its target range.  __________________________________________________________________________________________________     ACTIVITY:   Activity as tolerated  ________________________________________________________________________________________________________________     PAIN:     Please note, A small amount of pain, drainage and/or bleeding from this process might be expected, and is NORMAL. Elevate the affected limb. Use over-the-counter medications you would normally use for pain as permitted by your family doctor. For persistent pain not relieved by the above interventions, please call your family doctor.  ________________________________________________________________________________  Call your doctor now or seek immediate medical care if:    You have symptoms of infection, such as: Increased pain, swelling, warmth, or redness. Red streaks leading from the area. Pus draining from the area. A fever. _______________________________________________________________________     Return Appointment:  DME/Wound Dressing Supplies Provided by:  [employer]  (Supply companies distribute one month supply at a time. Please call them directly to reorder supplies when you run out)     ECF or Home Healthcare: Your Home Care Agency is responsible to order your supplies.      Return Appointment: With Yamileth Drake CNP  in 1 Week(s)  ( IF YOU HAVE ULTRASOUND THIS WEEK GIVE US A CALL TO FIGURE OUT DRESSING-POST TEST)                [x] Orders placed during your visit:   CULTURE OBTAINED 11/30/22, results reviewed with patient      :  [address] [place] [address]         Electronically signed by Fay Man RN on 12/28/2022 at 8:32 AM            [facility] Information: Should you experience any significant chnges in your wound(s) or have questions about your wound care, please contact the [address] [place] [address] at [address] 8:30 am - 4:30 pm and Friday 8:30 am - 1:00 pm.  If you need help with your wound outside these hours and cannot wait until we are again available, contact your PCP or go to the hospital emergency room. PLEASE NOTE: IF YOU ARE UNABLE TO OBTAIN WOUND SUPPLIES, CONTINUE TO USE THE SUPPLIES YOU HAVE AVAILABLE UNTIL YOU ARE ABLE TO REACH US. KEEP THE WOUND COVERED AT ALL TIMES.           Electronically signed by MADISYN Malhotra CNP on 12/28/2022 at 1:09 PM

## 2022-12-28 NOTE — TELEPHONE ENCOUNTER
Put in new order. [employer]. They said I need to have patient call to schedule this.      Left detailed VM for patient to call back @ [phone] #2 #2

## 2022-12-29 NOTE — DISCHARGE INSTRUCTIONS
PHYSICIANS SURGICAL [facility] Wound Care Physician Orders and Discharge Instructions  [address] [place] [address], [address], [address]  Telephone: [phone]  [address] 8:30 am - 4:30 pm and Friday 8:30 am - 1:00 pm.          NAME:  Daniel Steiner  YOB: 1955  MEDICAL RECORD NUMBER:  [id number]  TODAYS DATE:  01/04/23           VASHE ON IN CLINIC TODAY      Please wash hands with soap and water prior to and right after  every dressing change          Topical Treatments:              [x] Apply around the wound:   [x] moisturizing lotion TO LEFT LEG AND FOOT THAT IS NOT CLOSE TO WOUND         WOUND LOCATION   Left Heel **NUSHEILD #2 ( PURAPLY X 1 = TOTAL 3 GRAFTS) FIRST APPLIED 11/2/2022**     May rinse wounds with 0.9% saline  Do NOT SCRUB WOUND. Keep wounds dry in the shower unless otherwise instructed by the physician.         PRIMARY DRESSING:                                 [x]   CUTIMED SORBACT  FLUFF     SECONDARY DRESSING:                      [X] DRAWTEX              [x]   LARGE OPTILOCK                                 COMPRESSION:                    (X)    2 LAYER WRAP     HOW OFTEN TO CHANGE DRESSINGS:                    [x] LEAVE DRESSING IN PLACE UNTIL NEXT WEEK           TRY AND WEAR COMPRESSION STOCKING TO RIGHT LEG     _________________________________________________________________  PRESSURE RELIEF AND OFF-LOADING:     Off-Loading:   With heel protector  [x] Off-loading when       [x] in bed         [x] sitting                             Specialty equipment ordered:       [] Wheelchair cushion    [] Specialty Bed/Mattress     [x] Assistive Device: please continue to use any assistive devices advised by the provider discussed during your visit   [x] Surgical shoe    [] Podus Boot(s)   [] Foam Boot(s)    [] CROW Boot     _____________________________________________________________________________________________     Dietary:  Continue your diet as tolerated. Eat heart-healthy foods. These foods include vegetables, fruits, nuts, beans, lean meat, fish, and whole grains. Limit sodium, alcohol, and sugar. Protein is a teresa nutrient in helping to repair damaged tissue and promote new tissue growth. Good sources of protein are milk, yogurt, cheese, fish, lean meat, and beans. If you are a diabetic, having diabetes can make it hard for wounds to heal. So try to keep your blood sugar in its target range.  __________________________________________________________________________________________________     ACTIVITY:   Activity as tolerated  ________________________________________________________________________________________________________________     PAIN:     Please note, A small amount of pain, drainage and/or bleeding from this process might be expected, and is NORMAL. Elevate the affected limb. Use over-the-counter medications you would normally use for pain as permitted by your family doctor. For persistent pain not relieved by the above interventions, please call your family doctor.  ________________________________________________________________________________  Call your doctor now or seek immediate medical care if:    You have symptoms of infection, such as: Increased pain, swelling, warmth, or redness. Red streaks leading from the area. Pus draining from the area. A fever. _______________________________________________________________________     Return Appointment:  DME/Wound Dressing Supplies Provided by:  [facility]  (Supply companies distribute one month supply at a time. Please call them directly to reorder supplies when you run out)     ECF or Home Healthcare: Your Home Care Agency is responsible to order your supplies.      Return Appointment: With Tae Cihn CNP  in 1 Week(s)  ( IF YOU HAVE ULTRASOUND THIS WEEK GIVE US A CALL TO FIGURE OUT DRESSING-POST TEST)                [x] Orders placed during your visit:   CULTURE OBTAINED 11/30/22, results reviewed with patient      :  [facility]        Electronically signed by Glenys Sanchez RN on 1/4/2023 at 8:39 AM    .          [address] Information: Should you experience any significant chnges in your wound(s) or have questions about your wound care, please contact the [address] [place] [address] at [address] 8:30 am - 4:30 pm and Friday 8:30 am - 1:00 pm.  If you need help with your wound outside these hours and cannot wait until we are again available, contact your PCP or go to the hospital emergency room. PLEASE NOTE: IF YOU ARE UNABLE TO OBTAIN WOUND SUPPLIES, CONTINUE TO USE THE SUPPLIES YOU HAVE AVAILABLE UNTIL YOU ARE ABLE TO REACH US. KEEP THE WOUND COVERED AT ALL TIMES.

## 2023-01-04 ENCOUNTER — HOSPITAL ENCOUNTER (OUTPATIENT)
Dept: WOUND CARE | Age: 68
Discharge: HOME | End: 2023-01-04
Payer: MEDICARE

## 2023-01-04 VITALS
SYSTOLIC BLOOD PRESSURE: 105 MMHG | TEMPERATURE: 98.4 F | DIASTOLIC BLOOD PRESSURE: 60 MMHG | HEART RATE: 83 BPM | RESPIRATION RATE: 18 BRPM

## 2023-01-04 DIAGNOSIS — Z22.322 MRSA (METHICILLIN RESISTANT STAPH AUREUS) CULTURE POSITIVE: ICD-10-CM

## 2023-01-04 DIAGNOSIS — L89.623 PRESSURE ULCER OF LEFT HEEL, STAGE 3 (HCC): Primary | ICD-10-CM

## 2023-01-04 DIAGNOSIS — S91.302A NON-HEALING OPEN WOUND OF LEFT HEEL: ICD-10-CM

## 2023-01-04 DIAGNOSIS — G81.94 LEFT HEMIPARESIS (HCC): ICD-10-CM

## 2023-01-04 DIAGNOSIS — L89.620 PRESSURE ULCER OF HEEL, LEFT, UNSTAGEABLE (HCC): ICD-10-CM

## 2023-01-04 DIAGNOSIS — R25.2 SPASTICITY: ICD-10-CM

## 2023-01-04 DIAGNOSIS — I63.9 CEREBROVASCULAR ACCIDENT (CVA), UNSPECIFIED MECHANISM (HCC): ICD-10-CM

## 2023-01-04 PROCEDURE — 11042 DBRDMT SUBQ TIS 1ST 20SQCM/<: CPT

## 2023-01-04 PROCEDURE — 11042 DBRDMT SUBQ TIS 1ST 20SQCM/<: CPT | Performed by: NURSE PRACTITIONER

## 2023-01-04 RX ORDER — BETAMETHASONE DIPROPIONATE 0.05 %
OINTMENT (GRAM) TOPICAL ONCE
OUTPATIENT
Start: 2023-01-04 | End: 2023-01-04

## 2023-01-04 RX ORDER — BACITRACIN ZINC AND POLYMYXIN B SULFATE 500; 1000 [USP'U]/G; [USP'U]/G
OINTMENT TOPICAL ONCE
OUTPATIENT
Start: 2023-01-04 | End: 2023-01-04

## 2023-01-04 RX ORDER — LIDOCAINE HYDROCHLORIDE 20 MG/ML
JELLY TOPICAL ONCE
OUTPATIENT
Start: 2023-01-04 | End: 2023-01-04

## 2023-01-04 RX ORDER — LIDOCAINE 50 MG/G
OINTMENT TOPICAL ONCE
OUTPATIENT
Start: 2023-01-04 | End: 2023-01-04

## 2023-01-04 RX ORDER — CLOBETASOL PROPIONATE 0.5 MG/G
OINTMENT TOPICAL ONCE
OUTPATIENT
Start: 2023-01-04 | End: 2023-01-04

## 2023-01-04 RX ORDER — LIDOCAINE 50 MG/G
OINTMENT TOPICAL ONCE
Status: COMPLETED | OUTPATIENT
Start: 2023-01-04 | End: 2023-01-04

## 2023-01-04 RX ORDER — LIDOCAINE HYDROCHLORIDE 40 MG/ML
SOLUTION TOPICAL ONCE
OUTPATIENT
Start: 2023-01-04 | End: 2023-01-04

## 2023-01-04 RX ORDER — GINSENG 100 MG
CAPSULE ORAL ONCE
OUTPATIENT
Start: 2023-01-04 | End: 2023-01-04

## 2023-01-04 RX ORDER — GENTAMICIN SULFATE 1 MG/G
OINTMENT TOPICAL ONCE
OUTPATIENT
Start: 2023-01-04 | End: 2023-01-04

## 2023-01-04 RX ORDER — BACITRACIN, NEOMYCIN, POLYMYXIN B 400; 3.5; 5 [USP'U]/G; MG/G; [USP'U]/G
OINTMENT TOPICAL ONCE
OUTPATIENT
Start: 2023-01-04 | End: 2023-01-04

## 2023-01-04 RX ORDER — LIDOCAINE 40 MG/G
CREAM TOPICAL ONCE
OUTPATIENT
Start: 2023-01-04 | End: 2023-01-04

## 2023-01-04 RX ADMIN — LIDOCAINE: 50 OINTMENT TOPICAL at 08:18

## 2023-01-04 ASSESSMENT — PAIN SCALES - GENERAL: PAINLEVEL_OUTOF10: 0

## 2023-01-04 NOTE — PROGRESS NOTES
[address]   Progress Note and Procedure Note      Blanche Caldwell  MEDICAL RECORD NUMBER:  [id number]  AGE: 79 y.o. GENDER: male  : 1955  EPISODE DATE:  2023    Subjective:     Chief Complaint   Patient presents with    Wound Check     Follow up visit for left foot wound. HISTORY of PRESENT ILLNESS HPI  History of Wound Context: Recent displaced fracture of base of neck of left femur. Pressure ulcer left heel now designated as a Stage 3 pressure ulcer since unstable eschar removed. No periwound redness. Offload boot applied when in bed. PT and DP pulse strong with Doppler. Reports getting protein supplements. Pt transferring only, no walking with walker still too unstable. Noting intermittent spastic movements legs during visit. Concern about friction on left heel with this movement. Pt wears post-op shoe on left foot. His son Socrates Zuñiga and daughter Melisa Gu are monitoring and changing outer dressings as needed. Today presents with much improved edema in left lower leg and ankle. Wound on heel with less depth. Will continue with same treatment and plan for application of skin substitute next week. Denies constitutional issues. Last week place 2 layer compression over heel dressing. When removed today, swelling decreased and moderate amount of drainage on old dressing. Prior to surgery pt was wearing compression stockings on a routine basis but since the wound occurred has stopped wearing. Displaced fracture of base of neck of left femur, history of CVA, hemiplegia and hemiparesis following CVA affecting left nondominant side, with intermittent spasms of both lower extremities.   Wound/Ulcer Pain Timing/Severity: intermittent  Quality of pain: tender  Severity:  0 / 10   Modifying Factors: Pain is relieved/improved with rest, repositioning  Associated Signs/Symptoms: edema and drainage     Ulcer Identification:  Ulcer Type: pressure ulcer Contributing Factors: edema; while hospitalized developed wound from chronic pressure and decreased mobility     Acute Wound: N/A not an acute wound     PAST MEDICAL HISTORY        Diagnosis Date    CAD (coronary artery disease)     HTN (hypertension)     Hyperlipemia     Left hemiparesis ([address] 8/11/2020    MRSA (methicillin resistant staph aureus) culture positive 9/14/2022    heel    Non-healing open wound of left heel 10/12/2022    Pressure injury of left ankle, stage 2 ([address] 9/13/2021    Pressure ulcer of heel, left, unstageable ([address] 8/8/2022    Stable eschar covered.     Pressure ulcer of left heel, stage 3 ([address] 10/20/2022    S/P PTCA (percutaneous transluminal coronary angioplasty) 2010    two stents place    Spasticity 7/9/2019    Stroke ([address] 10/2012    left-sided weakness       PAST SURGICAL HISTORY    Past Surgical History:   Procedure Laterality Date    CARDIAC SURGERY      HIP SURGERY Left     INGUINAL HERNIA REPAIR      right side    INTRACAPSULAR CATARACT EXTRACTION Right 08/30/2019    PHACOEMULSIFICATION WITH INTRAOCULAR LENS IMPLANT performed by Alejandrina Kaiser MD at [address] [place] [address] EXTRACTION Left 09/06/2019    PHACOEMULSIFICATION WITH INTRAOCULAR LENS IMPLANT performed by Alej[facility]rina Kaiser MD at [facility]    Family History   Problem Relation Age of Onset    Hypertension Mother     Heart Disease Father     Breast Cancer Sister     Heart Attack Brother        SOCIAL HISTORY    Social History     Tobacco Use    Smoking status: Never    Smokeless tobacco: Never   Vaping Use    Vaping Use: Never used   Substance Use Topics    Alcohol use: No    Drug use: No       ALLERGIES    No Known Allergies    MEDICATIONS    Current Outpatient Medications on File Prior to Encounter   Medication Sig Dispense Refill    baclofen (LIORESAL) 10 MG tablet Take 1 tablet by mouth 2 times daily 180 tablet 1    lisinopril (PRINIVIL;ZESTRIL) 20 MG tablet Take 1 tablet by mouth daily 90 tablet 0    hydrALAZINE (APRESOLINE) 50 MG tablet Take 1 tablet by mouth 3 times daily 270 tablet 0    Wound Dressings ([facility] WOUND/BURN DRESSING) GEL gel Apply 1 each topically daily 1 each 1    senna-docusate (PERICOLACE) 8.6-50 MG per tablet Take 1 tablet by mouth 2 times daily      atorvastatin (LIPITOR) 40 MG tablet Take 1 tablet by mouth daily 90 tablet 3    amLODIPine (NORVASC) 10 MG tablet Take 1 tablet by mouth daily 90 tablet 3    gabapentin (NEURONTIN) 100 MG capsule Take 1 capsule by mouth 4 times daily for 180 days. 360 capsule 1    loratadine (CLARITIN) 10 MG tablet Take 1 tablet by mouth daily Pt needs to come in and be seen before more rx's are given. 90 tablet 1    aspirin 81 MG tablet Take 81 mg by mouth daily      Sennosides 17.2 MG TABS Take 17.2 mg by mouth       No current facility-administered medications on file prior to encounter. REVIEW OF SYSTEMS  Review of Systems    Pertinent items are noted in HPI.     Objective:      /60   Pulse 83   Temp 98.4 °F (36.9 °C) (Temporal)   Resp 18     Wt Readings from Last 3 Encounters:   09/29/22 228 lb 14.4 oz (103.8 kg)   09/01/22 230 lb (104.3 kg)   08/08/22 230 lb (104.3 kg)       PHYSICAL EXAM  Physical Exam    General Appearance: alert and oriented to person, place and time, well-developed and well-nourished, in no acute distress  Skin: warm and dry, no rash or erythema  Head: normocephalic and atraumatic  Eyes: pupils equal, round, and reactive to light  Pulmonary/Chest: clear to auscultation bilaterally- no wheezes, rales or rhonchi, normal air movement, no respiratory distress  Cardiovascular: normal rate, regular rhythm, and intact distal pulses      Assessment:        Problem List Items Addressed This Visit       Pressure ulcer of heel, left, unstageable (HCC)    Relevant Orders    Initiate Outpatient Wound Care Protocol    MRSA (methicillin resistant staph aureus) culture positive    Relevant Orders Initiate Outpatient Wound Care Protocol    Non-healing open wound of left heel    Relevant Orders    Initiate Outpatient Wound Care Protocol    Pressure ulcer of left heel, stage 3 (HCC) - Primary    Relevant Orders    Initiate Outpatient Wound Care Protocol    Stroke [facility]) (Chronic)    Relevant Orders    Initiate Outpatient Wound Care Protocol    Spasticity    Relevant Orders    Initiate Outpatient Wound Care Protocol    Left hemiparesis [facility])    Relevant Orders    Initiate Outpatient Wound Care Protocol        Procedure Note  Indications:  Based on my examination of this patient's wound(s)/ulcer(s) today, debridement is required to promote healing and evaluate the wound base. Performed by: MADISYN Lam - CNP    Consent obtained:  Yes    Time out taken:  Yes    Pain Control: Anesthetic  Anesthetic: 5% Lidocaine Ointment Topical       Debridement: Excisional Debridement    Using curette the wound(s)/ulcer(s) was/were debrided down through and including the removal of epidermis, dermis, and subcutaneous tissue. Devitalized Tissue Debrided:  fibrin, biofilm, and slough    Pre Debridement Measurements:  Are located in the [place]  Documentation Flow Sheet    Diabetic/Pressure/Non Pressure Ulcers only:  Ulcer: Pressure ulcer, Stage 3     Wound/Ulcer #: 1    Post Debridement Measurements:  Wound/Ulcer Descriptions are Pre Debridement except measurements:    Wound 08/08/22 Heel Left #1 ( states since about 6/24/2022) (Active)   Wound Image   01/04/23 0819   Wound Etiology Pressure Unstageable 08/08/22 1404   Dressing Status Old drainage noted 10/19/22 0941   Wound Cleansed Vashe 12/28/22 0914   Dressing/Treatment Cutimed/Sorbact; Other (comment) 12/28/22 0914   Offloading for Diabetic Foot Ulcers Post op shoe 12/28/22 0914   Wound Length (cm) 2.2 cm 01/04/23 0819   Wound Width (cm) 2.7 cm 01/04/23 0819   Wound Depth (cm) 0.3 cm 01/04/23 0819   Wound Surface Area (cm^2) 5.94 cm^2 01/04/23 0819 Change in Wound Size % (l*w) 65.26 01/04/23 0819   Wound Volume (cm^3) 1.782 cm^3 01/04/23 0819   Wound Healing % -4 01/04/23 0819   Post-Procedure Length (cm) 2.3 cm 01/04/23 0834   Post-Procedure Width (cm) 2.8 cm 01/04/23 0834   Post-Procedure Depth (cm) 0.4 cm 01/04/23 0834   Post-Procedure Surface Area (cm^2) 6.44 cm^2 01/04/23 0834   Post-Procedure Volume (cm^3) 2.576 cm^3 01/04/23 0834   Undermining Starts ___ O'Clock 9 11/02/22 0907   Undermining Ends___ O'Clock 12 11/02/22 0907   Undermining Maxium Distance (cm) 0.7 11/02/22 0907   Wound Assessment Granulation tissue;Slough 01/04/23 0819   Drainage Amount Large 01/04/23 0819   Drainage Description Serosanguinous 01/04/23 0819   Odor None 01/04/23 0819   Anahy-wound Assessment Maceration 01/04/23 0819   Margins Undefined edges 01/04/23 0819   Wound Thickness Description not for Pressure Injury Full thickness 01/04/23 0819   Number of days: 148          Total Surface Area Debrided:  6.44 sq cm     Estimated Blood Loss:  Minimal    Hemostasis Achieved:  by pressure    Procedural Pain:  0  / 10     Post Procedural Pain:  0 / 10     Response to treatment:  Well tolerated by patient. Plan:     Treatment Note please see attached Discharge Instructions    Written patient dismissal instructions given to patient and signed by patient or POA.          Discharge [address] [place] [address] Physician Orders and Discharge Instructions  [address] [place] [address] [address], [facility]  Telephone: [phone] [address] 8:30 am - 4:30 pm and Friday 8:30 am - 1:00 pm.          NAME:  Brady Betancur  YOB: 1955  MEDICAL RECORD NUMBER:  [id number]  TODAYS DATE:  01/04/23           VASHE ON IN CLINIC TODAY      Please wash hands with soap and water prior to and right after  every dressing change          Topical Treatments:              [x] Apply around the wound:   [x] moisturizing lotion TO LEFT LEG AND FOOT THAT IS NOT CLOSE TO WOUND         WOUND LOCATION   Left Heel **GEORGIANAEILD #2 ( PURAPLY X 1 = TOTAL 3 GRAFTS) FIRST APPLIED 11/2/2022**     May rinse wounds with 0.9% saline  Do NOT SCRUB WOUND. Keep wounds dry in the shower unless otherwise instructed by the physician. PRIMARY DRESSING:                                 [x]   CUTIMED SORBACT  FLUFF     SECONDARY DRESSING:                      [X] DRAWTEX              [x]   LARGE OPTILOCK                                 COMPRESSION:                    (X)    2 LAYER WRAP     HOW OFTEN TO CHANGE DRESSINGS:                    [x] LEAVE DRESSING IN PLACE UNTIL NEXT WEEK           TRY AND WEAR COMPRESSION STOCKING TO RIGHT LEG     _________________________________________________________________  PRESSURE RELIEF AND OFF-LOADING:     Off-Loading:   With heel protector  [x] Off-loading when       [x] in bed         [x] sitting                             Specialty equipment ordered:       [] Wheelchair cushion    [] Specialty Bed/Mattress     [x] Assistive Device: please continue to use any assistive devices advised by the provider discussed during your visit   [x] Surgical shoe    [] Podus Boot(s)   [] Foam Boot(s)    [] CROW Boot     _____________________________________________________________________________________________     Dietary:  Continue your diet as tolerated. Eat heart-healthy foods. These foods include vegetables, fruits, nuts, beans, lean meat, fish, and whole grains. Limit sodium, alcohol, and sugar. Protein is a teresa nutrient in helping to repair damaged tissue and promote new tissue growth. Good sources of protein are milk, yogurt, cheese, fish, lean meat, and beans.   If you are a diabetic, having diabetes can make it hard for wounds to heal. So try to keep your blood sugar in its target range.  __________________________________________________________________________________________________     ACTIVITY:   Activity as tolerated  ________________________________________________________________________________________________________________     PAIN:     Please note, A small amount of pain, drainage and/or bleeding from this process might be expected, and is NORMAL. Elevate the affected limb. Use over-the-counter medications you would normally use for pain as permitted by your family doctor. For persistent pain not relieved by the above interventions, please call your family doctor.  ________________________________________________________________________________  Call your doctor now or seek immediate medical care if:    You have symptoms of infection, such as: Increased pain, swelling, warmth, or redness. Red streaks leading from the area. Pus draining from the area. A fever. _______________________________________________________________________     Return Appointment:  DME/Wound Dressing Supplies Provided by:  [facility]  (Supply companies distribute one month supply at a time. Please call them directly to reorder supplies when you run out)     ECF or Home Healthcare: Your Home Care Agency is responsible to order your supplies. Return Appointment: With Sonali Dunbar CNP  in 1 Week(s)  ( IF YOU HAVE ULTRASOUND THIS WEEK GIVE US A CALL TO FIGURE OUT DRESSING-POST TEST)                [x] Orders placed during your visit:   CULTURE OBTAINED 11/30/22, results reviewed with patient      :  [address] [place] [address]        Electronically signed by Neetu Yin RN on 1/4/2023 at 8:39 AM    .          [address] Information: Should you experience any significant chnges in your wound(s) or have questions about your wound care, please contact the [facility] at [address] 8:30 am - 4:30 pm and Friday 8:30 am - 1:00 pm.  If you need help with your wound outside these hours and cannot wait until we are again available, contact your PCP or go to the hospital emergency room.      PLEASE NOTE: IF YOU ARE UNABLE TO OBTAIN WOUND SUPPLIES, CONTINUE TO USE THE SUPPLIES YOU HAVE AVAILABLE UNTIL YOU ARE ABLE TO REACH US. KEEP THE WOUND COVERED AT ALL TIMES.           Electronically signed by MADISYN Berman CNP on 1/4/2023 at 9:31 AM

## 2023-01-04 NOTE — PLAN OF CARE
Multilayer Compression Wrap   (Not Unna) Below the Knee    NAME:  Ayl Belt OF BIRTH:  1955  MEDICAL RECORD NUMBER:  [id number]  DATE:  1/4/2023    Multilayer compression wrap: Removed old Multilayer wrap if indicated and wash leg with mild soap/water. Applied moisturizing agent to dry skin as needed. Applied primary and secondary dressing as ordered. Applied multilayered dressing below the knee to left lower leg. Instructed patient/caregiver not to remove dressing and to keep it clean and dry. Instructed patient/caregiver on complications to report to provider, such as pain, numbness in toes, heavy drainage, and slippage of dressing. Instructed patient on purpose of compression dressing and on activity and exercise recommendations.       Electronically signed by Daryle Hertz, RN on 1/4/2023 at 11:11 AM

## 2023-01-05 NOTE — DISCHARGE INSTRUCTIONS
PHYSICIANS SURG[facility] Wound Care Physician Orders and Discharge Instructions  [address] [place] [address] Drive, [address], [address]  Telephone: [phone]  [address] 8:30 am - 4:30 pm and Friday 8:30 am - 1:00 pm.          NAME:  Kofi Lacey  YOB: 1955  MEDICAL RECORD NUMBER:  [id number]  TODAYS DATE:  01/11/23           VASHE ON IN CLINIC TODAY      Please wash hands with soap and water prior to and right after  every dressing change          Topical Treatments:              [x] Apply around the wound:   [x] moisturizing lotion TO LEFT LEG AND FOOT THAT IS NOT CLOSE TO WOUND         WOUND LOCATION   Left Heel **NUSHEILD #2 ( PURAPLY X 1 = TOTAL 3 GRAFTS) FIRST APPLIED 11/2/2022**     May rinse wounds with 0.9% saline  Do NOT SCRUB WOUND. Keep wounds dry in the shower unless otherwise instructed by the physician.         PRIMARY DRESSING:                                 [x]   Endoform AM ( NO SALINE)                              (X)     CUTIMED SORBACT                                (X)      Plain alginate        SECONDARY DRESSING:                                   [x]   LARGE OPTILOCK             (X)  ROLL GUAZE                                 COMPRESSION:                    ( X)    MEDIUM SPANDAGRIP TO LEFT LEG     HOW OFTEN TO CHANGE DRESSINGS:                  (  X ) THREE TIMES A WEEK ( MONDAY AND Friday AT HOME AND AS NEEDED , IF DRAINS THROUGH BANDAGE)           TRY AND WEAR COMPRESSION STOCKING TO RIGHT LEG     _________________________________________________________________  PRESSURE RELIEF AND OFF-LOADING:     Off-Loading:   With heel protector  [x] Off-loading when       [x] in bed         [x] sitting                             Specialty equipment ordered:       [] Wheelchair cushion    [] Specialty Bed/Mattress     [x] Assistive Device: please continue to use any assistive devices advised by the provider discussed during your visit   [x] Surgical shoe    [] Podus Boot(s)   [] Foam Boot(s)    [] CROW Boot     _____________________________________________________________________________________________     Dietary:  Continue your diet as tolerated. Eat heart-healthy foods. These foods include vegetables, fruits, nuts, beans, lean meat, fish, and whole grains. Limit sodium, alcohol, and sugar. Protein is a teresa nutrient in helping to repair damaged tissue and promote new tissue growth. Good sources of protein are milk, yogurt, cheese, fish, lean meat, and beans. If you are a diabetic, having diabetes can make it hard for wounds to heal. So try to keep your blood sugar in its target range.  __________________________________________________________________________________________________     ACTIVITY:   Activity as tolerated  ________________________________________________________________________________________________________________     PAIN:     Please note, A small amount of pain, drainage and/or bleeding from this process might be expected, and is NORMAL. Elevate the affected limb. Use over-the-counter medications you would normally use for pain as permitted by your family doctor. For persistent pain not relieved by the above interventions, please call your family doctor.  ________________________________________________________________________________  Call your doctor now or seek immediate medical care if:    You have symptoms of infection, such as: Increased pain, swelling, warmth, or redness. Red streaks leading from the area. Pus draining from the area. A fever. _______________________________________________________________________     Return Appointment:  DME/Wound Dressing Supplies Provided by:  [facility]  (Supply companies distribute one month supply at a time. Please call them directly to reorder supplies when you run out)     ECF or Home Healthcare: Your Home Care Agency is responsible to order your supplies.      Return Appointment: With Lit Botello CNP  in 1 Week(s)                  [x] Orders placed during your visit:   CULTURE OBTAINED 11/30/22, results reviewed with patient      :  HILARY     CARDIOLOGISTS:  Michaelle Fenton OR Lilly Zuniga        . Electronically signed by Nathaniel Thapa RN on 1/11/2023 at 8:55 [address] [facility] Information: Should you experience any significant chnges in your wound(s) or have questions about your wound care, please contact the [address] [place] [address] at [address] 8:30 am - 4:30 pm and Friday 8:30 am - 1:00 pm.  If you need help with your wound outside these hours and cannot wait until we are again available, contact your PCP or go to the hospital emergency room. PLEASE NOTE: IF YOU ARE UNABLE TO OBTAIN WOUND SUPPLIES, CONTINUE TO USE THE SUPPLIES YOU HAVE AVAILABLE UNTIL YOU ARE ABLE TO REACH US. KEEP THE WOUND COVERED AT ALL TIMES.

## 2023-01-11 ENCOUNTER — HOSPITAL ENCOUNTER (OUTPATIENT)
Dept: WOUND CARE | Age: 68
Discharge: HOME | End: 2023-01-11
Payer: MEDICARE

## 2023-01-11 VITALS
HEART RATE: 73 BPM | RESPIRATION RATE: 18 BRPM | SYSTOLIC BLOOD PRESSURE: 120 MMHG | DIASTOLIC BLOOD PRESSURE: 82 MMHG | TEMPERATURE: 98.4 F

## 2023-01-11 DIAGNOSIS — G81.94 LEFT HEMIPARESIS (HCC): ICD-10-CM

## 2023-01-11 DIAGNOSIS — L89.623 PRESSURE ULCER OF LEFT HEEL, STAGE 3 (HCC): Primary | ICD-10-CM

## 2023-01-11 DIAGNOSIS — L89.620 PRESSURE ULCER OF HEEL, LEFT, UNSTAGEABLE (HCC): ICD-10-CM

## 2023-01-11 DIAGNOSIS — Z22.322 MRSA (METHICILLIN RESISTANT STAPH AUREUS) CULTURE POSITIVE: ICD-10-CM

## 2023-01-11 DIAGNOSIS — R25.2 SPASTICITY: ICD-10-CM

## 2023-01-11 DIAGNOSIS — S91.302A NON-HEALING OPEN WOUND OF LEFT HEEL: ICD-10-CM

## 2023-01-11 DIAGNOSIS — I63.9 CEREBROVASCULAR ACCIDENT (CVA), UNSPECIFIED MECHANISM (HCC): ICD-10-CM

## 2023-01-11 PROCEDURE — 11042 DBRDMT SUBQ TIS 1ST 20SQCM/<: CPT | Performed by: NURSE PRACTITIONER

## 2023-01-11 PROCEDURE — 11042 DBRDMT SUBQ TIS 1ST 20SQCM/<: CPT

## 2023-01-11 RX ORDER — GENTAMICIN SULFATE 1 MG/G
OINTMENT TOPICAL ONCE
OUTPATIENT
Start: 2023-01-11 | End: 2023-01-11

## 2023-01-11 RX ORDER — LIDOCAINE HYDROCHLORIDE 40 MG/ML
SOLUTION TOPICAL ONCE
OUTPATIENT
Start: 2023-01-11 | End: 2023-01-11

## 2023-01-11 RX ORDER — LIDOCAINE 50 MG/G
OINTMENT TOPICAL ONCE
OUTPATIENT
Start: 2023-01-11 | End: 2023-01-11

## 2023-01-11 RX ORDER — BETAMETHASONE DIPROPIONATE 0.05 %
OINTMENT (GRAM) TOPICAL ONCE
OUTPATIENT
Start: 2023-01-11 | End: 2023-01-11

## 2023-01-11 RX ORDER — LIDOCAINE HYDROCHLORIDE 20 MG/ML
JELLY TOPICAL ONCE
OUTPATIENT
Start: 2023-01-11 | End: 2023-01-11

## 2023-01-11 RX ORDER — BACITRACIN ZINC AND POLYMYXIN B SULFATE 500; 1000 [USP'U]/G; [USP'U]/G
OINTMENT TOPICAL ONCE
OUTPATIENT
Start: 2023-01-11 | End: 2023-01-11

## 2023-01-11 RX ORDER — LIDOCAINE 50 MG/G
OINTMENT TOPICAL ONCE
Status: COMPLETED | OUTPATIENT
Start: 2023-01-11 | End: 2023-01-11

## 2023-01-11 RX ORDER — LIDOCAINE 40 MG/G
CREAM TOPICAL ONCE
OUTPATIENT
Start: 2023-01-11 | End: 2023-01-11

## 2023-01-11 RX ORDER — GINSENG 100 MG
CAPSULE ORAL ONCE
OUTPATIENT
Start: 2023-01-11 | End: 2023-01-11

## 2023-01-11 RX ORDER — CLOBETASOL PROPIONATE 0.5 MG/G
OINTMENT TOPICAL ONCE
OUTPATIENT
Start: 2023-01-11 | End: 2023-01-11

## 2023-01-11 RX ORDER — BACITRACIN, NEOMYCIN, POLYMYXIN B 400; 3.5; 5 [USP'U]/G; MG/G; [USP'U]/G
OINTMENT TOPICAL ONCE
OUTPATIENT
Start: 2023-01-11 | End: 2023-01-11

## 2023-01-11 RX ADMIN — LIDOCAINE: 50 OINTMENT TOPICAL at 08:17

## 2023-01-11 ASSESSMENT — PAIN SCALES - GENERAL
PAINLEVEL_OUTOF10: 0
PAINLEVEL_OUTOF10: 0

## 2023-01-11 NOTE — LETTER
[address]  [address] [place] [address] [facility] [address]  [phone]  Dept: [phone]        Thank you . Mary Ann Ramirez for choosing [facility] for your Wound Care needs. We hope you found your first visit both encouraging and educational.  We look forward to serving you throughout the healing process. Before your next visit please review the information you received in your [employer]. The first visit can be overwhelming and this is a useful tool to refresh what information you may have been given. If you find yourself with any questions prior to your upcoming appointment, please feel free to contact us. Often wounds can be challenging and it is our goal to see you through the healing process with as much support possible. Again, thank you for choosing [facility]!     Sincerely,      The Staff of [address] [place] [address] Pkwy and Hyperbaric Oxygen Therapy

## 2023-01-11 NOTE — PROGRESS NOTES
[address]   Progress Note and Procedure Note      Abdi Smalls  MEDICAL RECORD NUMBER:  [id number]  AGE: 79 y.o. GENDER: male  : 1955  EPISODE DATE:  2023    Subjective:     Chief Complaint   Patient presents with    Wound Check     Follow up for left heel wound. HISTORY of PRESENT ILLNESS HPI  Jeremiah Matias is a 79year old male presenting today for follow up for left heel wound. 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 Pt is as active as he can be. Once wound is closed can resume physical therapy with goal of walk ing with walker instead of using wheelchair for mobility. History of Wound Context:   On 22 fell and sustained displaced fracture of base of neck of left femur and developed  a pressure ulcer left heel. Postop he developed left heel DTI   2022 pt first seen at [facility] for treatment of left heel wound dx as stable eschar over wound. Pt has an offloading boot in place to float heel and prevent further heel damage. Pt has hx of  stroke with left hemiparesis. Pt has residual left sided weakness and ongoing therapy has been delayed because of pressure ulcer. Pt also has chronic bilateral lower leg edema for which he uses compression stockings. Not using compression on left leg because of wound. Pt has spastic movements bilateral upper and lower extremities since after stroke with is tx with Baclofen; but friction still a concern. PT and DP pulse strong with Doppler. Reports getting protein supplements. Pt transferring only, no walking with walker still too unstable. Noting intermittent spastic movements legs during visit. Concern about friction on left heel with this movement. Pt wears post-op shoe on left foot. His son Tali Monteiro and daughter José Bedoya are monitoring and changing outer dressings as needed. 10/19/22 Eschar debrided because of drainage and redness. Culture of MRSA tx with antibiotics. Wound now designated as a Stage 3 pressure ulcer. 10/22 applied for skin substitutes. Theraskin is preferable for its robust nature especially on a heel wound. Progress of wound:    After wound debrided on 10/19/22 measured 3 x 3.5 x 1.5. Theraskin denied. We have provided compression therapy, tx wound infection  and application of collagen, Puraply and NuShield products to wound with  little progress to healing. 1/11/2023 wound measures 2.2 x 2.9 x 0.4. Spoke with [employer] about possibly re-applying to insurance for [employer]. Lack of progress to wound healing is also delaying needed physical therapy/rehab. Pt scheduled for ultrasounds bilateral lower legs on 1/13/23       Wound/Ulcer Pain Timing/Severity: intermittent  Quality of pain: tender  Severity:  0 / 10   Modifying Factors: Pain is relieved/improved with rest, repositioning  Associated Signs/Symptoms: edema and drainage     Ulcer Identification:  Ulcer Type: pressure ulcer     Contributing Factors: edema; while hospitalized developed wound from chronic pressure and decreased mobility     Acute Wound: N/A not an acute wound     PAST MEDICAL HISTORY        Diagnosis Date    CAD (coronary artery disease)     HTN (hypertension)     Hyperlipemia     Left hemiparesis ([address] 8/11/2020    MRSA (methicillin resistant staph aureus) culture positive 9/14/2022    heel    Non-healing open wound of left heel 10/12/2022    Pressure injury of left ankle, stage 2 ([address] 9/13/2021    Pressure ulcer of heel, left, unstageable ([address] 8/8/2022    Stable eschar covered.     Pressure ulcer of left heel, stage 3 ([address] 10/20/2022    S/P PTCA (percutaneous transluminal coronary angioplasty) 2010    two stents place    Spasticity 7/9/2019    Stroke ([address] 10/2012    left-sided weakness       PAST SURGICAL HISTORY    Past Surgical History:   Procedure Laterality Date    CARDIAC SURGERY      HIP SURGERY Left     INGUINAL HERNIA REPAIR      right side    INTRACAPSULAR CATARACT EXTRACTION Right 08/30/2019    PHACOEMULSIFICATION WITH INTRAOCULAR LENS IMPLANT performed by Melchor Hernandez MD at [address] EXTRACTION Left 09/06/2019    PHACOEMULSIFICATION WITH INTRAOCULAR LENS IMPLANT performed by Melchor Hernandez MD at [facility]    Family History   Problem Relation Age of Onset    Hypertension Mother     Heart Disease Father     Breast Cancer Sister     Heart Attack Brother        SOCIAL HISTORY    Social History     Tobacco Use    Smoking status: Never    Smokeless tobacco: Never   Vaping Use    Vaping Use: Never used   Substance Use Topics    Alcohol use: No    Drug use: No       ALLERGIES    No Known Allergies    MEDICATIONS    Current Outpatient Medications on File Prior to Encounter   Medication Sig Dispense Refill    baclofen (LIORESAL) 10 MG tablet Take 1 tablet by mouth 2 times daily 180 tablet 1    lisinopril (PRINIVIL;ZESTRIL) 20 MG tablet Take 1 tablet by mouth daily 90 tablet 0    hydrALAZINE (APRESOLINE) 50 MG tablet Take 1 tablet by mouth 3 times daily 270 tablet 0    Wound Dressings (MEDIHONEY WOUND/BURN DRESSING) GEL gel Apply 1 each topically daily 1 each 1    senna-docusate (PERICOLACE) 8.6-50 MG per tablet Take 1 tablet by mouth 2 times daily      atorvastatin (LIPITOR) 40 MG tablet Take 1 tablet by mouth daily 90 tablet 3    amLODIPine (NORVASC) 10 MG tablet Take 1 tablet by mouth daily 90 tablet 3    gabapentin (NEURONTIN) 100 MG capsule Take 1 capsule by mouth 4 times daily for 180 days. 360 capsule 1    loratadine (CLARITIN) 10 MG tablet Take 1 tablet by mouth daily Pt needs to come in and be seen before more rx's are given. 90 tablet 1    aspirin 81 MG tablet Take 81 mg by mouth daily      Sennosides 17.2 MG TABS Take 17.2 mg by mouth       No current facility-administered medications on file prior to encounter.        REVIEW OF SYSTEMS  Review of Systems    Pertinent items are noted in HPI.    Objective:      /82   Pulse 73   Temp 98.4 °F (36.9 °C) (Infrared)   Resp 18     Wt Readings from Last 3 Encounters:   09/29/22 228 lb 14.4 oz (103.8 kg)   09/01/22 230 lb (104.3 kg)   08/08/22 230 lb (104.3 kg)       PHYSICAL EXAM  Physical Exam    General Appearance: alert and oriented to person, place and time  Skin: warm and dry, no rash or erythema  Head: normocephalic and atraumatic  Eyes: pupils equal, round, and reactive to light  Pulmonary/Chest:  normal air movement, no respiratory distress  Cardiovascular: normal rate, regular rhythm, and intact distal pulses  Extremities: no cyanosis, no clubbing, and nonpitting edema bilateral lower legs. Assessment:        Problem List Items Addressed This Visit       Pressure ulcer of heel, left, unstageable (HCC)    Relevant Orders    Initiate Outpatient Wound Care Protocol    MRSA (methicillin resistant staph aureus) culture positive    Relevant Orders    Initiate Outpatient Wound Care Protocol    Non-healing open wound of left heel    Relevant Orders    Initiate Outpatient Wound Care Protocol    Pressure ulcer of left heel, stage 3 (HCC) - Primary    Relevant Orders    Initiate Outpatient Wound Care Protocol    Stroke [facility]) (Chronic)    Relevant Orders    Initiate Outpatient Wound Care Protocol    Spasticity    Relevant Orders    Initiate Outpatient Wound Care Protocol    Left hemiparesis [facility])    Relevant Orders    Initiate Outpatient Wound Care Protocol        Procedure Note  Indications:  Based on my examination of this patient's wound(s)/ulcer(s) today, debridement is required to promote healing and evaluate the wound base.     Performed by: MADISYN Lemons - CNP    Consent obtained:  Yes    Time out taken:  Yes    Pain Control: Anesthetic  Anesthetic: 5% Lidocaine Ointment Topical       Debridement: Excisional Debridement    Using curette and forceps the wound(s)/ulcer(s) was/were debrided down through and including the removal of epidermis, dermis, and subcutaneous tissue. Devitalized Tissue Debrided:  fibrin, biofilm, and slough    Pre Debridement Measurements:  Are located in the [place]  Documentation Flow Sheet    Diabetic/Pressure/Non Pressure Ulcers only:  Ulcer: Pressure ulcer, Stage 3     Wound/Ulcer #: 1    Post Debridement Measurements:  Wound/Ulcer Descriptions are Pre Debridement except measurements:    Wound 08/08/22 Heel Left #1 ( states since about 6/24/2022) (Active)   Wound Image   01/04/23 0819   Wound Etiology Pressure Unstageable 08/08/22 1404   Dressing Status Old drainage noted 01/11/23 0817   Wound Cleansed Vashe 01/04/23 1110   Dressing/Treatment Cutimed/Sorbact; Other (comment) 01/04/23 1110   Offloading for Diabetic Foot Ulcers Post op shoe 01/04/23 1110   Wound Length (cm) 2.4 cm 01/11/23 0817   Wound Width (cm) 2.9 cm 01/11/23 0817   Wound Depth (cm) 0.4 cm 01/11/23 0817   Wound Surface Area (cm^2) 6.96 cm^2 01/11/23 0817   Change in Wound Size % (l*w) 59.3 01/11/23 0817   Wound Volume (cm^3) 2.784 cm^3 01/11/23 0817   Wound Healing % -63 01/11/23 0817   Post-Procedure Length (cm) 2.5 cm 01/11/23 0841   Post-Procedure Width (cm) 3 cm 01/11/23 0841   Post-Procedure Depth (cm) 0.5 cm 01/11/23 0841   Post-Procedure Surface Area (cm^2) 7.5 cm^2 01/11/23 0841   Post-Procedure Volume (cm^3) 3.75 cm^3 01/11/23 0841   Undermining Starts ___ O'Clock 11 01/11/23 0817   Undermining Ends___ O'Clock 1 01/11/23 0817   Undermining Maxium Distance (cm) 0.3 01/11/23 0817   Wound Assessment Granulation tissue;Slough 01/11/23 0817   Drainage Amount Moderate 01/11/23 0817   Drainage Description Serosanguinous;Brown 01/11/23 0817   Odor None 01/11/23 0817   Anahy-wound Assessment Hyperkeratosis (callous); Dry/flaky 01/11/23 0817   Margins Epibole (rolled edges); Undefined edges 01/11/23 0817   Wound Thickness Description not for Pressure Injury Full thickness 01/11/23 0817   Number of days: 155          Total Surface Area Debrided:  7.5 sq cm     Estimated Blood Loss:  Minimal    Hemostasis Achieved:  by pressure    Procedural Pain:  0  / 10     Post Procedural Pain:  0 / 10     Response to treatment:  Well tolerated by patient. Plan:     Treatment Note please see attached Discharge Instructions    Written patient dismissal instructions given to patient and signed by patient or POA. Discharge [id number] [facility] Physician Orders and Discharge Instructions  [address] [place] [address], [facility], [place] [address]  Telephone: [phone] [address] 8:30 am - 4:30 pm and Friday 8:30 am - 1:00 pm.          NAME:  Giana Wynn  YOB: 1955  MEDICAL RECORD NUMBER:  [id number]  TODAYS DATE:  01/11/23           VASHE ON IN CLINIC TODAY      Please wash hands with soap and water prior to and right after  every dressing change          Topical Treatments:              [x] Apply around the wound:   [x] moisturizing lotion TO LEFT LEG AND FOOT THAT IS NOT CLOSE TO WOUND         WOUND LOCATION   Left Heel **NUSHEILD #2 ( PURAPLY X 1 = TOTAL 3 GRAFTS) FIRST APPLIED 11/2/2022**     May rinse wounds with 0.9% saline  Do NOT SCRUB WOUND. Keep wounds dry in the shower unless otherwise instructed by the physician.         PRIMARY DRESSING:                                 [x]   Endoform AM ( NO SALINE)                              (X)     CUTIMED SORBACT                                (X)      Plain alginate        SECONDARY DRESSING:                                   [x]   LARGE OPTILOCK             (X)  ROLL GUAZE                                 COMPRESSION:                    ( X)    MEDIUM SPANDAGRIP TO LEFT LEG     HOW OFTEN TO CHANGE DRESSINGS:                  (  X ) THREE TIMES A WEEK ( MONDAY AND Friday AT HOME AND AS NEEDED , IF DRAINS THROUGH BANDAGE)           TRY AND WEAR COMPRESSION STOCKING TO RIGHT LEG _________________________________________________________________  PRESSURE RELIEF AND OFF-LOADING:     Off-Loading:   With heel protector  [x] Off-loading when       [x] in bed         [x] sitting                             Specialty equipment ordered:       [] Wheelchair cushion    [] Specialty Bed/Mattress     [x] Assistive Device: please continue to use any assistive devices advised by the provider discussed during your visit   [x] Surgical shoe    [] Podus Boot(s)   [] Foam Boot(s)    [] Berna Ybarramarychuy     _____________________________________________________________________________________________     Dietary:  Continue your diet as tolerated. Eat heart-healthy foods. These foods include vegetables, fruits, nuts, beans, lean meat, fish, and whole grains. Limit sodium, alcohol, and sugar. Protein is a teresa nutrient in helping to repair damaged tissue and promote new tissue growth. Good sources of protein are milk, yogurt, cheese, fish, lean meat, and beans. If you are a diabetic, having diabetes can make it hard for wounds to heal. So try to keep your blood sugar in its target range.  __________________________________________________________________________________________________     ACTIVITY:   Activity as tolerated  ________________________________________________________________________________________________________________     PAIN:     Please note, A small amount of pain, drainage and/or bleeding from this process might be expected, and is NORMAL. Elevate the affected limb. Use over-the-counter medications you would normally use for pain as permitted by your family doctor. For persistent pain not relieved by the above interventions, please call your family doctor.  ________________________________________________________________________________  Call your doctor now or seek immediate medical care if:    You have symptoms of infection, such as: Increased pain, swelling, warmth, or redness.   Red streaks leading from the area. Pus draining from the area. A fever. _______________________________________________________________________     Return Appointment:  DME/Wound Dressing Supplies Provided by:  [facility]  (Supply companies distribute one month supply at a time. Please call them directly to reorder supplies when you run out)     ECF or Home Healthcare: Your Home Care Agency is responsible to order your supplies. Return Appointment: With Francisca Quiñonez CNP  in 1 Week(s)                  [x] Orders placed during your visit:   CULTURE OBTAINED 11/30/22, results reviewed with patient      :  HILARY     CARDIOLOGISTS:  Henrietta Milligan OR Kika Porter        . Electronically signed by Kayleigh Diaz RN on 1/11/2023 at 8:55 [address] [facility] Information: Should you experience any significant chnges in your wound(s) or have questions about your wound care, please contact the [address] [place] [address] at [address] 8:30 am - 4:30 pm and Friday 8:30 am - 1:00 pm.  If you need help with your wound outside these hours and cannot wait until we are again available, contact your PCP or go to the hospital emergency room. PLEASE NOTE: IF YOU ARE UNABLE TO OBTAIN WOUND SUPPLIES, CONTINUE TO USE THE SUPPLIES YOU HAVE AVAILABLE UNTIL YOU ARE ABLE TO REACH US. KEEP THE WOUND COVERED AT ALL TIMES.                 Electronically signed by MADISYN Stover CNP on 1/11/2023 at 9:15 AM

## 2023-01-12 NOTE — DISCHARGE INSTRUCTIONS
PHYSICIANS SURG[facility] Wound Care Physician Orders and Discharge Instructions  [address] [place] [address], [address], [address]  Telephone: [phone] [address] 8:30 am - 4:30 pm and Friday 8:30 am - 1:00 pm.          NAME:  Deena Alexandre  YOB: 1955  MEDICAL RECORD NUMBER:  [id number]  TODAYS DATE:  01/18/23           VASHE ON IN CLINIC TODAY      Please wash hands with soap and water prior to and right after  every dressing change          Topical Treatments:              [x] Apply around the wound:   [x] moisturizing lotion TO LEFT LEG AND FOOT THAT IS NOT CLOSE TO WOUND         WOUND LOCATION   Left Heel **NUSHEILD #2 ( PURAPLY X 1 = TOTAL 3 GRAFTS) FIRST APPLIED 11/2/2022**     May rinse wounds with 0.9% saline  Do NOT SCRUB WOUND. Keep wounds dry in the shower unless otherwise instructed by the physician. PRIMARY DRESSING:                                 [x]   Endoform AM ( NO SALINE) cut to size and tucked into undermining wound areas (secure with steri strips)                                                          (X) Plain alginate on top        SECONDARY DRESSING:                                   [x]   LARGE OPTILOCK             (X)  ROLL GUAZE                            TRY WEARING MOON BOOT AT NIGHT.      COMPRESSION:                    ( X)    MEDIUM SPANDAGRIP TO LEFT LEG        HOW OFTEN TO CHANGE DRESSINGS:                  (  X ) THREE TIMES A WEEK ( MONDAY AND Friday AT HOME AND AS NEEDED , IF DRAINS THROUGH BANDAGE)           TRY AND WEAR COMPRESSION STOCKING TO RIGHT LEG     _________________________________________________________________  PRESSURE RELIEF AND OFF-LOADING:     Off-Loading:   With heel protector  [x] Off-loading when       [x] in bed         [x] sitting                             Specialty equipment ordered:       [] Wheelchair cushion    [] Specialty Bed/Mattress     [x] Assistive Device: please continue to use any assistive devices advised by the provider discussed during your visit   [x] Surgical shoe    [] Podus Boot(s)   [] Foam Boot(s)    [] Jacy Figueredokimberly     _____________________________________________________________________________________________     Dietary:  Continue your diet as tolerated. Eat heart-healthy foods. These foods include vegetables, fruits, nuts, beans, lean meat, fish, and whole grains. Limit sodium, alcohol, and sugar. Protein is a teresa nutrient in helping to repair damaged tissue and promote new tissue growth. Good sources of protein are milk, yogurt, cheese, fish, lean meat, and beans. If you are a diabetic, having diabetes can make it hard for wounds to heal. So try to keep your blood sugar in its target range.  __________________________________________________________________________________________________     ACTIVITY:   Activity as tolerated  ________________________________________________________________________________________________________________     PAIN:     Please note, A small amount of pain, drainage and/or bleeding from this process might be expected, and is NORMAL. Elevate the affected limb. Use over-the-counter medications you would normally use for pain as permitted by your family doctor. For persistent pain not relieved by the above interventions, please call your family doctor.  ________________________________________________________________________________  Call your doctor now or seek immediate medical care if:    You have symptoms of infection, such as: Increased pain, swelling, warmth, or redness. Red streaks leading from the area. Pus draining from the area. A fever. _______________________________________________________________________     Return Appointment:  DME/Wound Dressing Supplies Provided by:  [facility]  (Supply companies distribute one month supply at a time.   Please call them directly to reorder supplies when you run out)     ECF or Home Healthcare: Your Home Care Agency is responsible to order your supplies. Return Appointment: With Simon Tanner CNP  in 1 Week(s)                  [x] Orders placed during your visit:   CULTURE OBTAINED 11/30/22, results reviewed with patient      :  [facility]     CARDIOLOGISTS:  Monae García OR Mando Yo           Electronically signed by Michael Eaton RN on 1/18/2023 at 8:39 AM          [facility] Information: Should you experience any significant chnges in your wound(s) or have questions about your wound care, please contact the [address] [place] [address] at [address] 8:30 am - 4:30 pm and Friday 8:30 am - 1:00 pm.  If you need help with your wound outside these hours and cannot wait until we are again available, contact your PCP or go to the hospital emergency room. PLEASE NOTE: IF YOU ARE UNABLE TO OBTAIN WOUND SUPPLIES, CONTINUE TO USE THE SUPPLIES YOU HAVE AVAILABLE UNTIL YOU ARE ABLE TO REACH US. KEEP THE WOUND COVERED AT ALL TIMES.

## 2023-01-13 ENCOUNTER — HOSPITAL ENCOUNTER (OUTPATIENT)
Dept: VASCULAR LAB | Age: 68
Discharge: HOME | End: 2023-01-13
Payer: MEDICARE

## 2023-01-13 DIAGNOSIS — S91.302A NON-HEALING OPEN WOUND OF LEFT HEEL: ICD-10-CM

## 2023-01-13 DIAGNOSIS — L97.422 NON-PRESSURE CHRONIC ULCER OF LEFT HEEL AND MIDFOOT WITH FAT LAYER EXPOSED (HCC): ICD-10-CM

## 2023-01-13 DIAGNOSIS — L89.623 PRESSURE ULCER OF LEFT HEEL, STAGE 3 (HCC): ICD-10-CM

## 2023-01-13 DIAGNOSIS — I25.10 CORONARY ARTERY DISEASE INVOLVING NATIVE CORONARY ARTERY OF NATIVE HEART WITHOUT ANGINA PECTORIS: ICD-10-CM

## 2023-01-13 PROCEDURE — 93925 LOWER EXTREMITY STUDY: CPT

## 2023-01-18 ENCOUNTER — HOSPITAL ENCOUNTER (OUTPATIENT)
Dept: WOUND CARE | Age: 68
Discharge: HOME | End: 2023-01-18
Payer: MEDICARE

## 2023-01-18 VITALS
SYSTOLIC BLOOD PRESSURE: 142 MMHG | TEMPERATURE: 98.2 F | DIASTOLIC BLOOD PRESSURE: 66 MMHG | HEART RATE: 57 BPM | RESPIRATION RATE: 16 BRPM

## 2023-01-18 DIAGNOSIS — Z22.322 MRSA (METHICILLIN RESISTANT STAPH AUREUS) CULTURE POSITIVE: ICD-10-CM

## 2023-01-18 DIAGNOSIS — L89.623 PRESSURE ULCER OF LEFT HEEL, STAGE 3 (HCC): Primary | ICD-10-CM

## 2023-01-18 DIAGNOSIS — R25.2 SPASTICITY: ICD-10-CM

## 2023-01-18 DIAGNOSIS — L89.620 PRESSURE ULCER OF HEEL, LEFT, UNSTAGEABLE (HCC): ICD-10-CM

## 2023-01-18 DIAGNOSIS — G81.94 LEFT HEMIPARESIS (HCC): ICD-10-CM

## 2023-01-18 DIAGNOSIS — I63.9 CEREBROVASCULAR ACCIDENT (CVA), UNSPECIFIED MECHANISM (HCC): ICD-10-CM

## 2023-01-18 DIAGNOSIS — S91.302A NON-HEALING OPEN WOUND OF LEFT HEEL: ICD-10-CM

## 2023-01-18 PROCEDURE — 11042 DBRDMT SUBQ TIS 1ST 20SQCM/<: CPT

## 2023-01-18 PROCEDURE — 11042 DBRDMT SUBQ TIS 1ST 20SQCM/<: CPT | Performed by: NURSE PRACTITIONER

## 2023-01-18 RX ORDER — BACITRACIN ZINC AND POLYMYXIN B SULFATE 500; 1000 [USP'U]/G; [USP'U]/G
OINTMENT TOPICAL ONCE
OUTPATIENT
Start: 2023-01-18 | End: 2023-01-18

## 2023-01-18 RX ORDER — GENTAMICIN SULFATE 1 MG/G
OINTMENT TOPICAL ONCE
OUTPATIENT
Start: 2023-01-18 | End: 2023-01-18

## 2023-01-18 RX ORDER — LIDOCAINE 40 MG/G
CREAM TOPICAL ONCE
OUTPATIENT
Start: 2023-01-18 | End: 2023-01-18

## 2023-01-18 RX ORDER — BACITRACIN, NEOMYCIN, POLYMYXIN B 400; 3.5; 5 [USP'U]/G; MG/G; [USP'U]/G
OINTMENT TOPICAL ONCE
OUTPATIENT
Start: 2023-01-18 | End: 2023-01-18

## 2023-01-18 RX ORDER — LIDOCAINE 50 MG/G
OINTMENT TOPICAL ONCE
OUTPATIENT
Start: 2023-01-18 | End: 2023-01-18

## 2023-01-18 RX ORDER — LIDOCAINE 50 MG/G
OINTMENT TOPICAL ONCE
Status: COMPLETED | OUTPATIENT
Start: 2023-01-18 | End: 2023-01-18

## 2023-01-18 RX ORDER — CLOBETASOL PROPIONATE 0.5 MG/G
OINTMENT TOPICAL ONCE
OUTPATIENT
Start: 2023-01-18 | End: 2023-01-18

## 2023-01-18 RX ORDER — LIDOCAINE HYDROCHLORIDE 40 MG/ML
SOLUTION TOPICAL ONCE
OUTPATIENT
Start: 2023-01-18 | End: 2023-01-18

## 2023-01-18 RX ORDER — BETAMETHASONE DIPROPIONATE 0.05 %
OINTMENT (GRAM) TOPICAL ONCE
OUTPATIENT
Start: 2023-01-18 | End: 2023-01-18

## 2023-01-18 RX ORDER — GINSENG 100 MG
CAPSULE ORAL ONCE
OUTPATIENT
Start: 2023-01-18 | End: 2023-01-18

## 2023-01-18 RX ORDER — LIDOCAINE HYDROCHLORIDE 20 MG/ML
JELLY TOPICAL ONCE
OUTPATIENT
Start: 2023-01-18 | End: 2023-01-18

## 2023-01-18 RX ADMIN — LIDOCAINE: 50 OINTMENT TOPICAL at 08:29

## 2023-01-18 ASSESSMENT — PAIN SCALES - GENERAL
PAINLEVEL_OUTOF10: 1
PAINLEVEL_OUTOF10: 1

## 2023-01-18 ASSESSMENT — PAIN DESCRIPTION - DESCRIPTORS
DESCRIPTORS: TINGLING
DESCRIPTORS: ACHING

## 2023-01-18 ASSESSMENT — PAIN DESCRIPTION - ORIENTATION
ORIENTATION: LEFT
ORIENTATION: LEFT

## 2023-01-18 ASSESSMENT — PAIN DESCRIPTION - FREQUENCY
FREQUENCY: INTERMITTENT
FREQUENCY: INTERMITTENT

## 2023-01-18 ASSESSMENT — PAIN DESCRIPTION - ONSET
ONSET: GRADUAL
ONSET: GRADUAL

## 2023-01-18 ASSESSMENT — PAIN DESCRIPTION - PAIN TYPE
TYPE: ACUTE PAIN
TYPE: ACUTE PAIN

## 2023-01-18 ASSESSMENT — PAIN DESCRIPTION - LOCATION
LOCATION: FOOT
LOCATION: FOOT

## 2023-01-18 NOTE — DISCHARGE INSTRUCTIONS
[facility] Wound Care Physician Orders and Discharge Instructions  [address], [address]  Telephone: [phone]  [address] 8:30 am - 4:30 pm and Friday 8:30 am - 1:00 pm.          NAME:  Ryan Prather  YOB: 1955  MEDICAL RECORD NUMBER:  [id number]  TODAYS DATE:  01/25/23           VASHE ON IN CLINIC TODAY      Please wash hands with soap and water prior to and right after  every dressing change          Topical Treatments:              [x] Apply around the wound:   [x] moisturizing lotion TO LEFT LEG AND FOOT THAT IS NOT CLOSE TO WOUND         WOUND LOCATION   Left Heel **NUSHEILD #2 ( PURAPLY X 1 = TOTAL 3 GRAFTS) FIRST APPLIED 11/2/2022**     May rinse wounds with 0.9% saline  Do NOT SCRUB WOUND. Keep wounds dry in the shower unless otherwise instructed by the physician.         PRIMARY DRESSING:                                 [x] HYDROFERA BLUE TRANSFER cut to size and tucked into undermining wound areas (secure with steri strips)                                                                                 SECONDARY DRESSING:              [x]   LARGE OPTILOCK             (X)  ROLL GUAZE                            HOW OFTEN TO CHANGE DRESSINGS:                  (  X ) THREE TIMES A WEEK ( MONDAY AND Friday AT HOME AND AS NEEDED , IF DRAINS THROUGH BANDAGE)    COMPRESSION:                    ( X)    MEDIUM SPANDAGRIP THEN ACE WRAP TO LEFT LEG              TRY AND WEAR COMPRESSION STOCKING TO RIGHT LEG     _________________________________________________________________  PRESSURE RELIEF AND OFF-LOADING:     Off-Loading:   With heel protector ( HEEL LIFT BOOT)  [x] Off-loading when       [x] in bed         [x] sitting                             Specialty equipment ordered:       [] Wheelchair cushion    [] Specialty Bed/Mattress     [x] Assistive Device: please continue to use any assistive devices advised by the provider discussed during your visit   [x] Surgical shoe    [] Podus Boot(s)   [] Foam Boot(s)    [] CROW Boot     _____________________________________________________________________________________________     Dietary:  Continue your diet as tolerated. Eat heart-healthy foods. These foods include vegetables, fruits, nuts, beans, lean meat, fish, and whole grains. Limit sodium, alcohol, and sugar. Protein is a teresa nutrient in helping to repair damaged tissue and promote new tissue growth. Good sources of protein are milk, yogurt, cheese, fish, lean meat, and beans. If you are a diabetic, having diabetes can make it hard for wounds to heal. So try to keep your blood sugar in its target range.  __________________________________________________________________________________________________     ACTIVITY:   Activity as tolerated  ________________________________________________________________________________________________________________     PAIN:     Please note, A small amount of pain, drainage and/or bleeding from this process might be expected, and is NORMAL. Elevate the affected limb. Use over-the-counter medications you would normally use for pain as permitted by your family doctor. For persistent pain not relieved by the above interventions, please call your family doctor.  ________________________________________________________________________________  Call your doctor now or seek immediate medical care if:    You have symptoms of infection, such as: Increased pain, swelling, warmth, or redness. Red streaks leading from the area. Pus draining from the area. A fever. _______________________________________________________________________     Return Appointment:  DME/Wound Dressing Supplies Provided by:  [facility]  (Supply companies distribute one month supply at a time. Please call them directly to reorder supplies when you run out)     ECF or Home Healthcare:    Your Home Care Agency is responsible to order your supplies. Return Appointment: With Mario Alberto Mead CNP  in 1 Week(s)                  [x] Orders placed during your visit:   CULTURE OBTAINED 11/30/22, results reviewed with patient      :  [address] [place] [address]    Electronically signed by Donna Carlin RN on 1/25/2023 at 10:29 AM       CARDIOLOGISTS:  [id number] Information: Should you experience any significant chnges in your wound(s) or have questions about your wound care, please contact the [address] [place] [address] at [address] 8:30 am - 4:30 pm and Friday 8:30 am - 1:00 pm.  If you need help with your wound outside these hours and cannot wait until we are again available, contact your PCP or go to the hospital emergency room. PLEASE NOTE: IF YOU ARE UNABLE TO OBTAIN WOUND SUPPLIES, CONTINUE TO USE THE SUPPLIES YOU HAVE AVAILABLE UNTIL YOU ARE ABLE TO REACH US. KEEP THE WOUND COVERED AT ALL TIMES.

## 2023-01-18 NOTE — PROGRESS NOTES
[address]   Progress Note and Procedure Note      Veronica Villegas  MEDICAL RECORD NUMBER:  [id number]  AGE: 79 y.o. GENDER: male  : 1955  EPISODE DATE:  2023    Subjective:     Chief Complaint   Patient presents with    Wound Check     Follow up visit for left heel wound         HISTORY of PRESENT ILLNESS HPI  Donald Shankar is a 79year old male presenting today for follow up for left heel wound. 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 Pt is as active as he can be. Once wound is closed can resume physical therapy with goal of walking with walker instead of using wheelchair for mobility. History of Wound Context:   On 22 fell and sustained displaced fracture of base of neck of left femur and developed  a pressure ulcer left heel. Postop he developed left heel DTI   2022 pt first seen at [address] [place] [address],[address] for treatment of left heel wound dx as stable eschar over wound. Pt has an offloading boot in place to float heel and prevent further heel damage. Pt has hx of  stroke with left hemiparesis. Pt has residual left sided weakness and ongoing therapy has been delayed because of pressure ulcer. Pt also has chronic bilateral lower leg edema for which he uses compression stockings. Not using compression on left leg because of wound. Pt has spastic movements bilateral upper and lower extremities since after stroke with is tx with Baclofen; but friction still a concern. PT and DP pulse strong with Doppler. Reports getting protein supplements. Pt transferring only, no walking with walker still too unstable. Noting intermittent spastic movements legs during visit. Concern about friction on left heel with this movement. Pt wears post-op shoe on left foot. His son Kaylyn Luna and daughter Eloy Keene are monitoring and changing outer dressings as needed. 10/19/22 Eschar debrided because of drainage and redness. Culture of MRSA tx with antibiotics. Wound now designated as a Stage 3 pressure ulcer. 10/22 applied for skin substitutes. Theraskin is preferable for its robust nature especially on a heel wound. Progress of wound:    After wound debrided on 10/19/22 measured 3 x 3.5 x 1.5. Theraskin denied. We have provided compression therapy, tx wound infection  and application of collagen, Puraply and NuShield products to wound with  little progress to healing. 1/11/2023 wound measures 2.2 x 2.9 x 0.4. Spoke with Cleo about possibly re-applying to insurance for [employer]. Lack of progress to wound healing is also delaying needed physical therapy/rehab. Pt scheduled for ultrasounds bilateral lower legs on 1/13/23 1/18/2023: Pt presents today with less lower leg/ankle edema noted; wound with some maceration and still with small amount of undermining from 9-12 o'clock. Results of vascular study from 1/13/23:   Summary        APPLE not obtained. Right lower extremity demonstrates occluded posterior    tibial and peroneal arteries but grossly normal macrovascular flow. Left    lower extremity demonstrates no evidence of hemodynamically significant    stenosis or occlusion. Discussed skin graft application with pt and his son and pt has agreed to get Theraskin to be applied next week. Spoke with son about application of Endoform to fit into wound and tucked into undermining with dressing changes. Will also secure with steri strips. No overt signs of infection. Pt to use pressure redistribution boots on left heel during the night instead of \"fashioned\" post-op shoe.         Wound/Ulcer Pain Timing/Severity: intermittent  Quality of pain: tender  Severity:  0 / 10   Modifying Factors: Pain is relieved/improved with rest, repositioning  Associated Signs/Symptoms: edema and drainage     Ulcer Identification:  Ulcer Type: pressure ulcer     Contributing Factors: edema; while hospitalized developed wound from chronic pressure and decreased mobility     Acute Wound: N/A not an acute wound     PAST MEDICAL HISTORY        Diagnosis Date    CAD (coronary artery disease)     HTN (hypertension)     Hyperlipemia     Left hemiparesis ([address] 8/11/2020    MRSA (methicillin resistant staph aureus) culture positive 9/14/2022    heel    Non-healing open wound of left heel 10/12/2022    Pressure injury of left ankle, stage 2 ([address] 9/13/2021    Pressure ulcer of heel, left, unstageable ([address] 8/8/2022    Stable eschar covered.     Pressure ulcer of left heel, stage 3 ([address] 10/20/2022    S/P PTCA (percutaneous transluminal coronary angioplasty) 2010    two stents place    Spasticity 7/9/2019    Stroke ([address] 10/2012    left-sided weakness       PAST SURGICAL HISTORY    Past Surgical History:   Procedure Laterality Date    CARDIAC SURGERY      HIP SURGERY Left     INGUINAL HERNIA REPAIR      right side    INTRACAPSULAR CATARACT EXTRACTION Right 08/30/2019    PHACOEMULSIFICATION WITH INTRAOCULAR LENS IMPLANT performed by Shani Salcedo MD at [address] [place], [place] [address]vd EXTRACTION Left 09/06/2019    PHACOEMULSIFICATION WITH INTRAOCULAR LENS IMPLANT performed by Shani Salcedo MD at [facility]    Family History   Problem Relation Age of Onset    Hypertension Mother     Heart Disease Father     Breast Cancer Sister     Heart Attack Brother        SOCIAL HISTORY    Social History     Tobacco Use    Smoking status: Never    Smokeless tobacco: Never   Vaping Use    Vaping Use: Never used   Substance Use Topics    Alcohol use: No    Drug use: No       ALLERGIES    No Known Allergies    MEDICATIONS    Current Outpatient Medications on File Prior to Encounter   Medication Sig Dispense Refill    baclofen (LIORESAL) 10 MG tablet Take 1 tablet by mouth 2 times daily 180 tablet 1    lisinopril (PRINIVIL;ZESTRIL) 20 MG tablet Take 1 tablet by mouth daily 90 tablet 0    hydrALAZINE (APRESOLINE) 50 MG tablet Take 1 tablet by mouth 3 times daily 270 tablet 0    Wound Dressings ([facility] WOUND/BURN DRESSING) GEL gel Apply 1 each topically daily 1 each 1    senna-docusate (PERICOLACE) 8.6-50 MG per tablet Take 1 tablet by mouth 2 times daily      atorvastatin (LIPITOR) 40 MG tablet Take 1 tablet by mouth daily 90 tablet 3    amLODIPine (NORVASC) 10 MG tablet Take 1 tablet by mouth daily 90 tablet 3    gabapentin (NEURONTIN) 100 MG capsule Take 1 capsule by mouth 4 times daily for 180 days. 360 capsule 1    loratadine (CLARITIN) 10 MG tablet Take 1 tablet by mouth daily Pt needs to come in and be seen before more rx's are given. 90 tablet 1    aspirin 81 MG tablet Take 81 mg by mouth daily      Sennosides 17.2 MG TABS Take 17.2 mg by mouth       No current facility-administered medications on file prior to encounter. REVIEW OF SYSTEMS  Review of Systems    Pertinent items are noted in HPI.     Objective:      BP (!) 142/66   Pulse 57   Temp 98.2 °F (36.8 °C) (Temporal)   Resp 16     Wt Readings from Last 3 Encounters:   09/29/22 228 lb 14.4 oz (103.8 kg)   09/01/22 230 lb (104.3 kg)   08/08/22 230 lb (104.3 kg)       PHYSICAL EXAM  Physical Exam    General Appearance: alert and oriented to person, place and time and in no acute distress  Skin: warm and dry, no rash or erythema  Head: normocephalic and atraumatic  Eyes: pupils equal, round, and reactive to light  Pulmonary/Chest: normal air movement, no respiratory distress  Cardiovascular: normal rate, regular rhythm, and intact distal pulses  Extremities: no cyanosis and no clubbing      Assessment:        Problem List Items Addressed This Visit       Pressure ulcer of heel, left, unstageable (HCC)    Relevant Orders    Initiate Outpatient Wound Care Protocol    MRSA (methicillin resistant staph aureus) culture positive    Relevant Orders    Initiate Outpatient Wound Care Protocol    Non-healing open wound of left heel    Relevant Orders    Initiate Outpatient Wound Care Protocol    Pressure ulcer of left heel, stage 3 (HCC) - Primary    Relevant Orders    Initiate Outpatient Wound Care Protocol    Stroke [facility]) (Chronic)    Relevant Orders    Initiate Outpatient Wound Care Protocol    Spasticity    Relevant Orders    Initiate Outpatient Wound Care Protocol    Left hemiparesis [facility])    Relevant Orders    Initiate Outpatient Wound Care Protocol        Procedure Note  Indications:  Based on my examination of this patient's wound(s)/ulcer(s) today, debridement is required to promote healing and evaluate the wound base. Performed by: MADISYN Gomez CNP    Consent obtained:  Yes    Time out taken:  Yes    Pain Control: Anesthetic  Anesthetic: 5% Lidocaine Ointment Topical       Debridement: Excisional Debridement    Using curette and forceps the wound(s)/ulcer(s) was/were debrided down through and including the removal of epidermis, dermis, and subcutaneous tissue. Devitalized Tissue Debrided:  fibrin, biofilm, and slough    Pre Debridement Measurements:  Are located in the [place]  Documentation Flow Sheet    Diabetic/Pressure/Non Pressure Ulcers only:  Ulcer: Pressure ulcer, Stage 3     Wound/Ulcer #: 1    Post Debridement Measurements:  Wound/Ulcer Descriptions are Pre Debridement except measurements:    Wound 08/08/22 Heel Left #1 ( states since about 6/24/2022) (Active)   Wound Image   01/04/23 0819   Wound Etiology Pressure Unstageable 08/08/22 1404   Dressing Status New dressing applied 01/18/23 0848   Wound Cleansed Vashe 01/18/23 0840   Dressing/Treatment Other (comment); Alginate;Steri-strips;Roll gauze 01/18/23 0848   Offloading for Diabetic Foot Ulcers Post op shoe 01/18/23 0848   Wound Length (cm) 1.9 cm 01/18/23 0815   Wound Width (cm) 2.4 cm 01/18/23 0815   Wound Depth (cm) 0.4 cm 01/18/23 0815   Wound Surface Area (cm^2) 4.56 cm^2 01/18/23 0815   Change in Wound Size % (l*w) 73.33 01/18/23 0815   Wound Volume (cm^3) 1.824 cm^3 01/18/23 0815   Wound Healing % -7 01/18/23 0815 Post-Procedure Length (cm) 2 cm 01/18/23 0833   Post-Procedure Width (cm) 2.5 cm 01/18/23 0833   Post-Procedure Depth (cm) 0.5 cm 01/18/23 0833   Post-Procedure Surface Area (cm^2) 5 cm^2 01/18/23 0833   Post-Procedure Volume (cm^3) 2.5 cm^3 01/18/23 0833   Undermining Starts ___ O'Clock 9 01/18/23 0815   Undermining Ends___ O'Clock 12 01/18/23 0815   Undermining Maxium Distance (cm) 0.8 01/18/23 0815   Wound Assessment Granulation tissue;Slough 01/18/23 0815   Drainage Amount Moderate 01/18/23 0815   Drainage Description Thick; Yellow;Serosanguinous 01/18/23 0815   Odor None 01/18/23 0815   Anahy-wound Assessment Maceration;Dry/flaky 01/18/23 0815   Margins Epibole (rolled edges) 01/18/23 0815   Wound Thickness Description not for Pressure Injury Full thickness 01/18/23 0815   Number of days: 162          Total Surface Area Debrided:  5 sq cm     Estimated Blood Loss:  Estimated amount of blood loss is 1 ml. Hemostasis Achieved:  by pressure    Procedural Pain:  1  / 10     Post Procedural Pain:  0 / 10     Response to treatment:  Well tolerated by patient. Plan:     Treatment Note please see attached Discharge Instructions    Written patient dismissal instructions given to patient and signed by patient or POA.          Discharge [address] [facility] Physician Orders and Discharge Instructions  [address] [place] [address], [place] [address]  Telephone: [phone]   [address] 8:30 am - 4:30 pm and Friday 8:30 am - 1:00 pm.          NAME:  Debi Mosqueda  YOB: 1955  MEDICAL RECORD NUMBER:  [id number]  TODAYS DATE:  01/18/23           VASHE ON IN CLINIC TODAY      Please wash hands with soap and water prior to and right after  every dressing change          Topical Treatments:              [x] Apply around the wound:   [x] moisturizing lotion TO LEFT LEG AND FOOT THAT IS NOT CLOSE TO WOUND         WOUND LOCATION Left Heel **NUSHEILD #2 ( PURAPLY X 1 = TOTAL 3 GRAFTS) FIRST APPLIED 11/2/2022**     May rinse wounds with 0.9% saline  Do NOT SCRUB WOUND. Keep wounds dry in the shower unless otherwise instructed by the physician. PRIMARY DRESSING:                                 [x]   Endoform AM ( NO SALINE) cut to size and tucked into undermining wound areas (secure with steri strips)                                                          (X) Plain alginate on top        SECONDARY DRESSING:                                   [x]   LARGE OPTILOCK             (X)  ROLL GUAZE                            TRY WEARING MOON BOOT AT NIGHT. COMPRESSION:                    ( X)    MEDIUM SPANDAGRIP TO LEFT LEG        HOW OFTEN TO CHANGE DRESSINGS:                  (  X ) THREE TIMES A WEEK ( MONDAY AND Friday AT HOME AND AS NEEDED , IF DRAINS THROUGH BANDAGE)           TRY AND WEAR COMPRESSION STOCKING TO RIGHT LEG     _________________________________________________________________  PRESSURE RELIEF AND OFF-LOADING:     Off-Loading:   With heel protector  [x] Off-loading when       [x] in bed         [x] sitting                             Specialty equipment ordered:       [] Wheelchair cushion    [] Specialty Bed/Mattress     [x] Assistive Device: please continue to use any assistive devices advised by the provider discussed during your visit   [x] Surgical shoe    [] Podus Boot(s)   [] Foam Boot(s)    [] CROW Boot     _____________________________________________________________________________________________     Dietary:  Continue your diet as tolerated. Eat heart-healthy foods. These foods include vegetables, fruits, nuts, beans, lean meat, fish, and whole grains. Limit sodium, alcohol, and sugar. Protein is a teresa nutrient in helping to repair damaged tissue and promote new tissue growth. Good sources of protein are milk, yogurt, cheese, fish, lean meat, and beans.   If you are a diabetic, having diabetes can make it hard for wounds to heal. So try to keep your blood sugar in its target range.  __________________________________________________________________________________________________     ACTIVITY:   Activity as tolerated  ________________________________________________________________________________________________________________     PAIN:     Please note, A small amount of pain, drainage and/or bleeding from this process might be expected, and is NORMAL. Elevate the affected limb. Use over-the-counter medications you would normally use for pain as permitted by your family doctor. For persistent pain not relieved by the above interventions, please call your family doctor.  ________________________________________________________________________________  Call your doctor now or seek immediate medical care if:    You have symptoms of infection, such as: Increased pain, swelling, warmth, or redness. Red streaks leading from the area. Pus draining from the area. A fever. _______________________________________________________________________     Return Appointment:  DME/Wound Dressing Supplies Provided by:  [facility]  (Supply companies distribute one month supply at a time. Please call them directly to reorder supplies when you run out)     ECF or Home Healthcare: Your Home Care Agency is responsible to order your supplies.      Return Appointment: With Spike Velarde CNP  in 1 Week(s)                  [x] Orders placed during your visit:   CULTURE OBTAINED 11/30/22, results reviewed with patient      :  HILARY     CARDIOLOGISTS:  Dyanna Gitelman OR Mando Yo           Electronically signed by Doc Rosado RN on 1/18/2023 at 8:39 AM          [address] [place] [address] Information: Should you experience any significant chnges in your wound(s) or have questions about your wound care, please contact the [address] [place] [address] at [address] 8:30 am - 4:30 pm and Friday 8:30 am - 1:00 pm.  If you need help with your wound outside these hours and cannot wait until we are again available, contact your PCP or go to the hospital emergency room. PLEASE NOTE: IF YOU ARE UNABLE TO OBTAIN WOUND SUPPLIES, CONTINUE TO USE THE SUPPLIES YOU HAVE AVAILABLE UNTIL YOU ARE ABLE TO REACH US. KEEP THE WOUND COVERED AT ALL TIMES.                          Electronically signed by MADISYN Corbin CNP on 1/18/2023 at 9:41 AM

## 2023-01-25 ENCOUNTER — HOSPITAL ENCOUNTER (OUTPATIENT)
Dept: WOUND CARE | Age: 68
Discharge: HOME | End: 2023-01-25
Payer: MEDICARE

## 2023-01-25 VITALS
SYSTOLIC BLOOD PRESSURE: 134 MMHG | DIASTOLIC BLOOD PRESSURE: 79 MMHG | HEART RATE: 65 BPM | TEMPERATURE: 97.2 F | RESPIRATION RATE: 18 BRPM

## 2023-01-25 DIAGNOSIS — L89.620 PRESSURE ULCER OF HEEL, LEFT, UNSTAGEABLE (HCC): ICD-10-CM

## 2023-01-25 DIAGNOSIS — R25.2 SPASTICITY: ICD-10-CM

## 2023-01-25 DIAGNOSIS — I63.9 CEREBROVASCULAR ACCIDENT (CVA), UNSPECIFIED MECHANISM (HCC): ICD-10-CM

## 2023-01-25 DIAGNOSIS — G81.94 LEFT HEMIPARESIS (HCC): ICD-10-CM

## 2023-01-25 DIAGNOSIS — L89.623 PRESSURE ULCER OF LEFT HEEL, STAGE 3 (HCC): ICD-10-CM

## 2023-01-25 DIAGNOSIS — S91.302A NON-HEALING OPEN WOUND OF LEFT HEEL: Primary | ICD-10-CM

## 2023-01-25 DIAGNOSIS — Z22.322 MRSA (METHICILLIN RESISTANT STAPH AUREUS) CULTURE POSITIVE: ICD-10-CM

## 2023-01-25 PROCEDURE — 11045 DBRDMT SUBQ TISS EACH ADDL: CPT

## 2023-01-25 PROCEDURE — 11042 DBRDMT SUBQ TIS 1ST 20SQCM/<: CPT | Performed by: NURSE PRACTITIONER

## 2023-01-25 RX ORDER — LIDOCAINE HYDROCHLORIDE 40 MG/ML
SOLUTION TOPICAL ONCE
OUTPATIENT
Start: 2023-01-25 | End: 2023-01-25

## 2023-01-25 RX ORDER — LIDOCAINE 50 MG/G
OINTMENT TOPICAL ONCE
OUTPATIENT
Start: 2023-01-25 | End: 2023-01-25

## 2023-01-25 RX ORDER — LIDOCAINE 40 MG/G
CREAM TOPICAL ONCE
OUTPATIENT
Start: 2023-01-25 | End: 2023-01-25

## 2023-01-25 RX ORDER — GENTAMICIN SULFATE 1 MG/G
OINTMENT TOPICAL ONCE
OUTPATIENT
Start: 2023-01-25 | End: 2023-01-25

## 2023-01-25 RX ORDER — BETAMETHASONE DIPROPIONATE 0.05 %
OINTMENT (GRAM) TOPICAL ONCE
OUTPATIENT
Start: 2023-01-25 | End: 2023-01-25

## 2023-01-25 RX ORDER — LIDOCAINE HYDROCHLORIDE 20 MG/ML
JELLY TOPICAL ONCE
OUTPATIENT
Start: 2023-01-25 | End: 2023-01-25

## 2023-01-25 RX ORDER — GINSENG 100 MG
CAPSULE ORAL ONCE
OUTPATIENT
Start: 2023-01-25 | End: 2023-01-25

## 2023-01-25 RX ORDER — BACITRACIN ZINC AND POLYMYXIN B SULFATE 500; 1000 [USP'U]/G; [USP'U]/G
OINTMENT TOPICAL ONCE
OUTPATIENT
Start: 2023-01-25 | End: 2023-01-25

## 2023-01-25 RX ORDER — BACITRACIN, NEOMYCIN, POLYMYXIN B 400; 3.5; 5 [USP'U]/G; MG/G; [USP'U]/G
OINTMENT TOPICAL ONCE
OUTPATIENT
Start: 2023-01-25 | End: 2023-01-25

## 2023-01-25 RX ORDER — CLOBETASOL PROPIONATE 0.5 MG/G
OINTMENT TOPICAL ONCE
OUTPATIENT
Start: 2023-01-25 | End: 2023-01-25

## 2023-01-25 RX ORDER — LIDOCAINE HYDROCHLORIDE 40 MG/ML
SOLUTION TOPICAL ONCE
Status: COMPLETED | OUTPATIENT
Start: 2023-01-25 | End: 2023-01-25

## 2023-01-25 RX ADMIN — LIDOCAINE HYDROCHLORIDE: 40 SOLUTION TOPICAL at 10:20

## 2023-01-25 ASSESSMENT — PAIN DESCRIPTION - DESCRIPTORS: DESCRIPTORS: ACHING;SHARP

## 2023-01-25 ASSESSMENT — PAIN DESCRIPTION - PAIN TYPE: TYPE: ACUTE PAIN

## 2023-01-25 ASSESSMENT — PAIN DESCRIPTION - FREQUENCY: FREQUENCY: INTERMITTENT

## 2023-01-25 ASSESSMENT — PAIN SCALES - GENERAL: PAINLEVEL_OUTOF10: 1

## 2023-01-25 ASSESSMENT — PAIN DESCRIPTION - LOCATION: LOCATION: FOOT

## 2023-01-25 ASSESSMENT — PAIN DESCRIPTION - ORIENTATION: ORIENTATION: LEFT

## 2023-01-25 ASSESSMENT — PAIN - FUNCTIONAL ASSESSMENT: PAIN_FUNCTIONAL_ASSESSMENT: ACTIVITIES ARE NOT PREVENTED

## 2023-01-25 ASSESSMENT — PAIN DESCRIPTION - ONSET: ONSET: ON-GOING

## 2023-01-25 NOTE — DISCHARGE INSTR - COC
Continuity of Care Form    Patient Name: Edy Gudino   :  1955  MRN:  [id number]    Admit date:  (Not on file)  Discharge date:  ***    Code Status Order: Prior   Advance Directives:     Admitting Physician:  No admitting provider for patient encounter. PCP: Caden Wooten MD    Discharging Nurse: [facility] Unit/Room#: No information available for this encounter.   Discharging Unit Phone Number: ***    Emergency Contact:   Extended Emergency Contact Information  Primary Emergency Contact: Viri Hahn   [address] Phone: [phone]  Relation: Child    Past Surgical History:  Past Surgical History:   Procedure Laterality Date    CARDIAC SURGERY      HIP SURGERY Left     INGUINAL HERNIA REPAIR      right side    INTRACAPSULAR CATARACT EXTRACTION Right 2019    PHACOEMULSIFICATION WITH INTRAOCULAR LENS IMPLANT performed by Jas Severino MD at [address] Left 2019    PHACOEMULSIFICATION WITH INTRAOCULAR LENS IMPLANT performed by Jas Severino MD at [facility]       Immunization History:   Immunization History   Administered Date(s) Administered    COVID-19, [facility], Primary or Immunocompromised, (age 12y+), IM, 100 mcg/0.5mL 2021    COVID-19, PFIZER PURPLE top, DILUTE for use, (age 15 y+), 30mcg/0.3mL 2021, 2021    Influenza, FLUAD, (age 72 y+), Adjuvanted, 0.5mL 10/09/2020    Pneumococcal Polysaccharide ([id number]) 2020    Tdap (Boostrix, Adacel) 2021       Active Problems:  Patient Active Problem List   Diagnosis Code    Coronary artery disease involving native coronary artery of native heart without angina pectoris I25.10    Cerebral infarction ([address] I63.9    Stroke ([facility] I63.9    Abnormality of gait as late effect of cerebrovascular accident (CVA) I69.398, R26.9    Essential hypertension I10    Hyperlipidemia E78.5    Combined forms of age-related cataract, bilateral H25.813    Presence of intraocular lens Z96.1    Spasticity R25.2    Left hemiparesis ([facility]) G81.94    Pressure ulcer of heel, left, unstageable ([facility]) L89.620    MRSA (methicillin resistant staph aureus) culture positive Z22.322    Non-healing open wound of left heel S91.302A    Pressure ulcer of left heel, stage 3 ([facility]) N23.196       Isolation/Infection:   Isolation            No Isolation          Patient Infection Status       Infection Onset Added Last Indicated Last Indicated By Review Planned Expiration Resolved Resolved By    MRSA 09/07/22 09/10/22 11/02/22 Culture, Anaerobic and Aerobic        Resolved    COVID-19 (Rule Out) 06/17/20 06/17/20 06/17/20 COVID-19 Ambulatory (Ordered)   06/19/20 Rule-Out Test Resulted            Nurse Assessment:  Last Vital Signs: There were no vitals taken for this visit. Last documented pain score (0-10 scale):    Last Weight:   Wt Readings from Last 1 Encounters:   09/29/22 228 lb 14.4 oz (103.8 kg)     Mental Status:  {IP PT MENTAL STATUS:20030}    IV Access:  { MAHENDRA IV ACCESS:091013292}    Nursing Mobility/ADLs:  Walking   {[facility] DME DAYANARA:387816704}  Transfer  {[facility] DME ZNVN:804554259}  Bathing  {[facility] DME OLHQ:412610820}  Dressing  {[facility] DME KSXF:919388856}  Toileting  {[facility] DME OIBO:648822285}  Feeding  {[facility] DME AGUZ:169617223}  Med Admin  {[facility] DME MXBB:037434961}  Med Delivery   {[facility] MED Delivery:317831399}    Wound Care Documentation and Therapy:  Wound 08/08/22 Heel Left #1 ( states since about 6/24/2022) (Active)   Wound Image   01/04/23 0819   Dressing Status New dressing applied 01/18/23 0848   Wound Cleansed Vashe 01/18/23 0840   Dressing/Treatment Other (comment); Alginate;Steri-strips;Roll gauze 01/18/23 0848   Offloading for Diabetic Foot Ulcers Post op shoe 01/18/23 0848   Wound Length (cm) 1.9 cm 01/18/23 0815   Wound Width (cm) 2.4 cm 01/18/23 0815   Wound Depth (cm) 0.4 cm 01/18/23 0815   Wound Surface Area (cm^2) 4.56 cm^2 01/18/23 0815 Change in Wound Size % (l*w) 73.33 01/18/23 0815   Wound Volume (cm^3) 1.824 cm^3 01/18/23 0815   Wound Healing % -7 01/18/23 0815   Post-Procedure Length (cm) 2 cm 01/18/23 0833   Post-Procedure Width (cm) 2.5 cm 01/18/23 0833   Post-Procedure Depth (cm) 0.5 cm 01/18/23 0833   Post-Procedure Surface Area (cm^2) 5 cm^2 01/18/23 0833   Post-Procedure Volume (cm^3) 2.5 cm^3 01/18/23 0833   Undermining Starts ___ O'Clock 9 01/18/23 0815   Undermining Ends___ O'Clock 12 01/18/23 0815   Undermining Maxium Distance (cm) 0.8 01/18/23 0815   Wound Assessment Granulation tissue;Slough 01/18/23 0815   Drainage Amount Moderate 01/18/23 0815   Drainage Description Thick; Yellow;Serosanguinous 01/18/23 0815   Odor None 01/18/23 0815   Anahy-wound Assessment Maceration;Dry/flaky 01/18/23 0815   Margins Epibole (rolled edges) 01/18/23 0815   Wound Thickness Description not for Pressure Injury Full thickness 01/18/23 0815   Number of days: 169        Elimination:  Continence: Bowel: {YES / OH:71096}  Bladder: {YES / UA:43527}  Urinary Catheter: {Urinary Catheter:178371406}   Colostomy/Ileostomy/Ileal Conduit: {YES / CZ:73402}       Date of Last BM: ***  No intake or output data in the 24 hours ending 01/25/23 0951  No intake/output data recorded.     Safety Concerns:     [employer] Safety Concerns:948469432}    Impairments/Disabilities:      [employer] Impairments/Disabilities:666828094}    Nutrition Therapy:  Current Nutrition Therapy:   [employer] Diet List:878029196}    Routes of Feeding: {CHP DME Other Feedings:381327214}  Liquids: {Slp liquid thickness:05621}  Daily Fluid Restriction: {CHP DME Yes amt example:712607478}  Last Modified Barium Swallow with Video (Video Swallowing Test): {Done Not Done VSQU:810204423}    Treatments at the Time of Hospital Discharge:   Respiratory Treatments: ***  Oxygen Therapy:  {Therapy; copd oxygen:59700}  Ventilator:    { CC Vent MOES:931164845}    Rehab Therapies: {THERAPEUTIC INTERVENTION:6150039454}  Weight Bearing Status/Restrictions: 50Shukri Bradley CC Weight Bearin}  Other Medical Equipment (for information only, NOT a DME order):  {EQUIPMENT:431320161}  Other Treatments: ***    Patient's personal belongings (please select all that are sent with patient):  {CHP DME Belongings:219968015}    RN SIGNATURE:  {Esignature:490595594}    CASE MANAGEMENT/SOCIAL WORK SECTION    Inpatient Status Date: ***    Readmission Risk Assessment Score:  Readmission Risk              Risk of Unplanned Readmission:  0           Discharging to Facility/ Agency   Name:   Address:  Phone:  Fax:    Dialysis Facility (if applicable)   Name:  Address:  Dialysis Schedule:  Phone:  Fax:    / signature: {Esignature:245711605}    PHYSICIAN SECTION    Prognosis: {Prognosis:3873932295}    Condition at Discharge: Bo Bradley Patient Condition:284440108}    Rehab Potential (if transferring to Rehab): {Prognosis:2877659061}    Recommended Labs or Other Treatments After Discharge: ***    Physician Certification: I certify the above information and transfer of Levar Ortiz  is necessary for the continuing treatment of the diagnosis listed and that he requires {Admit to Appropriate Level of Care:16224} for {GREATER/LESS:306164004} 30 days.      Update Admission H&P: {CHP DME Changes in PNMXC:626817142}    PHYSICIAN SIGNATURE:  {Esignature:961889231}

## 2023-01-25 NOTE — PROGRESS NOTES
[address]   Progress Note and Procedure Note      Flaquita Zamudio  MEDICAL RECORD NUMBER:  [id number]  AGE: 79 y.o. GENDER: male  : 1955  EPISODE DATE:  2023    Subjective:     Chief Complaint   Patient presents with    Wound Check     Follow Up on Left Heel         HISTORY of PRESENT ILLNESS HPI  Madeleine Fraser is a 79year old male presenting today for follow up for left heel wound. 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 Pt is as active as he can be. Once wound is closed can resume physical therapy with goal of walking with walker instead of using wheelchair for mobility. History of Wound Context:   On 22 fell and sustained displaced fracture of base of neck of left femur and developed  a pressure ulcer left heel. Postop he developed left heel DTI   2022 pt first seen at [facility] for treatment of left heel wound dx as stable eschar over wound. Pt has an offloading boot in place to float heel and prevent further heel damage. Pt has hx of  stroke with left hemiparesis. Pt has residual left sided weakness and ongoing therapy has been delayed because of pressure ulcer. Pt also has chronic bilateral lower leg edema for which he uses compression stockings. Not using compression on left leg because of wound. Pt has spastic movements bilateral upper and lower extremities since after stroke with is tx with Baclofen; but friction still a concern. PT and DP pulse strong with Doppler. Reports getting protein supplements. Pt transferring only, no walking with walker still too unstable. Noting intermittent spastic movements legs during visit. Concern about friction on left heel with this movement. Pt wears post-op shoe on left foot. His son Catalina Friedman and daughter Zeyad Guthrie are monitoring and changing outer dressings as needed. 10/19/22 Eschar debrided because of drainage and redness. Culture of MRSA tx with antibiotics.   Wound now designated as a Stage 3 pressure ulcer. 10/22 applied for skin substitutes. Theraskin is preferable for its robust nature especially on a heel wound. Progress of wound:    After wound debrided on 10/19/22 measured 3 x 3.5 x 1.5. Theraskin denied. We have provided compression therapy, tx wound infection  and application of collagen, Puraply and NuShield products to wound with  little progress to healing. 1/11/2023 wound measures 2.2 x 2.9 x 0.4. Spoke with [place] about possibly re-applying to insurance for [employer]. Lack of progress to wound healing is also delaying needed physical therapy/rehab. Pt scheduled for ultrasounds bilateral lower legs on 1/13/23 1/18/2023: Pt presents today with less lower leg/ankle edema noted; wound with some maceration and still with small amount of undermining from 9-12 o'clock. Results of vascular study from 1/13/23:   Summary        APPLE not obtained. Right lower extremity demonstrates occluded posterior    tibial and peroneal arteries but grossly normal macrovascular flow. Left    lower extremity demonstrates no evidence of hemodynamically significant    stenosis or occlusion. Discussed skin graft application with pt and his son and pt has agreed to get Theraskin to be applied. No overt signs of infection. Pt to use pressure redistribution boots on left heel during the night instead of \"fashioned\" post-op shoe. Today presents with thicker, purulent drainage on old dressing and periwound maceration. Discussed with pt will hold Theraskin application until less purulent drainage. Topically will use Vashe wash for 10 minutes prior to new dressing applications, apply Hydrofera blue to wound tucked into area of undermining. Secure in place with steri strips, Spandigrip and ace wrap for compression.          Wound/Ulcer Pain Timing/Severity: intermittent  Quality of pain: tender  Severity:  3/ 10   Modifying Factors: Pain is relieved/improved with rest, repositioning  Associated Signs/Symptoms: edema and drainage     Ulcer Identification:  Ulcer Type: pressure ulcer     Contributing Factors: edema; while hospitalized developed wound from chronic pressure and decreased mobility     Acute Wound: N/A not an acute wound       PAST MEDICAL HISTORY        Diagnosis Date    CAD (coronary artery disease)     HTN (hypertension)     Hyperlipemia     Left hemiparesis ([address] 8/11/2020    MRSA (methicillin resistant staph aureus) culture positive 9/14/2022    heel    Non-healing open wound of left heel 10/12/2022    Pressure injury of left ankle, stage 2 ([address] 9/13/2021    Pressure ulcer of heel, left, unstageable ([address] 8/8/2022    Stable eschar covered.     Pressure ulcer of left heel, stage 3 ([address] 10/20/2022    S/P PTCA (percutaneous transluminal coronary angioplasty) 2010    two stents place    Spasticity 7/9/2019    Stroke ([address] 10/2012    left-sided weakness       PAST SURGICAL HISTORY    Past Surgical History:   Procedure Laterality Date    CARDIAC SURGERY      HIP SURGERY Left     INGUINAL HERNIA REPAIR      right side    INTRACAPSULAR CATARACT EXTRACTION Right 08/30/2019    PHACOEMULSIFICATION WITH INTRAOCULAR LENS IMPLANT performed by Shahram Hendricks MD at [address] [place] [address] EXTRACTION Left 09/06/2019    PHACOEMULSIFICATION WITH INTRAOCULAR LENS IMPLANT performed by Shahram Hendricks MD at [facility]    Family History   Problem Relation Age of Onset    Hypertension Mother     Heart Disease Father     Breast Cancer Sister     Heart Attack Brother        SOCIAL HISTORY    Social History     Tobacco Use    Smoking status: Never    Smokeless tobacco: Never   Vaping Use    Vaping Use: Never used   Substance Use Topics    Alcohol use: No    Drug use: No       ALLERGIES    No Known Allergies    MEDICATIONS    Current Outpatient Medications on File Prior to Encounter   Medication Sig Dispense Refill    baclofen (LIORESAL) 10 MG tablet Take 1 tablet by mouth 2 times daily 180 tablet 1    lisinopril (PRINIVIL;ZESTRIL) 20 MG tablet Take 1 tablet by mouth daily 90 tablet 0    hydrALAZINE (APRESOLINE) 50 MG tablet Take 1 tablet by mouth 3 times daily 270 tablet 0    Wound Dressings ([facility] WOUND/BURN DRESSING) GEL gel Apply 1 each topically daily 1 each 1    senna-docusate (PERICOLACE) 8.6-50 MG per tablet Take 1 tablet by mouth 2 times daily      atorvastatin (LIPITOR) 40 MG tablet Take 1 tablet by mouth daily 90 tablet 3    amLODIPine (NORVASC) 10 MG tablet Take 1 tablet by mouth daily 90 tablet 3    gabapentin (NEURONTIN) 100 MG capsule Take 1 capsule by mouth 4 times daily for 180 days. 360 capsule 1    loratadine (CLARITIN) 10 MG tablet Take 1 tablet by mouth daily Pt needs to come in and be seen before more rx's are given. 90 tablet 1    aspirin 81 MG tablet Take 81 mg by mouth daily      Sennosides 17.2 MG TABS Take 17.2 mg by mouth       No current facility-administered medications on file prior to encounter. REVIEW OF SYSTEMS  Review of Systems    Pertinent items are noted in HPI.     Objective:      /79   Pulse 65   Temp 97.2 °F (36.2 °C) (Temporal)   Resp 18     Wt Readings from Last 3 Encounters:   09/29/22 228 lb 14.4 oz (103.8 kg)   09/01/22 230 lb (104.3 kg)   08/08/22 230 lb (104.3 kg)       PHYSICAL EXAM  Physical Exam    General Appearance: alert and oriented to person, place and time  Skin: warm and dry  Head: normocephalic and atraumatic  Eyes: pupils equal, round, and reactive to light  Pulmonary/Chest: clear to auscultation bilaterally- no wheezes, rales or rhonchi, normal air movement, no respiratory distress  Cardiovascular: normal rate and regular rhythm      Assessment:        Problem List Items Addressed This Visit       Pressure ulcer of heel, left, unstageable (HCC)    Relevant Orders    Initiate Outpatient Wound Care Protocol    MRSA (methicillin resistant staph aureus) culture positive    Relevant Orders    Initiate Outpatient Wound Care Protocol    Non-healing open wound of left heel - Primary    Relevant Orders    Initiate Outpatient Wound Care Protocol    Pressure ulcer of left heel, stage 3 (HCC)    Relevant Orders    Initiate Outpatient Wound Care Protocol    Stroke [facility]) (Chronic)    Relevant Orders    Initiate Outpatient Wound Care Protocol    Spasticity    Relevant Orders    Initiate Outpatient Wound Care Protocol    Left hemiparesis [facility])    Relevant Orders    Initiate Outpatient Wound Care Protocol        Procedure Note  Indications:  Based on my examination of this patient's wound(s)/ulcer(s) today, debridement is required to promote healing and evaluate the wound base. Performed by: MADISYN Belle CNP    Consent obtained:  Yes    Time out taken:  Yes    Pain Control: Anesthetic  Anesthetic: 4% Lidocaine Liquid Topical       Debridement: Excisional Debridement    Using curette and forceps the wound(s)/ulcer(s) was/were debrided down through and including the removal of epidermis, dermis, and subcutaneous tissue. Devitalized Tissue Debrided:  fibrin, biofilm, and slough    Pre Debridement Measurements:  Are located in the [place]  Documentation Flow Sheet    Diabetic/Pressure/Non Pressure Ulcers only:  Ulcer: Pressure ulcer, Stage 3     Wound/Ulcer #: 1    Post Debridement Measurements:  Wound/Ulcer Descriptions are Pre Debridement except measurements:    Wound 08/08/22 Heel Left #1 ( states since about 6/24/2022) (Active)   Wound Image   01/04/23 0819   Wound Etiology Pressure Unstageable 08/08/22 1404   Dressing Status Old drainage noted 01/25/23 1014   Wound Cleansed Vashe 01/18/23 0840   Dressing/Treatment Other (comment); Alginate;Steri-strips;Roll gauze 01/18/23 0848   Offloading for Diabetic Foot Ulcers Post op shoe 01/18/23 0848   Wound Length (cm) 1.7 cm 01/25/23 1014   Wound Width (cm) 2 cm 01/25/23 1014   Wound Depth (cm) 0.5 cm 01/25/23 1014 Wound Surface Area (cm^2) 3.4 cm^2 01/25/23 1014   Change in Wound Size % (l*w) 80.12 01/25/23 1014   Wound Volume (cm^3) 1.7 cm^3 01/25/23 1014   Wound Healing % 1 01/25/23 1014   Post-Procedure Length (cm) 1.8 cm 01/25/23 1023   Post-Procedure Width (cm) 2.1 cm 01/25/23 1023   Post-Procedure Depth (cm) 0.5 cm 01/25/23 1023   Post-Procedure Surface Area (cm^2) 3.78 cm^2 01/25/23 1023   Post-Procedure Volume (cm^3) 1.89 cm^3 01/25/23 1023   Undermining Starts ___ O'Clock 3 01/25/23 1014   Undermining Ends___ O'Clock 8 01/25/23 1014   Undermining Maxium Distance (cm) 0.5 01/25/23 1014   Wound Assessment Granulation tissue;Slough 01/25/23 1014   Drainage Amount Moderate 01/25/23 1014   Drainage Description Purulent; Thick; Serosanguinous 01/25/23 1014   Odor Moderate 01/25/23 1014   Anahy-wound Assessment Maceration;Fragile 01/25/23 1014   Margins Undefined edges; Unattached edges;Epibole (rolled edges) 01/25/23 1014   Wound Thickness Description not for Pressure Injury Full thickness 01/25/23 1014   Number of days: 169          Total Surface Area Debrided:  3.78 sq cm     Estimated Blood Loss:  Minimal    Hemostasis Achieved:  by pressure    Procedural Pain:  2  / 10     Post Procedural Pain:  1 / 10     Response to treatment:  Well tolerated by patient. Plan:     Treatment Note please see attached Discharge Instructions    Written patient dismissal instructions given to patient and signed by patient or POA.          Discharge [id number] [facility] Physician Orders and Discharge Instructions  [address] [place] [address], [facility]. , [place] [address]  Telephone: [phone]  [address] 8:30 am - 4:30 pm and Friday 8:30 am - 1:00 pm.          NAME:  Harriet Heller  YOB: 1955  MEDICAL RECORD NUMBER:  [id number]  TODAYS DATE:  01/25/23           VASHE ON IN CLINIC TODAY      Please wash hands with soap and water prior to and right after every dressing change          Topical Treatments:              [x] Apply around the wound:   [x] moisturizing lotion TO LEFT LEG AND FOOT THAT IS NOT CLOSE TO WOUND         WOUND LOCATION   Left Heel **MEGHAN #2 ( PURAPLY X 1 = TOTAL 3 GRAFTS) FIRST APPLIED 11/2/2022**     May rinse wounds with 0.9% saline  Do NOT SCRUB WOUND. Keep wounds dry in the shower unless otherwise instructed by the physician. PRIMARY DRESSING:                                 [x] HYDROFERA BLUE TRANSFER cut to size and tucked into undermining wound areas (secure with steri strips)                                                                                 SECONDARY DRESSING:              [x]   LARGE OPTILOCK             (X)  ROLL GUAZE                            HOW OFTEN TO CHANGE DRESSINGS:                  (  X ) THREE TIMES A WEEK ( MONDAY AND Friday AT HOME AND AS NEEDED , IF DRAINS THROUGH BANDAGE)    COMPRESSION:                    ( X)    MEDIUM SPANDAGRIP THEN ACE WRAP TO LEFT LEG              TRY AND WEAR COMPRESSION STOCKING TO RIGHT LEG     _________________________________________________________________  PRESSURE RELIEF AND OFF-LOADING:     Off-Loading:   With heel protector ( HEEL LIFT BOOT)  [x] Off-loading when       [x] in bed         [x] sitting                             Specialty equipment ordered:       [] Wheelchair cushion    [] Specialty Bed/Mattress     [x] Assistive Device: please continue to use any assistive devices advised by the provider discussed during your visit   [x] Surgical shoe    [] Podus Boot(s)   [] Foam Boot(s)    [] CROW Boot     _____________________________________________________________________________________________     Dietary:  Continue your diet as tolerated. Eat heart-healthy foods. These foods include vegetables, fruits, nuts, beans, lean meat, fish, and whole grains. Limit sodium, alcohol, and sugar.   Protein is a teresa nutrient in helping to repair damaged tissue and promote new tissue growth. Good sources of protein are milk, yogurt, cheese, fish, lean meat, and beans. If you are a diabetic, having diabetes can make it hard for wounds to heal. So try to keep your blood sugar in its target range.  __________________________________________________________________________________________________     ACTIVITY:   Activity as tolerated  ________________________________________________________________________________________________________________     PAIN:     Please note, A small amount of pain, drainage and/or bleeding from this process might be expected, and is NORMAL. Elevate the affected limb. Use over-the-counter medications you would normally use for pain as permitted by your family doctor. For persistent pain not relieved by the above interventions, please call your family doctor.  ________________________________________________________________________________  Call your doctor now or seek immediate medical care if:    You have symptoms of infection, such as: Increased pain, swelling, warmth, or redness. Red streaks leading from the area. Pus draining from the area. A fever. _______________________________________________________________________     Return Appointment:  DME/Wound Dressing Supplies Provided by:  [facility]  (Supply companies distribute one month supply at a time. Please call them directly to reorder supplies when you run out)     ECF or Home Healthcare: Your Home Care Agency is responsible to order your supplies.      Return Appointment: With Wes Mane CNP  in 1 Week(s)                  [x] Orders placed during your visit:   CULTURE OBTAINED 11/30/22, results reviewed with patient      :  [address] [place] [address]    Electronically signed by Sissy Miller RN on 1/25/2023 at 10:29 AM       CARDIOLOGISTS:  [address] Information: Should you experience any significant chnges in your wound(s) or have questions about your wound care, please contact the [address] [place] [address] at [address] 8:30 am - 4:30 pm and Friday 8:30 am - 1:00 pm.  If you need help with your wound outside these hours and cannot wait until we are again available, contact your PCP or go to the hospital emergency room. PLEASE NOTE: IF YOU ARE UNABLE TO OBTAIN WOUND SUPPLIES, CONTINUE TO USE THE SUPPLIES YOU HAVE AVAILABLE UNTIL YOU ARE ABLE TO REACH US. KEEP THE WOUND COVERED AT ALL TIMES.                 Electronically signed by MADISYN Babb CNP on 1/25/2023 at 10:49 AM

## 2023-01-31 NOTE — DISCHARGE INSTRUCTIONS
[facility] SUR[facility] Wound Care Physician Orders and Discharge Instructions  [address] [facility], [address] [place] [address]  Telephone: [phone] [address] 8:30 am - 4:30 pm and Friday 8:30 am - 1:00 pm.          NAME:  Osiris Donnelly  YOB: 1955  MEDICAL RECORD NUMBER:  [id number]  TODAYS DATE:  2/1/23           VASHE ON IN CLINIC TODAY      Please wash hands with soap and water prior to and right after  every dressing change          Topical Treatments:              [x] Apply around the wound:   [x] moisturizing lotion TO LEFT LEG AND FOOT THAT IS NOT CLOSE TO WOUND         WOUND LOCATION   Left Heel **Theraskin #1 APPLIED 2/1/2023  ( PURAPLY X 1 , NUSHEILD x2 = TOTAL 4 GRAFTS)            PRIMARY DRESSING: GRAFT IN PLACE TODAY                                 [x] Mepitel secured with Steri-strips                 DO NOT CHANGE ANYTHING BELOW THE STERI- STRIPS                                                             SECONDARY DRESSING:               [x]   LARGE OPTILOCK             (X)  ROLL GUAZE                            HOW OFTEN TO CHANGE DRESSINGS:                  (  X ) ONCE AT HOME ON Saturday OR Sunday, AND AS NEEDED , IF DRAINS THROUGH BANDAGE)  ONLY OUTER DRESSING IS TO BE CHANGED     COMPRESSION:                    ( X)    MEDIUM SPANDAGRIP THEN ACE WRAP TO LEFT LEG              TRY AND WEAR COMPRESSION STOCKING TO RIGHT LEG         YOU HAVE HAD AN THERASKIN       PLACED ON YOUR WOUND TODAY. FOR BEST RESULTS, WE RECOMMEND YOU LIMIT YOUR ACTIVITY FOR THE NEXT WEEK AS MUCH AS POSSIBLE. AVOID LONG PERIODS OF TIME ON YOUR FEET.  THIS ALSO INCLUDES ELEVATING YOUR LEGS AND FEET 3-4 TIMES DAILY FOR AT LEAST 20-30 MINUTES ON PILLOWS AND AT NIGHT SO THAT THEY ARE HIGHER THAN THE LEVEL OF YOUR HEART     Electronically signed by Cait Das RN on 2/1/2023 at 9:12 AM     _________________________________________________________________  PRESSURE RELIEF AND OFF-LOADING:     Off-Loading:   With heel protector ( HEEL LIFT BOOT)  [x] Off-loading when       [x] in bed         [x] sitting                             Specialty equipment ordered:       [] Wheelchair cushion    [] Specialty Bed/Mattress     [x] Assistive Device: please continue to use any assistive devices advised by the provider discussed during your visit   [x] Surgical shoe    [] Podus Boot(s)   [] Foam Boot(s)    [] Berna Ybarramarychuy     _____________________________________________________________________________________________     Dietary:  Continue your diet as tolerated. Eat heart-healthy foods. These foods include vegetables, fruits, nuts, beans, lean meat, fish, and whole grains. Limit sodium, alcohol, and sugar. Protein is a teresa nutrient in helping to repair damaged tissue and promote new tissue growth. Good sources of protein are milk, yogurt, cheese, fish, lean meat, and beans. If you are a diabetic, having diabetes can make it hard for wounds to heal. So try to keep your blood sugar in its target range.  __________________________________________________________________________________________________     ACTIVITY:   Activity as tolerated  ________________________________________________________________________________________________________________     PAIN:     Please note, A small amount of pain, drainage and/or bleeding from this process might be expected, and is NORMAL. Elevate the affected limb. Use over-the-counter medications you would normally use for pain as permitted by your family doctor. For persistent pain not relieved by the above interventions, please call your family doctor.  ________________________________________________________________________________  Call your doctor now or seek immediate medical care if:    You have symptoms of infection, such as: Increased pain, swelling, warmth, or redness. Red streaks leading from the area. Pus draining from the area.   A fever.      _______________________________________________________________________     Return Appointment:  DME/Wound Dressing Supplies Provided by:  [facility]  (Supply companies distribute one month supply at a time. Please call them directly to reorder supplies when you run out)     ECF or Home Healthcare: Your Home Care Agency is responsible to order your supplies. Return Appointment: With Walt Mary CNP  in 1 Week(s)                  [x] Orders placed during your visit:   CULTURE OBTAINED 11/30/22, results reviewed with patient      :  [address] [place] [address]        Electronically signed by Yue Varma RN on 2/1/2023 at 9:00 AM       CARDIOLOGISTS:  [address] Information: Should you experience any significant chnges in your wound(s) or have questions about your wound care, please contact the [address] [place] [address] at [address] 8:30 am - 4:30 pm and Friday 8:30 am - 1:00 pm.  If you need help with your wound outside these hours and cannot wait until we are again available, contact your PCP or go to the hospital emergency room. PLEASE NOTE: IF YOU ARE UNABLE TO OBTAIN WOUND SUPPLIES, CONTINUE TO USE THE SUPPLIES YOU HAVE AVAILABLE UNTIL YOU ARE ABLE TO REACH US. KEEP THE WOUND COVERED AT ALL TIMES.

## 2023-02-01 ENCOUNTER — HOSPITAL ENCOUNTER (OUTPATIENT)
Dept: WOUND CARE | Age: 68
Discharge: HOME | End: 2023-02-01
Payer: MEDICARE

## 2023-02-01 VITALS
TEMPERATURE: 97.6 F | DIASTOLIC BLOOD PRESSURE: 82 MMHG | SYSTOLIC BLOOD PRESSURE: 118 MMHG | RESPIRATION RATE: 18 BRPM | HEART RATE: 45 BPM

## 2023-02-01 DIAGNOSIS — R25.2 SPASTICITY: ICD-10-CM

## 2023-02-01 DIAGNOSIS — L89.620 PRESSURE ULCER OF HEEL, LEFT, UNSTAGEABLE (HCC): ICD-10-CM

## 2023-02-01 DIAGNOSIS — I63.9 CEREBROVASCULAR ACCIDENT (CVA), UNSPECIFIED MECHANISM (HCC): ICD-10-CM

## 2023-02-01 DIAGNOSIS — S91.302A NON-HEALING OPEN WOUND OF LEFT HEEL: ICD-10-CM

## 2023-02-01 DIAGNOSIS — L89.623 PRESSURE ULCER OF LEFT HEEL, STAGE 3 (HCC): Primary | ICD-10-CM

## 2023-02-01 DIAGNOSIS — Z22.322 MRSA (METHICILLIN RESISTANT STAPH AUREUS) CULTURE POSITIVE: ICD-10-CM

## 2023-02-01 DIAGNOSIS — G81.94 LEFT HEMIPARESIS (HCC): ICD-10-CM

## 2023-02-01 PROCEDURE — 15275 SKIN SUB GRAFT FACE/NK/HF/G: CPT

## 2023-02-01 RX ORDER — LIDOCAINE 40 MG/G
CREAM TOPICAL ONCE
OUTPATIENT
Start: 2023-02-01 | End: 2023-02-01

## 2023-02-01 RX ORDER — BACITRACIN, NEOMYCIN, POLYMYXIN B 400; 3.5; 5 [USP'U]/G; MG/G; [USP'U]/G
OINTMENT TOPICAL ONCE
OUTPATIENT
Start: 2023-02-01 | End: 2023-02-01

## 2023-02-01 RX ORDER — GINSENG 100 MG
CAPSULE ORAL ONCE
OUTPATIENT
Start: 2023-02-01 | End: 2023-02-01

## 2023-02-01 RX ORDER — LIDOCAINE HYDROCHLORIDE 20 MG/ML
JELLY TOPICAL ONCE
OUTPATIENT
Start: 2023-02-01 | End: 2023-02-01

## 2023-02-01 RX ORDER — GENTAMICIN SULFATE 1 MG/G
OINTMENT TOPICAL ONCE
OUTPATIENT
Start: 2023-02-01 | End: 2023-02-01

## 2023-02-01 RX ORDER — LIDOCAINE 50 MG/G
OINTMENT TOPICAL ONCE
OUTPATIENT
Start: 2023-02-01 | End: 2023-02-01

## 2023-02-01 RX ORDER — BETAMETHASONE DIPROPIONATE 0.05 %
OINTMENT (GRAM) TOPICAL ONCE
OUTPATIENT
Start: 2023-02-01 | End: 2023-02-01

## 2023-02-01 RX ORDER — LIDOCAINE HYDROCHLORIDE 40 MG/ML
SOLUTION TOPICAL ONCE
Status: COMPLETED | OUTPATIENT
Start: 2023-02-01 | End: 2023-02-01

## 2023-02-01 RX ORDER — BACITRACIN ZINC AND POLYMYXIN B SULFATE 500; 1000 [USP'U]/G; [USP'U]/G
OINTMENT TOPICAL ONCE
OUTPATIENT
Start: 2023-02-01 | End: 2023-02-01

## 2023-02-01 RX ORDER — LIDOCAINE HYDROCHLORIDE 40 MG/ML
SOLUTION TOPICAL ONCE
OUTPATIENT
Start: 2023-02-01 | End: 2023-02-01

## 2023-02-01 RX ORDER — CLOBETASOL PROPIONATE 0.5 MG/G
OINTMENT TOPICAL ONCE
OUTPATIENT
Start: 2023-02-01 | End: 2023-02-01

## 2023-02-01 RX ADMIN — LIDOCAINE HYDROCHLORIDE 10 ML: 40 SOLUTION TOPICAL at 08:34

## 2023-02-01 ASSESSMENT — PAIN SCALES - GENERAL
PAINLEVEL_OUTOF10: 0
PAINLEVEL_OUTOF10: 0

## 2023-02-01 NOTE — PLAN OF CARE
TheraSkin Treatment Note #4    NAME:  Fidel Lino OF BIRTH:  1955  MEDICAL RECORD NUMBER:  [id number]  DATE:  2/1/2023    Goal:  Patient will receive safe and proper application of skin substitute. Patient will comply with caring for dressing, offloading and reporting complications. Expiration date of TheraSkin checked immediately prior to use. Package intact prior to use and no damage noted. Transport temperature controlled and acceptable. TheraSkin was prepared for application by removing from package. TheraSkin was rinsed 2 times in room temperature normal saline for 2 minutes each time. A 2nd saline rinse was left on the TheraSkin until the physician was ready to apply it within 120 minutes of thawing. White side goes against ulcer bed. TheraSkin was applied to LEFT HEEL and affixed with steri-strips by the physician. STERI STRIPS was applied on top of non-adherent dressing. Fluffed gauze was applied. Dressing was secured with cover roll type tape to stabilize graft. Coban or ace wrap was applied to secure graft and decrease edema. Patient/caregiver was instructed not to remove dressing and to keep it clean and dry. Pt/family/caregiver was instructed on signs and symptoms of complications to report such as draining through, falling down/slipping, getting wet, or severe pain or tingling in toes. Pt/family/caregiver was instructed on need for offloading and elevation of affected extremity (if applicable) and on use of prescribed offloading device. Thera Skin may be applied a total of 10 times per wound over a 12 week period. Date of first application of Thera Skin for this current wound is February 1, 2023.                   Guidelines followed      Electronically signed by Tiara Elder RN on 2/1/2023 at 1:01 PM

## 2023-02-01 NOTE — PROGRESS NOTES
[address]   Progress Note and Procedure Note      Valerie Horton  MEDICAL RECORD NUMBER:  [id number]  AGE: 79 y.o. GENDER: male  : 1955  EPISODE DATE:  2023    Subjective:     Chief Complaint   Patient presents with    Wound Check     Follow up visit for left heel wound         HISTORY of PRESENT ILLNESS HPI  Andrea Gabriel is a 79year old male presenting today for follow up for left heel wound. 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 Pt is as active as he can be. Once wound is closed can resume physical therapy with goal of walking with walker instead of using wheelchair for mobility. Chronic spastic movements of extremities today, he states are worse early in am.   History of Wound Context:   On 22 fell and sustained displaced fracture of base of neck of left femur and developed  a pressure ulcer left heel. Postop he developed left heel DTI   2022 pt first seen at [facility] for treatment of left heel wound dx as stable eschar over wound. Pt has an offloading boot in place to float heel and prevent further heel damage. 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 Pt has residual left sided weakness and ongoing therapy has been delayed because of pressure ulcer. Pt also has chronic bilateral lower leg edema for which he uses compression stockings. Not using compression on left leg because of wound. Pt has spastic movements bilateral upper and lower extremities since after stroke with is tx with Baclofen; but friction still a concern. PT and DP pulse strong with Doppler. Reports getting protein supplements. Pt transferring only, no walking with walker still too unstable. Noting intermittent spastic movements legs during visit. Concern about friction on left heel with this movement. Pt wears post-op shoe on left foot. His son Niraj Shukla and daughter Arnoldo Lloyd are monitoring and changing outer dressings as needed.   10/19/22 Eschar debrided because of drainage and redness. Culture of MRSA tx with antibiotics. Wound now designated as a Stage 3 pressure ulcer. 10/22/22applied for skin substitutes. Theraskin is preferable for its robust nature especially on a heel wound. Progress of wound:    After wound debrided on 10/19/22 measured 3 x 3.5 x 1.5. We have provided compression therapy, tx wound infection  and application of collagen, Puraply and NuShield products to wound with  little progress to healing. Lack of progress to wound healing is also delaying needed for further physical therapy/rehab. Pt scheduled for ultrasounds bilateral lower legs on 1/13/23 1/18/2023: Pt presents today with less lower leg/ankle edema noted; wound with some maceration and still with small amount of undermining from 9-12 o'clock. Results of vascular study from 1/13/23:   Summary        APPLE not obtained. Right lower extremity demonstrates occluded posterior    tibial and peroneal arteries but grossly normal macrovascular flow. Left    lower extremity demonstrates no evidence of hemodynamically significant    stenosis or occlusion. Discussed skin graft application with pt and his son and pt has agreed to get Theraskin to be applied. No overt signs of infection. Pt to use pressure redistribution boots on left heel during the night instead of \"fashioned\" post-op shoe. 2/1/2023: Today presents with minimal amount of drainage from wound, wound bed pale with minimal slough noted. Wound edges with less maceration. Will apply Theraskin today. Discussed about Theraskin application today and precautions to take. Theraskin 6 cm applied ( total number of skin subs 4;  Theraskin #1)  Wound/Ulcer Pain Timing/Severity: intermittent  Quality of pain: tender  Severity:  3/ 10   Modifying Factors: Pain is relieved/improved with rest, repositioning  Associated Signs/Symptoms: edema and drainage     Ulcer Identification:  Ulcer Type: pressure ulcer Contributing Factors: edema; while hospitalized developed wound from chronic pressure and decreased mobility     Acute Wound: N/A not an acute wound    PAST MEDICAL HISTORY        Diagnosis Date    CAD (coronary artery disease)     HTN (hypertension)     Hyperlipemia     Left hemiparesis ([address] 8/11/2020    MRSA (methicillin resistant staph aureus) culture positive 9/14/2022    heel    Non-healing open wound of left heel 10/12/2022    Pressure injury of left ankle, stage 2 ([address] 9/13/2021    Pressure ulcer of heel, left, unstageable ([address] 8/8/2022    Stable eschar covered.     Pressure ulcer of left heel, stage 3 ([address] 10/20/2022    S/P PTCA (percutaneous transluminal coronary angioplasty) 2010    two stents place    Spasticity 7/9/2019    Stroke ([address] 10/2012    left-sided weakness       PAST SURGICAL HISTORY    Past Surgical History:   Procedure Laterality Date    CARDIAC SURGERY      HIP SURGERY Left     INGUINAL HERNIA REPAIR      right side    INTRACAPSULAR CATARACT EXTRACTION Right 08/30/2019    PHACOEMULSIFICATION WITH INTRAOCULAR LENS IMPLANT performed by Nuzhat Geller MD at [address] [place] [address] EXTRACTION Left 09/06/2019    PHACOEMULSIFICATION WITH INTRAOCULAR LENS IMPLANT performed by Nuzhat Geller MD at [facility]    Family History   Problem Relation Age of Onset    Hypertension Mother     Heart Disease Father     Breast Cancer Sister     Heart Attack Brother        SOCIAL HISTORY    Social History     Tobacco Use    Smoking status: Never    Smokeless tobacco: Never   Vaping Use    Vaping Use: Never used   Substance Use Topics    Alcohol use: No    Drug use: No       ALLERGIES    No Known Allergies    MEDICATIONS    Current Outpatient Medications on File Prior to Encounter   Medication Sig Dispense Refill    baclofen (LIORESAL) 10 MG tablet Take 1 tablet by mouth 2 times daily 180 tablet 1    lisinopril (PRINIVIL;ZESTRIL) 20 MG tablet Take 1 tablet by mouth daily 90 tablet 0    hydrALAZINE (APRESOLINE) 50 MG tablet Take 1 tablet by mouth 3 times daily 270 tablet 0    Wound Dressings (MEDIHONEY WOUND/BURN DRESSING) GEL gel Apply 1 each topically daily 1 each 1    senna-docusate (PERICOLACE) 8.6-50 MG per tablet Take 1 tablet by mouth 2 times daily      atorvastatin (LIPITOR) 40 MG tablet Take 1 tablet by mouth daily 90 tablet 3    amLODIPine (NORVASC) 10 MG tablet Take 1 tablet by mouth daily 90 tablet 3    gabapentin (NEURONTIN) 100 MG capsule Take 1 capsule by mouth 4 times daily for 180 days. 360 capsule 1    loratadine (CLARITIN) 10 MG tablet Take 1 tablet by mouth daily Pt needs to come in and be seen before more rx's are given. 90 tablet 1    aspirin 81 MG tablet Take 81 mg by mouth daily      Sennosides 17.2 MG TABS Take 17.2 mg by mouth       No current facility-administered medications on file prior to encounter. REVIEW OF SYSTEMS  Review of Systems    Pertinent items are noted in HPI.     Objective:      /82   Pulse (!) 45   Temp 97.6 °F (36.4 °C) (Temporal)   Resp 18     Wt Readings from Last 3 Encounters:   09/29/22 228 lb 14.4 oz (103.8 kg)   09/01/22 230 lb (104.3 kg)   08/08/22 230 lb (104.3 kg)       PHYSICAL EXAM  Physical Exam    General Appearance: alert and oriented to person, place and time  Skin: warm and dry  Head: normocephalic and atraumatic  Eyes: pupils equal, round, and reactive to light  Pulmonary/Chest: normal air movement, no respiratory distress  Cardiovascular: normal rate, regular rhythm, and intact distal pulses      Assessment:        Problem List Items Addressed This Visit       Pressure ulcer of heel, left, unstageable (HCC)    Relevant Orders    Initiate Outpatient Wound Care Protocol    MRSA (methicillin resistant staph aureus) culture positive    Relevant Orders    Initiate Outpatient Wound Care Protocol    Non-healing open wound of left heel    Relevant Orders    Initiate Outpatient Wound Care Protocol    Pressure ulcer of left heel, stage 3 (HCC) - Primary    Relevant Orders    Initiate Outpatient Wound Care Protocol    Stroke [facility]) (Chronic)    Relevant Orders    Initiate Outpatient Wound Care Protocol    Spasticity    Relevant Orders    Initiate Outpatient Wound Care Protocol    Left hemiparesis [facility])    Relevant Orders    Initiate Outpatient Wound Care Protocol        Procedure Note  Indications:  Based on my examination of this patient's wound(s)/ulcer(s) today, debridement is required to promote healing and evaluate the wound base. Performed by: MADISYN Ann CNP    Consent obtained:  Yes    Time out taken:  Yes    Pain Control: Anesthetic  Anesthetic: 4% Lidocaine Liquid Topical (10 ml)       Debridement: Excisional Debridement    Using curette and forceps the wound(s)/ulcer(s) was/were debrided down through and including the removal of epidermis, dermis, and subcutaneous tissue. Devitalized Tissue Debrided:  fibrin, biofilm, and slough    Pre Debridement Measurements:  Are located in the [facility]  Documentation Flow Sheet    Diabetic/Pressure/Non Pressure Ulcers only:  Ulcer: Pressure ulcer, Stage 3     Wound/Ulcer #: 1    Post Debridement Measurements:  Wound/Ulcer Descriptions are Pre Debridement except measurements:    Wound 08/08/22 Heel Left #1 ( states since about 6/24/2022) (Active)   Wound Image   02/01/23 0815   Wound Etiology Pressure Unstageable 08/08/22 1404   Dressing Status Old drainage noted 01/25/23 1014   Wound Cleansed Vashe 01/25/23 1045   Dressing/Treatment Other (comment); Roll gauze;Steri-strips;Silicone pad 80/00/93 3893   Offloading for Diabetic Foot Ulcers Post op shoe 02/01/23 0842   Wound Length (cm) 1.7 cm 02/01/23 0815   Wound Width (cm) 2.2 cm 02/01/23 0815   Wound Depth (cm) 0.3 cm 02/01/23 0815   Wound Surface Area (cm^2) 3.74 cm^2 02/01/23 0815   Change in Wound Size % (l*w) 78.13 02/01/23 0815   Wound Volume (cm^3) 1.122 cm^3 02/01/23 0815 Wound Healing % 34 02/01/23 0815   Post-Procedure Length (cm) 1.8 cm 02/01/23 0842   Post-Procedure Width (cm) 2.3 cm 02/01/23 0842   Post-Procedure Depth (cm) 0.4 cm 02/01/23 0842   Post-Procedure Surface Area (cm^2) 4.14 cm^2 02/01/23 0842   Post-Procedure Volume (cm^3) 1. 656 cm^3 02/01/23 0842   Undermining Starts ___ O'Clock 6 02/01/23 0815   Undermining Ends___ O'Clock 12 02/01/23 0815   Undermining Maxium Distance (cm) .7 02/01/23 0815   Wound Assessment Granulation tissue;Slough 02/01/23 0815   Drainage Amount Small 02/01/23 0815   Drainage Description Serosanguinous 02/01/23 0815   Odor Mild 02/01/23 0815   Anahy-wound Assessment Maceration 02/01/23 0815   Margins Undefined edges 02/01/23 0815   Wound Thickness Description not for Pressure Injury Full thickness 02/01/23 0815   Number of days: 176          Total Surface Area Debrided:  4.14 sq cm     Estimated Blood Loss:  Minimal    Hemostasis Achieved:  by pressure    Procedural Pain:  1  / 10     Post Procedural Pain:  0 / 10     Response to treatment:  Well tolerated by patient. Skin Substitute Applied:       Theraskin 6 sq/cm      Performed by: MADISYN Ann CNP    Wound Type:pressure    Consent obtained: Yes    Time out taken: Yes     Fenestrated: Yes    Instrument(s) curette and forceps      [] Mesher Utilized    All  Guidelines Followed    Skin Substitute was Applied to Wound Number(s): Wound #: 1      Wound 08/08/22 Heel Left #1 ( states since about 6/24/2022) (Active)   Wound Image   02/01/23 0815   Wound Etiology Pressure Unstageable 08/08/22 1404   Dressing Status Old drainage noted 01/25/23 1014   Wound Cleansed Vashe 01/25/23 1045   Dressing/Treatment Other (comment); Roll gauze;Steri-strips;Silicone pad 09/90/05 5186   Offloading for Diabetic Foot Ulcers Post op shoe 02/01/23 0842   Wound Length (cm) 1.7 cm 02/01/23 0815   Wound Width (cm) 2.2 cm 02/01/23 0815   Wound Depth (cm) 0.3 cm 02/01/23 0815   Wound Surface Area (cm^2) 3.74 cm^2 02/01/23 0815   Change in Wound Size % (l*w) 78.13 02/01/23 0815   Wound Volume (cm^3) 1.122 cm^3 02/01/23 0815   Wound Healing % 34 02/01/23 0815   Post-Procedure Length (cm) 1.8 cm 02/01/23 0842   Post-Procedure Width (cm) 2.3 cm 02/01/23 0842   Post-Procedure Depth (cm) 0.4 cm 02/01/23 0842   Post-Procedure Surface Area (cm^2) 4.14 cm^2 02/01/23 0842   Post-Procedure Volume (cm^3) 1. 656 cm^3 02/01/23 0842   Undermining Starts ___ O'Clock 6 02/01/23 0815   Undermining Ends___ O'Clock 12 02/01/23 0815   Undermining Maxium Distance (cm) .7 02/01/23 0815   Wound Assessment Granulation tissue;Slough 02/01/23 0815   Drainage Amount Small 02/01/23 0815   Drainage Description Serosanguinous 02/01/23 0815   Odor Mild 02/01/23 0815   Anahy-wound Assessment Maceration 02/01/23 0815   Margins Undefined edges 02/01/23 0815   Wound Thickness Description not for Pressure Injury Full thickness 02/01/23 0815   Number of days: 176         Total Surface Area Covered 4.14 sq/cm     Amount Wasted 0 sq/cm    Reason for Waste n/a      Was the Product Layered  No     Secured: Yes    Secured With:  [x]Steri Strips    []Sutures     []Staples [x]Other Mepitel    Procedural Pain: 0/10     Post Procedural Pain: 0 / 10    Response to Treatment:  Well tolerated by patient. Plan:     Treatment Note please see attached Discharge Instructions    Written patient dismissal instructions given to patient and signed by patient or POA.          Discharge [address] [place] [address] Physician Orders and Discharge Instructions  [address] [place] [address] 15, [facility] 24  Telephone: [phone]  [address] 8:30 am - 4:30 pm and Friday 8:30 am - 1:00 pm.          NAME:  Rafy Marte  YOB: 1955  MEDICAL RECORD NUMBER:  [id number]  TODAYS DATE:  2/1/23           VASHE ON IN CLINIC TODAY      Please wash hands with soap and water prior to and right after every dressing change          Topical Treatments:              [x] Apply around the wound:   [x] moisturizing lotion TO LEFT LEG AND FOOT THAT IS NOT CLOSE TO WOUND         WOUND LOCATION   Left Heel **Theraskin #1 APPLIED 2/1/2023  ( PURAPLY X 1 , NUSHEILD x2 = TOTAL 4 GRAFTS)            PRIMARY DRESSING: GRAFT IN PLACE TODAY                                 [x] Mepitel secured with Steri-strips                 DO NOT CHANGE ANYTHING BELOW THE STERI- STRIPS                                                             SECONDARY DRESSING:               [x]   LARGE OPTILOCK             (X)  ROLL GUAZE                            HOW OFTEN TO CHANGE DRESSINGS:                  (  X ) ONCE AT HOME ON Saturday OR Sunday, AND AS NEEDED , IF DRAINS THROUGH BANDAGE)  ONLY OUTER DRESSING IS TO BE CHANGED     COMPRESSION:                    ( X)    MEDIUM SPANDAGRIP THEN ACE WRAP TO LEFT LEG              TRY AND WEAR COMPRESSION STOCKING TO RIGHT LEG         YOU HAVE HAD AN THERASKIN       PLACED ON YOUR WOUND TODAY. FOR BEST RESULTS, WE RECOMMEND YOU LIMIT YOUR ACTIVITY FOR THE NEXT WEEK AS MUCH AS POSSIBLE. AVOID LONG PERIODS OF TIME ON YOUR FEET.  THIS ALSO INCLUDES ELEVATING YOUR LEGS AND FEET 3-4 TIMES DAILY FOR AT LEAST 20-30 MINUTES ON PILLOWS AND AT NIGHT SO THAT THEY ARE HIGHER THAN THE LEVEL OF YOUR HEART     Electronically signed by Milan Weaver RN on 2/1/2023 at 9:12 AM     _________________________________________________________________  PRESSURE RELIEF AND OFF-LOADING:     Off-Loading:   With heel protector ( HEEL LIFT BOOT)  [x] Off-loading when       [x] in bed         [x] sitting                             Specialty equipment ordered:       [] Wheelchair cushion    [] Specialty Bed/Mattress     [x] Assistive Device: please continue to use any assistive devices advised by the provider discussed during your visit   [x] Surgical shoe    [] Podus Boot(s)   [] Foam Boot(s)    [] [place] _____________________________________________________________________________________________     Dietary:  Continue your diet as tolerated. Eat heart-healthy foods. These foods include vegetables, fruits, nuts, beans, lean meat, fish, and whole grains. Limit sodium, alcohol, and sugar. Protein is a teresa nutrient in helping to repair damaged tissue and promote new tissue growth. Good sources of protein are milk, yogurt, cheese, fish, lean meat, and beans. If you are a diabetic, having diabetes can make it hard for wounds to heal. So try to keep your blood sugar in its target range.  __________________________________________________________________________________________________     ACTIVITY:   Activity as tolerated  ________________________________________________________________________________________________________________     PAIN:     Please note, A small amount of pain, drainage and/or bleeding from this process might be expected, and is NORMAL. Elevate the affected limb. Use over-the-counter medications you would normally use for pain as permitted by your family doctor. For persistent pain not relieved by the above interventions, please call your family doctor.  ________________________________________________________________________________  Call your doctor now or seek immediate medical care if:    You have symptoms of infection, such as: Increased pain, swelling, warmth, or redness. Red streaks leading from the area. Pus draining from the area. A fever. _______________________________________________________________________     Return Appointment:  DME/Wound Dressing Supplies Provided by:  [facility]  (Supply companies distribute one month supply at a time. Please call them directly to reorder supplies when you run out)     ECF or Home Healthcare: Your Home Care Agency is responsible to order your supplies.      Return Appointment: With Maribel Westbrook CNP  in 1 Week(s)                  [x] Orders placed during your visit:   CULTURE OBTAINED 11/30/22, results reviewed with patient      :  [address] [place] [address]        Electronically signed by Eva Yancey RN on 2/1/2023 at 9:00 AM       CARDIOLOGISTS:  [address] [place] [address] Information: Should you experience any significant chnges in your wound(s) or have questions about your wound care, please contact the [address] [place] [address] at [address] 8:30 am - 4:30 pm and Friday 8:30 am - 1:00 pm.  If you need help with your wound outside these hours and cannot wait until we are again available, contact your PCP or go to the hospital emergency room. PLEASE NOTE: IF YOU ARE UNABLE TO OBTAIN WOUND SUPPLIES, CONTINUE TO USE THE SUPPLIES YOU HAVE AVAILABLE UNTIL YOU ARE ABLE TO REACH US. KEEP THE WOUND COVERED AT ALL TIMES.              Electronically signed by MADISYN Willams CNP on 2/1/2023 at 10:17 AM

## 2023-02-02 NOTE — DISCHARGE INSTRUCTIONS
[facility] Wound Care Physician Orders and Discharge Instructions  [address] [place] [address] Drive, [address], [address]  Telephone: [phone]  [address] 8:30 am - 4:30 pm and Friday 8:30 am - 1:00 pm.          NAME:  Johnnie Scheuermann  YOB: 1955  MEDICAL RECORD NUMBER:  [id number]  TODAYS DATE:  2/8/23           VASHE ON IN CLINIC TODAY      Please wash hands with soap and water prior to and right after  every dressing change          Topical Treatments:              [x] Apply around the wound:   [x] moisturizing lotion TO LEFT LEG AND FOOT THAT IS NOT CLOSE TO WOUND         WOUND LOCATION   Left Heel **Theraskin #1 APPLIED 2/1/2023  ( PURAPLY X 1 , NUSHEILD x2 = TOTAL 4 GRAFTS)            PRIMARY DRESSING: GRAFT IN PLACE TODAY                                 [x] Mepitel secured with Steri-strips                 DO NOT CHANGE ANYTHING BELOW THE STERI- STRIPS                                                             SECONDARY DRESSING:               [x]   LARGE OPTILOCK             (X)  ROLL GUAZE                            HOW OFTEN TO CHANGE DRESSINGS:                  (  X ) ONCE AT HOME ON Saturday OR Sunday, AND AS NEEDED , IF DRAINS THROUGH BANDAGE)  ONLY OUTER DRESSING IS TO BE CHANGED     COMPRESSION:                    ( X)    MEDIUM SPANDAGRIP THEN ACE WRAP TO LEFT LEG              TRY AND WEAR COMPRESSION STOCKING TO RIGHT LEG           YOU HAVE HAD AN THERASKIN       PLACED ON YOUR WOUND TODAY. FOR BEST RESULTS, WE RECOMMEND YOU LIMIT YOUR ACTIVITY FOR THE NEXT WEEK AS MUCH AS POSSIBLE. AVOID LONG PERIODS OF TIME ON YOUR FEET.  THIS ALSO INCLUDES ELEVATING YOUR LEGS AND FEET 3-4 TIMES DAILY FOR AT LEAST 20-30 MINUTES ON PILLOWS AND AT NIGHT SO THAT THEY ARE HIGHER THAN THE LEVEL OF YOUR HEART           _________________________________________________________________  PRESSURE RELIEF AND OFF-LOADING:     Off-Loading:   With heel protector ( HEEL LIFT BOOT)  [x] Off-loading when       [x] in bed         [x] sitting                             Specialty equipment ordered:       [] Wheelchair cushion    [] Specialty Bed/Mattress     [x] Assistive Device: please continue to use any assistive devices advised by the provider discussed during your visit   [x] Surgical shoe    [] Podus Boot(s)   [] Foam Boot(s)    [] Abbi Mary     _____________________________________________________________________________________________     Dietary:  Continue your diet as tolerated. Eat heart-healthy foods. These foods include vegetables, fruits, nuts, beans, lean meat, fish, and whole grains. Limit sodium, alcohol, and sugar. Protein is a teresa nutrient in helping to repair damaged tissue and promote new tissue growth. Good sources of protein are milk, yogurt, cheese, fish, lean meat, and beans. If you are a diabetic, having diabetes can make it hard for wounds to heal. So try to keep your blood sugar in its target range.  __________________________________________________________________________________________________     ACTIVITY:   Activity as tolerated  ________________________________________________________________________________________________________________     PAIN:     Please note, A small amount of pain, drainage and/or bleeding from this process might be expected, and is NORMAL. Elevate the affected limb. Use over-the-counter medications you would normally use for pain as permitted by your family doctor. For persistent pain not relieved by the above interventions, please call your family doctor.  ________________________________________________________________________________  Call your doctor now or seek immediate medical care if:    You have symptoms of infection, such as: Increased pain, swelling, warmth, or redness. Red streaks leading from the area. Pus draining from the area. A fever. _______________________________________________________________________     Return Appointment:  DME/Wound Dressing Supplies Provided by:  [facility]  (Supply companies distribute one month supply at a time. Please call them directly to reorder supplies when you run out)     ECF or Home Healthcare: Your Home Care Agency is responsible to order your supplies. Return Appointment: With Nafisa Bourgeois CNP  in 1 Week(s)                  [] Orders placed during your visit:   :  [facility]        CARDIOLOGISTS:  Herson Cunningham OR Mando Yo        Electronically signed by Ade Hogan RN on 2/8/2023 at 8:43 AM          [facility] Information: Should you experience any significant chnges in your wound(s) or have questions about your wound care, please contact the [address] [place] [address] at [address] 8:30 am - 4:30 pm and Friday 8:30 am - 1:00 pm.  If you need help with your wound outside these hours and cannot wait until we are again available, contact your PCP or go to the hospital emergency room. PLEASE NOTE: IF YOU ARE UNABLE TO OBTAIN WOUND SUPPLIES, CONTINUE TO USE THE SUPPLIES YOU HAVE AVAILABLE UNTIL YOU ARE ABLE TO REACH US. KEEP THE WOUND COVERED AT ALL TIMES.

## 2023-02-08 ENCOUNTER — HOSPITAL ENCOUNTER (OUTPATIENT)
Dept: WOUND CARE | Age: 68
Discharge: HOME | End: 2023-02-08
Payer: MEDICARE

## 2023-02-08 VITALS
HEART RATE: 74 BPM | TEMPERATURE: 97.2 F | RESPIRATION RATE: 20 BRPM | SYSTOLIC BLOOD PRESSURE: 114 MMHG | DIASTOLIC BLOOD PRESSURE: 72 MMHG

## 2023-02-08 DIAGNOSIS — G81.94 LEFT HEMIPARESIS (HCC): ICD-10-CM

## 2023-02-08 DIAGNOSIS — Z22.322 MRSA (METHICILLIN RESISTANT STAPH AUREUS) CULTURE POSITIVE: ICD-10-CM

## 2023-02-08 DIAGNOSIS — L89.623 PRESSURE ULCER OF LEFT HEEL, STAGE 3 (HCC): Primary | ICD-10-CM

## 2023-02-08 DIAGNOSIS — S91.302A NON-HEALING OPEN WOUND OF LEFT HEEL: ICD-10-CM

## 2023-02-08 DIAGNOSIS — L89.620 PRESSURE ULCER OF HEEL, LEFT, UNSTAGEABLE (HCC): ICD-10-CM

## 2023-02-08 DIAGNOSIS — I63.9 CEREBROVASCULAR ACCIDENT (CVA), UNSPECIFIED MECHANISM (HCC): ICD-10-CM

## 2023-02-08 DIAGNOSIS — R25.2 SPASTICITY: ICD-10-CM

## 2023-02-08 PROCEDURE — 99213 OFFICE O/P EST LOW 20 MIN: CPT

## 2023-02-08 PROCEDURE — 99213 OFFICE O/P EST LOW 20 MIN: CPT | Performed by: NURSE PRACTITIONER

## 2023-02-08 RX ORDER — BETAMETHASONE DIPROPIONATE 0.05 %
OINTMENT (GRAM) TOPICAL ONCE
OUTPATIENT
Start: 2023-02-08 | End: 2023-02-08

## 2023-02-08 RX ORDER — LIDOCAINE HYDROCHLORIDE 40 MG/ML
SOLUTION TOPICAL ONCE
OUTPATIENT
Start: 2023-02-08 | End: 2023-02-08

## 2023-02-08 RX ORDER — LIDOCAINE HYDROCHLORIDE 40 MG/ML
SOLUTION TOPICAL ONCE
Status: DISCONTINUED | OUTPATIENT
Start: 2023-02-08 | End: 2023-02-09 | Stop reason: HOSPADM

## 2023-02-08 RX ORDER — LIDOCAINE 50 MG/G
OINTMENT TOPICAL ONCE
OUTPATIENT
Start: 2023-02-08 | End: 2023-02-08

## 2023-02-08 RX ORDER — LIDOCAINE 40 MG/G
CREAM TOPICAL ONCE
OUTPATIENT
Start: 2023-02-08 | End: 2023-02-08

## 2023-02-08 RX ORDER — CLOBETASOL PROPIONATE 0.5 MG/G
OINTMENT TOPICAL ONCE
OUTPATIENT
Start: 2023-02-08 | End: 2023-02-08

## 2023-02-08 RX ORDER — BACITRACIN, NEOMYCIN, POLYMYXIN B 400; 3.5; 5 [USP'U]/G; MG/G; [USP'U]/G
OINTMENT TOPICAL ONCE
OUTPATIENT
Start: 2023-02-08 | End: 2023-02-08

## 2023-02-08 RX ORDER — BACITRACIN ZINC AND POLYMYXIN B SULFATE 500; 1000 [USP'U]/G; [USP'U]/G
OINTMENT TOPICAL ONCE
OUTPATIENT
Start: 2023-02-08 | End: 2023-02-08

## 2023-02-08 RX ORDER — GENTAMICIN SULFATE 1 MG/G
OINTMENT TOPICAL ONCE
OUTPATIENT
Start: 2023-02-08 | End: 2023-02-08

## 2023-02-08 RX ORDER — GINSENG 100 MG
CAPSULE ORAL ONCE
OUTPATIENT
Start: 2023-02-08 | End: 2023-02-08

## 2023-02-08 RX ORDER — LIDOCAINE HYDROCHLORIDE 20 MG/ML
JELLY TOPICAL ONCE
OUTPATIENT
Start: 2023-02-08 | End: 2023-02-08

## 2023-02-08 ASSESSMENT — PAIN SCALES - GENERAL: PAINLEVEL_OUTOF10: 0

## 2023-02-08 NOTE — PROGRESS NOTES
[address]   Progress Note and Procedure Note      Meliton Morejon  MEDICAL RECORD NUMBER:  [id number]  AGE: 79 y.o. GENDER: male  : 1955  EPISODE DATE:  2023    Subjective:     Chief Complaint   Patient presents with    Wound Check     Follow up for left heel wound. HISTORY of PRESENT ILLNESS HPI  Luis M Ocampo is a 79year old male presenting today for follow up for left heel wound. 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 Pt is as active as he can be. Once wound is closed can resume physical therapy with goal of walking with walker instead of using wheelchair for mobility. Chronic spastic movements of extremities today, he states are worse early in am.   History of Wound Context:   On 22 fell and sustained displaced fracture of base of neck of left femur and developed  a pressure ulcer left heel. Postop he developed left heel DTI   2022 pt first seen at [address] [place] [address],[address] for treatment of left heel wound dx as stable eschar over wound. Pt has an offloading boot in place to float heel and prevent further heel damage. 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 Pt has residual left sided weakness and ongoing therapy has been delayed because of pressure ulcer. Pt also has chronic bilateral lower leg edema for which he uses compression stockings. Not using compression on left leg because of wound. Pt has spastic movements bilateral upper and lower extremities since after stroke with is tx with Baclofen; but friction still a concern. PT and DP pulse strong with Doppler. Reports getting protein supplements. Pt transferring only, no walking with walker still too unstable. Noting intermittent spastic movements legs during visit. Concern about friction on left heel with this movement. Pt wears post-op shoe on left foot. His son Dante Finch and daughter Israel Bradley are monitoring and changing outer dressings as needed.   10/19/22 Eschar debrided because of drainage and redness. Culture of MRSA tx with antibiotics. Wound now designated as a Stage 3 pressure ulcer. 10/22/22applied for skin substitutes. Theraskin is preferable for its robust nature especially on a heel wound. Progress of wound:    After wound debrided on 10/19/22 measured 3 x 3.5 x 1.5. We have provided compression therapy, tx wound infection  and application of collagen, Puraply and NuShield products to wound with  little progress to healing. Lack of progress to wound healing is also delaying needed for further physical therapy/rehab. Pt scheduled for ultrasounds bilateral lower legs on 1/13/23 1/18/2023: Pt presents today with less lower leg/ankle edema noted; wound with some maceration and still with small amount of undermining from 9-12 o'clock. Results of vascular study from 1/13/23:   Summary        APPLE not obtained. Right lower extremity demonstrates occluded posterior    tibial and peroneal arteries but grossly normal macrovascular flow. Left    lower extremity demonstrates no evidence of hemodynamically significant    stenosis or occlusion. Discussed skin graft application with pt and his son and pt has agreed to get Theraskin to be applied. No overt signs of infection. Pt to use pressure redistribution boots on left heel during the night instead of \"fashioned\" post-op shoe. 2/1/2023: Today presents with minimal amount of drainage from wound, wound bed pale with minimal slough noted. Wound edges with less maceration. Will apply Theraskin today. Discussed about Theraskin application today and precautions to take. Theraskin 6 cm applied ( total number of skin subs 4; Meagan Champion #1)  2/8/23: Presents today with partially intact Theraskin still in place over left heel wound, small amount of yellowish drainage on old dressing. Odor noted but not unexpected with graft. Minimal pink periwound skin. Denies constitutional issues.     Wound/Ulcer Pain Timing/Severity: intermittent  Quality of pain: tender  Severity:  2/ 10   Modifying Factors: Pain is relieved/improved with rest, repositioning  Associated Signs/Symptoms: edema and drainage     Ulcer Identification:  Ulcer Type: pressure ulcer     Contributing Factors: edema; while hospitalized developed wound from chronic pressure and decreased mobility     Acute Wound: N/A not an acute wound       PAST MEDICAL HISTORY        Diagnosis Date    CAD (coronary artery disease)     HTN (hypertension)     Hyperlipemia     Left hemiparesis ([address] 8/11/2020    MRSA (methicillin resistant staph aureus) culture positive 9/14/2022    heel    Non-healing open wound of left heel 10/12/2022    Pressure injury of left ankle, stage 2 ([address] 9/13/2021    Pressure ulcer of heel, left, unstageable ([address] 8/8/2022    Stable eschar covered.     Pressure ulcer of left heel, stage 3 ([address] 10/20/2022    S/P PTCA (percutaneous transluminal coronary angioplasty) 2010    two stents place    Spasticity 7/9/2019    Stroke ([address] 10/2012    left-sided weakness       PAST SURGICAL HISTORY    Past Surgical History:   Procedure Laterality Date    CARDIAC SURGERY      HIP SURGERY Left     INGUINAL HERNIA REPAIR      right side    INTRACAPSULAR CATARACT EXTRACTION Right 08/30/2019    PHACOEMULSIFICATION WITH INTRAOCULAR LENS IMPLANT performed by Ronen Simpson MD at [address] [place], [place] [address]vd EXTRACTION Left 09/06/2019    PHACOEMULSIFICATION WITH INTRAOCULAR LENS IMPLANT performed by Ronen Simpson MD at [facility]    Family History   Problem Relation Age of Onset    Hypertension Mother     Heart Disease Father     Breast Cancer Sister     Heart Attack Brother        SOCIAL HISTORY    Social History     Tobacco Use    Smoking status: Never    Smokeless tobacco: Never   Vaping Use    Vaping Use: Never used   Substance Use Topics    Alcohol use: No    Drug use: No       ALLERGIES    No Known Allergies    MEDICATIONS    Current Outpatient Medications on File Prior to Encounter   Medication Sig Dispense Refill    baclofen (LIORESAL) 10 MG tablet Take 1 tablet by mouth 2 times daily 180 tablet 1    lisinopril (PRINIVIL;ZESTRIL) 20 MG tablet Take 1 tablet by mouth daily 90 tablet 0    hydrALAZINE (APRESOLINE) 50 MG tablet Take 1 tablet by mouth 3 times daily 270 tablet 0    Wound Dressings ([facility] WOUND/BURN DRESSING) GEL gel Apply 1 each topically daily 1 each 1    senna-docusate (PERICOLACE) 8.6-50 MG per tablet Take 1 tablet by mouth 2 times daily      atorvastatin (LIPITOR) 40 MG tablet Take 1 tablet by mouth daily 90 tablet 3    amLODIPine (NORVASC) 10 MG tablet Take 1 tablet by mouth daily 90 tablet 3    gabapentin (NEURONTIN) 100 MG capsule Take 1 capsule by mouth 4 times daily for 180 days. 360 capsule 1    loratadine (CLARITIN) 10 MG tablet Take 1 tablet by mouth daily Pt needs to come in and be seen before more rx's are given. 90 tablet 1    aspirin 81 MG tablet Take 81 mg by mouth daily      Sennosides 17.2 MG TABS Take 17.2 mg by mouth       No current facility-administered medications on file prior to encounter. REVIEW OF SYSTEMS  Review of Systems    Pertinent items are noted in HPI.     Objective:      /72   Pulse 74   Temp 97.2 °F (36.2 °C) (Temporal)   Resp 20     Wt Readings from Last 3 Encounters:   09/29/22 228 lb 14.4 oz (103.8 kg)   09/01/22 230 lb (104.3 kg)   08/08/22 230 lb (104.3 kg)       PHYSICAL EXAM  Physical Exam    General Appearance: alert and oriented to person, place and time and in no acute distress  Skin: warm and dry  Head: normocephalic and atraumatic  Eyes: pupils equal, round, and reactive to light  Pulmonary/Chest:  normal air movement, no respiratory distress  Cardiovascular: normal rate, regular rhythm, and intact distal pulses      Assessment:        Problem List Items Addressed This Visit       Pressure ulcer of heel, left, unstageable ([facility] Relevant Medications    lidocaine (XYLOCAINE) 4 % external solution    Other Relevant Orders    Initiate Outpatient Wound Care Protocol    MRSA (methicillin resistant staph aureus) culture positive    Relevant Medications    lidocaine (XYLOCAINE) 4 % external solution    Other Relevant Orders    Initiate Outpatient Wound Care Protocol    Non-healing open wound of left heel    Relevant Medications    lidocaine (XYLOCAINE) 4 % external solution    Other Relevant Orders    Initiate Outpatient Wound Care Protocol    Pressure ulcer of left heel, stage 3 (HCC) - Primary    Relevant Medications    lidocaine (XYLOCAINE) 4 % external solution    Other Relevant Orders    Initiate Outpatient Wound Care Protocol    Stroke (HCC) (Chronic)    Relevant Medications    lidocaine (XYLOCAINE) 4 % external solution    Other Relevant Orders    Initiate Outpatient Wound Care Protocol    Spasticity    Relevant Medications    lidocaine (XYLOCAINE) 4 % external solution    Other Relevant Orders    Initiate Outpatient Wound Care Protocol    Left hemiparesis (HCC)    Relevant Medications    lidocaine (XYLOCAINE) 4 % external solution    Other Relevant Orders    Initiate Outpatient Wound Care Protocol        Procedure Note  Indications:  Based on my examination of this patient's wound(s)/ulcer(s) today, debridement is not required to promote healing and evaluate the wound base. Wound/Ulcer Descriptions are Pre Debridement except measurements:    Wound 08/08/22 Heel Left #1 ( states since about 6/24/2022) (Active)   Wound Image   02/01/23 0815   Wound Etiology Pressure Unstageable 08/08/22 1404   Dressing Status New dressing applied 02/08/23 0850   Wound Cleansed Vashe 01/25/23 1045   Dressing/Treatment Other (comment); Silicone pad;Steri-strips;Roll gauze 02/08/23 0850   Offloading for Diabetic Foot Ulcers Post op shoe 02/08/23 0850   Wound Length (cm) 1.7 cm 02/08/23 0821   Wound Width (cm) 2.2 cm 02/08/23 0821   Wound Depth (cm) 0.3 cm 02/08/23 0821   Wound Surface Area (cm^2) 3.74 cm^2 02/08/23 0821   Change in Wound Size % (l*w) 78.13 02/08/23 0821   Wound Volume (cm^3) 1.122 cm^3 02/08/23 0821   Wound Healing % 34 02/08/23 0821   Post-Procedure Length (cm) 2 cm 02/08/23 0842   Post-Procedure Width (cm) 1.5 cm 02/08/23 0842   Post-Procedure Depth (cm) 0.3 cm 02/08/23 0842   Post-Procedure Surface Area (cm^2) 3 cm^2 02/08/23 0842   Post-Procedure Volume (cm^3) 0.9 cm^3 02/08/23 0842   Undermining Starts ___ O'Clock 6 02/01/23 0815   Undermining Ends___ O'Clock 12 02/01/23 0815   Undermining Maxium Distance (cm) .7 02/01/23 0815   Wound Assessment Graft 02/08/23 0821   Drainage Amount Moderate 02/08/23 0821   Drainage Description Serosanguinous 02/08/23 0821   Odor Mild 02/08/23 0821   Anahy-wound Assessment Maceration 02/08/23 0821   Margins Undefined edges 02/08/23 0821   Wound Thickness Description not for Pressure Injury Full thickness 02/08/23 6090   Number of days: 183     Plan:     Treatment Note please see attached Discharge Instructions    Written patient dismissal instructions given to patient and signed by patient or POA.          Discharge [id number] [place] [address] Physician Orders and Discharge Instructions  [address] [place] [address]  Telephone: [phone]   [address] 8:30 am - 4:30 pm and Friday 8:30 am - 1:00 pm.          NAME:  Noman Frias  YOB: 1955  MEDICAL RECORD NUMBER:  [id number]  TODAYS DATE:  2/8/23           VASHE ON IN CLINIC TODAY      Please wash hands with soap and water prior to and right after  every dressing change          Topical Treatments:              [x] Apply around the wound:   [x] moisturizing lotion TO LEFT LEG AND FOOT THAT IS NOT CLOSE TO WOUND         WOUND LOCATION   Left Heel **Theraskin #1 APPLIED 2/1/2023  ( KARLOSY X 1 , MEGHAN x2 = TOTAL 4 GRAFTS)            PRIMARY DRESSING: GRAFT IN PLACE TODAY                                 [x] Mepitel secured with Steri-strips                 DO NOT CHANGE ANYTHING BELOW THE STERI- STRIPS                                                             SECONDARY DRESSING:               [x]   LARGE OPTILOCK             (X)  ROLL GUAZE                            HOW OFTEN TO CHANGE DRESSINGS:                  (  X ) ONCE AT HOME ON Saturday OR Sunday, AND AS NEEDED , IF DRAINS THROUGH BANDAGE)  ONLY OUTER DRESSING IS TO BE CHANGED     COMPRESSION:                    ( X)    MEDIUM SPANDAGRIP THEN ACE WRAP TO LEFT LEG              TRY AND WEAR COMPRESSION STOCKING TO RIGHT LEG           YOU HAVE HAD AN THERASKIN       PLACED ON YOUR WOUND TODAY. FOR BEST RESULTS, WE RECOMMEND YOU LIMIT YOUR ACTIVITY FOR THE NEXT WEEK AS MUCH AS POSSIBLE. AVOID LONG PERIODS OF TIME ON YOUR FEET. THIS ALSO INCLUDES ELEVATING YOUR LEGS AND FEET 3-4 TIMES DAILY FOR AT LEAST 20-30 MINUTES ON PILLOWS AND AT NIGHT SO THAT THEY ARE HIGHER THAN THE LEVEL OF YOUR HEART           _________________________________________________________________  PRESSURE RELIEF AND OFF-LOADING:     Off-Loading:   With heel protector ( HEEL LIFT BOOT)  [x] Off-loading when       [x] in bed         [x] sitting                             Specialty equipment ordered:       [] Wheelchair cushion    [] Specialty Bed/Mattress     [x] Assistive Device: please continue to use any assistive devices advised by the provider discussed during your visit   [x] Surgical shoe    [] Podus Boot(s)   [] Foam Boot(s)    [] CROW Boot     _____________________________________________________________________________________________     Dietary:  Continue your diet as tolerated. Eat heart-healthy foods. These foods include vegetables, fruits, nuts, beans, lean meat, fish, and whole grains. Limit sodium, alcohol, and sugar. Protein is a teresa nutrient in helping to repair damaged tissue and promote new tissue growth.  Good sources of protein are milk, yogurt, cheese, fish, lean meat, and beans. If you are a diabetic, having diabetes can make it hard for wounds to heal. So try to keep your blood sugar in its target range.  __________________________________________________________________________________________________     ACTIVITY:   Activity as tolerated  ________________________________________________________________________________________________________________     PAIN:     Please note, A small amount of pain, drainage and/or bleeding from this process might be expected, and is NORMAL. Elevate the affected limb. Use over-the-counter medications you would normally use for pain as permitted by your family doctor. For persistent pain not relieved by the above interventions, please call your family doctor.  ________________________________________________________________________________  Call your doctor now or seek immediate medical care if:    You have symptoms of infection, such as: Increased pain, swelling, warmth, or redness. Red streaks leading from the area. Pus draining from the area. A fever. _______________________________________________________________________     Return Appointment:  DME/Wound Dressing Supplies Provided by:  [facility]  (Supply companies distribute one month supply at a time. Please call them directly to reorder supplies when you run out)     ECF or Home Healthcare: Your Home Care Agency is responsible to order your supplies.      Return Appointment: With Antony Gomes CNP  in 1 Week(s)                  [] Orders placed during your visit:   :  [facility]        CARDIOLOGISTS:  Saman Durbin OR Mando Yo        Electronically signed by Bria Magallon RN on 2/8/2023 at 8:43 AM          [address] [place] [address] Information: Should you experience any significant chnges in your wound(s) or have questions about your wound care, please contact the [address] [place] [address] at [address] [facility] 8:30 am - 4:30 pm and Friday 8:30 am - 1:00 pm.  If you need help with your wound outside these hours and cannot wait until we are again available, contact your PCP or go to the hospital emergency room. PLEASE NOTE: IF YOU ARE UNABLE TO OBTAIN WOUND SUPPLIES, CONTINUE TO USE THE SUPPLIES YOU HAVE AVAILABLE UNTIL YOU ARE ABLE TO REACH US. KEEP THE WOUND COVERED AT ALL TIMES.                    Electronically signed by MADISYN Narvaez CNP on 2/8/2023 at 12:49 PM

## 2023-02-09 NOTE — DISCHARGE INSTRUCTIONS
[facility] Wound Care Physician Orders and Discharge Instructions  [address] [place] [address] Drive, [address], [address]  Telephone: [phone]  [address] 8:30 am - 4:30 pm and Friday 8:30 am - 1:00 pm.          NAME:  Doreen Orozco  YOB: 1955  MEDICAL RECORD NUMBER:  [id number]  TODAYS DATE:  2/15/23           VASHE ON IN CLINIC TODAY      Please wash hands with soap and water prior to and right after  every dressing change          Topical Treatments:              [x] Apply around the wound:   [x] moisturizing lotion TO LEFT LEG AND FOOT THAT IS NOT CLOSE TO WOUND         WOUND LOCATION   Left Heel **Theraskin #1 APPLIED 2/1/2023  ( PURAPLY X 1 , NUSHEILD x2 = TOTAL 4 GRAFTS)            PRIMARY DRESSING: GRAFT IN PLACE TODAY                                 [x] Mepitel secured with Steri-strips                 DO NOT CHANGE ANYTHING BELOW THE STERI- STRIPS                                     (X)Alginate with Silver                          SECONDARY DRESSING:               [x]   LARGE OPTILOCK             (X)  ROLL GUAZE                            HOW OFTEN TO CHANGE DRESSINGS:                  (  X ) EVERY OTHER DAY, AND AS NEEDED , IF DRAINS THROUGH BANDAGE)  ONLY OUTER DRESSING IS TO BE CHANGED     COMPRESSION:                    ( X)    MEDIUM SPANDAGRIP THEN ACE WRAP TO LEFT LEG                          ( X)    MEDIUM SPANDAGRIP to right leg place every other day with dressing change                          _________________________________________________________________  PRESSURE RELIEF AND OFF-LOADING:     Off-Loading:   With heel protector ( HEEL LIFT BOOT)  [x] Off-loading when       [x] in bed         [x] sitting                             Specialty equipment ordered:       [] Wheelchair cushion    [] Specialty Bed/Mattress     [x] Assistive Device: please continue to use any assistive devices advised by the provider discussed during your visit   [x] Surgical shoe    [] Podus Boot(s)   [] Foam Boot(s)    [] CROW Boot     _____________________________________________________________________________________________     Dietary:  Continue your diet as tolerated. Eat heart-healthy foods. These foods include vegetables, fruits, nuts, beans, lean meat, fish, and whole grains. Limit sodium, alcohol, and sugar. Protein is a teresa nutrient in helping to repair damaged tissue and promote new tissue growth. Good sources of protein are milk, yogurt, cheese, fish, lean meat, and beans. If you are a diabetic, having diabetes can make it hard for wounds to heal. So try to keep your blood sugar in its target range.  __________________________________________________________________________________________________     ACTIVITY:   Activity as tolerated  ________________________________________________________________________________________________________________     PAIN:     Please note, A small amount of pain, drainage and/or bleeding from this process might be expected, and is NORMAL. Elevate the affected limb. Use over-the-counter medications you would normally use for pain as permitted by your family doctor. For persistent pain not relieved by the above interventions, please call your family doctor.  ________________________________________________________________________________  Call your doctor now or seek immediate medical care if:    You have symptoms of infection, such as: Increased pain, swelling, warmth, or redness. Red streaks leading from the area. Pus draining from the area. A fever. _______________________________________________________________________     Return Appointment:  DME/Wound Dressing Supplies Provided by:  [facility]  (Supply companies distribute one month supply at a time. Please call them directly to reorder supplies when you run out)     ECF or Home Healthcare:    Your Home Care Agency is responsible to order your supplies. Return Appointment: With Farooq Wu CNP  in 1 Week(s)                  [] Orders placed during your visit:   :  [facility]        CARDIOLOGISTS:  Armand Chandler OR Mando Yo          Electronically signed by Betsy Ortiz RN on 2/15/2023 at 8:31 [address] Information: Should you experience any significant chnges in your wound(s) or have questions about your wound care, please contact the [address] [place] [address] at [address] 8:30 am - 4:30 pm and Friday 8:30 am - 1:00 pm.  If you need help with your wound outside these hours and cannot wait until we are again available, contact your PCP or go to the hospital emergency room. PLEASE NOTE: IF YOU ARE UNABLE TO OBTAIN WOUND SUPPLIES, CONTINUE TO USE THE SUPPLIES YOU HAVE AVAILABLE UNTIL YOU ARE ABLE TO REACH US. KEEP THE WOUND COVERED AT ALL TIMES.

## 2023-02-10 ENCOUNTER — TELEPHONE (OUTPATIENT)
Dept: CARDIOLOGY CLINIC | Age: 68
End: 2023-02-10

## 2023-02-10 ENCOUNTER — OFFICE VISIT (OUTPATIENT)
Dept: CARDIOLOGY CLINIC | Age: 68
End: 2023-02-10

## 2023-02-10 VITALS
OXYGEN SATURATION: 96 % | HEART RATE: 79 BPM | WEIGHT: 226 LBS | HEIGHT: 70 IN | DIASTOLIC BLOOD PRESSURE: 76 MMHG | SYSTOLIC BLOOD PRESSURE: 124 MMHG | BODY MASS INDEX: 32.35 KG/M2

## 2023-02-10 DIAGNOSIS — E78.5 HYPERLIPIDEMIA LDL GOAL <70: ICD-10-CM

## 2023-02-10 DIAGNOSIS — I48.91 NEW ONSET A-FIB (HCC): Primary | ICD-10-CM

## 2023-02-10 DIAGNOSIS — I10 ESSENTIAL HYPERTENSION: ICD-10-CM

## 2023-02-10 DIAGNOSIS — I25.10 CORONARY ARTERY DISEASE INVOLVING NATIVE CORONARY ARTERY OF NATIVE HEART WITHOUT ANGINA PECTORIS: ICD-10-CM

## 2023-02-10 DIAGNOSIS — I69.30 HISTORY OF HEMORRHAGIC CEREBROVASCULAR ACCIDENT (CVA) WITH RESIDUAL DEFICIT: ICD-10-CM

## 2023-02-10 NOTE — TELEPHONE ENCOUNTER
Ysabel Quintero / Shelley Negron, RN    Patient of Dr. Jenny Cheney evaluated today and found to have new onset A.fib. 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 Hx of CAD with APRIL. He will need to be scheduled with Dr. Sonali Eagle for consideration of Watchman LAAC. Currently on aspirin 81 mg daily.

## 2023-02-10 NOTE — PROGRESS NOTES
[address]  Cardiology Consult Note      Suleiman Bridges  1955, 79 y.o.      CC: \" I have no chest pain. \"              Rufino Jeff MD:      HPI:   This is a 79 y.o. male with a PMH significant for CAD with APRIL in 2009, hypertension, hyperlipidemia and hemorrhagic CVA with tremors (off plavix at time of stroke). Patient here today for management of CAD. Found to be in A.fib on EKG. Has no awareness of his heart racing or palpitations. Denies any chest pain or shortness of breath at rest. Left side deficits from CVA. Able to ambulate with a cane. In wheelchair today. Reports that he fell and broke his leg. As a result, has pressure wound on foot that is managed by wound care. Reports compliance with medication and tolerating. Past Medical History:   Diagnosis Date    CAD (coronary artery disease)     HTN (hypertension)     Hyperlipemia     Left hemiparesis ([address] 8/11/2020    MRSA (methicillin resistant staph aureus) culture positive 9/14/2022    heel    Non-healing open wound of left heel 10/12/2022    Pressure injury of left ankle, stage 2 ([address] 9/13/2021    Pressure ulcer of heel, left, unstageable ([address] 8/8/2022    Stable eschar covered.     Pressure ulcer of left heel, stage 3 ([address] 10/20/2022    S/P PTCA (percutaneous transluminal coronary angioplasty) 2010    two stents place    Spasticity 7/9/2019    Stroke ([address] 10/2012    left-sided weakness      Past Surgical History:   Procedure Laterality Date    CARDIAC SURGERY      HIP SURGERY Left     INGUINAL HERNIA REPAIR      right side    INTRACAPSULAR CATARACT EXTRACTION Right 08/30/2019    PHACOEMULSIFICATION WITH INTRAOCULAR LENS IMPLANT performed by Sagrario Beltrán MD at [address] EXTRACTION Left 09/06/2019    PHACOEMULSIFICATION WITH INTRAOCULAR LENS IMPLANT performed by Sagrario Beltrán MD at [address] [place]      Family History   Problem Relation Age of Onset    Hypertension Mother     Heart Disease Father     Breast Cancer Sister     Heart Attack Brother       Social History     Tobacco Use    Smoking status: Never    Smokeless tobacco: Never   Vaping Use    Vaping Use: Never used   Substance Use Topics    Alcohol use: No    Drug use: No     No Known Allergies      Review of Systems -   Constitutional: Negative for weight gain/loss; malaise, fever  Respiratory: Negative for Asthma;  cough and hemoptysis  Cardiovascular: Negative for palpitations,dizziness   Gastrointestinal: Negative for abd.pain; constipation/diarrhea;    Genitourinary: Negative for stones; hematuria; frequency hesitancy  Integumentt: Negative for rash or pruritis  Hematologic/lymphatic: Negative for blood dyscrasia; leukemia/lymphoma  Musculoskeletal: Negative for Connective tissue disease  Neurological:  Negative for Seizure   Behavioral/Psych:Negative for Bipolar disorder, Schizophrenia; Dementia  Endocrine: negative for thyroid, parathyroid disease    Physical Examination:    /76   Pulse 79   Ht 5' 10\" (1.778 m)   Wt 226 lb (102.5 kg)   SpO2 96%   BMI 32.43 kg/m²    HEENT:  Face: Atraumatic, Conjunctiva: Pink; non icteric,  Mucous Memb:  Moist, No thyromegaly or Lymphadenopathy  Respiratory:  Resp Assessment: normal, Resp Auscultation: clear   Cardiovascular: Auscultation: nl S1 & S2, Palpation:  Nl PMI;  No heaves or thrills, JVP:  normal  Abdomen: Soft, non-tender, Normal bowel sounds,  No organomegaly  Extremities: No Cyanosis or Clubbing  Neurological: Oriented to time, place, and person, Non-anxious  Psychiatric: Normal mood and affect  Skin: Warm and dry,  No rash seen     Outpatient Medications Marked as Taking for the 2/10/23 encounter (Office Visit) with Gen Mustafa MD   Medication Sig Dispense Refill    baclofen (LIORESAL) 10 MG tablet Take 1 tablet by mouth 2 times daily 180 tablet 1    lisinopril (PRINIVIL;ZESTRIL) 20 MG tablet Take 1 tablet by mouth daily 90 tablet 0    hydrALAZINE (APRESOLINE) 50 MG tablet Take 1 tablet by mouth 3 times daily 270 tablet 0    Wound Dressings ([facility] WOUND/BURN DRESSING) GEL gel Apply 1 each topically daily 1 each 1    senna-docusate (PERICOLACE) 8.6-50 MG per tablet Take 1 tablet by mouth 2 times daily      atorvastatin (LIPITOR) 40 MG tablet Take 1 tablet by mouth daily 90 tablet 3    amLODIPine (NORVASC) 10 MG tablet Take 1 tablet by mouth daily 90 tablet 3    gabapentin (NEURONTIN) 100 MG capsule Take 1 capsule by mouth 4 times daily for 180 days. 360 capsule 1    loratadine (CLARITIN) 10 MG tablet Take 1 tablet by mouth daily Pt needs to come in and be seen before more rx's are given. 90 tablet 1    aspirin 81 MG tablet Take 81 mg by mouth daily      Sennosides 17.2 MG TABS Take 17.2 mg by mouth           Labs:   No results for input(s): WBC, HGB, HCT, PLT in the last 72 hours. No results for input(s): NA, K, CO2, BUN, CREATININE, LABGLOM in the last 72 hours. No results for input(s): BNP in the last 72 hours. Lab Results   Component Value Date/Time    HDL 35 03/25/2022 07:34 AM    LDLCALC 63 03/25/2022 07:34 AM    TRIG 59 03/25/2022 07:34 AM         EKG: atrial fibrillation     ECHO:2009  See scanned report     Stress Nuclear: 2/2017  See scanned report     Corornary angiogram  & Intervention:2009  See scanned report         ASSESSMENT AND PLAN:        New onset a-fib ([address] Noted on EKG today. Asymptomatic. On aspirin. Hx of hemorrhagic stroke. [facility]. Coronary artery disease involving native coronary artery of native heart without angina pectoris. Hx of APRIL ([address] [facility] 2009) see scanned report. Continue aspirin and statin. Essential hypertension. Controlled. Continue medical management. Hyperlipidemia LDL goal <70. Well controlled. Continue statin therapy. History of hemorrhagic cerebrovascular accident (CVA) with residual deficit. Continue aspirin and statin.      Mr. Rissa Shay originally came in to have a cardiologist nearby. He has known history of two-vessel stents over a decade ago. He is doing well with no angina. His cholesterol values are excellent. On today's visit his main issue was asymptomatic A-fib that was discovered on a routine EKG. This is an new arrhythmia for him patient has a chads Vascor of greater than 4 and therefore needs to be anticoagulated. Unfortunately he has a history of spontaneous hemorrhagic stroke and therefore cannot be on anticoagulation. I recommended him from [address]. Return to the office in 6 months. Thank you very much for allowing me to participate in the care of your patient. Please do not hesitate to contact me if you have any questions. Sincerely,    Swati Burden M.D  [facility], [address] [place] [address]  Ph: [phone]  Fax: [phone]    This note was scribed in the presence of Dr. Swati Burden MD by Lyndia Jeans, RN  Physician Attestation:  The scribes documentation has been prepared under my direction and personally reviewed by me in its entirety. I confirm that the note above accurately reflects all work, treatment, procedures, and medical decision making performed by me. I have personally reviewed all previous testing for this visit today including imaging, lab results and EKG as detailed above and care everywhere.

## 2023-02-15 ENCOUNTER — TELEPHONE (OUTPATIENT)
Dept: PRIMARY CARE CLINIC | Age: 68
End: 2023-02-15

## 2023-02-15 ENCOUNTER — TELEPHONE (OUTPATIENT)
Dept: CARDIOLOGY CLINIC | Age: 68
End: 2023-02-15

## 2023-02-15 ENCOUNTER — HOSPITAL ENCOUNTER (OUTPATIENT)
Dept: WOUND CARE | Age: 68
Discharge: HOME | End: 2023-02-15
Payer: MEDICARE

## 2023-02-15 VITALS
TEMPERATURE: 98.1 F | HEART RATE: 96 BPM | RESPIRATION RATE: 20 BRPM | DIASTOLIC BLOOD PRESSURE: 67 MMHG | SYSTOLIC BLOOD PRESSURE: 138 MMHG

## 2023-02-15 DIAGNOSIS — Z01.818 PRE-OP TESTING: Primary | ICD-10-CM

## 2023-02-15 DIAGNOSIS — Z22.322 MRSA (METHICILLIN RESISTANT STAPH AUREUS) CULTURE POSITIVE: ICD-10-CM

## 2023-02-15 DIAGNOSIS — Z95.818 PRESENCE OF WATCHMAN LEFT ATRIAL APPENDAGE CLOSURE DEVICE: ICD-10-CM

## 2023-02-15 DIAGNOSIS — L89.623 PRESSURE ULCER OF LEFT HEEL, STAGE 3 (HCC): Primary | ICD-10-CM

## 2023-02-15 DIAGNOSIS — S91.302A NON-HEALING OPEN WOUND OF LEFT HEEL: ICD-10-CM

## 2023-02-15 DIAGNOSIS — I63.9 CEREBROVASCULAR ACCIDENT (CVA), UNSPECIFIED MECHANISM (HCC): ICD-10-CM

## 2023-02-15 DIAGNOSIS — R25.2 SPASTICITY: ICD-10-CM

## 2023-02-15 DIAGNOSIS — L89.620 PRESSURE ULCER OF HEEL, LEFT, UNSTAGEABLE (HCC): ICD-10-CM

## 2023-02-15 DIAGNOSIS — G81.94 LEFT HEMIPARESIS (HCC): ICD-10-CM

## 2023-02-15 DIAGNOSIS — I48.0 PAROXYSMAL A-FIB (HCC): ICD-10-CM

## 2023-02-15 PROCEDURE — 99213 OFFICE O/P EST LOW 20 MIN: CPT

## 2023-02-15 PROCEDURE — 99213 OFFICE O/P EST LOW 20 MIN: CPT | Performed by: NURSE PRACTITIONER

## 2023-02-15 RX ORDER — BETAMETHASONE DIPROPIONATE 0.05 %
OINTMENT (GRAM) TOPICAL ONCE
OUTPATIENT
Start: 2023-02-15 | End: 2023-02-15

## 2023-02-15 RX ORDER — LIDOCAINE 50 MG/G
OINTMENT TOPICAL ONCE
OUTPATIENT
Start: 2023-02-15 | End: 2023-02-15

## 2023-02-15 RX ORDER — GINSENG 100 MG
CAPSULE ORAL ONCE
OUTPATIENT
Start: 2023-02-15 | End: 2023-02-15

## 2023-02-15 RX ORDER — LIDOCAINE 40 MG/G
CREAM TOPICAL ONCE
OUTPATIENT
Start: 2023-02-15 | End: 2023-02-15

## 2023-02-15 RX ORDER — LIDOCAINE HYDROCHLORIDE 20 MG/ML
JELLY TOPICAL ONCE
OUTPATIENT
Start: 2023-02-15 | End: 2023-02-15

## 2023-02-15 RX ORDER — ACETAMINOPHEN 325 MG/1
650 TABLET ORAL EVERY 6 HOURS PRN
COMMUNITY

## 2023-02-15 RX ORDER — CLOBETASOL PROPIONATE 0.5 MG/G
OINTMENT TOPICAL ONCE
OUTPATIENT
Start: 2023-02-15 | End: 2023-02-15

## 2023-02-15 RX ORDER — BACITRACIN, NEOMYCIN, POLYMYXIN B 400; 3.5; 5 [USP'U]/G; MG/G; [USP'U]/G
OINTMENT TOPICAL ONCE
OUTPATIENT
Start: 2023-02-15 | End: 2023-02-15

## 2023-02-15 RX ORDER — LIDOCAINE HYDROCHLORIDE 40 MG/ML
SOLUTION TOPICAL ONCE
Status: COMPLETED | OUTPATIENT
Start: 2023-02-15 | End: 2023-02-15

## 2023-02-15 RX ORDER — BACITRACIN ZINC AND POLYMYXIN B SULFATE 500; 1000 [USP'U]/G; [USP'U]/G
OINTMENT TOPICAL ONCE
OUTPATIENT
Start: 2023-02-15 | End: 2023-02-15

## 2023-02-15 RX ORDER — GABAPENTIN 100 MG/1
100 CAPSULE ORAL 4 TIMES DAILY
Qty: 28 CAPSULE | Refills: 0 | Status: SHIPPED | OUTPATIENT
Start: 2023-02-15 | End: 2023-02-22

## 2023-02-15 RX ORDER — LIDOCAINE HYDROCHLORIDE 40 MG/ML
SOLUTION TOPICAL ONCE
OUTPATIENT
Start: 2023-02-15 | End: 2023-02-15

## 2023-02-15 RX ORDER — GENTAMICIN SULFATE 1 MG/G
OINTMENT TOPICAL ONCE
OUTPATIENT
Start: 2023-02-15 | End: 2023-02-15

## 2023-02-15 RX ADMIN — LIDOCAINE HYDROCHLORIDE: 40 SOLUTION TOPICAL at 08:30

## 2023-02-15 ASSESSMENT — PAIN SCALES - GENERAL
PAINLEVEL_OUTOF10: 0
PAINLEVEL_OUTOF10: 0

## 2023-02-15 NOTE — PROGRESS NOTES
[address]   Progress Note and Procedure Note      Claudie Goldberg  MEDICAL RECORD NUMBER:  [id number]  AGE: 79 y.o. GENDER: male  : 1955  EPISODE DATE:  2/15/2023    Subjective:     Chief Complaint   Patient presents with    Wound Check     Follow up visit for left heel wound         HISTORY of PRESENT ILLNESS HPI  Doreen Orozco is a 79year old male presenting today for follow up for left heel wound. 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 Pt is as active as he can be. Once wound is closed can resume physical therapy with goal of walking with walker instead of using wheelchair for mobility. Chronic spastic movements of extremities today, he states are worse early in am.   History of Wound Context:   On 22 fell and sustained displaced fracture of base of neck of left femur and developed  a pressure ulcer left heel. Postop he developed left heel DTI   2022 pt first seen at [address] [place] [address],[address] for treatment of left heel wound dx as stable eschar over wound. Pt has an offloading boot in place to float heel and prevent further heel damage. 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 Pt has residual left sided weakness and ongoing therapy has been delayed because of pressure ulcer. Pt also has chronic bilateral lower leg edema for which he uses compression stockings. Not using compression on left leg because of wound. Pt has spastic movements bilateral upper and lower extremities since after stroke with is tx with Baclofen; but friction still a concern. PT and DP pulse strong with Doppler. Reports getting protein supplements. Pt transferring only, no walking with walker still too unstable. Noting intermittent spastic movements legs during visit. Concern about friction on left heel with this movement. Pt wears post-op shoe on left foot. His son Carlos Baez and daughter Raghu Mcintosh are monitoring and changing outer dressings as needed.   10/19/22 Eschar debrided because of drainage and redness. Culture of MRSA tx with antibiotics. Wound now designated as a Stage 3 pressure ulcer. 10/22/22applied for skin substitutes. Theraskin is preferable for its robust nature especially on a heel wound. Progress of wound:    After wound debrided on 10/19/22 measured 3 x 3.5 x 1.5. We have provided compression therapy, tx wound infection  and application of collagen, Puraply and NuShield products to wound with  little progress to healing. Lack of progress to wound healing is also delaying needed for further physical therapy/rehab. Pt scheduled for ultrasounds bilateral lower legs on 1/13/23 1/18/2023: Pt presents today with less lower leg/ankle edema noted; wound with some maceration and still with small amount of undermining from 9-12 o'clock. Results of vascular study from 1/13/23:   Summary        APPLE not obtained. Right lower extremity demonstrates occluded posterior    tibial and peroneal arteries but grossly normal macrovascular flow. Left    lower extremity demonstrates no evidence of hemodynamically significant    stenosis or occlusion. Discussed skin graft application with pt and his son and pt has agreed to get Theraskin to be applied. No overt signs of infection. Pt to use pressure redistribution boots on left heel during the night instead of \"fashioned\" post-op shoe. Denies constitutional issues. 2/1/2023: Today presents with minimal amount of drainage from wound, wound bed pale with minimal slough noted. Wound edges with less maceration. Will apply Theraskin today. Discussed about Theraskin application today and precautions to take. Theraskin 6 cm applied ( total number of skin subs 4; Patrici Border #1)  2/8/23: Presents today with partially intact Theraskin still in place over left heel wound, small amount of yellowish drainage on old dressing. Odor noted but not unexpected with graft. Minimal pink periwound skin.      Wound/Ulcer Pain Timing/Severity: intermittent  Quality of pain: tender  Severity:  2/ 10   Modifying Factors: Pain is relieved/improved with rest, repositioning  Associated Signs/Symptoms: edema and drainage  2/15/23 Presented today with wound left heel. Theraskin is incorporating into wound with periwound maceration more this week. Ulcer Identification:  Ulcer Type: pressure ulcer     Contributing Factors: edema; while hospitalized developed wound from chronic pressure and decreased mobility     Acute Wound: N/A not an acute wound    PAST MEDICAL HISTORY        Diagnosis Date    CAD (coronary artery disease)     HTN (hypertension)     Hyperlipemia     Left hemiparesis ([address] 8/11/2020    MRSA (methicillin resistant staph aureus) culture positive 9/14/2022    heel    Non-healing open wound of left heel 10/12/2022    Pressure injury of left ankle, stage 2 ([address] 9/13/2021    Pressure ulcer of heel, left, unstageable ([address] 8/8/2022    Stable eschar covered.     Pressure ulcer of left heel, stage 3 ([address] 10/20/2022    S/P PTCA (percutaneous transluminal coronary angioplasty) 2010    two stents place    Spasticity 7/9/2019    Stroke ([address] 10/2012    left-sided weakness       PAST SURGICAL HISTORY    Past Surgical History:   Procedure Laterality Date    CARDIAC SURGERY      HIP SURGERY Left     INGUINAL HERNIA REPAIR      right side    INTRACAPSULAR CATARACT EXTRACTION Right 08/30/2019    PHACOEMULSIFICATION WITH INTRAOCULAR LENS IMPLANT performed by Geneva Freitas MD at [address] [place], [place] [address] EXTRACTION Left 09/06/2019    PHACOEMULSIFICATION WITH INTRAOCULAR LENS IMPLANT performed by Geneva Freitas MD at [facility]    Family History   Problem Relation Age of Onset    Hypertension Mother     Heart Disease Father     Breast Cancer Sister     Heart Attack Brother        SOCIAL HISTORY    Social History     Tobacco Use    Smoking status: Never    Smokeless tobacco: Never   Vaping Use    Vaping Use: Never used   Substance Use Topics    Alcohol use: No    Drug use: No       ALLERGIES    No Known Allergies    MEDICATIONS    Current Outpatient Medications on File Prior to Encounter   Medication Sig Dispense Refill    acetaminophen (TYLENOL) 325 MG tablet Take 650 mg by mouth every 6 hours as needed for Pain      baclofen (LIORESAL) 10 MG tablet Take 1 tablet by mouth 2 times daily 180 tablet 1    lisinopril (PRINIVIL;ZESTRIL) 20 MG tablet Take 1 tablet by mouth daily 90 tablet 0    hydrALAZINE (APRESOLINE) 50 MG tablet Take 1 tablet by mouth 3 times daily 270 tablet 0    Wound Dressings ([facility] WOUND/BURN DRESSING) GEL gel Apply 1 each topically daily 1 each 1    senna-docusate (PERICOLACE) 8.6-50 MG per tablet Take 1 tablet by mouth 2 times daily      atorvastatin (LIPITOR) 40 MG tablet Take 1 tablet by mouth daily 90 tablet 3    amLODIPine (NORVASC) 10 MG tablet Take 1 tablet by mouth daily 90 tablet 3    gabapentin (NEURONTIN) 100 MG capsule Take 1 capsule by mouth 4 times daily for 180 days. 360 capsule 1    loratadine (CLARITIN) 10 MG tablet Take 1 tablet by mouth daily Pt needs to come in and be seen before more rx's are given. 90 tablet 1    aspirin 81 MG tablet Take 81 mg by mouth daily      Sennosides 17.2 MG TABS Take 17.2 mg by mouth       No current facility-administered medications on file prior to encounter. REVIEW OF SYSTEMS  Review of Systems    Pertinent items are noted in HPI.     Objective:      /67   Pulse 96   Temp 98.1 °F (36.7 °C) (Temporal)   Resp 20     Wt Readings from Last 3 Encounters:   02/10/23 226 lb (102.5 kg)   09/29/22 228 lb 14.4 oz (103.8 kg)   09/01/22 230 lb (104.3 kg)       PHYSICAL EXAM  Physical Exam    General Appearance: alert and oriented to person, place and time, well-developed and well-nourished, in no acute distress  Skin: warm and dry  Head: normocephalic and atraumatic  Eyes: pupils equal, round, and reactive to light  Pulmonary/Chest: clear to auscultation bilaterally- no wheezes, rales or rhonchi, normal air movement, no respiratory distress  Cardiovascular: normal rate, regular rhythm, and intact distal pulses  Extremities: no cyanosis, no clubbing, and 2 + edema-  right lower leg. States unable to apply compression stockings by himself      Assessment:        Problem List Items Addressed This Visit       Pressure ulcer of heel, left, unstageable (HCC)    Relevant Orders    Initiate Outpatient Wound Care Protocol    MRSA (methicillin resistant staph aureus) culture positive    Relevant Orders    Initiate Outpatient Wound Care Protocol    Non-healing open wound of left heel    Relevant Orders    Initiate Outpatient Wound Care Protocol    Pressure ulcer of left heel, stage 3 (HCC) - Primary    Relevant Orders    Initiate Outpatient Wound Care Protocol    Stroke [facility]) (Chronic)    Relevant Orders    Initiate Outpatient Wound Care Protocol    Spasticity    Relevant Orders    Initiate Outpatient Wound Care Protocol    Left hemiparesis [facility])    Relevant Orders    Initiate Outpatient Wound Care Protocol        Procedure Note  Indications:  Based on my examination of this patient's wound(s)/ulcer(s) today, debridement is not required to promote healing and evaluate the wound base. Wound/Ulcer Descriptions are Pre Debridement except measurements:    Wound 08/08/22 Heel Left #1 ( states since about 6/24/2022) (Active)   Wound Image   02/01/23 0815   Wound Etiology Pressure Unstageable 08/08/22 1404   Dressing Status New dressing applied 02/08/23 0850   Wound Cleansed Vashe 01/25/23 1045   Dressing/Treatment Other (comment); Silicone pad;Steri-strips;Roll gauze 02/08/23 0850   Offloading for Diabetic Foot Ulcers Post op shoe 02/08/23 0850   Wound Length (cm) 1.8 cm 02/15/23 0821   Wound Width (cm) 2.2 cm 02/15/23 0821   Wound Depth (cm) 0.3 cm 02/15/23 0821   Wound Surface Area (cm^2) 3.96 cm^2 02/15/23 0821   Change in Wound Size % (l*w) 76.84 02/15/23 0821   Wound Volume (cm^3) 1.188 cm^3 02/15/23 0821   Wound Healing % 31 02/15/23 0821   Post-Procedure Length (cm) 1.8 cm 02/15/23 0829   Post-Procedure Width (cm) 2.2 cm 02/15/23 0829   Post-Procedure Depth (cm) 0.3 cm 02/15/23 0829   Post-Procedure Surface Area (cm^2) 3.96 cm^2 02/15/23 0829   Post-Procedure Volume (cm^3) 1.188 cm^3 02/15/23 0829   Undermining Starts ___ O'Clock 6 02/01/23 0815   Undermining Ends___ O'Clock 12 02/01/23 0815   Undermining Maxium Distance (cm) .7 02/01/23 0815   Wound Assessment Granulation tissue;Slough 02/15/23 0821   Drainage Amount Moderate 02/15/23 0821   Drainage Description Thick; Serosanguinous;Brown 02/15/23 0821   Odor Mild 02/15/23 0821   Anahy-wound Assessment Maceration 02/15/23 0821   Margins Undefined edges 02/15/23 0821   Wound Thickness Description not for Pressure Injury Full thickness 02/15/23 0975   Number of days: 190          Plan:     Treatment Note please see attached Discharge Instructions    Written patient dismissal instructions given to patient and signed by patient or POA.          Discharge [id number] [facility] Physician Orders and Discharge Instructions  [address], [facility]  Telephone: [phone]  [address] 8:30 am - 4:30 pm and Friday 8:30 am - 1:00 pm.          NAME:  Ryan Prather  YOB: 1955  MEDICAL RECORD NUMBER:  [id number]  TODAYS DATE:  2/15/23           VASHE ON IN CLINIC TODAY      Please wash hands with soap and water prior to and right after  every dressing change          Topical Treatments:              [x] Apply around the wound:   [x] moisturizing lotion TO LEFT LEG AND FOOT THAT IS NOT CLOSE TO WOUND         WOUND LOCATION   Left Heel **Theraskin #1 APPLIED 2/1/2023  ( PURAPLY X 1 , NUSHEILD x2 = TOTAL 4 GRAFTS)            PRIMARY DRESSING: GRAFT IN PLACE TODAY                                 [x] Mepitel secured with Steri-strips                 DO NOT CHANGE ANYTHING BELOW THE STERI- STRIPS                                     (X)Alginate with Silver                          SECONDARY DRESSING:               [x]   LARGE OPTILOCK             (X)  ROLL GUAZE                            HOW OFTEN TO CHANGE DRESSINGS:                  (  X ) EVERY OTHER DAY, AND AS NEEDED , IF DRAINS THROUGH BANDAGE)  ONLY OUTER DRESSING IS TO BE CHANGED     COMPRESSION:                    ( X)    MEDIUM SPANDAGRIP THEN ACE WRAP TO LEFT LEG                          ( X)    MEDIUM SPANDAGRIP to right leg place every other day with dressing change                          _________________________________________________________________  PRESSURE RELIEF AND OFF-LOADING:     Off-Loading:   With heel protector ( HEEL LIFT BOOT)  [x] Off-loading when       [x] in bed         [x] sitting                             Specialty equipment ordered:       [] Wheelchair cushion    [] Specialty Bed/Mattress     [x] Assistive Device: please continue to use any assistive devices advised by the provider discussed during your visit   [x] Surgical shoe    [] Podus Boot(s)   [] Foam Boot(s)    [] CROW Boot     _____________________________________________________________________________________________     Dietary:  Continue your diet as tolerated. Eat heart-healthy foods. These foods include vegetables, fruits, nuts, beans, lean meat, fish, and whole grains. Limit sodium, alcohol, and sugar. Protein is a teresa nutrient in helping to repair damaged tissue and promote new tissue growth. Good sources of protein are milk, yogurt, cheese, fish, lean meat, and beans.   If you are a diabetic, having diabetes can make it hard for wounds to heal. So try to keep your blood sugar in its target range.  __________________________________________________________________________________________________     ACTIVITY:   Activity as tolerated  ________________________________________________________________________________________________________________     PAIN:     Please note, A small amount of pain, drainage and/or bleeding from this process might be expected, and is NORMAL. Elevate the affected limb. Use over-the-counter medications you would normally use for pain as permitted by your family doctor. For persistent pain not relieved by the above interventions, please call your family doctor.  ________________________________________________________________________________  Call your doctor now or seek immediate medical care if:    You have symptoms of infection, such as: Increased pain, swelling, warmth, or redness. Red streaks leading from the area. Pus draining from the area. A fever. _______________________________________________________________________     Return Appointment:  DME/Wound Dressing Supplies Provided by:  [facility]  (Supply companies distribute one month supply at a time. Please call them directly to reorder supplies when you run out)     ECF or Home Healthcare: Your Home Care Agency is responsible to order your supplies. Return Appointment: With Dede Flores CNP  in 1 Week(s)                  [] Orders placed during your visit:   :  [facility]        CARDIOLOGISTS:  Spike Rocha OR Mando Yo          Electronically signed by Evaristo Patten RN on 2/15/2023 at 8:31 [address] Information: Should you experience any significant chnges in your wound(s) or have questions about your wound care, please contact the [facility] at [address] 8:30 am - 4:30 pm and Friday 8:30 am - 1:00 pm.  If you need help with your wound outside these hours and cannot wait until we are again available, contact your PCP or go to the hospital emergency room.      PLEASE NOTE: IF YOU ARE UNABLE TO OBTAIN WOUND SUPPLIES, CONTINUE TO USE THE SUPPLIES YOU HAVE AVAILABLE UNTIL YOU ARE ABLE TO REACH US. KEEP THE WOUND COVERED AT ALL TIMES.                          Electronically signed by MADISYN Lee CNP on 2/15/2023 at 8:53 AM

## 2023-02-15 NOTE — TELEPHONE ENCOUNTER
Patient would like for pcp to send gabapentin (NEURONTIN) 100 MG enough for 1 week because mail order will not come for 7 days please send to [facility]; please advise patient if cannot be done at [phone].

## 2023-02-15 NOTE — LETTER
[address] [facility] ([facility])        Dear, Cy Sterling        Your Watchman procedure has been scheduled for Monday 3/20/2023 at [facility] with Dr. Whitney Schilder, MD.  Please arrive directly to the Cath Lab at 7:30 am.     Cynthia Navarro will need to make arrangements to have a responsible adult drive you home after your procedure and someone needs to stay with you for 24 hours. Procedure labs Procedure labs will need to be completed at [facility] Monday 3/13/2023 the week before your procedure. Please check in with Registration in the main lobby of the hospital. The test to perform [address] LAB NOT OUTPATIENT LAB. are as follows BMP, CBC, Urine test and Type and Screen. Pre-Procedure Instructions:  Praveen Divine You will need to FAST for at least 8 hours prior to your procedure, nothing to eat or drink after midnight.  NO caffeine the morning of your procedure.  You need to wash your body with a soap called HIBICLENS the evening before or the morning of your scheduled procedure from the neck down. A prescription has been sent to your pharmacy. If your insurance does not cover the soap, you can pick it up over the counter, ask your pharmacy for help.  Take 1 Baby Aspirin the day of the procedure.  Hold Diabetic medication the day of the procedure.  Hold multi. Vitamins the day of procedure.  ALL other medications can be taken in the morning with sips of water   Do not use any lotions, creams or perfume the morning of procedure.  You will be going home the same day but if unable to you may stay overnight at [facility] after your procedure so, please pack an overnight bag if needed. Pre-certification  Our office will be in contact with your insurance company regarding pre-certification. If for some reason, your procedure is still pending the day before your scheduled procedure, it must be moved or delayed until we have authorization to proceed.

## 2023-02-16 NOTE — DISCHARGE INSTRUCTIONS
[address] Physician Orders and Discharge Instructions  [address] [facility], [address], [address]  Telephone: [phone]  [address] 8:30 am - 4:30 pm and Friday 8:30 am - 1:00 pm.          NAME:  Raciel Rousseau  YOB: 1955  MEDICAL RECORD NUMBER:  [id number]  TODAYS DATE:  2/22/23           VASHE ON IN CLINIC TODAY      Please wash hands with soap and water prior to and right after  every dressing change          Topical Treatments:              [x] Apply around the wound:   [x] moisturizing lotion TO LEFT LEG AND FOOT THAT IS NOT CLOSE TO WOUND         WOUND LOCATION   Left Heel **Theraskin #2 APPLIED 2/22/2023  ( PURAPLY X 1 , NUSHEILD x2 = TOTAL 5 GRAFTS)            PRIMARY DRESSING: GRAFT IN PLACE TODAY                                 [x] Mepitel secured with Steri-strips                 DO NOT CHANGE ANYTHING BELOW THE STERI- STRIPS                                     (X)Alginate with Silver (around graft)                           SECONDARY DRESSING:               []             (X)  ROLL GUAZE                            HOW OFTEN TO CHANGE DRESSINGS:                  (  X ) EVERY OTHER DAY, AND AS NEEDED , IF DRAINS THROUGH BANDAGE)  ONLY OUTER DRESSING IS TO BE CHANGED     COMPRESSION:                    ( X)    MEDIUM SPANDAGRIP THEN ACE WRAP TO LEFT LEG                           ( X)    MEDIUM SPANDAGRIP to right leg place every other day with dressing change                   YOU HAVE HAD A THERA SKIN     PLACED ON YOUR WOUND TODAY. FOR BEST RESULTS, WE RECOMMEND YOU LIMIT YOUR ACTIVITY FOR THE NEXT WEEK AS MUCH AS POSSIBLE. AVOID LONG PERIODS OF TIME ON YOUR FEET.  THIS ALSO INCLUDES ELEVATING YOUR LEGS AND FEET 3-4 TIMES DAILY FOR AT LEAST 20-30 MINUTES ON PILLOWS AND AT NIGHT SO THAT THEY ARE HIGHER THAN THE LEVEL OF YOUR HEART       Electronically signed by Nancy Marsh RN on 2/22/2023 at 8:58 AM _________________________________________________________________  PRESSURE RELIEF AND OFF-LOADING:     Off-Loading:   With heel protector ( HEEL LIFT BOOT)  [x] Off-loading when       [x] in bed         [x] sitting                             Specialty equipment ordered:       [] Wheelchair cushion    [] Specialty Bed/Mattress     [x] Assistive Device: please continue to use any assistive devices advised by the provider discussed during your visit   [x] Surgical shoe    [] Podus Boot(s)   [] Foam Boot(s)    [] Berna Tatemarychuy     _____________________________________________________________________________________________     Dietary:  Continue your diet as tolerated. Eat heart-healthy foods. These foods include vegetables, fruits, nuts, beans, lean meat, fish, and whole grains. Limit sodium, alcohol, and sugar. Protein is a teresa nutrient in helping to repair damaged tissue and promote new tissue growth. Good sources of protein are milk, yogurt, cheese, fish, lean meat, and beans. If you are a diabetic, having diabetes can make it hard for wounds to heal. So try to keep your blood sugar in its target range.  __________________________________________________________________________________________________     ACTIVITY:   Activity as tolerated  ________________________________________________________________________________________________________________     PAIN:     Please note, A small amount of pain, drainage and/or bleeding from this process might be expected, and is NORMAL. Elevate the affected limb. Use over-the-counter medications you would normally use for pain as permitted by your family doctor. For persistent pain not relieved by the above interventions, please call your family doctor.  ________________________________________________________________________________  Call your doctor now or seek immediate medical care if:    You have symptoms of infection, such as:   Increased pain, swelling, warmth, or redness. Red streaks leading from the area. Pus draining from the area. A fever. _______________________________________________________________________     Return Appointment:  DME/Wound Dressing Supplies Provided by:  [facility]  (Supply companies distribute one month supply at a time. Please call them directly to reorder supplies when you run out)     ECF or Home Healthcare: Your Home Care Agency is responsible to order your supplies. Return Appointment: With Benjamin Varma CNP  in 1 Week(s)                  [x] Orders placed during your visit: culture obtained today      :  [facility]        CARDIOLOGISTS:  Lenin Will OR Mando Yo            Electronically signed by Chandrika Gómez RN on 2/22/2023 at 8:50 [facility] Information: Should you experience any significant chnges in your wound(s) or have questions about your wound care, please contact the [address] [place] [address] at [address] 8:30 am - 4:30 pm and Friday 8:30 am - 1:00 pm.  If you need help with your wound outside these hours and cannot wait until we are again available, contact your PCP or go to the hospital emergency room. PLEASE NOTE: IF YOU ARE UNABLE TO OBTAIN WOUND SUPPLIES, CONTINUE TO USE THE SUPPLIES YOU HAVE AVAILABLE UNTIL YOU ARE ABLE TO REACH US. KEEP THE WOUND COVERED AT ALL TIMES.

## 2023-02-22 ENCOUNTER — HOSPITAL ENCOUNTER (OUTPATIENT)
Dept: WOUND CARE | Age: 68
Discharge: HOME | End: 2023-02-22
Payer: MEDICARE

## 2023-02-22 ENCOUNTER — TELEPHONE (OUTPATIENT)
Dept: PRIMARY CARE CLINIC | Age: 68
End: 2023-02-22

## 2023-02-22 VITALS
DIASTOLIC BLOOD PRESSURE: 78 MMHG | SYSTOLIC BLOOD PRESSURE: 112 MMHG | RESPIRATION RATE: 20 BRPM | HEART RATE: 73 BPM | TEMPERATURE: 97.3 F

## 2023-02-22 DIAGNOSIS — Z22.322 MRSA (METHICILLIN RESISTANT STAPH AUREUS) CULTURE POSITIVE: ICD-10-CM

## 2023-02-22 DIAGNOSIS — I63.9 CEREBROVASCULAR ACCIDENT (CVA), UNSPECIFIED MECHANISM (HCC): ICD-10-CM

## 2023-02-22 DIAGNOSIS — R25.2 SPASTICITY: ICD-10-CM

## 2023-02-22 DIAGNOSIS — S91.302A NON-HEALING OPEN WOUND OF LEFT HEEL: ICD-10-CM

## 2023-02-22 DIAGNOSIS — G81.94 LEFT HEMIPARESIS (HCC): ICD-10-CM

## 2023-02-22 DIAGNOSIS — L89.623 PRESSURE ULCER OF LEFT HEEL, STAGE 3 (HCC): Primary | ICD-10-CM

## 2023-02-22 DIAGNOSIS — L89.620 PRESSURE ULCER OF HEEL, LEFT, UNSTAGEABLE (HCC): ICD-10-CM

## 2023-02-22 PROCEDURE — 87070 CULTURE OTHR SPECIMN AEROBIC: CPT

## 2023-02-22 PROCEDURE — 87205 SMEAR GRAM STAIN: CPT

## 2023-02-22 PROCEDURE — 87075 CULTR BACTERIA EXCEPT BLOOD: CPT

## 2023-02-22 PROCEDURE — 87077 CULTURE AEROBIC IDENTIFY: CPT

## 2023-02-22 PROCEDURE — 15275 SKIN SUB GRAFT FACE/NK/HF/G: CPT

## 2023-02-22 RX ORDER — LIDOCAINE HYDROCHLORIDE 40 MG/ML
SOLUTION TOPICAL ONCE
Status: COMPLETED | OUTPATIENT
Start: 2023-02-22 | End: 2023-02-22

## 2023-02-22 RX ORDER — GINSENG 100 MG
CAPSULE ORAL ONCE
OUTPATIENT
Start: 2023-02-22 | End: 2023-02-22

## 2023-02-22 RX ORDER — HYDRALAZINE HYDROCHLORIDE 50 MG/1
50 TABLET, FILM COATED ORAL 3 TIMES DAILY
Qty: 90 TABLET | Refills: 0 | Status: SHIPPED | OUTPATIENT
Start: 2023-02-22

## 2023-02-22 RX ORDER — BETAMETHASONE DIPROPIONATE 0.05 %
OINTMENT (GRAM) TOPICAL ONCE
OUTPATIENT
Start: 2023-02-22 | End: 2023-02-22

## 2023-02-22 RX ORDER — BACITRACIN ZINC AND POLYMYXIN B SULFATE 500; 1000 [USP'U]/G; [USP'U]/G
OINTMENT TOPICAL ONCE
OUTPATIENT
Start: 2023-02-22 | End: 2023-02-22

## 2023-02-22 RX ORDER — GENTAMICIN SULFATE 1 MG/G
OINTMENT TOPICAL ONCE
OUTPATIENT
Start: 2023-02-22 | End: 2023-02-22

## 2023-02-22 RX ORDER — LIDOCAINE HYDROCHLORIDE 40 MG/ML
SOLUTION TOPICAL ONCE
OUTPATIENT
Start: 2023-02-22 | End: 2023-02-22

## 2023-02-22 RX ORDER — CLOBETASOL PROPIONATE 0.5 MG/G
OINTMENT TOPICAL ONCE
OUTPATIENT
Start: 2023-02-22 | End: 2023-02-22

## 2023-02-22 RX ORDER — LIDOCAINE 50 MG/G
OINTMENT TOPICAL ONCE
OUTPATIENT
Start: 2023-02-22 | End: 2023-02-22

## 2023-02-22 RX ORDER — LIDOCAINE HYDROCHLORIDE 20 MG/ML
JELLY TOPICAL ONCE
OUTPATIENT
Start: 2023-02-22 | End: 2023-02-22

## 2023-02-22 RX ORDER — LIDOCAINE 40 MG/G
CREAM TOPICAL ONCE
OUTPATIENT
Start: 2023-02-22 | End: 2023-02-22

## 2023-02-22 RX ORDER — BACITRACIN, NEOMYCIN, POLYMYXIN B 400; 3.5; 5 [USP'U]/G; MG/G; [USP'U]/G
OINTMENT TOPICAL ONCE
OUTPATIENT
Start: 2023-02-22 | End: 2023-02-22

## 2023-02-22 RX ADMIN — LIDOCAINE HYDROCHLORIDE: 40 SOLUTION TOPICAL at 08:27

## 2023-02-22 ASSESSMENT — PAIN SCALES - GENERAL: PAINLEVEL_OUTOF10: 0

## 2023-02-22 NOTE — PLAN OF CARE
TheraSkin Treatment Note    NAME:  Didier Hollingsworth OF BIRTH:  1955  MEDICAL RECORD NUMBER:  [id number]  DATE:  2/22/2023    Goal:  Patient will receive safe and proper application of skin substitute. Patient will comply with caring for dressing, offloading and reporting complications. Expiration date of TheraSkin checked immediately prior to use. Package intact prior to use and no damage noted. Transport temperature controlled and acceptable. TheraSkin was prepared for application by removing from package. TheraSkin was rinsed 2 times in room temperature normal saline for 2 minutes each time. A 2nd saline rinse was left on the TheraSkin until the physician was ready to apply it within 120 minutes of thawing. White side goes against ulcer bed. TheraSkin was applied to left heel and affixed with steri-strips by the physician.  Sheilda Oxford was applied on top of non-adherent dressing. Fluffed gauze was applied. Coban or ace wrap was applied to secure graft and decrease edema. Patient/caregiver was instructed not to remove dressing and to keep it clean and dry. Pt/family/caregiver was instructed on signs and symptoms of complications to report such as draining through, falling down/slipping, getting wet, or severe pain or tingling in toes. Pt/family/caregiver was instructed on need for offloading and elevation of affected extremity (if applicable) and on use of prescribed offloading device. Thera Skin may be applied a total of 10 times per wound over a 12 week period. Date of first application of Thera Skin for this current wound is February 1, 2023.                   Guidelines followed      Electronically signed by Nathaniel Thapa RN on 2/22/2023 at 12:20 PM

## 2023-02-22 NOTE — TELEPHONE ENCOUNTER
PT called in asking if Dr. Soledad Diaz could send in a temporary refill of his Hydralazine to the [facility]. PT is in between checks and can't afford to reorder a full refill until he gets paid again. He also wanted to know if this could be done today.      Please call back to adv: [phone]

## 2023-02-22 NOTE — PROGRESS NOTES
[address]   Progress Note and Procedure Note      Lisa Hargrove  MEDICAL RECORD NUMBER:  [id number]  AGE: 76 y.o. GENDER: male  : 1955  EPISODE DATE:  2023    Subjective:     Chief Complaint   Patient presents with    Wound Check     Follow up visit for left heel wound. HISTORY of PRESENT ILLNESS HPI  Prudencio Fox is a 79year old male presenting today for follow up for left heel wound. 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 Pt is as active as he can be. Once wound is closed can resume physical therapy with goal of walking with walker instead of using wheelchair for mobility. Chronic spastic movements of extremities today, he states are worse early in am.   History of Wound Context:   On 22  He fell and sustained displaced fracture of base of neck of left femur and developed  a pressure ulcer left heel. Postop he developed left heel DTI   2022 pt first seen at [address] [place] [address],[address] for treatment of left heel wound dx as stable eschar over wound. Pt has an offloading boot in place to float heel and prevent further heel damage. 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 Pt has residual left sided weakness and ongoing therapy has been delayed because of pressure ulcer. Pt also has chronic bilateral lower leg edema for which he uses compression stockings. Not using compression on left leg because of wound. Pt has spastic movements bilateral upper and lower extremities since after stroke with is tx with Baclofen; but friction still a concern. PT and DP pulse strong with Doppler. Reports getting protein supplements. Pt transferring only, no walking with walker still too unstable. Noting intermittent spastic movements legs during visit. Concern about friction on left heel with this movement. Pt wears post-op shoe on left foot. His son Chris Estes and daughter Mily Selby are monitoring and changing outer dressings as needed.   10/19/22 Eschar debrided because of drainage and redness. Culture of MRSA tx with antibiotics. Wound now designated as a Stage 3 pressure ulcer. 10/22/22applied for skin substitutes. Theraskin is preferable for its robust nature especially on a heel wound. Progress of wound:    After wound debrided on 10/19/22 measured 3 x 3.5 x 1.5. We have provided compression therapy, tx wound infection  and application of collagen, Puraply and NuShield products to wound with  little progress to healing. Lack of progress to wound healing is also delaying needed for further physical therapy/rehab. Pt scheduled for ultrasounds bilateral lower legs on 1/13/23 1/18/2023: Pt presents today with less lower leg/ankle edema noted; wound with some maceration and still with small amount of undermining from 9-12 o'clock. Results of vascular study from 1/13/23:   Summary        APPLE not obtained. Right lower extremity demonstrates occluded posterior    tibial and peroneal arteries but grossly normal macrovascular flow. Left    lower extremity demonstrates no evidence of hemodynamically significant    stenosis or occlusion. Discussed skin graft application with pt and his son and pt has agreed to get Theraskin to be applied. No overt signs of infection. Pt to use pressure redistribution boots on left heel during the night instead of \"fashioned\" post-op shoe. Denies constitutional issues. 2/1/2023: Today presents with minimal amount of drainage from wound, wound bed pale with minimal slough noted. Wound edges with less maceration. Will apply Theraskin today. Discussed about Theraskin application today and precautions to take. Theraskin 6 cm applied ( total number of skin subs 4; Nicci Bradford #1)  2/8/23: Presents today with partially intact Theraskin still in place over left heel wound, small amount of yellowish drainage on old dressing. Odor noted but not unexpected with graft. Minimal pink periwound skin.      Wound/Ulcer Pain Timing/Severity: intermittent  Quality of pain: tender  Severity:  2/ 10   Modifying Factors: Pain is relieved/improved with rest, repositioning  Associated Signs/Symptoms: edema and drainage  2/15/23 Presented today with wound left heel. Theraskin is incorporating into wound with periwound maceration more this week. 2/22/23 No major changes in wound size, but less maceration and drainage, sides of wound adherent with a small amount of undermining left wound edge. Reapplied Theraskin today and obtained culture. Ulcer Identification:  Ulcer Type: pressure ulcer     Contributing Factors: edema; while hospitalized developed wound from chronic pressure and decreased mobility     Acute Wound: N/A not an acute wound       PAST MEDICAL HISTORY        Diagnosis Date    CAD (coronary artery disease)     HTN (hypertension)     Hyperlipemia     Left hemiparesis ([address] 8/11/2020    MRSA (methicillin resistant staph aureus) culture positive 9/14/2022    heel    Non-healing open wound of left heel 10/12/2022    Pressure injury of left ankle, stage 2 ([address] 9/13/2021    Pressure ulcer of heel, left, unstageable ([address] 8/8/2022    Stable eschar covered.     Pressure ulcer of left heel, stage 3 ([address] 10/20/2022    S/P PTCA (percutaneous transluminal coronary angioplasty) 2010    two stents place    Spasticity 7/9/2019    Stroke ([address] 10/2012    left-sided weakness       PAST SURGICAL HISTORY    Past Surgical History:   Procedure Laterality Date    CARDIAC SURGERY      HIP SURGERY Left     INGUINAL HERNIA REPAIR      right side    INTRACAPSULAR CATARACT EXTRACTION Right 08/30/2019    PHACOEMULSIFICATION WITH INTRAOCULAR LENS IMPLANT performed by Joy Caldera MD at [address] [place] [address] EXTRACTION Left 09/06/2019    PHACOEMULSIFICATION WITH INTRAOCULAR LENS IMPLANT performed by Joy Caldera MD at [facility]    Family History   Problem Relation Age of Onset    Hypertension Mother     Heart Disease Father     Breast Cancer Sister     Heart Attack Brother        SOCIAL HISTORY    Social History     Tobacco Use    Smoking status: Never    Smokeless tobacco: Never   Vaping Use    Vaping Use: Never used   Substance Use Topics    Alcohol use: No    Drug use: No       ALLERGIES    No Known Allergies    MEDICATIONS    Current Outpatient Medications on File Prior to Encounter   Medication Sig Dispense Refill    acetaminophen (TYLENOL) 325 MG tablet Take 650 mg by mouth every 6 hours as needed for Pain      gabapentin (NEURONTIN) 100 MG capsule Take 1 capsule by mouth 4 times daily for 7 days. 28 capsule 0    baclofen (LIORESAL) 10 MG tablet Take 1 tablet by mouth 2 times daily 180 tablet 1    lisinopril (PRINIVIL;ZESTRIL) 20 MG tablet Take 1 tablet by mouth daily 90 tablet 0    hydrALAZINE (APRESOLINE) 50 MG tablet Take 1 tablet by mouth 3 times daily 270 tablet 0    Wound Dressings (MEDIHONEY WOUND/BURN DRESSING) GEL gel Apply 1 each topically daily 1 each 1    senna-docusate (PERICOLACE) 8.6-50 MG per tablet Take 1 tablet by mouth 2 times daily      atorvastatin (LIPITOR) 40 MG tablet Take 1 tablet by mouth daily 90 tablet 3    amLODIPine (NORVASC) 10 MG tablet Take 1 tablet by mouth daily 90 tablet 3    loratadine (CLARITIN) 10 MG tablet Take 1 tablet by mouth daily Pt needs to come in and be seen before more rx's are given. 90 tablet 1    aspirin 81 MG tablet Take 81 mg by mouth daily      Sennosides 17.2 MG TABS Take 17.2 mg by mouth       No current facility-administered medications on file prior to encounter. REVIEW OF SYSTEMS  Review of Systems    Pertinent items are noted in HPI.     Objective:      /78   Pulse 73   Temp 97.3 °F (36.3 °C) (Temporal)   Resp 20     Wt Readings from Last 3 Encounters:   02/10/23 226 lb (102.5 kg)   09/29/22 228 lb 14.4 oz (103.8 kg)   09/01/22 230 lb (104.3 kg)       PHYSICAL EXAM  Physical Exam    General Appearance: alert and oriented to person, place and time, well-developed and well-nourished, in no acute distress  Skin: warm and dry  Head: normocephalic and atraumatic  Eyes: pupils equal, round, and reactive to light  Pulmonary/Chest: clear to auscultation bilaterally- no wheezes, rales or rhonchi, normal air movement, no respiratory distress  Cardiovascular: normal rate, regular rhythm, and intact distal pulses      Assessment:        Problem List Items Addressed This Visit       Pressure ulcer of heel, left, unstageable (HCC)    Relevant Orders    Initiate Outpatient Wound Care Protocol    MRSA (methicillin resistant staph aureus) culture positive    Relevant Orders    Initiate Outpatient Wound Care Protocol    Non-healing open wound of left heel    Relevant Orders    Initiate Outpatient Wound Care Protocol    Pressure ulcer of left heel, stage 3 (HCC) - Primary    Relevant Orders    Initiate Outpatient Wound Care Protocol    Stroke [facility]) (Chronic)    Relevant Orders    Initiate Outpatient Wound Care Protocol    Spasticity    Relevant Orders    Initiate Outpatient Wound Care Protocol    Left hemiparesis [facility])    Relevant Orders    Initiate Outpatient Wound Care Protocol      Jose GALAN Jeffrey  AGE: 76 y.o. GENDER: male  : 1955  TODAY'S DATE:  2023    Chief Complaint   Patient presents with    Wound Check     Follow up visit for left heel wound. Skin Substitute Applied:       Theraskin 6 sq/cm    Performed by: MADISYN Malhotra CNP    Wound Type:pressure    Consent obtained: Yes    Time out taken: Yes     Fenestrated: Yes    Instrument(s) curette and forceps      [] Mesher Utilized    All  Guidelines Followed    Skin Substitute was Applied to Wound Number(s):     Wound #: 1      Wound 22 Heel Left #1 ( states since about 2022) (Active)   Wound Image   23 0815   Wound Etiology Pressure Unstageable 22 1404   Dressing Status New dressing applied 23 0850   Wound Cleansed Vashe 23 1045   Dressing/Treatment Other (comment); Silicone pad;Steri-strips;Roll gauze 02/08/23 0850   Offloading for Diabetic Foot Ulcers Post op shoe 02/08/23 0850   Wound Length (cm) 1.8 cm 02/22/23 0816   Wound Width (cm) 2.5 cm 02/22/23 0816   Wound Depth (cm) 0.4 cm 02/22/23 0816   Wound Surface Area (cm^2) 4.5 cm^2 02/22/23 0816   Change in Wound Size % (l*w) 73.68 02/22/23 0816   Wound Volume (cm^3) 1.8 cm^3 02/22/23 0816   Wound Healing % -5 02/22/23 0816   Post-Procedure Length (cm) 1.9 cm 02/22/23 0853   Post-Procedure Width (cm) 2.6 cm 02/22/23 0853   Post-Procedure Depth (cm) 0.5 cm 02/22/23 0853   Post-Procedure Surface Area (cm^2) 4.94 cm^2 02/22/23 0853   Post-Procedure Volume (cm^3) 2.47 cm^3 02/22/23 0853   Undermining Starts ___ O'Clock 6 02/01/23 0815   Undermining Ends___ O'Clock 12 02/01/23 0815   Undermining Maxium Distance (cm) .7 02/01/23 0815   Wound Assessment Granulation tissue;Slough 02/22/23 0816   Drainage Amount Small 02/22/23 0816   Drainage Description Serosanguinous 02/22/23 0816   Odor Mild 02/22/23 0816   Anahy-wound Assessment Maceration 02/22/23 0816   Margins Undefined edges 02/22/23 0816   Wound Thickness Description not for Pressure Injury Full thickness 02/22/23 0816   Number of days: 197         Total Surface Area Covered 4.94 sq/cm     Amount Wasted 0 sq/cm    Reason for Waste no waste      Was the Product Layered  No     Secured: Yes    Secured With: Mepitel [x]Steri Strips    []Sutures     []Staples []Other  Silver alginate encircling wound on top of Mepitel. Procedural Pain: 0/10     Post Procedural Pain: 0 / 10    Response to Treatment:  Well tolerated by patient. Procedure Note  Indications:  Based on my examination of this patient's wound(s)/ulcer(s) today, debridement is required to promote healing and evaluate the wound base.     Performed by: MADISYN Ma CNP    Consent obtained:  Yes    Time out taken:  Yes    Pain Control: Anesthetic  Anesthetic: 4% Lidocaine Liquid Topical       Debridement: Excisional Debridement    Using curette and forceps the wound(s)/ulcer(s) was/were debrided down through and including the removal of epidermis, dermis, and subcutaneous tissue. Devitalized Tissue Debrided:  fibrin, biofilm, and slough    Pre Debridement Measurements:  Are located in the [facility]  Documentation Flow Sheet    Diabetic/Pressure/Non Pressure Ulcers only:  Ulcer: Pressure ulcer, Stage 3     Wound/Ulcer #: 1    Post Debridement Measurements:  Wound/Ulcer Descriptions are Pre Debridement except measurements:    Wound 08/08/22 Heel Left #1 ( states since about 6/24/2022) (Active)   Wound Image   02/01/23 0815   Wound Etiology Pressure Unstageable 08/08/22 1404   Dressing Status New dressing applied 02/08/23 0850   Wound Cleansed Vashe 01/25/23 1045   Dressing/Treatment Other (comment); Silicone pad;Steri-strips;Roll gauze 02/08/23 0850   Offloading for Diabetic Foot Ulcers Post op shoe 02/08/23 0850   Wound Length (cm) 1.8 cm 02/22/23 0816   Wound Width (cm) 2.5 cm 02/22/23 0816   Wound Depth (cm) 0.4 cm 02/22/23 0816   Wound Surface Area (cm^2) 4.5 cm^2 02/22/23 0816   Change in Wound Size % (l*w) 73.68 02/22/23 0816   Wound Volume (cm^3) 1.8 cm^3 02/22/23 0816   Wound Healing % -5 02/22/23 0816   Post-Procedure Length (cm) 1.8 cm 02/15/23 0829   Post-Procedure Width (cm) 2.2 cm 02/15/23 0829   Post-Procedure Depth (cm) 0.3 cm 02/15/23 0829   Post-Procedure Surface Area (cm^2) 3.96 cm^2 02/15/23 0829   Post-Procedure Volume (cm^3) 1.188 cm^3 02/15/23 0829   Undermining Starts ___ O'Clock 6 02/01/23 0815   Undermining Ends___ O'Clock 12 02/01/23 0815   Undermining Maxium Distance (cm) .7 02/01/23 0815   Wound Assessment Granulation tissue;Slough 02/22/23 0816   Drainage Amount Small 02/22/23 0816   Drainage Description Serosanguinous 02/22/23 0816   Odor Mild 02/22/23 0816   Anahy-wound Assessment Maceration 02/22/23 0816   Margins Undefined edges 02/22/23 0075   Wound Thickness Description not for Pressure Injury Full thickness 02/22/23 0816   Number of days: 197          Total Surface Area Debrided:  4.94 sq cm     Estimated Blood Loss:  Estimated amount of blood loss is 1 ml. Hemostasis Achieved:  by pressure    Procedural Pain:  2  / 10     Post Procedural Pain:  0 / 10     Response to treatment:  Well tolerated by patient. Plan:     Treatment Note please see attached Discharge Instructions    Written patient dismissal instructions given to patient and signed by patient or POA.          Discharge [id number] [facility] Physician Orders and Discharge Instructions  [address] [place] [address], [facility] [address] [place] [address]  Telephone: [phone]  [address] 8:30 am - 4:30 pm and Friday 8:30 am - 1:00 pm.          NAME:  Raciel Rousseau  YOB: 1955  MEDICAL RECORD NUMBER:  [id number]  TODAYS DATE:  2/22/23           VASHE ON IN CLINIC TODAY      Please wash hands with soap and water prior to and right after  every dressing change          Topical Treatments:              [x] Apply around the wound:   [x] moisturizing lotion TO LEFT LEG AND FOOT THAT IS NOT CLOSE TO WOUND         WOUND LOCATION   Left Heel **Theraskin #2 APPLIED 2/22/2023  ( PURAPLY X 1 , NUSHEILD x2 = TOTAL 5 GRAFTS)            PRIMARY DRESSING: GRAFT IN PLACE TODAY                                 [x] Mepitel secured with Steri-strips                 DO NOT CHANGE ANYTHING BELOW THE STERI- STRIPS                                     (X)Alginate with Silver (around graft)                           SECONDARY DRESSING:               []             (X)  ROLL GUAZE                            HOW OFTEN TO CHANGE DRESSINGS:                  (  X ) EVERY OTHER DAY, AND AS NEEDED , IF DRAINS THROUGH BANDAGE)  ONLY OUTER DRESSING IS TO BE CHANGED     COMPRESSION:                    ( X)    MEDIUM SPANDAGRIP THEN ACE WRAP TO LEFT LEG                           ( X)    MEDIUM SPANDAGRIP to right leg place every other day with dressing change                          _________________________________________________________________  PRESSURE RELIEF AND OFF-LOADING:     Off-Loading:   With heel protector ( HEEL LIFT BOOT)  [x] Off-loading when       [x] in bed         [x] sitting                             Specialty equipment ordered:       [] Wheelchair cushion    [] Specialty Bed/Mattress     [x] Assistive Device: please continue to use any assistive devices advised by the provider discussed during your visit   [x] Surgical shoe    [] Podus Boot(s)   [] Foam Boot(s)    [] CROW Boot     _____________________________________________________________________________________________     Dietary:  Continue your diet as tolerated. Eat heart-healthy foods. These foods include vegetables, fruits, nuts, beans, lean meat, fish, and whole grains. Limit sodium, alcohol, and sugar. Protein is a teresa nutrient in helping to repair damaged tissue and promote new tissue growth. Good sources of protein are milk, yogurt, cheese, fish, lean meat, and beans. If you are a diabetic, having diabetes can make it hard for wounds to heal. So try to keep your blood sugar in its target range.  __________________________________________________________________________________________________     ACTIVITY:   Activity as tolerated  ________________________________________________________________________________________________________________     PAIN:     Please note, A small amount of pain, drainage and/or bleeding from this process might be expected, and is NORMAL. Elevate the affected limb. Use over-the-counter medications you would normally use for pain as permitted by your family doctor.   For persistent pain not relieved by the above interventions, please call your family doctor.  ________________________________________________________________________________  Call your doctor now or seek immediate medical care if:    You have symptoms of infection, such as: Increased pain, swelling, warmth, or redness. Red streaks leading from the area. Pus draining from the area. A fever. _______________________________________________________________________     Return Appointment:  DME/Wound Dressing Supplies Provided by:  [facility]  (Supply companies distribute one month supply at a time. Please call them directly to reorder supplies when you run out)     ECF or Home Healthcare: Your Home Care Agency is responsible to order your supplies. Return Appointment: With Nick Hdz CNP  in 1 Week(s)                  [] Orders placed during your visit:   :  [facility]        CARDIOLOGISTS:  Efren Bradshaw OR Mando Yo            Electronically signed by Lazaro Romero RN on 2/22/2023 at 8:50 [facility] Information: Should you experience any significant chnges in your wound(s) or have questions about your wound care, please contact the [address] [place] [address] at [address] 8:30 am - 4:30 pm and Friday 8:30 am - 1:00 pm.  If you need help with your wound outside these hours and cannot wait until we are again available, contact your PCP or go to the hospital emergency room. PLEASE NOTE: IF YOU ARE UNABLE TO OBTAIN WOUND SUPPLIES, CONTINUE TO USE THE SUPPLIES YOU HAVE AVAILABLE UNTIL YOU ARE ABLE TO REACH US. KEEP THE WOUND COVERED AT ALL TIMES.                     Electronically signed by MADISYN Salinas CNP on 2/22/2023 at 8:53 AM

## 2023-02-22 NOTE — DISCHARGE INSTRUCTIONS
[address] [place] [address] Physician Orders and Discharge Instructions  [address] [place] [address] Drive, [address], [address]  Telephone: [phone]  [address] 8:30 am - 4:30 pm and Friday 8:30 am - 1:00 pm.          NAME:  Lakhwinder Bailey  YOB: 1955  MEDICAL RECORD NUMBER:  [id number]  TODAYS DATE:  3/1/23           VASHE ON IN CLINIC TODAY      Please wash hands with soap and water prior to and right after  every dressing change          Topical Treatments:              [x] Apply around the wound:   [x] moisturizing lotion TO LEFT LEG AND FOOT THAT IS NOT CLOSE TO WOUND         WOUND LOCATION   Left Heel **Theraskin #2 APPLIED 2/22/2023  ( PURAPLY X 1 , NUSHEILD x2 = TOTAL 5 GRAFTS)            PRIMARY DRESSING: GRAFT IN PLACE TODAY                                 [x] Mepitel secured with Steri-strips                 DO NOT CHANGE ANYTHING BELOW THE STERI- STRIPS                                     (X)Alginate (around graft) change if necessary.                            SECONDARY DRESSING:               []             (X)  ROLL GUAZE                            HOW OFTEN TO CHANGE DRESSINGS:                  (  X ) EVERY OTHER DAY, AND AS NEEDED , IF DRAINS THROUGH BANDAGE)  ONLY OUTER DRESSING IS TO BE CHANGED     COMPRESSION:                    ( X)    MEDIUM SPANDAGRIP THEN ACE WRAP TO LEFT LEG                           ( X)    MEDIUM SPANDAGRIP to right leg place every other day with dressing change                          _________________________________________________________________  PRESSURE RELIEF AND OFF-LOADING:     Off-Loading:   With heel protector ( HEEL LIFT BOOT)  [x] Off-loading when       [x] in bed         [x] sitting                             Specialty equipment ordered:       [] Wheelchair cushion    [] Specialty Bed/Mattress     [x] Assistive Device: please continue to use any assistive devices advised by the provider discussed during your visit   [x] Surgical shoe    [] Podus Boot(s)   [] Foam Boot(s)    [] CROW Boot     _____________________________________________________________________________________________     Dietary:  Continue your diet as tolerated. Eat heart-healthy foods. These foods include vegetables, fruits, nuts, beans, lean meat, fish, and whole grains. Limit sodium, alcohol, and sugar. Protein is a teresa nutrient in helping to repair damaged tissue and promote new tissue growth. Good sources of protein are milk, yogurt, cheese, fish, lean meat, and beans. If you are a diabetic, having diabetes can make it hard for wounds to heal. So try to keep your blood sugar in its target range.  __________________________________________________________________________________________________     ACTIVITY:   Activity as tolerated  ________________________________________________________________________________________________________________     PAIN:     Please note, A small amount of pain, drainage and/or bleeding from this process might be expected, and is NORMAL. Elevate the affected limb. Use over-the-counter medications you would normally use for pain as permitted by your family doctor. For persistent pain not relieved by the above interventions, please call your family doctor.  ________________________________________________________________________________  Call your doctor now or seek immediate medical care if:    You have symptoms of infection, such as: Increased pain, swelling, warmth, or redness. Red streaks leading from the area. Pus draining from the area. A fever. _______________________________________________________________________     Return Appointment:  DME/Wound Dressing Supplies Provided by:  [facility]  (Supply companies distribute one month supply at a time. Please call them directly to reorder supplies when you run out)     ECF or Home Healthcare:    Your Home Care Agency is responsible to order your supplies. Return Appointment: With Lizy Castañeda CNP  in 1 Week(s)                  [] Orders placed during your visit:   :  [facility]        CARDIOLOGISTS:  Jd Dumont OR Mando Yo          Electronically signed by Chin Manzo RN on 3/1/2023 at 8:36 AM      [address] [facility] Information: Should you experience any significant chnges in your wound(s) or have questions about your wound care, please contact the [address] [place] [address] at [address] 8:30 am - 4:30 pm and Friday 8:30 am - 1:00 pm.  If you need help with your wound outside these hours and cannot wait until we are again available, contact your PCP or go to the hospital emergency room. PLEASE NOTE: IF YOU ARE UNABLE TO OBTAIN WOUND SUPPLIES, CONTINUE TO USE THE SUPPLIES YOU HAVE AVAILABLE UNTIL YOU ARE ABLE TO REACH US. KEEP THE WOUND COVERED AT ALL TIMES.

## 2023-02-24 ENCOUNTER — TELEPHONE (OUTPATIENT)
Dept: WOUND CARE | Age: 68
End: 2023-02-24

## 2023-02-24 LAB
ANAEROBIC CULTURE: ABNORMAL
GRAM STAIN RESULT: ABNORMAL
ORGANISM: ABNORMAL
WOUND/ABSCESS: ABNORMAL

## 2023-03-01 ENCOUNTER — HOSPITAL ENCOUNTER (OUTPATIENT)
Dept: WOUND CARE | Age: 68
Discharge: HOME | End: 2023-03-01
Payer: MEDICARE

## 2023-03-01 VITALS
TEMPERATURE: 98.6 F | HEART RATE: 96 BPM | RESPIRATION RATE: 20 BRPM | DIASTOLIC BLOOD PRESSURE: 69 MMHG | SYSTOLIC BLOOD PRESSURE: 107 MMHG

## 2023-03-01 DIAGNOSIS — G81.94 LEFT HEMIPARESIS (HCC): ICD-10-CM

## 2023-03-01 DIAGNOSIS — R25.2 SPASTICITY: ICD-10-CM

## 2023-03-01 DIAGNOSIS — L89.623 PRESSURE ULCER OF LEFT HEEL, STAGE 3 (HCC): Primary | ICD-10-CM

## 2023-03-01 DIAGNOSIS — L89.620 PRESSURE ULCER OF HEEL, LEFT, UNSTAGEABLE (HCC): ICD-10-CM

## 2023-03-01 DIAGNOSIS — Z22.322 MRSA (METHICILLIN RESISTANT STAPH AUREUS) CULTURE POSITIVE: ICD-10-CM

## 2023-03-01 DIAGNOSIS — I63.9 CEREBROVASCULAR ACCIDENT (CVA), UNSPECIFIED MECHANISM (HCC): ICD-10-CM

## 2023-03-01 DIAGNOSIS — S91.302A NON-HEALING OPEN WOUND OF LEFT HEEL: ICD-10-CM

## 2023-03-01 PROCEDURE — 99213 OFFICE O/P EST LOW 20 MIN: CPT

## 2023-03-01 PROCEDURE — 99213 OFFICE O/P EST LOW 20 MIN: CPT | Performed by: NURSE PRACTITIONER

## 2023-03-01 RX ORDER — CLOBETASOL PROPIONATE 0.5 MG/G
OINTMENT TOPICAL ONCE
OUTPATIENT
Start: 2023-03-01 | End: 2023-03-01

## 2023-03-01 RX ORDER — BACITRACIN, NEOMYCIN, POLYMYXIN B 400; 3.5; 5 [USP'U]/G; MG/G; [USP'U]/G
OINTMENT TOPICAL ONCE
OUTPATIENT
Start: 2023-03-01 | End: 2023-03-01

## 2023-03-01 RX ORDER — GINSENG 100 MG
CAPSULE ORAL ONCE
OUTPATIENT
Start: 2023-03-01 | End: 2023-03-01

## 2023-03-01 RX ORDER — LIDOCAINE HYDROCHLORIDE 40 MG/ML
SOLUTION TOPICAL ONCE
OUTPATIENT
Start: 2023-03-01 | End: 2023-03-01

## 2023-03-01 RX ORDER — LIDOCAINE 40 MG/G
CREAM TOPICAL ONCE
OUTPATIENT
Start: 2023-03-01 | End: 2023-03-01

## 2023-03-01 RX ORDER — BACITRACIN ZINC AND POLYMYXIN B SULFATE 500; 1000 [USP'U]/G; [USP'U]/G
OINTMENT TOPICAL ONCE
OUTPATIENT
Start: 2023-03-01 | End: 2023-03-01

## 2023-03-01 RX ORDER — BETAMETHASONE DIPROPIONATE 0.05 %
OINTMENT (GRAM) TOPICAL ONCE
OUTPATIENT
Start: 2023-03-01 | End: 2023-03-01

## 2023-03-01 RX ORDER — LIDOCAINE 50 MG/G
OINTMENT TOPICAL ONCE
OUTPATIENT
Start: 2023-03-01 | End: 2023-03-01

## 2023-03-01 RX ORDER — LIDOCAINE HYDROCHLORIDE 20 MG/ML
JELLY TOPICAL ONCE
OUTPATIENT
Start: 2023-03-01 | End: 2023-03-01

## 2023-03-01 RX ORDER — GENTAMICIN SULFATE 1 MG/G
OINTMENT TOPICAL ONCE
OUTPATIENT
Start: 2023-03-01 | End: 2023-03-01

## 2023-03-01 RX ORDER — ACETAMINOPHEN 500 MG
1000 TABLET ORAL 2 TIMES DAILY
COMMUNITY

## 2023-03-01 ASSESSMENT — PAIN SCALES - GENERAL
PAINLEVEL_OUTOF10: 0
PAINLEVEL_OUTOF10: 0

## 2023-03-01 NOTE — PROGRESS NOTES
[address]   Progress Note and Procedure Note      Piter Alexis  MEDICAL RECORD NUMBER:  [id number]  AGE: 76 y.o. GENDER: male  : 1955  EPISODE DATE:  3/1/2023    Subjective:     Chief Complaint   Patient presents with    Wound Check     Follow up for left heel wound. HISTORY of PRESENT ILLNESS HPI  Rickey De Paz is a 79year old male presenting today for follow up for left heel wound. 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 Pt is as active as he can be. Once wound is closed can resume physical therapy with goal of walking with walker instead of using wheelchair for mobility. Chronic spastic movements of extremities today, he states are worse early in am.   History of Wound Context:   On 22  He fell and sustained displaced fracture of base of neck of left femur and developed  a pressure ulcer left heel. Postop he developed left heel DTI   2022 pt first seen at [facility] for treatment of left heel wound dx as stable eschar over wound. Pt has an offloading boot in place to float heel and prevent further heel damage. 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 Pt has residual left sided weakness and ongoing therapy has been delayed because of pressure ulcer. Pt also has chronic bilateral lower leg edema for which he uses compression stockings. Not using compression on left leg because of wound. Pt has spastic movements bilateral upper and lower extremities since after stroke with is tx with Baclofen; but friction still a concern. PT and DP pulse strong with Doppler. Reports getting protein supplements. Pt transferring only, no walking with walker still too unstable. Noting intermittent spastic movements legs during visit. Concern about friction on left heel with this movement. Pt wears post-op shoe on left foot. His son Carlyn Verma and daughter Tez Lobo are monitoring and changing outer dressings as needed.   10/19/22 Eschar debrided because of drainage and redness. Culture of MRSA tx with antibiotics. Wound now designated as a Stage 3 pressure ulcer. 10/22/22applied for skin substitutes. Theraskin is preferable for its robust nature especially on a heel wound. Progress of wound:    After wound debrided on 10/19/22 measured 3 x 3.5 x 1.5. We have provided compression therapy, tx wound infection  and application of collagen, Puraply and NuShield products to wound with  little progress to healing. Lack of progress to wound healing is also delaying needed for further physical therapy/rehab. Pt scheduled for ultrasounds bilateral lower legs on 1/13/23 1/18/2023: Pt presents today with less lower leg/ankle edema noted; wound with some maceration and still with small amount of undermining from 9-12 o'clock. Results of vascular study from 1/13/23:   Summary        APPLE not obtained. Right lower extremity demonstrates occluded posterior    tibial and peroneal arteries but grossly normal macrovascular flow. Left    lower extremity demonstrates no evidence of hemodynamically significant    stenosis or occlusion. Discussed skin graft application with pt and his son and pt has agreed to get Theraskin to be applied. No overt signs of infection. Pt to use pressure redistribution boots on left heel during the night instead of \"fashioned\" post-op shoe. Denies constitutional issues. 2/1/2023: Today presents with minimal amount of drainage from wound, wound bed pale with minimal slough noted. Wound edges with less maceration. Will apply Theraskin today. Discussed about Theraskin application today and precautions to take. Theraskin 6 cm applied ( total number of skin subs 4; Yayo Howard #1)  2/8/23: Presents today with partially intact Theraskin still in place over left heel wound, small amount of yellowish drainage on old dressing. Odor noted but not unexpected with graft. Minimal pink periwound skin.      Wound/Ulcer Pain Timing/Severity: intermittent  Quality of pain: tender  Severity:  2/ 10   Modifying Factors: Pain is relieved/improved with rest, repositioning  Associated Signs/Symptoms: edema and drainage  2/15/23 Presented today with wound left heel. Theraskin is incorporating into wound with periwound maceration more this week. 2/22/23 No major changes in wound size, but less maceration and drainage, sides of wound adherent with a small amount of undermining left wound edge. Reapplied Theraskin today and obtained culture. 3/1/2023:  Checked  graft and will leave on another week. Graft has turned gray due to leaching of silver from surrounding silver alginate dressing. Ulcer Identification:  Ulcer Type: pressure ulcer     Contributing Factors: edema; while hospitalized developed wound from chronic pressure and decreased mobility     Acute Wound: N/A not an acute wound    PAST MEDICAL HISTORY        Diagnosis Date    CAD (coronary artery disease)     HTN (hypertension)     Hyperlipemia     Left hemiparesis ([address] 8/11/2020    MRSA (methicillin resistant staph aureus) culture positive 9/14/2022    heel    Non-healing open wound of left heel 10/12/2022    Pressure injury of left ankle, stage 2 ([address] 9/13/2021    Pressure ulcer of heel, left, unstageable ([address] 8/8/2022    Stable eschar covered.     Pressure ulcer of left heel, stage 3 ([address] 10/20/2022    S/P PTCA (percutaneous transluminal coronary angioplasty) 2010    two stents place    Spasticity 7/9/2019    Stroke ([address] 10/2012    left-sided weakness       PAST SURGICAL HISTORY    Past Surgical History:   Procedure Laterality Date    CARDIAC SURGERY      HIP SURGERY Left     INGUINAL HERNIA REPAIR      right side    INTRACAPSULAR CATARACT EXTRACTION Right 08/30/2019    PHACOEMULSIFICATION WITH INTRAOCULAR LENS IMPLANT performed by Elizabeth Vieyra MD at [address] Left 09/06/2019    PHACOEMULSIFICATION WITH INTRAOCULAR LENS IMPLANT performed by Sb Johnson MD at [facility]    Family History   Problem Relation Age of Onset    Hypertension Mother     Heart Disease Father     Breast Cancer Sister     Heart Attack Brother        SOCIAL HISTORY    Social History     Tobacco Use    Smoking status: Never    Smokeless tobacco: Never   Vaping Use    Vaping Use: Never used   Substance Use Topics    Alcohol use: No    Drug use: No       ALLERGIES    No Known Allergies    MEDICATIONS    Current Outpatient Medications on File Prior to Encounter   Medication Sig Dispense Refill    acetaminophen (TYLENOL) 500 MG tablet Take 1,000 mg by mouth in the morning and at bedtime      hydrALAZINE (APRESOLINE) 50 MG tablet Take 1 tablet by mouth 3 times daily 90 tablet 0    gabapentin (NEURONTIN) 100 MG capsule Take 1 capsule by mouth 4 times daily for 7 days. 28 capsule 0    baclofen (LIORESAL) 10 MG tablet Take 1 tablet by mouth 2 times daily 180 tablet 1    lisinopril (PRINIVIL;ZESTRIL) 20 MG tablet Take 1 tablet by mouth daily 90 tablet 0    Wound Dressings ([place] WOUND/BURN DRESSING) GEL gel Apply 1 each topically daily 1 each 1    senna-docusate (PERICOLACE) 8.6-50 MG per tablet Take 1 tablet by mouth 2 times daily      atorvastatin (LIPITOR) 40 MG tablet Take 1 tablet by mouth daily 90 tablet 3    amLODIPine (NORVASC) 10 MG tablet Take 1 tablet by mouth daily 90 tablet 3    loratadine (CLARITIN) 10 MG tablet Take 1 tablet by mouth daily Pt needs to come in and be seen before more rx's are given. 90 tablet 1    aspirin 81 MG tablet Take 81 mg by mouth daily      Sennosides 17.2 MG TABS Take 17.2 mg by mouth       No current facility-administered medications on file prior to encounter. REVIEW OF SYSTEMS  Review of Systems    Pertinent items are noted in HPI.     Objective:      /69   Pulse 96   Temp 98.6 °F (37 °C) (Infrared)   Resp 20     Wt Readings from Last 3 Encounters:   02/10/23 226 lb (102.5 kg) 09/29/22 228 lb 14.4 oz (103.8 kg)   09/01/22 230 lb (104.3 kg)       PHYSICAL EXAM  Physical Exam    General Appearance: alert and oriented to person, place and time  Skin: warm and dry  Head: normocephalic and atraumatic  Eyes: pupils equal, round, and reactive to light  Pulmonary/Chest: normal air movement, no respiratory distress  Cardiovascular: normal rate and regular rhythm      Assessment:        Problem List Items Addressed This Visit       Pressure ulcer of heel, left, unstageable (HCC)    Relevant Orders    Initiate Outpatient Wound Care Protocol    MRSA (methicillin resistant staph aureus) culture positive    Relevant Orders    Initiate Outpatient Wound Care Protocol    Non-healing open wound of left heel    Relevant Orders    Initiate Outpatient Wound Care Protocol    Pressure ulcer of left heel, stage 3 ([address] - Primary    Relevant Orders    Initiate Outpatient Wound Care Protocol    Stroke [facility]) (Chronic)    Relevant Orders    Initiate Outpatient Wound Care Protocol    Spasticity    Relevant Orders    Initiate Outpatient Wound Care Protocol    Left hemiparesis [facility])    Relevant Orders    Initiate Outpatient Wound Care Protocol        Procedure Note  Indications:  Based on my examination of this patient's wound(s)/ulcer(s) today, debridement is not required to promote healing and evaluate the wound base. Wound/Ulcer Descriptions are Pre Debridement except measurements:    Wound 08/08/22 Heel Left #1 ( states since about 6/24/2022) (Active)   Wound Image   02/01/23 0815   Wound Etiology Pressure Unstageable 08/08/22 1404   Dressing Status Old drainage noted 03/01/23 0819   Wound Cleansed Vashe 01/25/23 1045   Dressing/Treatment Other (comment); Silicone pad;Steri-strips; Alginate;Gauze dressing/dressing sponge;Roll gauze 03/01/23 0830   Offloading for Diabetic Foot Ulcers Post op shoe 02/22/23 1200   Wound Length (cm) 1.8 cm 03/01/23 0819   Wound Width (cm) 2.5 cm 03/01/23 0819   Wound Depth (cm) 0.4 cm 03/01/23 0819   Wound Surface Area (cm^2) 4.5 cm^2 03/01/23 0819   Change in Wound Size % (l*w) 73.68 03/01/23 0819   Wound Volume (cm^3) 1.8 cm^3 03/01/23 0819   Wound Healing % -5 03/01/23 0819   Post-Procedure Length (cm) 1.8 cm 03/01/23 0826   Post-Procedure Width (cm) 2.5 cm 03/01/23 0826   Post-Procedure Depth (cm) 0.4 cm 03/01/23 0826   Post-Procedure Surface Area (cm^2) 4.5 cm^2 03/01/23 0826   Post-Procedure Volume (cm^3) 1.8 cm^3 03/01/23 0826   Undermining Starts ___ O'Clock 6 02/01/23 0815   Undermining Ends___ O'Clock 12 02/01/23 0815   Undermining Maxium Distance (cm) .7 02/01/23 0815   Wound Assessment Granulation tissue;Slough 03/01/23 0819   Drainage Amount Small 03/01/23 0819   Drainage Description Serosanguinous 03/01/23 0819   Odor Mild 03/01/23 0819   Anahy-wound Assessment Maceration 03/01/23 0819   Margins Undefined edges 03/01/23 0819   Wound Thickness Description not for Pressure Injury Full thickness 03/01/23 0819   Number of days: 205     Plan:     Treatment Note please see attached Discharge Instructions    Written patient dismissal instructions given to patient and signed by patient or POA.         Discharge Instructions              [facility] Wound Care Physician Orders and Discharge Instructions  [address], [address]    [place] [address]  Telephone: [phone]      FAX [phone]  MONDAY - THURSDAY 8:30 am - 4:30 pm and Friday 8:30 am - 1:00 pm.          NAME:  Jose Murphy  YOB: 1955  MEDICAL RECORD NUMBER:  [id number]  TODAYS DATE:  3/1/23           VASHE ON IN CLINIC TODAY      Please wash hands with soap and water prior to and right after  every dressing change          Topical Treatments:              [x] Apply around the wound:   [x] moisturizing lotion TO LEFT LEG AND FOOT THAT IS NOT CLOSE TO WOUND         WOUND LOCATION   Left Heel **Theraskin #2 APPLIED 2/22/2023  ( PABLO X 1 , MEGHAN x2 = TOTAL 5 GRAFTS)            PRIMARY  DRESSING: GRAFT IN PLACE TODAY                                 [x] Mepitel secured with Steri-strips                 DO NOT CHANGE ANYTHING BELOW THE STERI- STRIPS                                     (X)Alginate (around graft) change if necessary. SECONDARY DRESSING:               []             (X)  ROLL GUAZE                            HOW OFTEN TO CHANGE DRESSINGS:                  (  X ) EVERY OTHER DAY, AND AS NEEDED , IF DRAINS THROUGH BANDAGE)  ONLY OUTER DRESSING IS TO BE CHANGED     COMPRESSION:                    ( X)    MEDIUM SPANDAGRIP THEN ACE WRAP TO LEFT LEG                           ( X)    MEDIUM SPANDAGRIP to right leg place every other day with dressing change                          _________________________________________________________________  PRESSURE RELIEF AND OFF-LOADING:     Off-Loading:   With heel protector ( HEEL LIFT BOOT)  [x] Off-loading when       [x] in bed         [x] sitting                             Specialty equipment ordered:       [] Wheelchair cushion    [] Specialty Bed/Mattress     [x] Assistive Device: please continue to use any assistive devices advised by the provider discussed during your visit   [x] Surgical shoe    [] Podus Boot(s)   [] Foam Boot(s)    [] CROW Boot     _____________________________________________________________________________________________     Dietary:  Continue your diet as tolerated. Eat heart-healthy foods. These foods include vegetables, fruits, nuts, beans, lean meat, fish, and whole grains. Limit sodium, alcohol, and sugar. Protein is a teresa nutrient in helping to repair damaged tissue and promote new tissue growth. Good sources of protein are milk, yogurt, cheese, fish, lean meat, and beans.   If you are a diabetic, having diabetes can make it hard for wounds to heal. So try to keep your blood sugar in its target range.  __________________________________________________________________________________________________     ACTIVITY:   Activity as tolerated  ________________________________________________________________________________________________________________     PAIN:     Please note, A small amount of pain, drainage and/or bleeding from this process might be expected, and is NORMAL. Elevate the affected limb. Use over-the-counter medications you would normally use for pain as permitted by your family doctor. For persistent pain not relieved by the above interventions, please call your family doctor.  ________________________________________________________________________________  Call your doctor now or seek immediate medical care if:    You have symptoms of infection, such as: Increased pain, swelling, warmth, or redness. Red streaks leading from the area. Pus draining from the area. A fever. _______________________________________________________________________     Return Appointment:  DME/Wound Dressing Supplies Provided by:  [facility]  (Supply companies distribute one month supply at a time. Please call them directly to reorder supplies when you run out)     ECF or Home Healthcare: Your Home Care Agency is responsible to order your supplies.      Return Appointment: With Nicole Tomlin CNP  in 1 Week(s)                  [] Orders placed during your visit:   :  HILARY        CARDIOLOGISTS:  Harshil Lo OR Mando Yo          Electronically signed by Rosette Muniz RN on 3/1/2023 at 8:36 AM      [address] [place] [address] Information: Should you experience any significant chnges in your wound(s) or have questions about your wound care, please contact the [facility] at [address] 8:30 am - 4:30 pm and Friday 8:30 am - 1:00 pm.  If you need help with your wound outside these hours and cannot wait until we are again available, contact your PCP or go to the hospital emergency room. PLEASE NOTE: IF YOU ARE UNABLE TO OBTAIN WOUND SUPPLIES, CONTINUE TO USE THE SUPPLIES YOU HAVE AVAILABLE UNTIL YOU ARE ABLE TO REACH US. KEEP THE WOUND COVERED AT ALL TIMES.                    Electronically signed by MADISYN Trevizo CNP on 3/1/2023 at 1:39 PM

## 2023-03-02 ENCOUNTER — OFFICE VISIT (OUTPATIENT)
Dept: CARDIOLOGY CLINIC | Age: 68
End: 2023-03-02
Payer: MEDICARE

## 2023-03-02 VITALS
SYSTOLIC BLOOD PRESSURE: 90 MMHG | WEIGHT: 226 LBS | OXYGEN SATURATION: 96 % | DIASTOLIC BLOOD PRESSURE: 70 MMHG | HEART RATE: 90 BPM | HEIGHT: 70 IN | BODY MASS INDEX: 32.35 KG/M2

## 2023-03-02 DIAGNOSIS — I48.0 PAF (PAROXYSMAL ATRIAL FIBRILLATION) (HCC): Primary | ICD-10-CM

## 2023-03-02 DIAGNOSIS — E78.2 MIXED HYPERLIPIDEMIA: ICD-10-CM

## 2023-03-02 DIAGNOSIS — I25.10 CORONARY ARTERY DISEASE INVOLVING NATIVE CORONARY ARTERY OF NATIVE HEART WITHOUT ANGINA PECTORIS: ICD-10-CM

## 2023-03-02 DIAGNOSIS — I10 ESSENTIAL HYPERTENSION: ICD-10-CM

## 2023-03-02 PROCEDURE — 3078F DIAST BP <80 MM HG: CPT | Performed by: INTERNAL MEDICINE

## 2023-03-02 PROCEDURE — G8427 DOCREV CUR MEDS BY ELIG CLIN: HCPCS | Performed by: INTERNAL MEDICINE

## 2023-03-02 PROCEDURE — 3074F SYST BP LT 130 MM HG: CPT | Performed by: INTERNAL MEDICINE

## 2023-03-02 PROCEDURE — G8484 FLU IMMUNIZE NO ADMIN: HCPCS | Performed by: INTERNAL MEDICINE

## 2023-03-02 PROCEDURE — 1123F ACP DISCUSS/DSCN MKR DOCD: CPT | Performed by: INTERNAL MEDICINE

## 2023-03-02 PROCEDURE — G8417 CALC BMI ABV UP PARAM F/U: HCPCS | Performed by: INTERNAL MEDICINE

## 2023-03-02 PROCEDURE — 99215 OFFICE O/P EST HI 40 MIN: CPT | Performed by: INTERNAL MEDICINE

## 2023-03-02 PROCEDURE — 3017F COLORECTAL CA SCREEN DOC REV: CPT | Performed by: INTERNAL MEDICINE

## 2023-03-02 PROCEDURE — 93000 ELECTROCARDIOGRAM COMPLETE: CPT | Performed by: INTERNAL MEDICINE

## 2023-03-02 PROCEDURE — 1036F TOBACCO NON-USER: CPT | Performed by: INTERNAL MEDICINE

## 2023-03-02 NOTE — DISCHARGE INSTRUCTIONS
[address] [place] [address] Physician Orders and Discharge Instructions  [address] [place] [address] Drive, [address], [address]  Telephone: [phone]  [address] 8:30 am - 4:30 pm and Friday 8:30 am - 1:00 pm.          NAME:  Tanya Malave  YOB: 1955  MEDICAL RECORD NUMBER:  [id number]  TODAYS DATE:  3/8/23           VASHE ON IN CLINIC TODAY      Please wash hands with soap and water prior to and right after  every dressing change          Topical Treatments:              [x] Apply around the wound:   [x] moisturizing lotion TO LEFT LEG AND FOOT THAT IS NOT CLOSE TO WOUND         WOUND LOCATION   Left Heel **Theraskin #3 APPLIED 3/8/2023  ( PURAPLY X 1 , NUSHEILD x2 = TOTAL 6 GRAFTS)              PRIMARY DRESSING: GRAFT IN PLACE TODAY                                 [x] Mepitel secured with Steri-strips                 DO NOT CHANGE ANYTHING BELOW THE STERI- STRIPS                                     (X)Alginate (around graft if necessary, if there is drainage THAT COME THROUGH THE GUAZE)                           SECONDARY DRESSING:               [x]  GUAZE           (X)  ROLL GUAZE                            HOW OFTEN TO CHANGE DRESSINGS:                  (  X ) EVERY OTHER DAY, AND AS NEEDED , IF DRAINS THROUGH BANDAGE)  ONLY OUTER DRESSING IS TO BE CHANGED     COMPRESSION:                    ( X)    MEDIUM SPANDAGRIP THEN ACE WRAP TO LEFT LEG                           ( X)    MEDIUM SPANDAGRIP to right leg place every other day with dressing change                          _________________________________________________________________  PRESSURE RELIEF AND OFF-LOADING:     Off-Loading:   With heel protector ( HEEL LIFT BOOT)  [x] Off-loading when       [x] in bed         [x] sitting                             Specialty equipment ordered:       [] Wheelchair cushion    [] Specialty Bed/Mattress     [x] Assistive Device: please continue to use any assistive devices advised by the provider discussed during your visit   [x] Surgical shoe    [] Podus Boot(s)   [] Foam Boot(s)    [] Keith No     _____________________________________________________________________________________________     Dietary:  Continue your diet as tolerated. Eat heart-healthy foods. These foods include vegetables, fruits, nuts, beans, lean meat, fish, and whole grains. Limit sodium, alcohol, and sugar. Protein is a teresa nutrient in helping to repair damaged tissue and promote new tissue growth. Good sources of protein are milk, yogurt, cheese, fish, lean meat, and beans. If you are a diabetic, having diabetes can make it hard for wounds to heal. So try to keep your blood sugar in its target range.  __________________________________________________________________________________________________     ACTIVITY:   Activity as tolerated  ________________________________________________________________________________________________________________     PAIN:     Please note, A small amount of pain, drainage and/or bleeding from this process might be expected, and is NORMAL. Elevate the affected limb. Use over-the-counter medications you would normally use for pain as permitted by your family doctor. For persistent pain not relieved by the above interventions, please call your family doctor.  ________________________________________________________________________________  Call your doctor now or seek immediate medical care if:    You have symptoms of infection, such as: Increased pain, swelling, warmth, or redness. Red streaks leading from the area. Pus draining from the area. A fever. _______________________________________________________________________     Return Appointment:  DME/Wound Dressing Supplies Provided by:  [facility]  (Supply companies distribute one month supply at a time.   Please call them directly to reorder supplies when you run out)     ECF or Home Healthcare: Your Home Care Agency is responsible to order your supplies. Return Appointment: With Mingo Saul CNP  in 1 Week(s)                  [] Orders placed during your visit:   :  [facility]ILARY       Electronically signed by Sha Negrete RN on 3/8/2023 at 8:35 AM      CARDIOLOGISTS:  [address] [facility] [place] [address] Information: Should you experience any significant chnges in your wound(s) or have questions about your wound care, please contact the [address] [place] [address] at [address] 8:30 am - 4:30 pm and Friday 8:30 am - 1:00 pm.  If you need help with your wound outside these hours and cannot wait until we are again available, contact your PCP or go to the hospital emergency room. PLEASE NOTE: IF YOU ARE UNABLE TO OBTAIN WOUND SUPPLIES, CONTINUE TO USE THE SUPPLIES YOU HAVE AVAILABLE UNTIL YOU ARE ABLE TO REACH US. KEEP THE WOUND COVERED AT ALL TIMES.

## 2023-03-02 NOTE — PROGRESS NOTES
[address]  Cardiology Consult    Lio Segura  1955 March 2, 2023    Primary Cardiologist: Marycarmen Bowles MD  Referring Physician: Marycarmen Bowles MD    Reason for Referral: Left atrial appendage closure    CC: \"I need to have the Watchman procedure. \"      Subjective:     History of Present Illness:    Lio Segura is a 76 y.o. patient with a PMH significant for coronary artery disease s/p APRIL 2009, hypertension, hyperlipidemia, paroxysmal atrial fibrillation (new onset 2/10/23) and hemorrhagic CVA. He is unable to start anticoagulation due to the hemorrhagic CVA and he was referred to discuss Watchman. Patient is being referred for Left Atrial Appendage Closure with WATCHMAN device for management of stroke risk resulting from non-valvular atrial fibrillation. Based on their past history, it has been determined that they are poor candidates for long-term oral-anticoagulation, however may be tolerant of short term treatment with [facility] as necessary. Today, he is here to discuss Watchman. He is in a wheelchair and has tremors often. He wants to proceed with the procedure so that he does not have another stroke. Patient denies exertional chest pain/pressure, dyspnea at rest, CRUZ, PND, orthopnea, palpitations, lightheadedness, weight changes, changes in LE edema, and syncope. Patient reports compliance to his medications. Past Medical History:   has a past medical history of CAD (coronary artery disease), HTN (hypertension), Hyperlipemia, Left hemiparesis (HCC), MRSA (methicillin resistant staph aureus) culture positive, Non-healing open wound of left heel, Pressure injury of left ankle, stage 2 (HCC), Pressure ulcer of heel, left, unstageable ([address] Pressure ulcer of left heel, stage 3 ([address] S/P PTCA (percutaneous transluminal coronary angioplasty), Spasticity, and Stroke ([address]     Surgical History:   has a past surgical history that includes Inguinal hernia repair; Cardiac surgery; Intracapsular cataract extraction (Right, 08/30/2019); Intracapsular cataract extraction (Left, 09/06/2019); and hip surgery (Left). Social History:   reports that he has never smoked. He has never used smokeless tobacco. He reports that he does not drink alcohol and does not use drugs. Family History:  family history includes Breast Cancer in his sister; Heart Attack in his brother; Heart Disease in his father; Hypertension in his mother. Home Medications:  Were reviewed and are listed in nursing record and/or below  Prior to Admission medications    Medication Sig Start Date End Date Taking? Authorizing Provider   acetaminophen (TYLENOL) 500 MG tablet Take 1,000 mg by mouth in the morning and at bedtime   Yes Historical Provider, MD   hydrALAZINE (APRESOLINE) 50 MG tablet Take 1 tablet by mouth 3 times daily 2/22/23  Yes Radha Price MD   gabapentin (NEURONTIN) 100 MG capsule Take 1 capsule by mouth 4 times daily for 7 days. 2/15/23 3/2/23 Yes Radha Price MD   baclofen (LIORESAL) 10 MG tablet Take 1 tablet by mouth 2 times daily 12/9/22  Yes Radha Price MD   lisinopril (PRINIVIL;ZESTRIL) 20 MG tablet Take 1 tablet by mouth daily 11/18/22  Yes Radha Price MD   Wound Dressings ([facility] WOUND/BURN DRESSING) GEL gel Apply 1 each topically daily 10/13/22  Yes MADISYN Alberto CNP   senna-docusate (PERICOLACE) 8.6-50 MG per tablet Take 1 tablet by mouth 2 times daily 7/8/22  Yes Historical Provider, MD   atorvastatin (LIPITOR) 40 MG tablet Take 1 tablet by mouth daily 9/29/22  Yes Gianni Henriquez MD   amLODIPine (NORVASC) 10 MG tablet Take 1 tablet by mouth daily 9/22/22  Yes Junnie Cooks, APRN - CNP   loratadine (CLARITIN) 10 MG tablet Take 1 tablet by mouth daily Pt needs to come in and be seen before more rx's are given.  3/19/20  Yes Junnie Cooks, APRN - CNP   aspirin 81 MG tablet Take 81 mg by mouth daily   Yes Historical Provider, MD Morannosides 17.2 MG TABS Take 17.2 mg by mouth 2/19/13  Yes Historical Provider, MD        CURRENT Medications:  No current facility-administered medications for this visit. Allergies:  Patient has no known allergies. Review of Systems: All reviewed and refer to HPI  Constitutional: no unanticipated weight loss. There's been no change in energy level, sleep pattern, or activity level. No fevers, chills. Eyes: No visual changes or diplopia. No scleral icterus. ENT: No Headaches, hearing loss or vertigo. No mouth sores or sore throat. Cardiovascular: No Chest pain, tightness or discomfort. No Shortness of breath. No Dyspnea on exertion, Orthopnea, Paroxysmal nocturnal dyspnea or breathlessness at rest.  No Palpitations. No Syncope ('blackouts', 'faints', 'collapse') or dizziness. Respiratory: No cough or wheezing, no sputum production. No hematemesis. Gastrointestinal: No abdominal pain, appetite loss, blood in stools. No change in bowel or bladder habits. Genitourinary: No dysuria, trouble voiding, or hematuria. Musculoskeletal:  No gait disturbance, no joint complaints. Integumentary: No rash or pruritis. Neurological: No headache, diplopia, change in muscle strength, numbness or tingling. Psychiatric: No anxiety or depression. Endocrine: No temperature intolerance. No excessive thirst, fluid intake, or urination. No tremor. Hematologic/Lymphatic: No abnormal bruising or bleeding, blood clots or swollen lymph nodes. Allergic/Immunologic: No nasal congestion or hives. Objective: all reveiwed      PHYSICAL EXAM:      Vitals:    03/02/23 1346   BP: 90/70   Pulse: 90   SpO2: 96%    Weight: 226 lb (102.5 kg)       General Appearance:  Alert, cooperative, no distress, appears stated age. Head:  Normocephalic, without obvious abnormality, atraumatic. Eyes:  Pupils equal and round. No scleral icterus. Mouth: Moist mucosa, no pharyngeal erythema. Nose: Nares normal. No drainage or sinus tenderness.    Neck: Supple, symmetrical, trachea midline. No adenopathy. No tenderness/mass/nodules. No carotid bruit or elevated JVD. Lungs:   Respiratory Effort: Normal   Auscultation: Clear to auscultation bilaterally, respirations unlabored. No wheeze, rales   Chest Wall:  No tenderness or deformity. Cardiovascular:    Pulses  Palpation: normal   Ascultation: Regular rate, S1/ S2 normal. No murmur, rub, or gallop. 2+ radial and pedal pulses, symmetric  Carotid  Femoral   Abdomen and Gastrointestinal:   Soft, non-tender, bowel sounds active. Liver and Spleen  Masses   Musculoskeletal: No muscle wasting  Back  Gait   Extremities: Extremities normal, atraumatic. No cyanosis or edema. No cyanosis clubbing       Skin: Inspection and palpation performed, no rashes or lesions. Pysch: Normal mood and affect.  Alert and oriented to time place person   Neurologic: Normal gross motor and sensory exam.       Labs: all labs have been reviewed      Lab Results   Component Value Date/Time    WBC 6.6 03/25/2022 07:34 AM    RBC 5.38 03/25/2022 07:34 AM    HGB 16.0 03/25/2022 07:34 AM    HCT 49.2 03/25/2022 07:34 AM    MCV 91.5 03/25/2022 07:34 AM    RDW 14.5 03/25/2022 07:34 AM     03/25/2022 07:34 AM     Lab Results   Component Value Date/Time     03/25/2022 07:34 AM    K 4.3 03/25/2022 07:34 AM     03/25/2022 07:34 AM    CO2 22 03/25/2022 07:34 AM    BUN 22 03/25/2022 07:34 AM    CREATININE 0.9 03/25/2022 07:34 AM    GFRAA >60 03/25/2022 07:34 AM    AGRATIO 1.6 03/25/2022 07:34 AM    LABGLOM >60 03/25/2022 07:34 AM    GLUCOSE 89 03/25/2022 07:34 AM    PROT 7.6 03/25/2022 07:34 AM    CALCIUM 9.5 03/25/2022 07:34 AM    BILITOT 0.4 03/25/2022 07:34 AM    ALKPHOS 78 03/25/2022 07:34 AM    AST 15 03/25/2022 07:34 AM    ALT 13 03/25/2022 07:34 AM     No results found for: PTINR  No results found for: LABA1C  No results found for: CKTOTAL, CKMB, CKMBINDEX, TROPONINI    Cardiac, Vascular and Imaging Data: All Personally Reviewed in Detail by Myself      EKG: 3/3/2023 Atrial fibrillation    Echocardiogram:     Stress Test:     Cath: Other imaging:     Assessment and Plan     Atrial Fibrillation, Paroxysmal     Referring reason: Hemorrhagic CVA    CHADSvasc is at least 5 (Age,CVA,CAD,HTN)    HASbled is at least 4     Currently not on anticoagulation. He is a poor candidate for chronic anticoagulation with his history of hemorrhagic CVA. He would be an appropriate candidate for the watchman device. Patient is agreeable to proceed with the Watchman procedure and instructed Jaelyn Ruiz, the coordinator will get in contact to facilitate scheduling. Specifically regarding risk of anticoagulation they have demonstrated:  History of bleeding (eg. Intracerebral, subdural, GI, retro-peritoneal)  Intolerance oral anticoagulation  Increased bleeding risk (e.g. Thrombocytopenia, anemia)  High risk of recurrent falls        Discussed left atrial appendage closure at length with patient and family members. I have discussed their unique stroke and bleeding risk both on and off oral-anticoagulation, and the rationale for this referral.  Based on both stroke and bleeding risk, a shared decision has been made to pursue closure of the left atrial appendage as an alternative to oral anticoagulant therapy for stroke prophylaxis and to reduce their long term risk of incidence of bleeding. I had a detail discussion with the patient and family regarding the risk and benefit of the procedures. I explained to them the details of the procedure. I explained to them that the procedure will be performed under general anesthesia using MARZENA and fluoroscopic guidance. I explained any risk of bleeding, pericardial effusion and tamponade, perforation of the vessel, stroke, myocardial infarction or death. The patient and family understood the risk and benefits and the details of procedure and would like to go ahead with the procedure. The patient gave informed consent.  All questions and concerns answered at this time. Coronary artery disease  Asymptomatic. Continue Asa and statin therapy. Essential hypertension  Controlled. Continue current medical managgment. Hyperlipidemia  Continue statin therapy. Hemorrhagic CVA  Currently in a wheelchair and experiencing tremors. Follow up as directed by Willwo Echavarria RN    Thank you for allowing us to participate in the care of [facility]. Please do not hesitate to contact me if you have any questions. Darío Navas MD, MPH    [address]   [facility]  Ph: [phone]  Fax: [phone]    This note was scribed in the presence of Dr Bertha De La O, by Evelyn Murphy RN  Physician Attestation:  The scribes documentation has been prepared under my direction and personally reviewed by me in its entirety. I confirm that the note above accurately reflects all work, treatment, procedures, and medical decision making performed by me.

## 2023-03-02 NOTE — TELEPHONE ENCOUNTER
Spoke with patient today regarding Watchman procedure. Patient was shown video on Watchman and given educational material.  Patient states interest and would like to proceed. I instructed patient to call me if he has any changes in his health prior to day of procedure. Copy of the Cardio smart tool was given for shared decision.  Check with Ludy to addendum his note to reflect a shared decision

## 2023-03-08 ENCOUNTER — HOSPITAL ENCOUNTER (OUTPATIENT)
Dept: WOUND CARE | Age: 68
Discharge: HOME | End: 2023-03-08
Payer: MEDICARE

## 2023-03-08 VITALS
TEMPERATURE: 98.1 F | HEART RATE: 66 BPM | DIASTOLIC BLOOD PRESSURE: 68 MMHG | SYSTOLIC BLOOD PRESSURE: 117 MMHG | RESPIRATION RATE: 18 BRPM

## 2023-03-08 DIAGNOSIS — S91.302A NON-HEALING OPEN WOUND OF LEFT HEEL: ICD-10-CM

## 2023-03-08 DIAGNOSIS — L89.623 PRESSURE ULCER OF LEFT HEEL, STAGE 3 (HCC): Primary | ICD-10-CM

## 2023-03-08 DIAGNOSIS — G81.94 LEFT HEMIPARESIS (HCC): ICD-10-CM

## 2023-03-08 DIAGNOSIS — R25.2 SPASTICITY: ICD-10-CM

## 2023-03-08 DIAGNOSIS — Z22.322 MRSA (METHICILLIN RESISTANT STAPH AUREUS) CULTURE POSITIVE: ICD-10-CM

## 2023-03-08 DIAGNOSIS — I63.9 CEREBROVASCULAR ACCIDENT (CVA), UNSPECIFIED MECHANISM (HCC): ICD-10-CM

## 2023-03-08 DIAGNOSIS — L89.620 PRESSURE ULCER OF HEEL, LEFT, UNSTAGEABLE (HCC): ICD-10-CM

## 2023-03-08 PROCEDURE — 15275 SKIN SUB GRAFT FACE/NK/HF/G: CPT

## 2023-03-08 PROCEDURE — 15275 SKIN SUB GRAFT FACE/NK/HF/G: CPT | Performed by: NURSE PRACTITIONER

## 2023-03-08 RX ORDER — LIDOCAINE 40 MG/G
CREAM TOPICAL ONCE
OUTPATIENT
Start: 2023-03-08 | End: 2023-03-08

## 2023-03-08 RX ORDER — BACITRACIN, NEOMYCIN, POLYMYXIN B 400; 3.5; 5 [USP'U]/G; MG/G; [USP'U]/G
OINTMENT TOPICAL ONCE
OUTPATIENT
Start: 2023-03-08 | End: 2023-03-08

## 2023-03-08 RX ORDER — LIDOCAINE HYDROCHLORIDE 20 MG/ML
JELLY TOPICAL ONCE
OUTPATIENT
Start: 2023-03-08 | End: 2023-03-08

## 2023-03-08 RX ORDER — GINSENG 100 MG
CAPSULE ORAL ONCE
OUTPATIENT
Start: 2023-03-08 | End: 2023-03-08

## 2023-03-08 RX ORDER — CLOBETASOL PROPIONATE 0.5 MG/G
OINTMENT TOPICAL ONCE
OUTPATIENT
Start: 2023-03-08 | End: 2023-03-08

## 2023-03-08 RX ORDER — LIDOCAINE 50 MG/G
OINTMENT TOPICAL ONCE
OUTPATIENT
Start: 2023-03-08 | End: 2023-03-08

## 2023-03-08 RX ORDER — BACITRACIN ZINC AND POLYMYXIN B SULFATE 500; 1000 [USP'U]/G; [USP'U]/G
OINTMENT TOPICAL ONCE
OUTPATIENT
Start: 2023-03-08 | End: 2023-03-08

## 2023-03-08 RX ORDER — LIDOCAINE HYDROCHLORIDE 40 MG/ML
SOLUTION TOPICAL ONCE
OUTPATIENT
Start: 2023-03-08 | End: 2023-03-08

## 2023-03-08 RX ORDER — BETAMETHASONE DIPROPIONATE 0.05 %
OINTMENT (GRAM) TOPICAL ONCE
OUTPATIENT
Start: 2023-03-08 | End: 2023-03-08

## 2023-03-08 RX ORDER — GENTAMICIN SULFATE 1 MG/G
OINTMENT TOPICAL ONCE
OUTPATIENT
Start: 2023-03-08 | End: 2023-03-08

## 2023-03-08 ASSESSMENT — PAIN SCALES - GENERAL
PAINLEVEL_OUTOF10: 0
PAINLEVEL_OUTOF10: 0

## 2023-03-08 NOTE — PLAN OF CARE
TheraSkin Treatment Note    NAME:  Marika Weiss OF BIRTH:  1955  MEDICAL RECORD NUMBER:  [id number]  DATE:  3/8/2023    Goal:  Patient will receive safe and proper application of skin substitute. Patient will comply with caring for dressing, offloading and reporting complications. Expiration date of TheraSkin checked immediately prior to use. Package intact prior to use and no damage noted. Transport temperature controlled and acceptable. TheraSkin was prepared for application by removing from package. TheraSkin was rinsed 2 times in room temperature normal saline for 2 minutes each time. A 2nd saline rinse was left on the TheraSkin until the physician was ready to apply it within 120 minutes of thawing. White side goes against ulcer bed. TheraSkin was applied to left heel and affixed with steri-strips by the physician. Greenup Mage was applied on top of non-adherent dressing. Fluffed gauze was applied. Dressing was secured with cover roll type tape to stabilize graft. Coban or ace wrap was applied to secure graft and decrease edema. Patient/caregiver was instructed not to remove dressing and to keep it clean and dry. Pt/family/caregiver was instructed on signs and symptoms of complications to report such as draining through, falling down/slipping, getting wet, or severe pain or tingling in toes. Pt/family/caregiver was instructed on need for offloading and elevation of affected extremity (if applicable) and on use of prescribed offloading device. Thera Skin may be applied a total of 10 times per wound over a 12 week period. Date of first application of Thera Skin for this current wound is February 1, 2023.                   Guidelines followed      Electronically signed by Abhishek Gmoez RN on 3/8/2023 at 8:45 AM

## 2023-03-08 NOTE — PROGRESS NOTES
[address]   Progress Note and Procedure Note      Anita Dumont  MEDICAL RECORD NUMBER:  [id number]  AGE: 76 y.o. GENDER: male  : 1955  EPISODE DATE:  3/8/2023    Subjective:     Chief Complaint   Patient presents with    Wound Check     Follow up visit for left heel wound. HISTORY of PRESENT ILLNESS HPI  Terrance Miller is a 79year old male presenting today for follow up for left heel wound. 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 Pt is as active as he can be. Once wound is closed can resume physical therapy with goal of walking with walker instead of using wheelchair for mobility. Chronic spastic movements of extremities today, he states are worse early in am.   History of Wound Context:   On 22  He fell and sustained displaced fracture of base of neck of left femur and developed  a pressure ulcer left heel. Postop he developed left heel DTI   2022 pt first seen at [address] [place] [address],[address] for treatment of left heel wound dx as stable eschar over wound. Pt has an offloading boot in place to float heel and prevent further heel damage. 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 Pt has residual left sided weakness and ongoing therapy has been delayed because of pressure ulcer. Pt also has chronic bilateral lower leg edema for which he uses compression stockings. Not using compression on left leg because of wound. Pt has spastic movements bilateral upper and lower extremities since after stroke with is tx with Baclofen; but friction still a concern. PT and DP pulse strong with Doppler. Reports getting protein supplements. Pt transferring only, no walking with walker still too unstable. Noting intermittent spastic movements legs during visit. Concern about friction on left heel with this movement. Pt wears post-op shoe on left foot. His son RAH ADHIKARI and daughter Nova Doran are monitoring and changing outer dressings as needed.   10/19/22 Eschar debrided because of drainage and redness. Culture of MRSA tx with antibiotics. Wound now designated as a Stage 3 pressure ulcer. 10/22/22applied for skin substitutes. Theraskin is preferable for its robust nature especially on a heel wound. Progress of wound:    After wound debrided on 10/19/22 measured 3 x 3.5 x 1.5. We have provided compression therapy, tx wound infection  and application of collagen, Puraply and NuShield products to wound with  little progress to healing. Lack of progress to wound healing is also delaying needed for further physical therapy/rehab. Pt scheduled for ultrasounds bilateral lower legs on 1/13/23 1/18/2023: Pt presents today with less lower leg/ankle edema noted; wound with some maceration and still with small amount of undermining from 9-12 o'clock. Results of vascular study from 1/13/23:   Summary        APPLE not obtained. Right lower extremity demonstrates occluded posterior    tibial and peroneal arteries but grossly normal macrovascular flow. Left    lower extremity demonstrates no evidence of hemodynamically significant    stenosis or occlusion. Discussed skin graft application with pt and his son and pt has agreed to get Theraskin to be applied. No overt signs of infection. Pt to use pressure redistribution boots on left heel during the night instead of \"fashioned\" post-op shoe. Denies constitutional issues. 2/1/2023: Today presents with minimal amount of drainage from wound, wound bed pale with minimal slough noted. Wound edges with less maceration. Will apply Theraskin today. Discussed about Theraskin application today and precautions to take. Theraskin 6 cm applied ( total number of skin subs 4; Ranjeet Major #1)  2/8/23: Presents today with partially intact Theraskin still in place over left heel wound, small amount of yellowish drainage on old dressing. Odor noted but not unexpected with graft. Minimal pink periwound skin.      Wound/Ulcer Pain Timing/Severity: intermittent  Quality of pain: tender  Severity:  2/ 10   Modifying Factors: Pain is relieved/improved with rest, repositioning  Associated Signs/Symptoms: edema and drainage  2/15/23 Presented today with wound left heel. Theraskin is incorporating into wound with periwound maceration more this week. 2/22/23 No major changes in wound size, but less maceration and drainage, sides of wound adherent with a small amount of undermining left wound edge. Reapplied Theraskin today and obtained culture. 3/1/2023:  Checked  graft and will leave on another week. Graft has turned gray due to leaching of silver from surrounding silver alginate dressing. 3/8/2023:  Pt presents with wound measurements decreasing as well as wound more shallow, small amount of periwound white tissue, no redness, minimal drainage. Applied Theraskin #3 today. Ulcer Identification:  Ulcer Type: pressure ulcer     Contributing Factors: edema; while hospitalized developed wound from chronic pressure and decreased mobility     Acute Wound: N/A not an acute wound       PAST MEDICAL HISTORY        Diagnosis Date    CAD (coronary artery disease)     HTN (hypertension)     Hyperlipemia     Left hemiparesis ([address] 8/11/2020    MRSA (methicillin resistant staph aureus) culture positive 9/14/2022    heel    Non-healing open wound of left heel 10/12/2022    Pressure injury of left ankle, stage 2 ([address] 9/13/2021    Pressure ulcer of heel, left, unstageable ([address] 8/8/2022    Stable eschar covered.     Pressure ulcer of left heel, stage 3 ([address] 10/20/2022    S/P PTCA (percutaneous transluminal coronary angioplasty) 2010    two stents place    Spasticity 7/9/2019    Stroke ([address] 10/2012    left-sided weakness       PAST SURGICAL HISTORY    Past Surgical History:   Procedure Laterality Date    CARDIAC SURGERY      HIP SURGERY Left     INGUINAL HERNIA REPAIR      right side    INTRACAPSULAR CATARACT EXTRACTION Right 08/30/2019 PHACOEMULSIFICATION WITH INTRAOCULAR LENS IMPLANT performed by Shahram Hendricks MD at [address] Left 09/06/2019    PHACOEMULSIFICATION WITH INTRAOCULAR LENS IMPLANT performed by Shahram Hendricks MD at [facility]    Family History   Problem Relation Age of Onset    Hypertension Mother     Heart Disease Father     Breast Cancer Sister     Heart Attack Brother        SOCIAL HISTORY    Social History     Tobacco Use    Smoking status: Never    Smokeless tobacco: Never   Vaping Use    Vaping Use: Never used   Substance Use Topics    Alcohol use: No    Drug use: No       ALLERGIES    No Known Allergies    MEDICATIONS    Current Outpatient Medications on File Prior to Encounter   Medication Sig Dispense Refill    acetaminophen (TYLENOL) 500 MG tablet Take 1,000 mg by mouth in the morning and at bedtime      hydrALAZINE (APRESOLINE) 50 MG tablet Take 1 tablet by mouth 3 times daily 90 tablet 0    gabapentin (NEURONTIN) 100 MG capsule Take 1 capsule by mouth 4 times daily for 7 days. 28 capsule 0    baclofen (LIORESAL) 10 MG tablet Take 1 tablet by mouth 2 times daily 180 tablet 1    lisinopril (PRINIVIL;ZESTRIL) 20 MG tablet Take 1 tablet by mouth daily 90 tablet 0    Wound Dressings ([facility] WOUND/BURN DRESSING) GEL gel Apply 1 each topically daily 1 each 1    senna-docusate (PERICOLACE) 8.6-50 MG per tablet Take 1 tablet by mouth 2 times daily      atorvastatin (LIPITOR) 40 MG tablet Take 1 tablet by mouth daily 90 tablet 3    amLODIPine (NORVASC) 10 MG tablet Take 1 tablet by mouth daily 90 tablet 3    loratadine (CLARITIN) 10 MG tablet Take 1 tablet by mouth daily Pt needs to come in and be seen before more rx's are given. 90 tablet 1    aspirin 81 MG tablet Take 81 mg by mouth daily      Sennosides 17.2 MG TABS Take 17.2 mg by mouth       No current facility-administered medications on file prior to encounter.        REVIEW OF SYSTEMS  Review of Systems    Pertinent items are noted in HPI. Objective:      /68   Pulse 66   Temp 98.1 °F (36.7 °C) (Temporal)   Resp 18     Wt Readings from Last 3 Encounters:   03/02/23 226 lb (102.5 kg)   02/10/23 226 lb (102.5 kg)   09/29/22 228 lb 14.4 oz (103.8 kg)       PHYSICAL EXAM  Physical Exam    General Appearance: alert and oriented to person, place and time  Skin: warm and dry, no rash or erythema  Head: normocephalic and atraumatic  Eyes: pupils equal, round, and reactive to light  Pulmonary/Chest: normal air movement, no respiratory distress  Cardiovascular: normal rate, regular rhythm, and intact distal pulses      Assessment:        Problem List Items Addressed This Visit       Pressure ulcer of heel, left, unstageable (HCC)    Relevant Orders    Initiate Outpatient Wound Care Protocol    MRSA (methicillin resistant staph aureus) culture positive    Relevant Orders    Initiate Outpatient Wound Care Protocol    Non-healing open wound of left heel    Relevant Orders    Initiate Outpatient Wound Care Protocol    Pressure ulcer of left heel, stage 3 ([address] - Primary    Relevant Orders    Initiate Outpatient Wound Care Protocol    Stroke [facility]) (Chronic)    Relevant Orders    Initiate Outpatient Wound Care Protocol    Spasticity    Relevant Orders    Initiate Outpatient Wound Care Protocol    Left hemiparesis [facility])    Relevant Orders    Initiate Outpatient Wound Care Protocol        Procedure Note  Indications:  Based on my examination of this patient's wound(s)/ulcer(s) today, debridement is required to promote healing and evaluate the wound base. Performed by: MADISYN Hodges - CNP    Consent obtained:  Yes    Time out taken:  Yes    Pain Control: Anesthetic  Anesthetic: None       Debridement: Excisional Debridement    Using curette and forceps the wound(s)/ulcer(s) was/were debrided down through and including the removal of epidermis, dermis, and subcutaneous tissue.         Devitalized Tissue Debrided:  fibrin, biofilm, and slough    Pre Debridement Measurements:  Are located in the [place]  Documentation Flow Sheet    Diabetic/Pressure/Non Pressure Ulcers only:  Ulcer: Pressure ulcer, Stage 3     Wound/Ulcer #: 1    Post Debridement Measurements:  Wound/Ulcer Descriptions are Pre Debridement except measurements:    Wound 08/08/22 Heel Left #1 ( states since about 6/24/2022) (Active)   Wound Image   02/01/23 0815   Wound Etiology Pressure Unstageable 08/08/22 1404   Dressing Status Old drainage noted 03/01/23 0819   Wound Cleansed Vashe 01/25/23 1045   Dressing/Treatment Other (comment);Gauze dressing/dressing sponge;Roll gauze;Silicone pad;Steri-strips 03/08/23 0845   Offloading for Diabetic Foot Ulcers Post op shoe 03/08/23 0845   Wound Length (cm) 1.4 cm 03/08/23 0821   Wound Width (cm) 1.7 cm 03/08/23 0821   Wound Depth (cm) 0.2 cm 03/08/23 0821   Wound Surface Area (cm^2) 2.38 cm^2 03/08/23 0821   Change in Wound Size % (l*w) 86.08 03/08/23 0821   Wound Volume (cm^3) 0.476 cm^3 03/08/23 0821   Wound Healing % 72 03/08/23 0821   Post-Procedure Length (cm) 1.5 cm 03/08/23 0843   Post-Procedure Width (cm) 1.8 cm 03/08/23 0843   Post-Procedure Depth (cm) 0.3 cm 03/08/23 0843   Post-Procedure Surface Area (cm^2) 2.7 cm^2 03/08/23 0843   Post-Procedure Volume (cm^3) 0.81 cm^3 03/08/23 0843   Undermining Starts ___ O'Clock 6 02/01/23 0815   Undermining Ends___ O'Clock 12 02/01/23 0815   Undermining Maxium Distance (cm) .7 02/01/23 0815   Wound Assessment Graft 03/08/23 0821   Drainage Amount Small 03/08/23 0821   Drainage Description Serosanguinous 03/08/23 0821   Odor Moderate 03/08/23 0821   Anahy-wound Assessment Maceration 03/08/23 0821   Margins Undefined edges 03/08/23 0821   Wound Thickness Description not for Pressure Injury Full thickness 03/08/23 0821   Number of days: 211          Total Surface Area Debrided:  2.7 sq cm     Estimated Blood Loss:  Minimal    Hemostasis Achieved:  not needed    Procedural Pain:  0  / 10     Post Procedural Pain:  0 / 10     Response to treatment:  Well tolerated by patient. Jose Murphy  AGE: 76 y.o. GENDER: male  : 1955  TODAY'S DATE:  3/8/2023    Chief Complaint   Patient presents with    Wound Check     Follow up visit for left heel wound. Skin Substitute Applied: Other Theraskin 6 sq/cm    Performed by: MADISYN Mcelroy CNP    Wound Type:pressure    Consent obtained: Yes    Time out taken: Yes     Fenestrated: Yes    Instrument(s) curette and forceps      [] Mesher Utilized    All  Guidelines Followed    Skin Substitute was Applied to Wound Number(s):     Wound #: 1      Wound 22 Heel Left #1 ( states since about 2022) (Active)   Wound Image   23 0815   Wound Etiology Pressure Unstageable 22 1404   Dressing Status Old drainage noted 23 0819   Wound Cleansed Vashe 23 1045   Dressing/Treatment Other (comment);Gauze dressing/dressing sponge;Roll gauze;Silicone pad;Steri-strips 23 0845   Offloading for Diabetic Foot Ulcers Post op shoe 23 0845   Wound Length (cm) 1.4 cm 23 0821   Wound Width (cm) 1.7 cm 23 0821   Wound Depth (cm) 0.2 cm 23 0821   Wound Surface Area (cm^2) 2.38 cm^2 23 0821   Change in Wound Size % (l*w) 86.08 23 0821   Wound Volume (cm^3) 0.476 cm^3 23 0821   Wound Healing % 72 23 0821   Post-Procedure Length (cm) 1.5 cm 23 0843   Post-Procedure Width (cm) 1.8 cm 23 0843   Post-Procedure Depth (cm) 0.3 cm 23 0843   Post-Procedure Surface Area (cm^2) 2.7 cm^2 23 0843   Post-Procedure Volume (cm^3) 0.81 cm^3 23 0843   Undermining Starts ___ O'Clock 6 23 0815   Undermining Ends___ O'Clock 12 23 0815   Undermining Maxium Distance (cm) .7 23 0815   Wound Assessment Graft 23 0821   Drainage Amount Small 23 0821   Drainage Description Serosanguinous 23 8066   Odor Moderate 03/08/23 0821   Anahy-wound Assessment Maceration 03/08/23 0821   Margins Undefined edges 03/08/23 0821   Wound Thickness Description not for Pressure Injury Full thickness 03/08/23 0821   Number of days: 211         Total Surface Area Covered 2.7 sq/cm     Amount Wasted 0 sq/cm    Reason for Waste n/a      Was the Product Layered  No     Secured: Yes    Secured With: Mepitel [x]Steri Strips    []Sutures     []Staples []Other    Procedural Pain: 0/10     Post Procedural Pain: 0 / 10    Response to Treatment:  Well tolerated by patient. Plan:     Treatment Note please see attached Discharge Instructions    Written patient dismissal instructions given to patient and signed by patient or POA.          Discharge [address] Physician Orders and Discharge Instructions  [address] [place] [address]  Telephone: [phone] [address] 8:30 am - 4:30 pm and Friday 8:30 am - 1:00 pm.          NAME:  Brady Betancur  YOB: 1955  MEDICAL RECORD NUMBER:  [id number]  TODAYS DATE:  3/8/23           VASHE ON IN CLINIC TODAY      Please wash hands with soap and water prior to and right after  every dressing change          Topical Treatments:              [x] Apply around the wound:   [x] moisturizing lotion TO LEFT LEG AND FOOT THAT IS NOT CLOSE TO WOUND         WOUND LOCATION   Left Heel **Theraskin #3 APPLIED 3/8/2023  ( PURAPLY X 1 , NUSHEILD x2 = TOTAL 6 GRAFTS)            PRIMARY DRESSING: GRAFT IN PLACE TODAY                                 [x] Mepitel secured with Steri-strips                 DO NOT CHANGE ANYTHING BELOW THE STERI- STRIPS                                     (X)Alginate (around graft if necessary, if there is drainage THAT COME THROUGH THE GUAZE)                           SECONDARY DRESSING:               [x]  GUAZE           (X)  ROLL [place] HOW OFTEN TO CHANGE DRESSINGS:                  (  X ) EVERY OTHER DAY, AND AS NEEDED , IF DRAINS THROUGH BANDAGE)  ONLY OUTER DRESSING IS TO BE CHANGED     COMPRESSION:                    ( X)    MEDIUM SPANDAGRIP THEN ACE WRAP TO LEFT LEG                           ( X)    MEDIUM SPANDAGRIP to right leg place every other day with dressing change                          _________________________________________________________________  PRESSURE RELIEF AND OFF-LOADING:     Off-Loading:   With heel protector ( HEEL LIFT BOOT)  [x] Off-loading when       [x] in bed         [x] sitting                             Specialty equipment ordered:       [] Wheelchair cushion    [] Specialty Bed/Mattress     [x] Assistive Device: please continue to use any assistive devices advised by the provider discussed during your visit   [x] Surgical shoe    [] Podus Boot(s)   [] Foam Boot(s)    [] CROW Boot     _____________________________________________________________________________________________     Dietary:  Continue your diet as tolerated. Eat heart-healthy foods. These foods include vegetables, fruits, nuts, beans, lean meat, fish, and whole grains. Limit sodium, alcohol, and sugar. Protein is a teresa nutrient in helping to repair damaged tissue and promote new tissue growth. Good sources of protein are milk, yogurt, cheese, fish, lean meat, and beans. If you are a diabetic, having diabetes can make it hard for wounds to heal. So try to keep your blood sugar in its target range.  __________________________________________________________________________________________________     ACTIVITY:   Activity as tolerated  ________________________________________________________________________________________________________________     PAIN:     Please note, A small amount of pain, drainage and/or bleeding from this process might be expected, and is NORMAL. Elevate the affected limb.   Use over-the-counter medications you would normally use for pain as permitted by your family doctor. For persistent pain not relieved by the above interventions, please call your family doctor.  ________________________________________________________________________________  Call your doctor now or seek immediate medical care if:    You have symptoms of infection, such as: Increased pain, swelling, warmth, or redness. Red streaks leading from the area. Pus draining from the area. A fever. _______________________________________________________________________     Return Appointment:  DME/Wound Dressing Supplies Provided by:  [facility]  (Supply companies distribute one month supply at a time. Please call them directly to reorder supplies when you run out)     ECF or Home Healthcare: Your Home Care Agency is responsible to order your supplies. Return Appointment: With Rm Salgado CNP  in 1 Week(s)                  [] Orders placed during your visit:   :  HILARY       Electronically signed by Jorge Fernandez RN on 3/8/2023 at 8:35 AM      CARDIOLOGISTS:  [address] [place], [place] [address] [address] [place] [address] Information: Should you experience any significant chnges in your wound(s) or have questions about your wound care, please contact the [address] [place] [address] at [address] 8:30 am - 4:30 pm and Friday 8:30 am - 1:00 pm.  If you need help with your wound outside these hours and cannot wait until we are again available, contact your PCP or go to the hospital emergency room. PLEASE NOTE: IF YOU ARE UNABLE TO OBTAIN WOUND SUPPLIES, CONTINUE TO USE THE SUPPLIES YOU HAVE AVAILABLE UNTIL YOU ARE ABLE TO REACH US. KEEP THE WOUND COVERED AT ALL TIMES.                        Electronically signed by MADISYN Toscano CNP on 3/8/2023 at 9:24 AM

## 2023-03-09 RX ORDER — CHLORHEXIDINE GLUCONATE 213 G/1000ML
SOLUTION TOPICAL
Qty: 1 EACH | Refills: 0 | Status: ON HOLD | OUTPATIENT
Start: 2023-03-09 | End: 2023-03-20 | Stop reason: HOSPADM

## 2023-03-09 NOTE — TELEPHONE ENCOUNTER
Spoke with patient and informed Dr. Wendy Raymundo will not be here this day was fine with having Dr. Ryan Reyes and Jada Stallworth to do Watchman procedure.

## 2023-03-09 NOTE — TELEPHONE ENCOUNTER
Spoke with patient and informed him of the procedure that is being scheduled on 3/20/2023 at 7:30 AM arrival.  Lab work has been order instructed to have done at [facility] on 3/13/2023. Also informed to get Hibiclens bath at their Pharmacy. All procedure instructions were mailed to patient.  Instructed to contact me with any questions prior to procedure

## 2023-03-09 NOTE — TELEPHONE ENCOUNTER
2/10/2023 referred for Watchman procedure.   Dr. Gabby Segura consulted on 3/2/2023  Glenis Almeida  shared decision on 2/10/2023  MARZENA to be done the day of the procedure  Insurance to be approved per [facility]

## 2023-03-13 ENCOUNTER — HOSPITAL ENCOUNTER (OUTPATIENT)
Age: 68
Discharge: HOME | End: 2023-03-13
Payer: MEDICARE

## 2023-03-13 DIAGNOSIS — Z01.818 PRE-OP TESTING: ICD-10-CM

## 2023-03-13 LAB
ABO/RH: NORMAL
ANION GAP SERPL CALCULATED.3IONS-SCNC: 13 MMOL/L (ref 3–16)
ANTIBODY SCREEN: NORMAL
BACTERIA: NORMAL /HPF
BILIRUBIN URINE: NEGATIVE
BLOOD, URINE: NEGATIVE
BUN BLDV-MCNC: 21 MG/DL (ref 7–20)
CALCIUM SERPL-MCNC: 9.2 MG/DL (ref 8.3–10.6)
CHLORIDE BLD-SCNC: 103 MMOL/L (ref 99–110)
CLARITY: CLEAR
CO2: 24 MMOL/L (ref 21–32)
COLOR: YELLOW
CREAT SERPL-MCNC: 0.9 MG/DL (ref 0.8–1.3)
EPITHELIAL CELLS, UA: 1 /HPF (ref 0–5)
GFR SERPL CREATININE-BSD FRML MDRD: >60 ML/MIN/{1.73_M2}
GLUCOSE BLD-MCNC: 90 MG/DL (ref 70–99)
GLUCOSE URINE: NEGATIVE MG/DL
HCT VFR BLD CALC: 47.8 % (ref 40.5–52.5)
HEMOGLOBIN: 15.8 G/DL (ref 13.5–17.5)
HYALINE CASTS: 2 /LPF (ref 0–8)
KETONES, URINE: ABNORMAL MG/DL
LEUKOCYTE ESTERASE, URINE: ABNORMAL
MCH RBC QN AUTO: 30.4 PG (ref 26–34)
MCHC RBC AUTO-ENTMCNC: 33 G/DL (ref 31–36)
MCV RBC AUTO: 91.9 FL (ref 80–100)
MICROSCOPIC EXAMINATION: YES
NITRITE, URINE: NEGATIVE
PDW BLD-RTO: 14.2 % (ref 12.4–15.4)
PH UA: 5 (ref 5–8)
PLATELET # BLD: 228 K/UL (ref 135–450)
PMV BLD AUTO: 8.8 FL (ref 5–10.5)
POTASSIUM SERPL-SCNC: 4.2 MMOL/L (ref 3.5–5.1)
PROTEIN UA: ABNORMAL MG/DL
RBC # BLD: 5.2 M/UL (ref 4.2–5.9)
RBC UA: 1 /HPF (ref 0–4)
SODIUM BLD-SCNC: 140 MMOL/L (ref 136–145)
SPECIFIC GRAVITY UA: 1.03 (ref 1–1.03)
URINE TYPE: ABNORMAL
UROBILINOGEN, URINE: 1 E.U./DL
WBC # BLD: 6.3 K/UL (ref 4–11)
WBC UA: 3 /HPF (ref 0–5)

## 2023-03-13 PROCEDURE — 86850 RBC ANTIBODY SCREEN: CPT

## 2023-03-13 PROCEDURE — 81001 URINALYSIS AUTO W/SCOPE: CPT

## 2023-03-13 PROCEDURE — 86900 BLOOD TYPING SEROLOGIC ABO: CPT

## 2023-03-13 PROCEDURE — 36415 COLL VENOUS BLD VENIPUNCTURE: CPT

## 2023-03-13 PROCEDURE — 80048 BASIC METABOLIC PNL TOTAL CA: CPT

## 2023-03-13 PROCEDURE — 86901 BLOOD TYPING SEROLOGIC RH(D): CPT

## 2023-03-13 PROCEDURE — 85027 COMPLETE CBC AUTOMATED: CPT

## 2023-03-13 NOTE — TELEPHONE ENCOUNTER
Sakina Marshall called in this morning wanting to talk to Delfino Isbell, [facility]. Sakina Marshall states that Delfino Isbell RN was supposed to be sending him an e-mail regarding all his information about his procedure.      You can reach Sakina Marshall at #[phone]

## 2023-03-14 NOTE — DISCHARGE INSTRUCTIONS
[facility]ound [facility] Physician Orders and Discharge Instructions  [address], [address]    [place], [place] [address]  Telephone: [phone]      FAX [phone]  MONDAY - THURSDAY 8:30 am - 4:30 pm and Friday 8:30 am - 1:00 pm.          NAME:  Jose Murphy  YOB: 1955  MEDICAL RECORD NUMBER:  [id number]  TODAYS DATE:  3/15/23           VASHE ON IN CLINIC TODAY      Please wash hands with soap and water prior to and right after  every dressing change          Topical Treatments:              [x] Apply around the wound:   [x] moisturizing lotion TO LEFT LEG AND FOOT THAT IS NOT CLOSE TO WOUND         WOUND LOCATION   Left Heel **Theraskin #3 APPLIED 3/8/2023  ( PURAPLY X 1 , NUSHEILD x2 = TOTAL 6 GRAFTS)            PRIMARY DRESSING: GRAFT IN PLACE TODAY                                 [x] Mepitel secured with Steri-strips                 DO NOT CHANGE ANYTHING BELOW THE STERI- STRIPS                                     (X)Alginate (around graft)     SECONDARY DRESSING:               [x]  GUAZE           (X)  ROLL GUAZE                            HOW OFTEN TO CHANGE DRESSINGS:                  (  X ) EVERY OTHER DAY, AND AS NEEDED , IF DRAINS THROUGH BANDAGE)  ONLY OUTER DRESSING IS TO BE CHANGED     COMPRESSION:                    ( X)    MEDIUM SPANDAGRIP THEN ACE WRAP TO LEFT LEG                           ( X)    MEDIUM SPANDAGRIP to right leg place every other day with dressing change                          _________________________________________________________________  PRESSURE RELIEF AND OFF-LOADING:     Off-Loading:   With heel protector ( HEEL LIFT BOOT)  [x] Off-loading when       [x] in bed         [x] sitting                             Specialty equipment ordered:       [] Wheelchair cushion    [] Specialty Bed/Mattress     [x] Assistive Device: please continue to use any assistive devices advised by the provider discussed during your visit   [x] Surgical shoe     [] Podus Boot(s)   [] Foam Boot(s)    [] CROW Boot     _____________________________________________________________________________________________     Dietary:  Continue your diet as tolerated. Eat heart-healthy foods. These foods include vegetables, fruits, nuts, beans, lean meat, fish, and whole grains. Limit sodium, alcohol, and sugar. Protein is a teresa nutrient in helping to repair damaged tissue and promote new tissue growth. Good sources of protein are milk, yogurt, cheese, fish, lean meat, and beans. If you are a diabetic, having diabetes can make it hard for wounds to heal. So try to keep your blood sugar in its target range.  __________________________________________________________________________________________________     ACTIVITY:   Activity as tolerated  ________________________________________________________________________________________________________________     PAIN:     Please note, A small amount of pain, drainage and/or bleeding from this process might be expected, and is NORMAL. Elevate the affected limb. Use over-the-counter medications you would normally use for pain as permitted by your family doctor. For persistent pain not relieved by the above interventions, please call your family doctor.  ________________________________________________________________________________  Call your doctor now or seek immediate medical care if:    You have symptoms of infection, such as: Increased pain, swelling, warmth, or redness. Red streaks leading from the area. Pus draining from the area. A fever. _______________________________________________________________________     Return Appointment:  DME/Wound Dressing Supplies Provided by:  [facility]  (Supply companies distribute one month supply at a time. Please call them directly to reorder supplies when you run out)     ECF or Home Healthcare: Your Home Care Agency is responsible to order your supplies.      Return Appointment: With Mandie Garcia CNP  in 1 Week(s)                  [] Orders placed during your visit:   :  HILARY     Electronically signed by Tiara Elder RN on 3/15/2023 at 8:28 AM         CARDIOLOGISTS:  [address] [place] [address] Information: Should you experience any significant chnges in your wound(s) or have questions about your wound care, please contact the [address] [place] [address] at [address] 8:30 am - 4:30 pm and Friday 8:30 am - 1:00 pm.  If you need help with your wound outside these hours and cannot wait until we are again available, contact your PCP or go to the hospital emergency room. PLEASE NOTE: IF YOU ARE UNABLE TO OBTAIN WOUND SUPPLIES, CONTINUE TO USE THE SUPPLIES YOU HAVE AVAILABLE UNTIL YOU ARE ABLE TO REACH US. KEEP THE WOUND COVERED AT ALL TIMES.

## 2023-03-15 ENCOUNTER — HOSPITAL ENCOUNTER (OUTPATIENT)
Dept: WOUND CARE | Age: 68
Discharge: HOME | End: 2023-03-15
Payer: MEDICARE

## 2023-03-15 VITALS
DIASTOLIC BLOOD PRESSURE: 78 MMHG | RESPIRATION RATE: 18 BRPM | HEART RATE: 85 BPM | TEMPERATURE: 98.4 F | SYSTOLIC BLOOD PRESSURE: 133 MMHG

## 2023-03-15 DIAGNOSIS — G81.94 LEFT HEMIPARESIS (HCC): ICD-10-CM

## 2023-03-15 DIAGNOSIS — L89.623 PRESSURE ULCER OF LEFT HEEL, STAGE 3 (HCC): ICD-10-CM

## 2023-03-15 DIAGNOSIS — R25.2 SPASTICITY: ICD-10-CM

## 2023-03-15 DIAGNOSIS — L89.620 PRESSURE ULCER OF HEEL, LEFT, UNSTAGEABLE (HCC): ICD-10-CM

## 2023-03-15 DIAGNOSIS — Z22.322 MRSA (METHICILLIN RESISTANT STAPH AUREUS) CULTURE POSITIVE: ICD-10-CM

## 2023-03-15 DIAGNOSIS — S91.302A NON-HEALING OPEN WOUND OF LEFT HEEL: Primary | ICD-10-CM

## 2023-03-15 PROCEDURE — 99213 OFFICE O/P EST LOW 20 MIN: CPT

## 2023-03-15 PROCEDURE — 99213 OFFICE O/P EST LOW 20 MIN: CPT | Performed by: NURSE PRACTITIONER

## 2023-03-15 ASSESSMENT — PAIN DESCRIPTION - ORIENTATION
ORIENTATION: LEFT
ORIENTATION: LEFT

## 2023-03-15 ASSESSMENT — PAIN DESCRIPTION - ONSET
ONSET: ON-GOING
ONSET: ON-GOING

## 2023-03-15 ASSESSMENT — PAIN DESCRIPTION - FREQUENCY
FREQUENCY: INTERMITTENT
FREQUENCY: INTERMITTENT

## 2023-03-15 ASSESSMENT — PAIN DESCRIPTION - PAIN TYPE
TYPE: ACUTE PAIN
TYPE: ACUTE PAIN

## 2023-03-15 ASSESSMENT — PAIN SCALES - GENERAL
PAINLEVEL_OUTOF10: 2
PAINLEVEL_OUTOF10: 2

## 2023-03-15 ASSESSMENT — PAIN DESCRIPTION - LOCATION
LOCATION: FOOT
LOCATION: FOOT

## 2023-03-15 ASSESSMENT — PAIN DESCRIPTION - DESCRIPTORS
DESCRIPTORS: ACHING;DISCOMFORT
DESCRIPTORS: ACHING;DISCOMFORT

## 2023-03-15 NOTE — PROGRESS NOTES
[address]   Progress Note and Procedure Note      Kelsey Seay  MEDICAL RECORD NUMBER:  [id number]  AGE: 76 y.o. GENDER: male  : 1955  EPISODE DATE:  3/15/2023    Subjective:     Chief Complaint   Patient presents with    Wound Check     Follow Up on Left Heel         HISTORY of PRESENT ILLNESS HPI  Deepika Patten is a 79year old male presenting today for follow up for left heel wound. 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 Pt is as active as he can be. Once wound is closed can resume physical therapy with goal of walking with walker instead of using wheelchair for mobility. Chronic spastic movements of extremities today, he states are worse early in am.   History of Wound Context:   On 22  He fell and sustained displaced fracture of base of neck of left femur and developed  a pressure ulcer left heel. Postop he developed left heel DTI   2022 pt first seen at [address] [place] [address],[address] for treatment of left heel wound dx as stable eschar over wound. Pt has an offloading boot in place to float heel and prevent further heel damage. 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 Pt has residual left sided weakness and ongoing therapy has been delayed because of pressure ulcer. Pt also has chronic bilateral lower leg edema for which he uses compression stockings. Not using compression on left leg because of wound. Pt has spastic movements bilateral upper and lower extremities since after stroke with is tx with Baclofen; but friction still a concern. PT and DP pulse strong with Doppler. Reports getting protein supplements. Pt transferring only, no walking with walker still too unstable. Noting intermittent spastic movements legs during visit. Concern about friction on left heel with this movement. Pt wears post-op shoe on left foot. His son Cj Garcia and daughter Sasha Bee are monitoring and changing outer dressings as needed.   10/19/22 Eschar debrided because of drainage and redness. Culture of MRSA tx with antibiotics. Wound now designated as a Stage 3 pressure ulcer. 10/22/22applied for skin substitutes. Theraskin is preferable for its robust nature especially on a heel wound. Progress of wound:    After wound debrided on 10/19/22 measured 3 x 3.5 x 1.5. We have provided compression therapy, tx wound infection  and application of collagen, Puraply and NuShield products to wound with  little progress to healing. Lack of progress to wound healing is also delaying needed for further physical therapy/rehab. Pt scheduled for ultrasounds bilateral lower legs on 1/13/23 1/18/2023: Pt presents today with less lower leg/ankle edema noted; wound with some maceration and still with small amount of undermining from 9-12 o'clock. Results of vascular study from 1/13/23:   Summary        APPLE not obtained. Right lower extremity demonstrates occluded posterior    tibial and peroneal arteries but grossly normal macrovascular flow. Left    lower extremity demonstrates no evidence of hemodynamically significant    stenosis or occlusion. Discussed skin graft application with pt and his son and pt has agreed to get Theraskin to be applied. No overt signs of infection. Pt to use pressure redistribution boots on left heel during the night instead of \"fashioned\" post-op shoe. Denies constitutional issues. 2/1/2023: Today presents with minimal amount of drainage from wound, wound bed pale with minimal slough noted. Wound edges with less maceration. Will apply Theraskin today. Discussed about Theraskin application today and precautions to take. Theraskin 6 cm applied ( total number of skin subs 4; Sabrina Allison #1)  2/8/23: Presents today with partially intact Theraskin still in place over left heel wound, small amount of yellowish drainage on old dressing. Odor noted but not unexpected with graft. Minimal pink periwound skin.      Wound/Ulcer Pain Timing/Severity: intermittent  Quality of pain: tender  Severity:  2/ 10   Modifying Factors: Pain is relieved/improved with rest, repositioning  Associated Signs/Symptoms: edema and drainage  2/15/23 Presented today with wound left heel. Theraskin is incorporating into wound with periwound maceration more this week. 2/22/23 No major changes in wound size, but less maceration and drainage, sides of wound adherent with a small amount of undermining left wound edge. Reapplied Theraskin today and obtained culture. 3/1/2023:  Checked  graft and will leave on another week. Graft has turned gray due to leaching of silver from surrounding silver alginate dressing. 3/8/2023:  Pt presents with wound measurements decreasing as well as wound more shallow, small amount of periwound white tissue, no redness, minimal drainage. Applied Theraskin #3 today. 3/15/23: Graft check today with small amount yellowish drainage on old dressing. Edema of foot is +2 , no overt signs of infection or pain. Will keep graft intact this next week. Ulcer Identification:  Ulcer Type: pressure ulcer     Contributing Factors: edema; while hospitalized developed wound from chronic pressure and decreased mobility     Acute Wound: N/A not an acute wound       PAST MEDICAL HISTORY        Diagnosis Date    CAD (coronary artery disease)     HTN (hypertension)     Hyperlipemia     Left hemiparesis ([address] 8/11/2020    MRSA (methicillin resistant staph aureus) culture positive 9/14/2022    heel    Non-healing open wound of left heel 10/12/2022    Pressure injury of left ankle, stage 2 ([address] 9/13/2021    Pressure ulcer of heel, left, unstageable ([address] 8/8/2022    Stable eschar covered.     Pressure ulcer of left heel, stage 3 ([address] 10/20/2022    S/P PTCA (percutaneous transluminal coronary angioplasty) 2010    two stents place    Spasticity 7/9/2019    Stroke ([address] 10/2012    left-sided weakness       PAST SURGICAL HISTORY    Past Surgical History:   Procedure Laterality Date    CARDIAC SURGERY      HIP SURGERY Left     INGUINAL HERNIA REPAIR      right side    INTRACAPSULAR CATARACT EXTRACTION Right 08/30/2019    PHACOEMULSIFICATION WITH INTRAOCULAR LENS IMPLANT performed by Chhaya Summers MD at [facility] [address] EXTRACTION Left 09/06/2019    PHACOEMULSIFICATION WITH INTRAOCULAR LENS IMPLANT performed by Chhaya Summers MD at [facility]    Family History   Problem Relation Age of Onset    Hypertension Mother     Heart Disease Father     Breast Cancer Sister     Heart Attack Brother        SOCIAL HISTORY    Social History     Tobacco Use    Smoking status: Never    Smokeless tobacco: Never   Vaping Use    Vaping Use: Never used   Substance Use Topics    Alcohol use: No    Drug use: No       ALLERGIES    No Known Allergies    MEDICATIONS    Current Outpatient Medications on File Prior to Encounter   Medication Sig Dispense Refill    chlorhexidine (HIBICLENS) 4 % external liquid Wash from neck down the night before or morning of the procedure 1 each 0    acetaminophen (TYLENOL) 500 MG tablet Take 1,000 mg by mouth in the morning and at bedtime      hydrALAZINE (APRESOLINE) 50 MG tablet Take 1 tablet by mouth 3 times daily 90 tablet 0    gabapentin (NEURONTIN) 100 MG capsule Take 1 capsule by mouth 4 times daily for 7 days.  28 capsule 0    baclofen (LIORESAL) 10 MG tablet Take 1 tablet by mouth 2 times daily 180 tablet 1    lisinopril (PRINIVIL;ZESTRIL) 20 MG tablet Take 1 tablet by mouth daily 90 tablet 0    Wound Dressings ([facility] WOUND/BURN DRESSING) GEL gel Apply 1 each topically daily 1 each 1    senna-docusate (PERICOLACE) 8.6-50 MG per tablet Take 1 tablet by mouth 2 times daily      atorvastatin (LIPITOR) 40 MG tablet Take 1 tablet by mouth daily 90 tablet 3    amLODIPine (NORVASC) 10 MG tablet Take 1 tablet by mouth daily 90 tablet 3    loratadine (CLARITIN) 10 MG tablet Take 1 tablet by mouth daily Pt needs to come in and be seen before more rx's are given. 90 tablet 1    aspirin 81 MG tablet Take 81 mg by mouth daily      Sennosides 17.2 MG TABS Take 17.2 mg by mouth       No current facility-administered medications on file prior to encounter. REVIEW OF SYSTEMS  Review of Systems    Pertinent items are noted in HPI. Objective:      /78   Pulse 85   Temp 98.4 °F (36.9 °C) (Temporal)   Resp 18     Wt Readings from Last 3 Encounters:   03/02/23 226 lb (102.5 kg)   02/10/23 226 lb (102.5 kg)   09/29/22 228 lb 14.4 oz (103.8 kg)       PHYSICAL EXAM  Physical Exam    General Appearance: alert and oriented to person, place and time, well-developed and well-nourished, in no acute distress  Skin: warm and dry, no rash or erythema  Head: normocephalic and atraumatic  Eyes: pupils equal, round, and reactive to light  Pulmonary/Chest: clear to auscultation bilaterally- no wheezes, rales or rhonchi, normal air movement, no respiratory distress  Cardiovascular: normal rate, regular rhythm, and distal pulses diminished      Assessment:        Problem List Items Addressed This Visit       Pressure ulcer of heel, left, unstageable (HCC)    MRSA (methicillin resistant staph aureus) culture positive    Non-healing open wound of left heel - Primary    Pressure ulcer of left heel, stage 3 (HCC)    Spasticity    Left hemiparesis ([facility]        Procedure Note  Indications:  Based on my examination of this patient's wound(s)/ulcer(s) today, debridement is not required to promote healing and evaluate the wound base.   Wound/Ulcer Descriptions are Pre Debridement except measurements:    Wound 08/08/22 Heel Left #1 ( states since about 6/24/2022) (Active)   Wound Image   02/01/23 0815   Wound Etiology Pressure Unstageable 08/08/22 1404   Dressing Status Old drainage noted 03/15/23 0816   Wound Cleansed Vashe 01/25/23 1045   Dressing/Treatment Other (comment);Gauze dressing/dressing sponge;Roll gauze;Silicone pad;Steri-strips 03/08/23 0845   Offloading for Diabetic Foot Ulcers Post op shoe 03/08/23 0845   Wound Length (cm) 1.4 cm 03/15/23 0816   Wound Width (cm) 1.7 cm 03/15/23 0816   Wound Depth (cm) 0.2 cm 03/15/23 0816   Wound Surface Area (cm^2) 2.38 cm^2 03/15/23 0816   Change in Wound Size % (l*w) 86.08 03/15/23 0816   Wound Volume (cm^3) 0.476 cm^3 03/15/23 0816   Wound Healing % 72 03/15/23 0816   Post-Procedure Length (cm) 1.5 cm 03/08/23 0843   Post-Procedure Width (cm) 1.8 cm 03/08/23 0843   Post-Procedure Depth (cm) 0.3 cm 03/08/23 0843   Post-Procedure Surface Area (cm^2) 2.7 cm^2 03/08/23 0843   Post-Procedure Volume (cm^3) 0.81 cm^3 03/08/23 0843   Undermining Starts ___ O'Clock 6 02/01/23 0815   Undermining Ends___ O'Clock 12 02/01/23 0815   Undermining Maxium Distance (cm) .7 02/01/23 0815   Wound Assessment Graft 03/15/23 0816   Drainage Amount Small 03/15/23 0816   Drainage Description Serosanguinous 03/15/23 0816   Odor None 03/15/23 0816   Anahy-wound Assessment Dry/flaky 03/15/23 0816   Margins Undefined edges 03/15/23 0816   Wound Thickness Description not for Pressure Injury Full thickness 03/15/23 0816   Number of days: 218     Plan:     Treatment Note please see attached Discharge Instructions. Advised him to keep leg elevated. Written patient dismissal instructions given to patient and signed by patient or POA.          Discharge [address] [place] [address] Physician Orders and Discharge Instructions  [address] [place] [address], [address] [place] [address]  Telephone: [phone]  [address] 8:30 am - 4:30 pm and Friday 8:30 am - 1:00 pm.          NAME:  Madelon Collie  YOB: 1955  MEDICAL RECORD NUMBER:  [id number]  TODAYS DATE:  3/15/23           VASHE ON IN CLINIC TODAY      Please wash hands with soap and water prior to and right after  every dressing change          Topical Treatments:              [x] Apply around the wound:   [x] moisturizing lotion TO LEFT LEG AND FOOT THAT IS NOT CLOSE TO WOUND         WOUND LOCATION   Left Heel **Theraskin #3 APPLIED 3/8/2023  ( PURAPLY X 1 , YARELILD x2 = TOTAL 6 GRAFTS)            PRIMARY DRESSING: GRAFT IN PLACE TODAY                                 [x] Mepitel secured with Steri-strips                 DO NOT CHANGE ANYTHING BELOW THE STERI- STRIPS                                     (X)Alginate (around graft)     SECONDARY DRESSING:               [x]  GUAZE           (X)  ROLL GUAZE                            HOW OFTEN TO CHANGE DRESSINGS:                  (  X ) EVERY OTHER DAY, AND AS NEEDED , IF DRAINS THROUGH BANDAGE)  ONLY OUTER DRESSING IS TO BE CHANGED     COMPRESSION:                    ( X)    MEDIUM SPANDAGRIP THEN ACE WRAP TO LEFT LEG                           ( X)    MEDIUM SPANDAGRIP to right leg place every other day with dressing change                          _________________________________________________________________  PRESSURE RELIEF AND OFF-LOADING:     Off-Loading:   With heel protector ( HEEL LIFT BOOT)  [x] Off-loading when       [x] in bed         [x] sitting                             Specialty equipment ordered:       [] Wheelchair cushion    [] Specialty Bed/Mattress     [x] Assistive Device: please continue to use any assistive devices advised by the provider discussed during your visit   [x] Surgical shoe    [] Podus Boot(s)   [] Foam Boot(s)    [] CROW Boot     _____________________________________________________________________________________________     Dietary:  Continue your diet as tolerated. Eat heart-healthy foods. These foods include vegetables, fruits, nuts, beans, lean meat, fish, and whole grains. Limit sodium, alcohol, and sugar. Protein is a teresa nutrient in helping to repair damaged tissue and promote new tissue growth. Good sources of protein are milk, yogurt, cheese, fish, lean meat, and beans.   If you are a diabetic, having diabetes can make it hard for wounds to heal. So try to keep your blood sugar in its target range.  __________________________________________________________________________________________________     ACTIVITY:   Activity as tolerated  ________________________________________________________________________________________________________________     PAIN:     Please note, A small amount of pain, drainage and/or bleeding from this process might be expected, and is NORMAL. Elevate the affected limb. Use over-the-counter medications you would normally use for pain as permitted by your family doctor. For persistent pain not relieved by the above interventions, please call your family doctor.  ________________________________________________________________________________  Call your doctor now or seek immediate medical care if:    You have symptoms of infection, such as: Increased pain, swelling, warmth, or redness. Red streaks leading from the area. Pus draining from the area. A fever. _______________________________________________________________________     Return Appointment:  DME/Wound Dressing Supplies Provided by:  [facility]  (Supply companies distribute one month supply at a time. Please call them directly to reorder supplies when you run out)     ECF or Home Healthcare: Your Home Care Agency is responsible to order your supplies.      Return Appointment: With Mandie Garcia CNP  in 1 Week(s)                  [] Orders placed during your visit:   :  HILARY     Electronically signed by Tiara Elder RN on 3/15/2023 at 8:28 AM         CARDIOLOGISTS:  [address] [place] [address] [place] [address] Information: Should you experience any significant chnges in your wound(s) or have questions about your wound care, please contact the [address] [place] [address] at [address] 8:30 am - 4:30 pm and Friday 8:30 am - 1:00 pm.  If you need help with your wound outside these hours and cannot wait until we are again available, contact your PCP or go to the hospital emergency room. PLEASE NOTE: IF YOU ARE UNABLE TO OBTAIN WOUND SUPPLIES, CONTINUE TO USE THE SUPPLIES YOU HAVE AVAILABLE UNTIL YOU ARE ABLE TO REACH US. KEEP THE WOUND COVERED AT ALL TIMES.                          Electronically signed by MADISYN Momin CNP on 3/15/2023 at 8:48 AM

## 2023-03-20 ENCOUNTER — HOSPITAL ENCOUNTER (INPATIENT)
Dept: CARDIAC CATH/INVASIVE PROCEDURES | Age: 68
LOS: 1 days | Discharge: HOME | DRG: 274 | End: 2023-03-20
Attending: INTERNAL MEDICINE | Admitting: INTERNAL MEDICINE
Payer: MEDICARE

## 2023-03-20 ENCOUNTER — ANESTHESIA EVENT (OUTPATIENT)
Dept: CARDIAC CATH/INVASIVE PROCEDURES | Age: 68
DRG: 274 | End: 2023-03-20
Payer: MEDICARE

## 2023-03-20 ENCOUNTER — ANESTHESIA (OUTPATIENT)
Dept: CARDIAC CATH/INVASIVE PROCEDURES | Age: 68
DRG: 274 | End: 2023-03-20
Payer: MEDICARE

## 2023-03-20 VITALS
DIASTOLIC BLOOD PRESSURE: 68 MMHG | HEIGHT: 70 IN | TEMPERATURE: 98.1 F | WEIGHT: 226 LBS | SYSTOLIC BLOOD PRESSURE: 110 MMHG | HEART RATE: 88 BPM | OXYGEN SATURATION: 95 % | RESPIRATION RATE: 35 BRPM | BODY MASS INDEX: 32.35 KG/M2

## 2023-03-20 PROBLEM — I48.19 PERSISTENT ATRIAL FIBRILLATION (HCC): Status: ACTIVE | Noted: 2023-03-20

## 2023-03-20 LAB
ABO + RH BLD: NORMAL
BLD GP AB SCN SERPL QL: NORMAL
EKG ATRIAL RATE: 84 BPM
EKG DIAGNOSIS: NORMAL
EKG Q-T INTERVAL: 366 MS
EKG QRS DURATION: 94 MS
EKG QTC CALCULATION (BAZETT): 488 MS
EKG R AXIS: 52 DEGREES
EKG T AXIS: -71 DEGREES
EKG VENTRICULAR RATE: 107 BPM
POC ACT LR: 268 SEC
POC ACT LR: 318 SEC

## 2023-03-20 PROCEDURE — 6360000004 HC RX CONTRAST MEDICATION: Performed by: INTERNAL MEDICINE

## 2023-03-20 PROCEDURE — 2500000003 HC RX 250 WO HCPCS

## 2023-03-20 PROCEDURE — 86901 BLOOD TYPING SEROLOGIC RH(D): CPT

## 2023-03-20 PROCEDURE — B24BZZ4 ULTRASONOGRAPHY OF HEART WITH AORTA, TRANSESOPHAGEAL: ICD-10-PCS | Performed by: INTERNAL MEDICINE

## 2023-03-20 PROCEDURE — 7100000001 HC PACU RECOVERY - ADDTL 15 MIN

## 2023-03-20 PROCEDURE — 93010 ELECTROCARDIOGRAM REPORT: CPT | Performed by: INTERNAL MEDICINE

## 2023-03-20 PROCEDURE — C1894 INTRO/SHEATH, NON-LASER: HCPCS

## 2023-03-20 PROCEDURE — 2580000003 HC RX 258: Performed by: INTERNAL MEDICINE

## 2023-03-20 PROCEDURE — C1889 IMPLANT/INSERT DEVICE, NOC: HCPCS

## 2023-03-20 PROCEDURE — 6360000002 HC RX W HCPCS

## 2023-03-20 PROCEDURE — 93005 ELECTROCARDIOGRAM TRACING: CPT | Performed by: INTERNAL MEDICINE

## 2023-03-20 PROCEDURE — 2500000003 HC RX 250 WO HCPCS: Performed by: ANESTHESIOLOGY

## 2023-03-20 PROCEDURE — 6360000002 HC RX W HCPCS: Performed by: ANESTHESIOLOGY

## 2023-03-20 PROCEDURE — APPNB45 APP NON BILLABLE 31-45 MINUTES: Performed by: NURSE PRACTITIONER

## 2023-03-20 PROCEDURE — 2580000003 HC RX 258

## 2023-03-20 PROCEDURE — 1200000000 HC SEMI PRIVATE

## 2023-03-20 PROCEDURE — 2709999900 HC NON-CHARGEABLE SUPPLY

## 2023-03-20 PROCEDURE — 93320 DOPPLER ECHO COMPLETE: CPT

## 2023-03-20 PROCEDURE — 86850 RBC ANTIBODY SCREEN: CPT

## 2023-03-20 PROCEDURE — 85347 COAGULATION TIME ACTIVATED: CPT

## 2023-03-20 PROCEDURE — 6360000002 HC RX W HCPCS: Performed by: INTERNAL MEDICINE

## 2023-03-20 PROCEDURE — 2500000003 HC RX 250 WO HCPCS: Performed by: NURSE ANESTHETIST, CERTIFIED REGISTERED

## 2023-03-20 PROCEDURE — 33340 PERQ CLSR TCAT L ATR APNDGE: CPT

## 2023-03-20 PROCEDURE — 3700000001 HC ADD 15 MINUTES (ANESTHESIA)

## 2023-03-20 PROCEDURE — 36415 COLL VENOUS BLD VENIPUNCTURE: CPT

## 2023-03-20 PROCEDURE — 6360000002 HC RX W HCPCS: Performed by: NURSE ANESTHETIST, CERTIFIED REGISTERED

## 2023-03-20 PROCEDURE — 7100000011 HC PHASE II RECOVERY - ADDTL 15 MIN

## 2023-03-20 PROCEDURE — 33340 PERQ CLSR TCAT L ATR APNDGE: CPT | Performed by: INTERNAL MEDICINE

## 2023-03-20 PROCEDURE — 02L73DK OCCLUSION OF LEFT ATRIAL APPENDAGE WITH INTRALUMINAL DEVICE, PERCUTANEOUS APPROACH: ICD-10-PCS | Performed by: INTERNAL MEDICINE

## 2023-03-20 PROCEDURE — C1760 CLOSURE DEV, VASC: HCPCS

## 2023-03-20 PROCEDURE — C1893 INTRO/SHEATH, FIXED,NON-PEEL: HCPCS

## 2023-03-20 PROCEDURE — 6370000000 HC RX 637 (ALT 250 FOR IP): Performed by: NURSE PRACTITIONER

## 2023-03-20 PROCEDURE — 93325 DOPPLER ECHO COLOR FLOW MAPG: CPT

## 2023-03-20 PROCEDURE — 86900 BLOOD TYPING SEROLOGIC ABO: CPT

## 2023-03-20 PROCEDURE — 93312 ECHO TRANSESOPHAGEAL: CPT

## 2023-03-20 PROCEDURE — 7100000000 HC PACU RECOVERY - FIRST 15 MIN

## 2023-03-20 PROCEDURE — 3700000000 HC ANESTHESIA ATTENDED CARE

## 2023-03-20 PROCEDURE — 7100000010 HC PHASE II RECOVERY - FIRST 15 MIN

## 2023-03-20 RX ORDER — FENTANYL CITRATE 50 UG/ML
INJECTION, SOLUTION INTRAMUSCULAR; INTRAVENOUS PRN
Status: DISCONTINUED | OUTPATIENT
Start: 2023-03-20 | End: 2023-03-20 | Stop reason: SDUPTHER

## 2023-03-20 RX ORDER — CETIRIZINE HYDROCHLORIDE 10 MG/1
5 TABLET ORAL DAILY
Status: DISCONTINUED | OUTPATIENT
Start: 2023-03-20 | End: 2023-03-20 | Stop reason: HOSPADM

## 2023-03-20 RX ORDER — HYDRALAZINE HYDROCHLORIDE 25 MG/1
50 TABLET, FILM COATED ORAL 3 TIMES DAILY
Status: DISCONTINUED | OUTPATIENT
Start: 2023-03-20 | End: 2023-03-20 | Stop reason: HOSPADM

## 2023-03-20 RX ORDER — SODIUM CHLORIDE 0.9 % (FLUSH) 0.9 %
5-40 SYRINGE (ML) INJECTION EVERY 12 HOURS SCHEDULED
Status: DISCONTINUED | OUTPATIENT
Start: 2023-03-20 | End: 2023-03-20 | Stop reason: HOSPADM

## 2023-03-20 RX ORDER — SODIUM CHLORIDE 0.9 % (FLUSH) 0.9 %
5-40 SYRINGE (ML) INJECTION PRN
Status: DISCONTINUED | OUTPATIENT
Start: 2023-03-20 | End: 2023-03-20 | Stop reason: HOSPADM

## 2023-03-20 RX ORDER — GABAPENTIN 100 MG/1
100 CAPSULE ORAL 4 TIMES DAILY
Status: DISCONTINUED | OUTPATIENT
Start: 2023-03-20 | End: 2023-03-20 | Stop reason: HOSPADM

## 2023-03-20 RX ORDER — SUCCINYLCHOLINE/SOD CL,ISO/PF 200MG/10ML
SYRINGE (ML) INTRAVENOUS PRN
Status: DISCONTINUED | OUTPATIENT
Start: 2023-03-20 | End: 2023-03-20 | Stop reason: SDUPTHER

## 2023-03-20 RX ORDER — AMLODIPINE BESYLATE 5 MG/1
10 TABLET ORAL DAILY
Status: DISCONTINUED | OUTPATIENT
Start: 2023-03-20 | End: 2023-03-20 | Stop reason: HOSPADM

## 2023-03-20 RX ORDER — CLOPIDOGREL BISULFATE 75 MG/1
75 TABLET ORAL DAILY
Qty: 90 TABLET | Refills: 1 | Status: SHIPPED | OUTPATIENT
Start: 2023-03-21

## 2023-03-20 RX ORDER — SODIUM CHLORIDE 9 MG/ML
INJECTION, SOLUTION INTRAVENOUS PRN
Status: DISCONTINUED | OUTPATIENT
Start: 2023-03-20 | End: 2023-03-20 | Stop reason: HOSPADM

## 2023-03-20 RX ORDER — PROTAMINE SULFATE 10 MG/ML
INJECTION, SOLUTION INTRAVENOUS PRN
Status: DISCONTINUED | OUTPATIENT
Start: 2023-03-20 | End: 2023-03-20 | Stop reason: SDUPTHER

## 2023-03-20 RX ORDER — HEPARIN SODIUM 1000 [USP'U]/ML
INJECTION, SOLUTION INTRAVENOUS; SUBCUTANEOUS PRN
Status: DISCONTINUED | OUTPATIENT
Start: 2023-03-20 | End: 2023-03-20 | Stop reason: SDUPTHER

## 2023-03-20 RX ORDER — ROCURONIUM BROMIDE 10 MG/ML
INJECTION, SOLUTION INTRAVENOUS PRN
Status: DISCONTINUED | OUTPATIENT
Start: 2023-03-20 | End: 2023-03-20 | Stop reason: SDUPTHER

## 2023-03-20 RX ORDER — SODIUM CHLORIDE 9 MG/ML
INJECTION, SOLUTION INTRAVENOUS CONTINUOUS
Status: DISCONTINUED | OUTPATIENT
Start: 2023-03-20 | End: 2023-03-20 | Stop reason: HOSPADM

## 2023-03-20 RX ORDER — BACLOFEN 10 MG/1
10 TABLET ORAL 2 TIMES DAILY
Status: DISCONTINUED | OUTPATIENT
Start: 2023-03-20 | End: 2023-03-20 | Stop reason: HOSPADM

## 2023-03-20 RX ORDER — ONDANSETRON 2 MG/ML
4 INJECTION INTRAMUSCULAR; INTRAVENOUS
Status: DISCONTINUED | OUTPATIENT
Start: 2023-03-20 | End: 2023-03-20 | Stop reason: HOSPADM

## 2023-03-20 RX ORDER — HYDROMORPHONE HCL 110MG/55ML
0.5 PATIENT CONTROLLED ANALGESIA SYRINGE INTRAVENOUS EVERY 5 MIN PRN
Status: DISCONTINUED | OUTPATIENT
Start: 2023-03-20 | End: 2023-03-20 | Stop reason: HOSPADM

## 2023-03-20 RX ORDER — PROPOFOL 10 MG/ML
INJECTION, EMULSION INTRAVENOUS PRN
Status: DISCONTINUED | OUTPATIENT
Start: 2023-03-20 | End: 2023-03-20 | Stop reason: SDUPTHER

## 2023-03-20 RX ORDER — ACETAMINOPHEN 500 MG
1000 TABLET ORAL 2 TIMES DAILY
Status: DISCONTINUED | OUTPATIENT
Start: 2023-03-20 | End: 2023-03-20 | Stop reason: HOSPADM

## 2023-03-20 RX ORDER — AMOXICILLIN 250 MG
1 CAPSULE ORAL 2 TIMES DAILY
Status: DISCONTINUED | OUTPATIENT
Start: 2023-03-20 | End: 2023-03-20 | Stop reason: HOSPADM

## 2023-03-20 RX ORDER — ONDANSETRON 2 MG/ML
INJECTION INTRAMUSCULAR; INTRAVENOUS PRN
Status: DISCONTINUED | OUTPATIENT
Start: 2023-03-20 | End: 2023-03-20 | Stop reason: SDUPTHER

## 2023-03-20 RX ORDER — ACETAMINOPHEN 325 MG/1
650 TABLET ORAL EVERY 4 HOURS PRN
Status: DISCONTINUED | OUTPATIENT
Start: 2023-03-20 | End: 2023-03-20 | Stop reason: HOSPADM

## 2023-03-20 RX ORDER — CLOPIDOGREL 300 MG/1
300 TABLET, FILM COATED ORAL ONCE
Status: COMPLETED | OUTPATIENT
Start: 2023-03-20 | End: 2023-03-20

## 2023-03-20 RX ORDER — MEPERIDINE HYDROCHLORIDE 25 MG/ML
12.5 INJECTION INTRAMUSCULAR; INTRAVENOUS; SUBCUTANEOUS EVERY 5 MIN PRN
Status: DISCONTINUED | OUTPATIENT
Start: 2023-03-20 | End: 2023-03-20 | Stop reason: HOSPADM

## 2023-03-20 RX ORDER — LIDOCAINE HYDROCHLORIDE 20 MG/ML
INJECTION, SOLUTION EPIDURAL; INFILTRATION; INTRACAUDAL; PERINEURAL PRN
Status: DISCONTINUED | OUTPATIENT
Start: 2023-03-20 | End: 2023-03-20 | Stop reason: SDUPTHER

## 2023-03-20 RX ORDER — ATORVASTATIN CALCIUM 40 MG/1
40 TABLET, FILM COATED ORAL DAILY
Status: DISCONTINUED | OUTPATIENT
Start: 2023-03-20 | End: 2023-03-20 | Stop reason: HOSPADM

## 2023-03-20 RX ORDER — CLOPIDOGREL BISULFATE 75 MG/1
75 TABLET ORAL DAILY
Status: DISCONTINUED | OUTPATIENT
Start: 2023-03-21 | End: 2023-03-20 | Stop reason: HOSPADM

## 2023-03-20 RX ORDER — LISINOPRIL 20 MG/1
20 TABLET ORAL DAILY
Status: DISCONTINUED | OUTPATIENT
Start: 2023-03-20 | End: 2023-03-20 | Stop reason: HOSPADM

## 2023-03-20 RX ADMIN — IOPAMIDOL 30 ML: 755 INJECTION, SOLUTION INTRAVENOUS at 09:45

## 2023-03-20 RX ADMIN — Medication 120 MG: at 09:09

## 2023-03-20 RX ADMIN — ONDANSETRON 4 MG: 2 INJECTION INTRAMUSCULAR; INTRAVENOUS at 09:47

## 2023-03-20 RX ADMIN — PROTAMINE SULFATE 20 MG: 10 INJECTION, SOLUTION INTRAVENOUS at 09:45

## 2023-03-20 RX ADMIN — LIDOCAINE HYDROCHLORIDE 100 MG: 20 INJECTION, SOLUTION EPIDURAL; INFILTRATION; INTRACAUDAL; PERINEURAL at 09:07

## 2023-03-20 RX ADMIN — HEPARIN SODIUM 2000 UNITS: 1000 INJECTION INTRAVENOUS; SUBCUTANEOUS at 09:34

## 2023-03-20 RX ADMIN — ROCURONIUM BROMIDE 5 MG: 10 INJECTION, SOLUTION INTRAVENOUS at 09:07

## 2023-03-20 RX ADMIN — CLOPIDOGREL BISULFATE 300 MG: 300 TABLET, FILM COATED ORAL at 12:28

## 2023-03-20 RX ADMIN — SODIUM CHLORIDE: 9 INJECTION, SOLUTION INTRAVENOUS at 07:21

## 2023-03-20 RX ADMIN — PROPOFOL 200 MG: 10 INJECTION, EMULSION INTRAVENOUS at 09:07

## 2023-03-20 RX ADMIN — ROCURONIUM BROMIDE 15 MG: 10 INJECTION, SOLUTION INTRAVENOUS at 09:14

## 2023-03-20 RX ADMIN — HEPARIN SODIUM 8000 UNITS: 1000 INJECTION INTRAVENOUS; SUBCUTANEOUS at 09:29

## 2023-03-20 RX ADMIN — PHENYLEPHRINE HYDROCHLORIDE 300 MCG: 10 INJECTION INTRAVENOUS at 09:32

## 2023-03-20 RX ADMIN — Medication 2000 MG: at 09:13

## 2023-03-20 RX ADMIN — FENTANYL CITRATE 100 MCG: 50 INJECTION, SOLUTION INTRAMUSCULAR; INTRAVENOUS at 09:28

## 2023-03-20 RX ADMIN — SUGAMMADEX 200 MG: 100 INJECTION, SOLUTION INTRAVENOUS at 09:48

## 2023-03-20 ASSESSMENT — ENCOUNTER SYMPTOMS: SHORTNESS OF BREATH: 0

## 2023-03-20 NOTE — DISCHARGE INSTRUCTIONS
the morning of the procedure including, Metfomin. If you take Lantus/Levemir only take ½ your normal dose the evening before. All other medications can be taken in the morning with sips of water. Do not hold anticoagulation therapy: warfarin, eliquis, xarelto therapy. Do not use any lotions, creams or perfume the morning of procedure. Please arrive 1 hour prior to procedure time. Please have a responsible adult to drive you home after procedure. It is recommended you do not drive for 24 hours after procedure. Cath lab will provide you with all post procedure instructions. If you have any questions regarding the procedure itself or medications please call [phone] and ask to speak with a nurse. [facility] DISCHARGE INSTRUCTIONS    3/20/2023     Wear your seatbelt home. You are under the influence of drugs. Do not drink alcohol, drive, operate machinery, or make any important decisions or sign any legal documents for 24 hours  A responsible adult needs to be with you for 24 hours. You may experience lightheadedness, dizziness, nausea, heightened emotions and/or sleepiness following surgery. Rest at home today- increase activity as tolerated. Progress slowly to a regular diet and drink plenty of fluids unless your physician has instructed you otherwise. If nausea becomes a problem, call your physician. Coughing, sore throat, and muscle aches are other side effects of anesthesia and should improve with time. Do not drive or operate machinery while taking narcotics. ATTENTION FEMALE PATIENTS: if you use an oral contraceptive or birth control pill, you need to use an additional or alternative method for pregnancy prevention for the next thirty days. Medications given today may render your contraceptive ineffective for this cycle.

## 2023-03-20 NOTE — TELEPHONE ENCOUNTER
3/20/2023 s/p SUCCESSFUL WATCHMAN LAAC PROCEDURE PER DR. Will Boggs deployment of left atrial appendage occlusion device with no complication, WATCHMAN device used 31 mm size. --6-month f/u with Dr. Laurent Gonzales on 8/18/2023 at 2 PM  --1-year f/u unable to schedule d/t next year the schedule is not out yet. All will print on discharge after visit summary. Kiki Murillo procedure MARZENA to be completed 45-59 days post procedure (5/4/2023-5/18/2023) Order placed. Thanks.    Seven Nicole RN, BSN   Watchman Coordinator

## 2023-03-20 NOTE — PROCEDURES
[employer] Medicare and Medicaid Services (CMS) to meet the registry requirements outlined in the national coverage decisions for Percutaneous Left Atrial Appendage Closure     Thank you for letting me participate in this patient's care and please do not hesitate to call me with any questions or concerns.      Maddi Matute MD, [facility]

## 2023-03-20 NOTE — PROGRESS NOTES
Patient to PACU from Cath lab. Patient is alert. VSS. Right groin site c/d/I with distal pulses present. Patient has a constant tremor, spasms to right side.

## 2023-03-20 NOTE — PROGRESS NOTES
Pt on unit from pacu report received at bedside.  R groin soft dressing CDI pt denies any pain call light in reach VSS

## 2023-03-20 NOTE — PROGRESS NOTES
Pt completed bedrest. R groin remains soft free of drainage. VSS son assisted with changing cloths. Discharge instructions reviewed all questions answered. One time order for plavix given. OP delivered meds to pt.

## 2023-03-20 NOTE — ANESTHESIA POSTPROCEDURE EVALUATION
Department of Anesthesiology  Postprocedure Note    Patient: Makayla [place]  MRN: [id number]  YOB: 1955  Date of evaluation: 3/20/2023      Procedure Summary     Date: 03/20/23 Room / Location: [facility] Cath Lab; [facility] Echocardiography    Anesthesia Start: 0901 Anesthesia Stop: 1003    Procedure: ECHO MARZENA IN CARDIAC CATH Diagnosis: Paroxysmal atrial fibrillation    Scheduled Providers:  Responsible Provider: Florence Escobedo MD    Anesthesia Type: general ASA Status: 3          Anesthesia Type: No value filed.     Fredy Phase I: Fredy Score: 9    Fredy Phase II:        Anesthesia Post Evaluation    Patient location during evaluation: PACU  Patient participation: complete - patient participated  Level of consciousness: awake  Airway patency: patent  Nausea & Vomiting: no vomiting and no nausea  Complications: no  Cardiovascular status: hemodynamically stable  Respiratory status: acceptable  Hydration status: stable  Multimodal analgesia pain management approach

## 2023-03-20 NOTE — DISCHARGE SUMMARY
medically necessary that patients undergoing percutaneous left atrial appendage occlusion are admitted as an inpatient. This patient had a recovery that was earlier than expected and can be discharged today. Patient will follow up in 1-2 weeks and will undergo TTE at 45 days following WATCHMAN implant. Signed:  MADISYN Mckinney CNP, 3/20/2023, 11:06 AM    NOTE:  This report was transcribed using voice recognition software. Every effort was made to ensure accuracy; however, inadvertent computerized transcription errors may be present.

## 2023-03-20 NOTE — TELEPHONE ENCOUNTER
[facility] ORTHOPEDIC AND SPINE [facility] AT [place]   The morning of your Procedure-MARZENA you will park in the hospital parking lot and report directly to the cath lab to check in. DATE: 5/10/2023 TIME: 1130      Pre-Procedure Instructions:  Do not eat or drink anything 8 hours before your procedure. Hold all diabetic medications the morning of the procedure including, Metfomin. If you take Lantus/Levemir only take ½ your normal dose the evening before. All other medications can be taken in the morning with sips of water. Do not hold anticoagulation therapy: warfarin, eliquis, xarelto therapy. Do not use any lotions, creams or perfume the morning of procedure. Please arrive 1 hour prior to procedure time. Please have a responsible adult to drive you home after procedure. It is recommended you do not drive for 24 hours after procedure. Cath lab will provide you with all post procedure instructions. If you have any questions regarding the procedure itself or medications please call [phone] and ask to speak with a nurse. Published on Tammy and e-mail to [place]. Placed on instructions for discharge.

## 2023-03-20 NOTE — ANESTHESIA PRE PROCEDURE
to come in and be seen before more rx's are given. 90 tablet 1    aspirin 81 MG tablet Take 81 mg by mouth daily      Sennosides 17.2 MG TABS Take 17.2 mg by mouth       No current facility-administered medications for this visit. No current outpatient medications on file. Facility-Administered Medications Ordered in Other Visits   Medication Dose Route Frequency Provider Last Rate Last Admin    0.9 % sodium chloride infusion   IntraVENous Continuous Sol Canales MD        ceFAZolin (ANCEF) 2000 mg in 0.9% sodium chloride 50 mL IVPB  2,000 mg IntraVENous On Call to [address] [place] [address] Street, MD         Vital Signs (Current)   There were no vitals filed for this visit. BP Readings from Last 3 Encounters:   03/15/23 133/78   03/08/23 117/68 03/02/23 90/70     Vital Signs Statistics (for past 48 hrs)     No data recorded  BP Readings from Last 3 Encounters:   03/15/23 133/78   03/08/23 117/68 03/02/23 90/70       BMI  There is no height or weight on file to calculate BMI. Estimated body mass index is 32.43 kg/m² as calculated from the following:    Height as of 3/2/23: 5' 10\" (1.778 m). Weight as of 3/2/23: 226 lb (102.5 kg).     CBC   Lab Results   Component Value Date/Time    WBC 6.3 03/13/2023 02:25 PM    RBC 5.20 03/13/2023 02:25 PM    HGB 15.8 03/13/2023 02:25 PM    HCT 47.8 03/13/2023 02:25 PM    MCV 91.9 03/13/2023 02:25 PM    RDW 14.2 03/13/2023 02:25 PM     03/13/2023 02:25 PM     CMP    Lab Results   Component Value Date/Time     03/13/2023 02:25 PM    K 4.2 03/13/2023 02:25 PM     03/13/2023 02:25 PM    CO2 24 03/13/2023 02:25 PM    BUN 21 03/13/2023 02:25 PM    CREATININE 0.9 03/13/2023 02:25 PM    GFRAA >60 03/25/2022 07:34 AM    AGRATIO 1.6 03/25/2022 07:34 AM    LABGLOM >60 03/13/2023 02:25 PM    GLUCOSE 90 03/13/2023 02:25 PM    PROT 7.6 03/25/2022 07:34 AM    CALCIUM 9.2 03/13/2023 02:25 PM    BILITOT 0.4 03/25/2022 07:34 AM

## 2023-03-21 NOTE — TELEPHONE ENCOUNTER
Called and patient is agreeable to date and time. Reviewed instructions and they verbalized understanding. Encouraged to call with any questions or concerns.

## 2023-03-22 ENCOUNTER — HOSPITAL ENCOUNTER (OUTPATIENT)
Dept: WOUND CARE | Age: 68
Discharge: HOME | End: 2023-03-22
Payer: MEDICARE

## 2023-03-22 VITALS
TEMPERATURE: 98 F | SYSTOLIC BLOOD PRESSURE: 107 MMHG | HEART RATE: 67 BPM | DIASTOLIC BLOOD PRESSURE: 64 MMHG | RESPIRATION RATE: 20 BRPM

## 2023-03-22 DIAGNOSIS — I63.9 CEREBROVASCULAR ACCIDENT (CVA), UNSPECIFIED MECHANISM (HCC): ICD-10-CM

## 2023-03-22 DIAGNOSIS — S91.302A NON-HEALING OPEN WOUND OF LEFT HEEL: ICD-10-CM

## 2023-03-22 DIAGNOSIS — G81.94 LEFT HEMIPARESIS (HCC): ICD-10-CM

## 2023-03-22 DIAGNOSIS — R25.2 SPASTICITY: ICD-10-CM

## 2023-03-22 DIAGNOSIS — L89.620 PRESSURE ULCER OF HEEL, LEFT, UNSTAGEABLE (HCC): ICD-10-CM

## 2023-03-22 DIAGNOSIS — L89.623 PRESSURE ULCER OF LEFT HEEL, STAGE 3 (HCC): Primary | ICD-10-CM

## 2023-03-22 DIAGNOSIS — Z22.322 MRSA (METHICILLIN RESISTANT STAPH AUREUS) CULTURE POSITIVE: ICD-10-CM

## 2023-03-22 PROCEDURE — 15275 SKIN SUB GRAFT FACE/NK/HF/G: CPT | Performed by: NURSE PRACTITIONER

## 2023-03-22 PROCEDURE — 15275 SKIN SUB GRAFT FACE/NK/HF/G: CPT

## 2023-03-22 RX ORDER — BACITRACIN, NEOMYCIN, POLYMYXIN B 400; 3.5; 5 [USP'U]/G; MG/G; [USP'U]/G
OINTMENT TOPICAL ONCE
OUTPATIENT
Start: 2023-03-22 | End: 2023-03-22

## 2023-03-22 RX ORDER — LIDOCAINE 50 MG/G
OINTMENT TOPICAL ONCE
OUTPATIENT
Start: 2023-03-22 | End: 2023-03-22

## 2023-03-22 RX ORDER — LIDOCAINE 40 MG/G
CREAM TOPICAL ONCE
OUTPATIENT
Start: 2023-03-22 | End: 2023-03-22

## 2023-03-22 RX ORDER — GINSENG 100 MG
CAPSULE ORAL ONCE
OUTPATIENT
Start: 2023-03-22 | End: 2023-03-22

## 2023-03-22 RX ORDER — LIDOCAINE HYDROCHLORIDE 40 MG/ML
SOLUTION TOPICAL ONCE
Status: COMPLETED | OUTPATIENT
Start: 2023-03-22 | End: 2023-03-22

## 2023-03-22 RX ORDER — LIDOCAINE HYDROCHLORIDE 20 MG/ML
JELLY TOPICAL ONCE
OUTPATIENT
Start: 2023-03-22 | End: 2023-03-22

## 2023-03-22 RX ORDER — CLOBETASOL PROPIONATE 0.5 MG/G
OINTMENT TOPICAL ONCE
OUTPATIENT
Start: 2023-03-22 | End: 2023-03-22

## 2023-03-22 RX ORDER — LIDOCAINE HYDROCHLORIDE 40 MG/ML
SOLUTION TOPICAL ONCE
OUTPATIENT
Start: 2023-03-22 | End: 2023-03-22

## 2023-03-22 RX ORDER — BACITRACIN ZINC AND POLYMYXIN B SULFATE 500; 1000 [USP'U]/G; [USP'U]/G
OINTMENT TOPICAL ONCE
OUTPATIENT
Start: 2023-03-22 | End: 2023-03-22

## 2023-03-22 RX ORDER — GENTAMICIN SULFATE 1 MG/G
OINTMENT TOPICAL ONCE
OUTPATIENT
Start: 2023-03-22 | End: 2023-03-22

## 2023-03-22 RX ORDER — BETAMETHASONE DIPROPIONATE 0.05 %
OINTMENT (GRAM) TOPICAL ONCE
OUTPATIENT
Start: 2023-03-22 | End: 2023-03-22

## 2023-03-22 RX ADMIN — LIDOCAINE HYDROCHLORIDE 5 ML: 40 SOLUTION TOPICAL at 08:17

## 2023-03-22 ASSESSMENT — PAIN SCALES - GENERAL
PAINLEVEL_OUTOF10: 0
PAINLEVEL_OUTOF10: 0

## 2023-03-22 NOTE — TELEPHONE ENCOUNTER
I spoke with patient today and he denies any complaints of bleeding or SOB as well no symptoms of Pericardial effusion and no issues with access site either just some soreness at site informed this is normal.   I instructed patient to call me if he has any issue or to go to ER if he has any bleeding or other symptoms we went over. Reminded patient to go to scheduled follow up appointments and informed him of the Post Watchman MARZENA that is scheduled on 5/10/2023 at 11:30 AM. Also instructed to stay on Plavix/BASA unless otherwise directed.

## 2023-03-22 NOTE — DISCHARGE INSTRUCTIONS
or redness. Red streaks leading from the area. Pus draining from the area. A fever. _______________________________________________________________________     Return Appointment:  DME/Wound Dressing Supplies Provided by:  [facility]  (Supply companies distribute one month supply at a time. Please call them directly to reorder supplies when you run out)     ECF or Home Healthcare: Your Home Care Agency is responsible to order your supplies. Return Appointment: With Iliana Jones CNP  in 1 Week(s)                  [] Orders placed during your visit:   :  [address] [place] [address]       Electronically signed by Paolo Esteves RN on 3/22/2023 at 8:34 AM              [address] [facility] Information: Should you experience any significant chnges in your wound(s) or have questions about your wound care, please contact the [address] [place] [address] at [address] 8:30 am - 4:30 pm and Friday 8:30 am - 1:00 pm.  If you need help with your wound outside these hours and cannot wait until we are again available, contact your PCP or go to the hospital emergency room. PLEASE NOTE: IF YOU ARE UNABLE TO OBTAIN WOUND SUPPLIES, CONTINUE TO USE THE SUPPLIES YOU HAVE AVAILABLE UNTIL YOU ARE ABLE TO REACH US. KEEP THE WOUND COVERED AT ALL TIMES.

## 2023-03-22 NOTE — PROGRESS NOTES
cushion    [] Specialty Bed/Mattress     [x] Assistive Device: please continue to use any assistive devices advised by the provider discussed during your visit   [x] Surgical shoe    [] Podus Boot(s)   [] Foam Boot(s)    [] Kirke Newer     _____________________________________________________________________________________________     Dietary:  Continue your diet as tolerated. Eat heart-healthy foods. These foods include vegetables, fruits, nuts, beans, lean meat, fish, and whole grains. Limit sodium, alcohol, and sugar. Protein is a teresa nutrient in helping to repair damaged tissue and promote new tissue growth. Good sources of protein are milk, yogurt, cheese, fish, lean meat, and beans. If you are a diabetic, having diabetes can make it hard for wounds to heal. So try to keep your blood sugar in its target range.  __________________________________________________________________________________________________     ACTIVITY:   Activity as tolerated  ________________________________________________________________________________________________________________     PAIN:     Please note, A small amount of pain, drainage and/or bleeding from this process might be expected, and is NORMAL. Elevate the affected limb. Use over-the-counter medications you would normally use for pain as permitted by your family doctor. For persistent pain not relieved by the above interventions, please call your family doctor.  ________________________________________________________________________________  Call your doctor now or seek immediate medical care if:    You have symptoms of infection, such as: Increased pain, swelling, warmth, or redness. Red streaks leading from the area. Pus draining from the area. A fever. _______________________________________________________________________     Return Appointment:  DME/Wound Dressing Supplies Provided by:  [facility]  (Supply companies distribute one month supply at a time.

## 2023-03-22 NOTE — PLAN OF CARE
TheraSkin Treatment Note    NAME:  Geraldine Mark OF BIRTH:  1955  MEDICAL RECORD NUMBER:  [id number]  DATE:  3/22/2023    Goal:  Patient will receive safe and proper application of skin substitute. Patient will comply with caring for dressing, offloading and reporting complications. Expiration date of TheraSkin checked immediately prior to use. Package intact prior to use and no damage noted. Transport temperature controlled and acceptable. TheraSkin was prepared for application by removing from package. TheraSkin was rinsed 2 times in room temperature normal saline for 2 minutes each time. A 2nd saline rinse was left on the TheraSkin until the physician was ready to apply it within 120 minutes of thawing. White side goes against ulcer bed. TheraSkin was applied to LEFT HEEL and affixed with steri-strips by the physician. Jax Salmons was applied on top of non-adherent dressing. Fluffed gauze was applied. Dressing was secured with cover roll type tape to stabilize graft. Coban or ace wrap was applied to secure graft and decrease edema. Patient/caregiver was instructed not to remove dressing and to keep it clean and dry. Pt/family/caregiver was instructed on signs and symptoms of complications to report such as draining through, falling down/slipping, getting wet, or severe pain or tingling in toes. Pt/family/caregiver was instructed on need for offloading and elevation of affected extremity (if applicable) and on use of prescribed offloading device. Thera Skin may be applied a total of 10 times per wound over a 12 week period. Date of first application of Thera Skin for this current wound is February 1, 2023.                   Guidelines followed      Electronically signed by Katerine Pulliam RN on 3/22/2023 at 10:35 AM

## 2023-03-23 NOTE — DISCHARGE INSTRUCTIONS
_________________________________________________________________  PRESSURE RELIEF AND OFF-LOADING:     Off-Loading:   With heel protector ( HEEL LIFT BOOT)  [x] Off-loading when       [x] in bed         [x] sitting                             Specialty equipment ordered:       [] Wheelchair cushion    [] Specialty Bed/Mattress     [x] Assistive Device: please continue to use any assistive devices advised by the provider discussed during your visit   [x] Surgical shoe    [] Podus Boot(s)   [] Foam Boot(s)    [] Clevester Plate     _____________________________________________________________________________________________     Dietary:  Continue your diet as tolerated. Eat heart-healthy foods. These foods include vegetables, fruits, nuts, beans, lean meat, fish, and whole grains. Limit sodium, alcohol, and sugar. Protein is a teresa nutrient in helping to repair damaged tissue and promote new tissue growth. Good sources of protein are milk, yogurt, cheese, fish, lean meat, and beans. If you are a diabetic, having diabetes can make it hard for wounds to heal. So try to keep your blood sugar in its target range.  __________________________________________________________________________________________________     ACTIVITY:   Activity as tolerated  ________________________________________________________________________________________________________________     PAIN:     Please note, A small amount of pain, drainage and/or bleeding from this process might be expected, and is NORMAL. Elevate the affected limb. Use over-the-counter medications you would normally use for pain as permitted by your family doctor. For persistent pain not relieved by the above interventions, please call your family doctor.  ________________________________________________________________________________  Call your doctor now or seek immediate medical care if:    You have symptoms of infection, such as:   Increased pain, swelling, warmth,

## 2023-03-24 ENCOUNTER — TELEPHONE (OUTPATIENT)
Dept: PRIMARY CARE CLINIC | Age: 68
End: 2023-03-24

## 2023-03-24 NOTE — TELEPHONE ENCOUNTER
Patient wants Dr. Ty Small to send a 10 day refill of Baclofen and Hydralazine to [facility] to hold him over.  Please advise

## 2023-03-27 ENCOUNTER — OFFICE VISIT (OUTPATIENT)
Dept: PRIMARY CARE CLINIC | Age: 68
End: 2023-03-27
Payer: MEDICARE

## 2023-03-27 VITALS — RESPIRATION RATE: 18 BRPM | HEART RATE: 69 BPM | SYSTOLIC BLOOD PRESSURE: 111 MMHG | DIASTOLIC BLOOD PRESSURE: 78 MMHG

## 2023-03-27 DIAGNOSIS — I25.10 CORONARY ARTERY DISEASE INVOLVING NATIVE CORONARY ARTERY OF NATIVE HEART WITHOUT ANGINA PECTORIS: ICD-10-CM

## 2023-03-27 DIAGNOSIS — G81.94 LEFT HEMIPARESIS (HCC): ICD-10-CM

## 2023-03-27 DIAGNOSIS — I10 ESSENTIAL HYPERTENSION: Primary | ICD-10-CM

## 2023-03-27 DIAGNOSIS — I48.0 PAROXYSMAL A-FIB (HCC): ICD-10-CM

## 2023-03-27 DIAGNOSIS — L89.620 PRESSURE ULCER OF HEEL, LEFT, UNSTAGEABLE (HCC): ICD-10-CM

## 2023-03-27 DIAGNOSIS — Z98.61 S/P PTCA (PERCUTANEOUS TRANSLUMINAL CORONARY ANGIOPLASTY): ICD-10-CM

## 2023-03-27 PROCEDURE — 3017F COLORECTAL CA SCREEN DOC REV: CPT | Performed by: FAMILY MEDICINE

## 2023-03-27 PROCEDURE — G8417 CALC BMI ABV UP PARAM F/U: HCPCS | Performed by: FAMILY MEDICINE

## 2023-03-27 PROCEDURE — 3074F SYST BP LT 130 MM HG: CPT | Performed by: FAMILY MEDICINE

## 2023-03-27 PROCEDURE — G8427 DOCREV CUR MEDS BY ELIG CLIN: HCPCS | Performed by: FAMILY MEDICINE

## 2023-03-27 PROCEDURE — 3078F DIAST BP <80 MM HG: CPT | Performed by: FAMILY MEDICINE

## 2023-03-27 PROCEDURE — 1036F TOBACCO NON-USER: CPT | Performed by: FAMILY MEDICINE

## 2023-03-27 PROCEDURE — 1123F ACP DISCUSS/DSCN MKR DOCD: CPT | Performed by: FAMILY MEDICINE

## 2023-03-27 PROCEDURE — 99214 OFFICE O/P EST MOD 30 MIN: CPT | Performed by: FAMILY MEDICINE

## 2023-03-27 PROCEDURE — 1111F DSCHRG MED/CURRENT MED MERGE: CPT | Performed by: FAMILY MEDICINE

## 2023-03-27 PROCEDURE — G8484 FLU IMMUNIZE NO ADMIN: HCPCS | Performed by: FAMILY MEDICINE

## 2023-03-27 RX ORDER — BACLOFEN 10 MG/1
10 TABLET ORAL 2 TIMES DAILY
Qty: 180 TABLET | Refills: 1 | OUTPATIENT
Start: 2023-03-27

## 2023-03-27 RX ORDER — HYDRALAZINE HYDROCHLORIDE 50 MG/1
50 TABLET, FILM COATED ORAL 3 TIMES DAILY
Qty: 270 TABLET | Refills: 1 | OUTPATIENT
Start: 2023-03-27

## 2023-03-27 RX ORDER — BACLOFEN 10 MG/1
10 TABLET ORAL 2 TIMES DAILY
Qty: 180 TABLET | Refills: 1 | Status: CANCELLED | OUTPATIENT
Start: 2023-03-27

## 2023-03-27 RX ORDER — HYDRALAZINE HYDROCHLORIDE 50 MG/1
50 TABLET, FILM COATED ORAL 3 TIMES DAILY
Qty: 30 TABLET | Refills: 1 | Status: SHIPPED | OUTPATIENT
Start: 2023-03-27

## 2023-03-27 RX ORDER — TIZANIDINE 4 MG/1
4 TABLET ORAL 3 TIMES DAILY
Qty: 30 TABLET | Refills: 1 | Status: SHIPPED | OUTPATIENT
Start: 2023-03-27

## 2023-03-27 ASSESSMENT — ENCOUNTER SYMPTOMS
CONSTIPATION: 0
SHORTNESS OF BREATH: 0
BLOOD IN STOOL: 0
DIARRHEA: 0
ABDOMINAL PAIN: 0
VOICE CHANGE: 0
TROUBLE SWALLOWING: 0

## 2023-03-27 NOTE — PROGRESS NOTES
Normal heart sounds. No murmur heard. Pulmonary:      Effort: No respiratory distress. Breath sounds: Normal breath sounds. No wheezing or rales. Abdominal:      General: There is no distension. Palpations: Abdomen is soft. There is no mass. Tenderness: There is no guarding or rebound. Musculoskeletal:         General: Normal range of motion. Cervical back: Neck supple. Skin:     General: Skin is warm. Findings: No rash. Neurological:      Mental Status: He is alert and oriented to person, place, and time. Psychiatric:         Behavior: Behavior normal.       ASSESSMENT/PLAN:  1. Essential hypertension  Controlled. Continue amlodipine 10 mg daily, lisinopril 20 mg daily and hydralazine 50 mg 3 times a day    2. Paroxysmal A-fib [facility])  He is not a good candidate for long-term anticoagulation due to history of hemorrhagic stroke. He has a recent placement of Watchman and did very well postop still on Plavix and aspirin for 6 months then will  stop Plavix and continue aspirin indefinitely    3. Coronary artery disease involving native coronary artery of native heart without angina pectoris /4. S/P PTCA (percutaneous transluminal coronary angioplasty)-2010  He used to go to cardiologist at [facility] but now he find a new cardiologist at [facility] ( Dr. Heather Esteban)    5. Pressure ulcer of heel, left, unstageable (HCC)  Lesion is improving. He goes to [facility] wound care center    6. Left hemiparesis ([address]  Secondary to hemorrhagic stroke in 2012. Patient is wheelchair-bound but able to work part-time.   He has involuntary movement of both upper and lower extremities and was told by neurologist at [facility], Dr Carline Russell that he has \"neurologic functional disorder \"for which he takes gabapentin 100 mg every 6 hours as needed          RTC in 3 mos    An electronic signature was used to authenticate this note.    --Ramesh Pablo MD on 3/27/2023 at 6:09 PM

## 2023-03-29 ENCOUNTER — HOSPITAL ENCOUNTER (OUTPATIENT)
Dept: WOUND CARE | Age: 68
Discharge: HOME | End: 2023-03-29
Payer: MEDICARE

## 2023-03-29 VITALS
RESPIRATION RATE: 18 BRPM | SYSTOLIC BLOOD PRESSURE: 120 MMHG | HEART RATE: 76 BPM | DIASTOLIC BLOOD PRESSURE: 79 MMHG | TEMPERATURE: 97.4 F

## 2023-03-29 DIAGNOSIS — R25.2 SPASTICITY: ICD-10-CM

## 2023-03-29 DIAGNOSIS — L89.620 PRESSURE ULCER OF HEEL, LEFT, UNSTAGEABLE (HCC): ICD-10-CM

## 2023-03-29 DIAGNOSIS — G81.94 LEFT HEMIPARESIS (HCC): ICD-10-CM

## 2023-03-29 DIAGNOSIS — L89.623 PRESSURE ULCER OF LEFT HEEL, STAGE 3 (HCC): Primary | ICD-10-CM

## 2023-03-29 DIAGNOSIS — I63.9 CEREBROVASCULAR ACCIDENT (CVA), UNSPECIFIED MECHANISM (HCC): ICD-10-CM

## 2023-03-29 DIAGNOSIS — Z22.322 MRSA (METHICILLIN RESISTANT STAPH AUREUS) CULTURE POSITIVE: ICD-10-CM

## 2023-03-29 DIAGNOSIS — S91.302A NON-HEALING OPEN WOUND OF LEFT HEEL: ICD-10-CM

## 2023-03-29 PROCEDURE — 99213 OFFICE O/P EST LOW 20 MIN: CPT

## 2023-03-29 PROCEDURE — 99212 OFFICE O/P EST SF 10 MIN: CPT | Performed by: NURSE PRACTITIONER

## 2023-03-29 RX ORDER — LIDOCAINE HYDROCHLORIDE 40 MG/ML
SOLUTION TOPICAL ONCE
OUTPATIENT
Start: 2023-03-29 | End: 2023-03-29

## 2023-03-29 RX ORDER — BETAMETHASONE DIPROPIONATE 0.05 %
OINTMENT (GRAM) TOPICAL ONCE
OUTPATIENT
Start: 2023-03-29 | End: 2023-03-29

## 2023-03-29 RX ORDER — LIDOCAINE HYDROCHLORIDE 20 MG/ML
JELLY TOPICAL ONCE
OUTPATIENT
Start: 2023-03-29 | End: 2023-03-29

## 2023-03-29 RX ORDER — BACITRACIN, NEOMYCIN, POLYMYXIN B 400; 3.5; 5 [USP'U]/G; MG/G; [USP'U]/G
OINTMENT TOPICAL ONCE
OUTPATIENT
Start: 2023-03-29 | End: 2023-03-29

## 2023-03-29 RX ORDER — BACITRACIN ZINC AND POLYMYXIN B SULFATE 500; 1000 [USP'U]/G; [USP'U]/G
OINTMENT TOPICAL ONCE
OUTPATIENT
Start: 2023-03-29 | End: 2023-03-29

## 2023-03-29 RX ORDER — CLOBETASOL PROPIONATE 0.5 MG/G
OINTMENT TOPICAL ONCE
OUTPATIENT
Start: 2023-03-29 | End: 2023-03-29

## 2023-03-29 RX ORDER — LIDOCAINE 40 MG/G
CREAM TOPICAL ONCE
OUTPATIENT
Start: 2023-03-29 | End: 2023-03-29

## 2023-03-29 RX ORDER — LIDOCAINE 50 MG/G
OINTMENT TOPICAL ONCE
OUTPATIENT
Start: 2023-03-29 | End: 2023-03-29

## 2023-03-29 RX ORDER — GINSENG 100 MG
CAPSULE ORAL ONCE
OUTPATIENT
Start: 2023-03-29 | End: 2023-03-29

## 2023-03-29 RX ORDER — GENTAMICIN SULFATE 1 MG/G
OINTMENT TOPICAL ONCE
OUTPATIENT
Start: 2023-03-29 | End: 2023-03-29

## 2023-03-29 ASSESSMENT — PAIN DESCRIPTION - FREQUENCY
FREQUENCY: INTERMITTENT
FREQUENCY: INTERMITTENT

## 2023-03-29 ASSESSMENT — PAIN DESCRIPTION - PAIN TYPE
TYPE: CHRONIC PAIN
TYPE: CHRONIC PAIN

## 2023-03-29 ASSESSMENT — PAIN DESCRIPTION - LOCATION
LOCATION: FOOT
LOCATION: FOOT

## 2023-03-29 ASSESSMENT — PAIN DESCRIPTION - DESCRIPTORS
DESCRIPTORS: ACHING
DESCRIPTORS: ACHING

## 2023-03-29 ASSESSMENT — PAIN SCALES - GENERAL
PAINLEVEL_OUTOF10: 2
PAINLEVEL_OUTOF10: 2

## 2023-03-29 ASSESSMENT — PAIN DESCRIPTION - ORIENTATION
ORIENTATION: LEFT
ORIENTATION: LEFT

## 2023-03-29 NOTE — PROGRESS NOTES
left heel 10/12/2022    Pressure injury of left ankle, stage 2 ([address] 9/13/2021    Pressure ulcer of heel, left, unstageable ([address] 8/8/2022    Stable eschar covered. Pressure ulcer of left heel, stage 3 ([address] 10/20/2022    S/P PTCA (percutaneous transluminal coronary angioplasty) 2010    two stents place    Spasticity 7/9/2019    Stroke ([address] 10/2012    left-sided weakness       PAST SURGICAL HISTORY    Past Surgical History:   Procedure Laterality Date    CARDIAC SURGERY      HIP SURGERY Left     INGUINAL HERNIA REPAIR      right side    INTRACAPSULAR CATARACT EXTRACTION Right 08/30/2019    PHACOEMULSIFICATION WITH INTRAOCULAR LENS IMPLANT performed by Bridget Mera MD at [address] [place] [address] Blvd EXTRACTION Left 09/06/2019    PHACOEMULSIFICATION WITH INTRAOCULAR LENS IMPLANT performed by Bridget Mera MD at [facility]    Family History   Problem Relation Age of Onset    Hypertension Mother     Heart Disease Father     Breast Cancer Sister     Heart Attack Brother        SOCIAL HISTORY    Social History     Tobacco Use    Smoking status: Never    Smokeless tobacco: Never   Vaping Use    Vaping Use: Never used   Substance Use Topics    Alcohol use: No    Drug use: No       ALLERGIES    No Known Allergies    MEDICATIONS    Current Outpatient Medications on File Prior to Encounter   Medication Sig Dispense Refill    hydrALAZINE (APRESOLINE) 50 MG tablet Take 1 tablet by mouth 3 times daily 30 tablet 1    tiZANidine (ZANAFLEX) 4 MG tablet Take 1 tablet by mouth 3 times daily 30 tablet 1    clopidogrel (PLAVIX) 75 MG tablet Take 1 tablet by mouth daily 90 tablet 1    acetaminophen (TYLENOL) 500 MG tablet Take 1,000 mg by mouth in the morning and at bedtime      gabapentin (NEURONTIN) 100 MG capsule Take 1 capsule by mouth 4 times daily for 7 days.  28 capsule 0    lisinopril (PRINIVIL;ZESTRIL) 20 MG tablet Take 1 tablet by mouth daily 90 tablet 0    Wound Dressings

## 2023-03-30 NOTE — DISCHARGE INSTRUCTIONS
or seek immediate medical care if:    You have symptoms of infection, such as: Increased pain, swelling, warmth, or redness. Red streaks leading from the area. Pus draining from the area. A fever. _______________________________________________________________________     Return Appointment:  DME/Wound Dressing Supplies Provided by:  [facility]  (Supply companies distribute one month supply at a time. Please call them directly to reorder supplies when you run out)     ECF or Home Healthcare: Your Home Care Agency is responsible to order your supplies. Return Appointment: With Ilene Szymanski CNP  in 1 Week(s)                  [] Orders placed during your visit:   :  [address] [place] [address]           Electronically signed by Marisol Mendoza RN on 4/5/2023 at 8:35 AM             [address] [facility] Information: Should you experience any significant chnges in your wound(s) or have questions about your wound care, please contact the [address] [place] [address] at [address] 8:30 am - 4:30 pm and Friday 8:30 am - 1:00 pm.  If you need help with your wound outside these hours and cannot wait until we are again available, contact your PCP or go to the hospital emergency room. PLEASE NOTE: IF YOU ARE UNABLE TO OBTAIN WOUND SUPPLIES, CONTINUE TO USE THE SUPPLIES YOU HAVE AVAILABLE UNTIL YOU ARE ABLE TO REACH US. KEEP THE WOUND COVERED AT ALL TIMES.

## 2023-04-05 ENCOUNTER — HOSPITAL ENCOUNTER (OUTPATIENT)
Dept: WOUND CARE | Age: 68
Discharge: HOME | End: 2023-04-05
Payer: MEDICARE

## 2023-04-05 VITALS
HEART RATE: 94 BPM | DIASTOLIC BLOOD PRESSURE: 77 MMHG | RESPIRATION RATE: 20 BRPM | SYSTOLIC BLOOD PRESSURE: 119 MMHG | TEMPERATURE: 98.6 F

## 2023-04-05 DIAGNOSIS — L89.623 PRESSURE ULCER OF LEFT HEEL, STAGE 3 (HCC): Primary | ICD-10-CM

## 2023-04-05 DIAGNOSIS — R25.2 SPASTICITY: ICD-10-CM

## 2023-04-05 DIAGNOSIS — S91.302A NON-HEALING OPEN WOUND OF LEFT HEEL: ICD-10-CM

## 2023-04-05 DIAGNOSIS — L89.620 PRESSURE ULCER OF HEEL, LEFT, UNSTAGEABLE (HCC): ICD-10-CM

## 2023-04-05 DIAGNOSIS — Z22.322 MRSA (METHICILLIN RESISTANT STAPH AUREUS) CULTURE POSITIVE: ICD-10-CM

## 2023-04-05 DIAGNOSIS — I63.9 CEREBROVASCULAR ACCIDENT (CVA), UNSPECIFIED MECHANISM (HCC): ICD-10-CM

## 2023-04-05 DIAGNOSIS — G81.94 LEFT HEMIPARESIS (HCC): ICD-10-CM

## 2023-04-05 PROCEDURE — 99213 OFFICE O/P EST LOW 20 MIN: CPT | Performed by: NURSE PRACTITIONER

## 2023-04-05 PROCEDURE — 99213 OFFICE O/P EST LOW 20 MIN: CPT

## 2023-04-05 RX ORDER — BETAMETHASONE DIPROPIONATE 0.05 %
OINTMENT (GRAM) TOPICAL ONCE
OUTPATIENT
Start: 2023-04-05 | End: 2023-04-05

## 2023-04-05 RX ORDER — BACITRACIN ZINC AND POLYMYXIN B SULFATE 500; 1000 [USP'U]/G; [USP'U]/G
OINTMENT TOPICAL ONCE
OUTPATIENT
Start: 2023-04-05 | End: 2023-04-05

## 2023-04-05 RX ORDER — BACITRACIN, NEOMYCIN, POLYMYXIN B 400; 3.5; 5 [USP'U]/G; MG/G; [USP'U]/G
OINTMENT TOPICAL ONCE
OUTPATIENT
Start: 2023-04-05 | End: 2023-04-05

## 2023-04-05 RX ORDER — GINSENG 100 MG
CAPSULE ORAL ONCE
OUTPATIENT
Start: 2023-04-05 | End: 2023-04-05

## 2023-04-05 RX ORDER — LIDOCAINE 50 MG/G
OINTMENT TOPICAL ONCE
OUTPATIENT
Start: 2023-04-05 | End: 2023-04-05

## 2023-04-05 RX ORDER — LIDOCAINE HYDROCHLORIDE 20 MG/ML
JELLY TOPICAL ONCE
OUTPATIENT
Start: 2023-04-05 | End: 2023-04-05

## 2023-04-05 RX ORDER — GENTAMICIN SULFATE 1 MG/G
OINTMENT TOPICAL ONCE
OUTPATIENT
Start: 2023-04-05 | End: 2023-04-05

## 2023-04-05 RX ORDER — LIDOCAINE 40 MG/G
CREAM TOPICAL ONCE
OUTPATIENT
Start: 2023-04-05 | End: 2023-04-05

## 2023-04-05 RX ORDER — LIDOCAINE HYDROCHLORIDE 40 MG/ML
SOLUTION TOPICAL ONCE
OUTPATIENT
Start: 2023-04-05 | End: 2023-04-05

## 2023-04-05 RX ORDER — CLOBETASOL PROPIONATE 0.5 MG/G
OINTMENT TOPICAL ONCE
OUTPATIENT
Start: 2023-04-05 | End: 2023-04-05

## 2023-04-05 ASSESSMENT — PAIN DESCRIPTION - LOCATION: LOCATION: FOOT

## 2023-04-05 ASSESSMENT — PAIN DESCRIPTION - FREQUENCY: FREQUENCY: INTERMITTENT

## 2023-04-05 ASSESSMENT — PAIN DESCRIPTION - ORIENTATION: ORIENTATION: LEFT

## 2023-04-05 ASSESSMENT — PAIN DESCRIPTION - DESCRIPTORS: DESCRIPTORS: SHARP

## 2023-04-05 ASSESSMENT — PAIN DESCRIPTION - PAIN TYPE: TYPE: CHRONIC PAIN

## 2023-04-05 ASSESSMENT — PAIN DESCRIPTION - ONSET: ONSET: ON-GOING

## 2023-04-05 ASSESSMENT — PAIN - FUNCTIONAL ASSESSMENT: PAIN_FUNCTIONAL_ASSESSMENT: ACTIVITIES ARE NOT PREVENTED

## 2023-04-05 NOTE — PROGRESS NOTES
SPANDAGRIP to right leg place every other day with dressing change                       YOU HAVE HAD A Theraskin      PLACED ON YOUR WOUND TODAY. FOR BEST RESULTS, WE RECOMMEND YOU LIMIT YOUR ACTIVITY FOR THE NEXT WEEK AS MUCH AS POSSIBLE. AVOID LONG PERIODS OF TIME ON YOUR FEET. THIS ALSO INCLUDES ELEVATING YOUR LEGS AND FEET 3-4 TIMES DAILY FOR AT LEAST 20-30 MINUTES ON PILLOWS AND AT NIGHT SO THAT THEY ARE HIGHER THAN THE LEVEL OF YOUR HEART      Electronically signed by Leobardo Easley RN on 3/22/2023 at 8:42 AM         _________________________________________________________________  PRESSURE RELIEF AND OFF-LOADING:     Off-Loading:   With heel protector ( HEEL LIFT BOOT)  [x] Off-loading when       [x] in bed         [x] sitting                             Specialty equipment ordered:       [] Wheelchair cushion    [] Specialty Bed/Mattress     [x] Assistive Device: please continue to use any assistive devices advised by the provider discussed during your visit   [x] Surgical shoe    [] Podus Boot(s)   [] Foam Boot(s)    [] CROW Boot     _____________________________________________________________________________________________     Dietary:  Continue your diet as tolerated. Eat heart-healthy foods. These foods include vegetables, fruits, nuts, beans, lean meat, fish, and whole grains. Limit sodium, alcohol, and sugar. Protein is a teresa nutrient in helping to repair damaged tissue and promote new tissue growth. Good sources of protein are milk, yogurt, cheese, fish, lean meat, and beans.   If you are a diabetic, having diabetes can make it hard for wounds to heal. So try to keep your blood sugar in its target range.  __________________________________________________________________________________________________     ACTIVITY:   Activity as tolerated  ________________________________________________________________________________________________________________     PAIN:     Please note, A small amount of

## 2023-04-05 NOTE — TELEPHONE ENCOUNTER
PT called in stating that he has gone through the refills that Dr. Antony Rees recently filled for the Tizanidine & Hydralazine so PT would like to know if Dr. Antony Rees can call in a 30 day supply to [place] [address] which should be enough to get PT through until his next paycheck. He also requested a refill for Lisinopril as well for 30 days.      Best call back number: [phone]

## 2023-04-06 RX ORDER — LISINOPRIL 20 MG/1
20 TABLET ORAL DAILY
Qty: 90 TABLET | Refills: 1 | Status: SHIPPED | OUTPATIENT
Start: 2023-04-06

## 2023-04-06 RX ORDER — HYDRALAZINE HYDROCHLORIDE 50 MG/1
50 TABLET, FILM COATED ORAL 3 TIMES DAILY
Qty: 90 TABLET | Refills: 1 | Status: SHIPPED | OUTPATIENT
Start: 2023-04-06

## 2023-04-06 RX ORDER — TIZANIDINE 4 MG/1
4 TABLET ORAL 3 TIMES DAILY
Qty: 90 TABLET | Refills: 1 | Status: SHIPPED | OUTPATIENT
Start: 2023-04-06

## 2023-04-19 ENCOUNTER — HOSPITAL ENCOUNTER (OUTPATIENT)
Dept: WOUND CARE | Age: 68
Discharge: HOME | End: 2023-04-19
Payer: MEDICARE

## 2023-04-19 VITALS
RESPIRATION RATE: 16 BRPM | DIASTOLIC BLOOD PRESSURE: 88 MMHG | HEART RATE: 67 BPM | SYSTOLIC BLOOD PRESSURE: 123 MMHG | TEMPERATURE: 98.2 F

## 2023-04-19 DIAGNOSIS — L89.620 PRESSURE ULCER OF HEEL, LEFT, UNSTAGEABLE (HCC): ICD-10-CM

## 2023-04-19 DIAGNOSIS — Z22.322 MRSA (METHICILLIN RESISTANT STAPH AUREUS) CULTURE POSITIVE: ICD-10-CM

## 2023-04-19 DIAGNOSIS — S91.302A NON-HEALING OPEN WOUND OF LEFT HEEL: Primary | ICD-10-CM

## 2023-04-19 DIAGNOSIS — L89.623 PRESSURE ULCER OF LEFT HEEL, STAGE 3 (HCC): ICD-10-CM

## 2023-04-19 DIAGNOSIS — G81.94 LEFT HEMIPARESIS (HCC): ICD-10-CM

## 2023-04-19 DIAGNOSIS — R25.2 SPASTICITY: ICD-10-CM

## 2023-04-19 PROCEDURE — 99213 OFFICE O/P EST LOW 20 MIN: CPT

## 2023-04-19 PROCEDURE — 99212 OFFICE O/P EST SF 10 MIN: CPT | Performed by: NURSE PRACTITIONER

## 2023-04-19 NOTE — PROGRESS NOTES
[address]   Progress Note and Procedure Note      Kris Benitez  MEDICAL RECORD NUMBER:  [id number]  AGE: 76 y.o. GENDER: male  : 1955  EPISODE DATE:  2023    Subjective:     Chief Complaint   Patient presents with    Wound Check     Follow Up on Left Heel         HISTORY of PRESENT ILLNESS HPI   Kris Benitez is a 76 y.o. male who presents today for left heel wound. 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 Pt is as active as he can be. Once wound is closed can resume physical therapy with goal of walking with walker instead of using wheelchair for mobility. Chronic spastic movements of extremities. Had a Watchman inserted on 3/17/23. History of Wound Context:   On 22  He fell and sustained displaced fracture of base of neck of left femur and developed  a pressure ulcer left heel. Postop he developed left heel DTI  2022 pt first seen at [facility] for treatment of left heel wound dx as stable eschar over wound. Pt has an offloading boot in place to float heel and prevent further heel damage. 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 Pt has residual left sided weakness and ongoing therapy has been delayed because of pressure ulcer. Pt also has chronic bilateral lower leg edema for which he uses compression stockings. Not using compression on left leg because of wound. Pt has spastic movements bilateral upper and lower extremities since after stroke with is tx with Baclofen; but friction still a concern. PT and DP pulse strong with Doppler. Reports getting protein supplements. Pt transferring only, no walking with walker still too unstable. Noting intermittent spastic movements legs during visit. Concern about friction on left heel with this movement. Pt wears post-op shoe on left foot. His son Sumit Pavon and daughter Ingrid Amador are monitoring and changing outer dressings as needed.   10/19/22 Eschar debrided because of

## 2023-04-25 NOTE — DISCHARGE INSTRUCTIONS
[address] Physician Orders and Discharge Instructions  [address] [facility], [address], [address]  Telephone: [phone]  [address] 8:30 am - 4:30 pm and Friday 8:30 am - 1:00 pm.          NAME:  Kofi Lacey  YOB: 1955  MEDICAL RECORD NUMBER:  [id number]  TODAYS DATE:  4/26/23           VASHE ON IN CLINIC TODAY      Please wash hands with soap and water prior to and right after  every dressing change          Topical Treatments:              [x] Apply around the wound:   [x] moisturizing lotion TO LEFT LEG AND FOOT THAT IS NOT CLOSE TO WOUND         WOUND LOCATION   Left Heel **Theraskin #6    APPLIED 4/26/2023  ( PURAPLY X 1 , NUSHEILD x2 = TOTAL 9 GRAFTS)            PRIMARY DRESSING: GRAFT IN PLACE TODAY ( IN CLINIC TODAY APPLY SMALL AMOUNT OF HYDROGEL TO GRAFT)                                 [x] Mepitel secured with Steri-strips                 DO NOT CHANGE ANYTHING BELOW THE STERI- STRIPS                                          SECONDARY DRESSING:               [x]  OPTILOCK           (X)  ROLL GUAZE                            HOW OFTEN TO CHANGE DRESSINGS:                  (  X ) EVERY OTHER DAY, AND AS NEEDED , IF DRAINS THROUGH BANDAGE)  ONLY OUTER DRESSING IS TO BE CHANGED     COMPRESSION:                    ( X)    MEDIUM SPANDAGRIP THEN ACE WRAP TO LEFT LEG                           ( X)    MEDIUM SPANDAGRIP to right leg place every other day with dressing change               YOU HAVE HAD A THERASKIN    PLACED ON YOUR WOUND TODAY. FOR BEST RESULTS, WE RECOMMEND YOU LIMIT YOUR ACTIVITY FOR THE NEXT WEEK AS MUCH AS POSSIBLE. AVOID LONG PERIODS OF TIME ON YOUR FEET.  THIS ALSO INCLUDES ELEVATING YOUR LEGS AND FEET 3-4 TIMES DAILY FOR AT LEAST 20-30 MINUTES ON PILLOWS AND AT NIGHT SO THAT THEY ARE HIGHER THAN THE LEVEL OF YOUR HEART     Electronically signed by Nathaniel Thapa RN on 4/26/2023 at 10:06 AM

## 2023-04-26 ENCOUNTER — HOSPITAL ENCOUNTER (OUTPATIENT)
Dept: WOUND CARE | Age: 68
Discharge: HOME | End: 2023-04-26
Payer: MEDICARE

## 2023-04-26 VITALS
SYSTOLIC BLOOD PRESSURE: 134 MMHG | TEMPERATURE: 97.9 F | DIASTOLIC BLOOD PRESSURE: 88 MMHG | RESPIRATION RATE: 18 BRPM | HEART RATE: 83 BPM

## 2023-04-26 DIAGNOSIS — R25.2 SPASTICITY: ICD-10-CM

## 2023-04-26 DIAGNOSIS — L89.620 PRESSURE ULCER OF HEEL, LEFT, UNSTAGEABLE (HCC): ICD-10-CM

## 2023-04-26 DIAGNOSIS — L89.623 PRESSURE ULCER OF LEFT HEEL, STAGE 3 (HCC): Primary | ICD-10-CM

## 2023-04-26 DIAGNOSIS — I63.9 CEREBROVASCULAR ACCIDENT (CVA), UNSPECIFIED MECHANISM (HCC): ICD-10-CM

## 2023-04-26 DIAGNOSIS — G81.94 LEFT HEMIPARESIS (HCC): ICD-10-CM

## 2023-04-26 DIAGNOSIS — S91.302A NON-HEALING OPEN WOUND OF LEFT HEEL: ICD-10-CM

## 2023-04-26 DIAGNOSIS — Z22.322 MRSA (METHICILLIN RESISTANT STAPH AUREUS) CULTURE POSITIVE: ICD-10-CM

## 2023-04-26 PROCEDURE — 15275 SKIN SUB GRAFT FACE/NK/HF/G: CPT | Performed by: NURSE PRACTITIONER

## 2023-04-26 PROCEDURE — 15275 SKIN SUB GRAFT FACE/NK/HF/G: CPT

## 2023-04-26 RX ORDER — LIDOCAINE HYDROCHLORIDE 20 MG/ML
JELLY TOPICAL ONCE
OUTPATIENT
Start: 2023-04-26 | End: 2023-04-26

## 2023-04-26 RX ORDER — GENTAMICIN SULFATE 1 MG/G
OINTMENT TOPICAL ONCE
OUTPATIENT
Start: 2023-04-26 | End: 2023-04-26

## 2023-04-26 RX ORDER — BACITRACIN ZINC AND POLYMYXIN B SULFATE 500; 1000 [USP'U]/G; [USP'U]/G
OINTMENT TOPICAL ONCE
OUTPATIENT
Start: 2023-04-26 | End: 2023-04-26

## 2023-04-26 RX ORDER — LIDOCAINE 50 MG/G
OINTMENT TOPICAL ONCE
OUTPATIENT
Start: 2023-04-26 | End: 2023-04-26

## 2023-04-26 RX ORDER — GINSENG 100 MG
CAPSULE ORAL ONCE
OUTPATIENT
Start: 2023-04-26 | End: 2023-04-26

## 2023-04-26 RX ORDER — BACITRACIN, NEOMYCIN, POLYMYXIN B 400; 3.5; 5 [USP'U]/G; MG/G; [USP'U]/G
OINTMENT TOPICAL ONCE
OUTPATIENT
Start: 2023-04-26 | End: 2023-04-26

## 2023-04-26 RX ORDER — LIDOCAINE HYDROCHLORIDE 40 MG/ML
SOLUTION TOPICAL ONCE
Status: COMPLETED | OUTPATIENT
Start: 2023-04-26 | End: 2023-04-26

## 2023-04-26 RX ORDER — LIDOCAINE HYDROCHLORIDE 40 MG/ML
SOLUTION TOPICAL ONCE
OUTPATIENT
Start: 2023-04-26 | End: 2023-04-26

## 2023-04-26 RX ORDER — BETAMETHASONE DIPROPIONATE 0.05 %
OINTMENT (GRAM) TOPICAL ONCE
OUTPATIENT
Start: 2023-04-26 | End: 2023-04-26

## 2023-04-26 RX ORDER — LIDOCAINE 40 MG/G
CREAM TOPICAL ONCE
OUTPATIENT
Start: 2023-04-26 | End: 2023-04-26

## 2023-04-26 RX ORDER — CLOBETASOL PROPIONATE 0.5 MG/G
OINTMENT TOPICAL ONCE
OUTPATIENT
Start: 2023-04-26 | End: 2023-04-26

## 2023-04-26 RX ADMIN — LIDOCAINE HYDROCHLORIDE 5 ML: 40 SOLUTION TOPICAL at 08:21

## 2023-04-26 ASSESSMENT — PAIN DESCRIPTION - ONSET
ONSET: ON-GOING
ONSET: ON-GOING

## 2023-04-26 ASSESSMENT — PAIN DESCRIPTION - ORIENTATION
ORIENTATION: LEFT
ORIENTATION: LEFT

## 2023-04-26 ASSESSMENT — PAIN SCALES - GENERAL
PAINLEVEL_OUTOF10: 1
PAINLEVEL_OUTOF10: 1

## 2023-04-26 ASSESSMENT — PAIN DESCRIPTION - FREQUENCY
FREQUENCY: CONTINUOUS
FREQUENCY: CONTINUOUS

## 2023-04-26 ASSESSMENT — PAIN DESCRIPTION - LOCATION
LOCATION: FOOT
LOCATION: FOOT

## 2023-04-26 ASSESSMENT — PAIN DESCRIPTION - DESCRIPTORS
DESCRIPTORS: ACHING
DESCRIPTORS: ACHING;DULL

## 2023-04-26 ASSESSMENT — PAIN DESCRIPTION - PAIN TYPE
TYPE: CHRONIC PAIN
TYPE: CHRONIC PAIN

## 2023-04-26 NOTE — PROGRESS NOTES
[address]   Progress Note and Procedure Note      Petr Oviedo  MEDICAL RECORD NUMBER:  [id number]  AGE: 76 y.o. GENDER: male  : 1955  EPISODE DATE:  2023    Subjective:     Chief Complaint   Patient presents with    Wound Check     Follow up visit left heel wound         HISTORY of PRESENT ILLNESS HPI   Petr Oviedo is a 76 y.o. male who presents today for left heel wound. 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 Pt is as active as he can be. Once wound is closed can resume physical therapy with goal of walking with walker instead of using wheelchair for mobility. Chronic spastic movements of extremities. Had a Watchman inserted on 3/17/23. History of Wound Context:   On 22  He fell and sustained displaced fracture of base of neck of left femur and developed  a pressure ulcer left heel. Postop he developed left heel DTI  2022 pt first seen at [address] [place] [address],[address] for treatment of left heel wound dx as stable eschar over wound. Pt has an offloading boot in place to float heel and prevent further heel damage. 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 Pt has residual left sided weakness and ongoing therapy has been delayed because of pressure ulcer. Pt also has chronic bilateral lower leg edema for which he uses compression stockings. Not using compression on left leg because of wound. Pt has spastic movements bilateral upper and lower extremities since after stroke with is tx with Baclofen; but friction still a concern. PT and DP pulse strong with Doppler. Reports getting protein supplements. Pt transferring only, no walking with walker still too unstable. Noting intermittent spastic movements legs during visit. Concern about friction on left heel with this movement. Pt wears post-op shoe on left foot. His son Naz Engel and daughter Katiuska Ta are monitoring and changing outer dressings as needed.   10/19/22 Eschar debrided because

## 2023-04-26 NOTE — PLAN OF CARE
TheraSkin Treatment Note    NAME:  Chantale Renee OF BIRTH:  1955  MEDICAL RECORD NUMBER:  [id number]  DATE:  4/26/2023    Goal:  Patient will receive safe and proper application of skin substitute. Patient will comply with caring for dressing, offloading and reporting complications. Expiration date of TheraSkin checked immediately prior to use. Package intact prior to use and no damage noted. Transport temperature controlled and acceptable. TheraSkin was prepared for application by removing from package. TheraSkin was rinsed 2 times in room temperature normal saline for 2 minutes each time. A 2nd saline rinse was left on the TheraSkin until the physician was ready to apply it within 120 minutes of thawing. White side goes against ulcer bed. TheraSkin was applied to LEFT HEEL and affixed with steri-strips by the physician. OPTILOCK was applied on top of non-adherent dressing. Fluffed gauze was applied. Dressing was secured with cover roll type tape to stabilize graft. Coban or ace wrap was applied to secure graft and decrease edema. Patient/caregiver was instructed not to remove dressing and to keep it clean and dry. Pt/family/caregiver was instructed on signs and symptoms of complications to report such as draining through, falling down/slipping, getting wet, or severe pain or tingling in toes. Pt/family/caregiver was instructed on need for offloading and elevation of affected extremity (if applicable) and on use of prescribed offloading device. Thera Skin may be applied a total of 10 times per wound over a 12 week period. Date of first application of Thera Skin for this current wound is November 2, 2022.                   Guidelines followed      Electronically signed by Rodney Brown RN on 4/26/2023 at 10:09 AM

## 2023-05-03 ENCOUNTER — HOSPITAL ENCOUNTER (OUTPATIENT)
Dept: WOUND CARE | Age: 68
Discharge: HOME | End: 2023-05-03
Payer: MEDICARE

## 2023-05-03 VITALS
DIASTOLIC BLOOD PRESSURE: 62 MMHG | TEMPERATURE: 98.2 F | SYSTOLIC BLOOD PRESSURE: 105 MMHG | RESPIRATION RATE: 18 BRPM | HEART RATE: 110 BPM

## 2023-05-03 DIAGNOSIS — S91.302A NON-HEALING OPEN WOUND OF LEFT HEEL: ICD-10-CM

## 2023-05-03 DIAGNOSIS — G81.94 LEFT HEMIPARESIS (HCC): ICD-10-CM

## 2023-05-03 DIAGNOSIS — R25.2 SPASTICITY: ICD-10-CM

## 2023-05-03 DIAGNOSIS — I63.9 CEREBROVASCULAR ACCIDENT (CVA), UNSPECIFIED MECHANISM (HCC): ICD-10-CM

## 2023-05-03 DIAGNOSIS — L89.623 PRESSURE ULCER OF LEFT HEEL, STAGE 3 (HCC): Primary | ICD-10-CM

## 2023-05-03 DIAGNOSIS — Z22.322 MRSA (METHICILLIN RESISTANT STAPH AUREUS) CULTURE POSITIVE: ICD-10-CM

## 2023-05-03 DIAGNOSIS — L89.620 PRESSURE ULCER OF HEEL, LEFT, UNSTAGEABLE (HCC): ICD-10-CM

## 2023-05-03 PROCEDURE — 99213 OFFICE O/P EST LOW 20 MIN: CPT | Performed by: NURSE PRACTITIONER

## 2023-05-03 PROCEDURE — 99213 OFFICE O/P EST LOW 20 MIN: CPT

## 2023-05-03 RX ORDER — LIDOCAINE 50 MG/G
OINTMENT TOPICAL ONCE
OUTPATIENT
Start: 2023-05-03 | End: 2023-05-03

## 2023-05-03 RX ORDER — GENTAMICIN SULFATE 1 MG/G
OINTMENT TOPICAL ONCE
OUTPATIENT
Start: 2023-05-03 | End: 2023-05-03

## 2023-05-03 RX ORDER — LIDOCAINE 40 MG/G
CREAM TOPICAL ONCE
OUTPATIENT
Start: 2023-05-03 | End: 2023-05-03

## 2023-05-03 RX ORDER — LIDOCAINE HYDROCHLORIDE 20 MG/ML
JELLY TOPICAL ONCE
OUTPATIENT
Start: 2023-05-03 | End: 2023-05-03

## 2023-05-03 RX ORDER — BETAMETHASONE DIPROPIONATE 0.05 %
OINTMENT (GRAM) TOPICAL ONCE
OUTPATIENT
Start: 2023-05-03 | End: 2023-05-03

## 2023-05-03 RX ORDER — LIDOCAINE HYDROCHLORIDE 40 MG/ML
SOLUTION TOPICAL ONCE
OUTPATIENT
Start: 2023-05-03 | End: 2023-05-03

## 2023-05-03 RX ORDER — BACITRACIN, NEOMYCIN, POLYMYXIN B 400; 3.5; 5 [USP'U]/G; MG/G; [USP'U]/G
OINTMENT TOPICAL ONCE
OUTPATIENT
Start: 2023-05-03 | End: 2023-05-03

## 2023-05-03 RX ORDER — GINSENG 100 MG
CAPSULE ORAL ONCE
OUTPATIENT
Start: 2023-05-03 | End: 2023-05-03

## 2023-05-03 RX ORDER — CLOBETASOL PROPIONATE 0.5 MG/G
OINTMENT TOPICAL ONCE
OUTPATIENT
Start: 2023-05-03 | End: 2023-05-03

## 2023-05-03 RX ORDER — BACITRACIN ZINC AND POLYMYXIN B SULFATE 500; 1000 [USP'U]/G; [USP'U]/G
OINTMENT TOPICAL ONCE
OUTPATIENT
Start: 2023-05-03 | End: 2023-05-03

## 2023-05-03 ASSESSMENT — PAIN SCALES - GENERAL
PAINLEVEL_OUTOF10: 0
PAINLEVEL_OUTOF10: 0

## 2023-05-03 NOTE — PROGRESS NOTES
[address]   Progress Note and Procedure Note      Sharri Camargo  MEDICAL RECORD NUMBER:  [id number]  AGE: 76 y.o. GENDER: male  : 1955  EPISODE DATE:  5/3/2023    Subjective:     Chief Complaint   Patient presents with    Wound Check     Follow up visit left heel wound         HISTORY of PRESENT ILLNESS HPI   Sharri Camargo is a 76 y.o. male who presents today for left heel wound. 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 Pt is as active as he can be. Once wound is closed can resume physical therapy with goal of walking with walker instead of using wheelchair for mobility. Chronic spastic movements of extremities. Had a Watchman inserted on 3/17/23. History of Wound Context:   On 22  He fell and sustained displaced fracture of base of neck of left femur and developed  a pressure ulcer left heel. Postop he developed left heel DTI  2022 pt first seen at [facility] for treatment of left heel wound dx as stable eschar over wound. Pt has an offloading boot in place to float heel and prevent further heel damage. 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 Pt has residual left sided weakness and ongoing therapy has been delayed because of pressure ulcer. Pt also has chronic bilateral lower leg edema for which he uses compression stockings. Not using compression on left leg because of wound. Pt has spastic movements bilateral upper and lower extremities since after stroke with is tx with Baclofen; but friction still a concern. PT and DP pulse strong with Doppler. Reports getting protein supplements. Pt transferring only, no walking with walker still too unstable. Noting intermittent spastic movements legs during visit. Concern about friction on left heel with this movement. Pt wears post-op shoe on left foot. His son Sujatha Wisdom and daughter Domingo Hanson are monitoring and changing outer dressings as needed.   10/19/22 Eschar debrided because

## 2023-05-03 NOTE — PLAN OF CARE
Multilayer Compression Wrap   (Not Unna) Below the Knee    NAME:  Amanda Griggs  YOB: 1955  MEDICAL RECORD NUMBER:  [id number]  DATE:  5/3/2023    Multilayer compression wrap: Applied moisturizing agent to dry skin as needed. Applied primary and secondary dressing as ordered. Applied multilayered dressing below the knee to left lower leg. Instructed patient/caregiver not to remove dressing and to keep it clean and dry. Instructed patient/caregiver on complications to report to provider, such as pain, numbness in toes, heavy drainage, and slippage of dressing. Instructed patient on purpose of compression dressing and on activity and exercise recommendations.       Electronically signed by Virgina Hamman, RN on 5/3/2023 at 8:56 AM

## 2023-05-09 RX ORDER — SODIUM CHLORIDE 0.9 % (FLUSH) 0.9 %
5-40 SYRINGE (ML) INJECTION EVERY 12 HOURS SCHEDULED
OUTPATIENT
Start: 2023-05-10

## 2023-05-09 RX ORDER — SODIUM CHLORIDE 0.9 % (FLUSH) 0.9 %
5-40 SYRINGE (ML) INJECTION PRN
OUTPATIENT
Start: 2023-05-10

## 2023-05-09 RX ORDER — SODIUM CHLORIDE 9 MG/ML
INJECTION, SOLUTION INTRAVENOUS PRN
OUTPATIENT
Start: 2023-05-10

## 2023-05-10 ENCOUNTER — TELEPHONE (OUTPATIENT)
Dept: CARDIOLOGY CLINIC | Age: 68
End: 2023-05-10

## 2023-05-10 ENCOUNTER — HOSPITAL ENCOUNTER (OUTPATIENT)
Dept: WOUND CARE | Age: 68
Discharge: HOME | End: 2023-05-10
Payer: MEDICARE

## 2023-05-10 ENCOUNTER — HOSPITAL ENCOUNTER (OUTPATIENT)
Dept: CARDIAC CATH/INVASIVE PROCEDURES | Age: 68
Discharge: HOME | End: 2023-05-10
Payer: MEDICARE

## 2023-05-10 VITALS
DIASTOLIC BLOOD PRESSURE: 71 MMHG | TEMPERATURE: 97.7 F | RESPIRATION RATE: 18 BRPM | SYSTOLIC BLOOD PRESSURE: 99 MMHG | HEART RATE: 71 BPM

## 2023-05-10 DIAGNOSIS — Z22.322 MRSA (METHICILLIN RESISTANT STAPH AUREUS) CULTURE POSITIVE: ICD-10-CM

## 2023-05-10 DIAGNOSIS — L89.620 PRESSURE ULCER OF HEEL, LEFT, UNSTAGEABLE (HCC): ICD-10-CM

## 2023-05-10 DIAGNOSIS — R25.2 SPASTICITY: ICD-10-CM

## 2023-05-10 DIAGNOSIS — S91.302A NON-HEALING OPEN WOUND OF LEFT HEEL: Primary | ICD-10-CM

## 2023-05-10 DIAGNOSIS — I63.9 CEREBROVASCULAR ACCIDENT (CVA), UNSPECIFIED MECHANISM (HCC): Chronic | ICD-10-CM

## 2023-05-10 DIAGNOSIS — I48.0 PAROXYSMAL A-FIB (HCC): Primary | ICD-10-CM

## 2023-05-10 DIAGNOSIS — G81.94 LEFT HEMIPARESIS (HCC): ICD-10-CM

## 2023-05-10 DIAGNOSIS — L89.623 PRESSURE ULCER OF LEFT HEEL, STAGE 3 (HCC): ICD-10-CM

## 2023-05-10 DIAGNOSIS — Z95.818 PRESENCE OF WATCHMAN LEFT ATRIAL APPENDAGE CLOSURE DEVICE: ICD-10-CM

## 2023-05-10 PROCEDURE — 15275 SKIN SUB GRAFT FACE/NK/HF/G: CPT

## 2023-05-10 RX ORDER — LIDOCAINE 50 MG/G
OINTMENT TOPICAL ONCE
OUTPATIENT
Start: 2023-05-10 | End: 2023-05-10

## 2023-05-10 RX ORDER — BETAMETHASONE DIPROPIONATE 0.05 %
OINTMENT (GRAM) TOPICAL ONCE
OUTPATIENT
Start: 2023-05-10 | End: 2023-05-10

## 2023-05-10 RX ORDER — CLOBETASOL PROPIONATE 0.5 MG/G
OINTMENT TOPICAL ONCE
OUTPATIENT
Start: 2023-05-10 | End: 2023-05-10

## 2023-05-10 RX ORDER — BACITRACIN ZINC AND POLYMYXIN B SULFATE 500; 1000 [USP'U]/G; [USP'U]/G
OINTMENT TOPICAL ONCE
OUTPATIENT
Start: 2023-05-10 | End: 2023-05-10

## 2023-05-10 RX ORDER — LIDOCAINE HYDROCHLORIDE 40 MG/ML
SOLUTION TOPICAL ONCE
OUTPATIENT
Start: 2023-05-10 | End: 2023-05-10

## 2023-05-10 RX ORDER — BACITRACIN, NEOMYCIN, POLYMYXIN B 400; 3.5; 5 [USP'U]/G; MG/G; [USP'U]/G
OINTMENT TOPICAL ONCE
OUTPATIENT
Start: 2023-05-10 | End: 2023-05-10

## 2023-05-10 RX ORDER — LIDOCAINE 40 MG/G
CREAM TOPICAL ONCE
OUTPATIENT
Start: 2023-05-10 | End: 2023-05-10

## 2023-05-10 RX ORDER — GENTAMICIN SULFATE 1 MG/G
OINTMENT TOPICAL ONCE
OUTPATIENT
Start: 2023-05-10 | End: 2023-05-10

## 2023-05-10 RX ORDER — LIDOCAINE HYDROCHLORIDE 20 MG/ML
JELLY TOPICAL ONCE
OUTPATIENT
Start: 2023-05-10 | End: 2023-05-10

## 2023-05-10 RX ORDER — GINSENG 100 MG
CAPSULE ORAL ONCE
OUTPATIENT
Start: 2023-05-10 | End: 2023-05-10

## 2023-05-10 RX ORDER — LIDOCAINE HYDROCHLORIDE 40 MG/ML
SOLUTION TOPICAL ONCE
Status: COMPLETED | OUTPATIENT
Start: 2023-05-10 | End: 2023-05-10

## 2023-05-10 RX ADMIN — LIDOCAINE HYDROCHLORIDE 2.5 ML: 40 SOLUTION TOPICAL at 08:07

## 2023-05-10 ASSESSMENT — PAIN SCALES - GENERAL
PAINLEVEL_OUTOF10: 0
PAINLEVEL_OUTOF10: 0

## 2023-05-10 NOTE — TELEPHONE ENCOUNTER
[facility]. Do you want to order CTA, the MARZENA has to be completed by 5/18.

## 2023-05-10 NOTE — PLAN OF CARE
TheraSkin Treatment Note    NAME:  João Padilla OF BIRTH:  1955  MEDICAL RECORD NUMBER:  [id number]  DATE:  5/10/2023    Goal:  Patient will receive safe and proper application of skin substitute. Patient will comply with caring for dressing, offloading and reporting complications. Expiration date of TheraSkin checked immediately prior to use. Package intact prior to use and no damage noted. Transport temperature controlled and acceptable. TheraSkin was prepared for application by removing from package. TheraSkin was rinsed 2 times in room temperature normal saline for 2 minutes each time. A 2nd saline rinse was left on the TheraSkin until the physician was ready to apply it within 120 minutes of thawing. White side goes against ulcer bed. TheraSkin was applied to left heel and affixed with steri-strips by the physician.  Rocael Mule was applied on top of non-adherent dressing. Fluffed gauze was applied. Dressing was secured with cover roll type tape to stabilize graft. Coban or ace wrap was applied to secure graft and decrease edema. Patient/caregiver was instructed not to remove dressing and to keep it clean and dry. Pt/family/caregiver was instructed on signs and symptoms of complications to report such as draining through, falling down/slipping, getting wet, or severe pain or tingling in toes. Pt/family/caregiver was instructed on need for offloading and elevation of affected extremity (if applicable) and on use of prescribed offloading device. Thera Skin may be applied a total of 10 times per wound over a 12 week period. Date of first application of Thera Skin for this current wound is May 10, 2023.                   Guidelines followed      Electronically signed by Khang Chen RN on 5/10/2023 at 9:13 AM

## 2023-05-10 NOTE — PROGRESS NOTES
TIME ON YOUR FEET. THIS ALSO INCLUDES ELEVATING YOUR LEGS AND FEET 3-4 TIMES DAILY FOR AT LEAST 20-30 MINUTES ON PILLOWS AND AT NIGHT SO THAT THEY ARE HIGHER THAN THE LEVEL OF YOUR HEART     Electronically signed by Camilo Norton RN on 5/10/2023 at 8:50 AM of graft         Electronically signed by Camilo Norton RN on 5/10/2023 at 8:49 AM         _________________________________________________________________  PRESSURE RELIEF AND OFF-LOADING:     Off-Loading:   With heel protector ( HEEL LIFT BOOT)  [x] Off-loading when       [x] in bed         [x] sitting                             Specialty equipment ordered:       [] Wheelchair cushion    [] Specialty Bed/Mattress     [x] Assistive Device: please continue to use any assistive devices advised by the provider discussed during your visit   [x] Surgical shoe    [] Podus Boot(s)   [] Foam Boot(s)    [] CROW Boot     _____________________________________________________________________________________________     Dietary:  Continue your diet as tolerated. Eat heart-healthy foods. These foods include vegetables, fruits, nuts, beans, lean meat, fish, and whole grains. Limit sodium, alcohol, and sugar. Protein is a teresa nutrient in helping to repair damaged tissue and promote new tissue growth. Good sources of protein are milk, yogurt, cheese, fish, lean meat, and beans. If you are a diabetic, having diabetes can make it hard for wounds to heal. So try to keep your blood sugar in its target range.  __________________________________________________________________________________________________     ACTIVITY:   Activity as tolerated  ________________________________________________________________________________________________________________     PAIN:     Please note, A small amount of pain, drainage and/or bleeding from this process might be expected, and is NORMAL. Elevate the affected limb.   Use over-the-counter medications you would normally use for pain

## 2023-05-10 NOTE — TELEPHONE ENCOUNTER
I spoke with patient and he informs me he had another appointment as well so he cancelled MARZENA. I informed patient this needed to be done to make sure the site is healing up Post Watchman LEN site. I have ordered a CTA to be done asap.

## 2023-05-11 NOTE — TELEPHONE ENCOUNTER
Pt called back he is at work and is going to call scheduling himself to set-up does not need to be stat per Ivar Scale, RN just needs to be done by the 18th

## 2023-05-13 ENCOUNTER — HOSPITAL ENCOUNTER (OUTPATIENT)
Dept: CT IMAGING | Age: 68
Discharge: HOME | End: 2023-05-13
Payer: MEDICARE

## 2023-05-13 DIAGNOSIS — Z95.818 PRESENCE OF WATCHMAN LEFT ATRIAL APPENDAGE CLOSURE DEVICE: ICD-10-CM

## 2023-05-13 DIAGNOSIS — I48.0 PAROXYSMAL A-FIB (HCC): ICD-10-CM

## 2023-05-13 LAB
PERFORMED ON: NORMAL
POC CREATININE: 1 MG/DL (ref 0.8–1.3)
POC SAMPLE TYPE: NORMAL

## 2023-05-13 PROCEDURE — 82565 ASSAY OF CREATININE: CPT

## 2023-05-13 PROCEDURE — 75574 CT ANGIO HRT W/3D IMAGE: CPT

## 2023-05-13 PROCEDURE — 6360000004 HC RX CONTRAST MEDICATION: Performed by: FAMILY MEDICINE

## 2023-05-13 RX ADMIN — IOPAMIDOL 75 ML: 755 INJECTION, SOLUTION INTRAVENOUS at 08:03

## 2023-05-16 NOTE — DISCHARGE INSTRUCTIONS
[facility] [address] Physician Orders and Discharge Instructions  [address] [place] [address], [address], [address]  Telephone: [phone]  [address] 8:30 am - 4:30 pm and Friday 8:30 am - 1:00 pm.          NAME:  Zully Pena  YOB: 1955  MEDICAL RECORD NUMBER:  [id number]  TODAYS DATE:  5/17/23           VASHE ON IN CLINIC TODAY      Please wash hands with soap and water prior to and right after  every dressing change          Topical Treatments:              [x] Apply around the wound:   [x] moisturizing lotion TO LEFT LEG AND FOOT THAT IS NOT CLOSE TO WOUND         WOUND LOCATION   Left Heel **Theraskin #7 APPLIED 5/10/2023  ( PURAPLY X 1 , NUSHEILD x2 = TOTAL 10 GRAFTS)            PRIMARY DRESSING: GRAFT  IN PLACE                                 [x] Mepitel secured with Steri-strips                 DO NOT CHANGE MEPITEL ( SILICONE PAD OR STERI STRIPS)          SECONDARY DRESSING:               [x]   2X2 GUAZE BOLSTER OVER GRAFT, Plain alginate around graft, guaze and roll guaze                                       HOW OFTEN TO CHANGE DRESSINGS:                  (  X ) EVERY OTHER DAY      COMPRESSION:                    ( x)    MEDIUM SPANDAGRIP with ace wrap on top.                                   ______________________________________________________________  PRESSURE RELIEF AND OFF-LOADING:     Off-Loading:   With heel protector ( HEEL LIFT BOOT)  [x] Off-loading when       [x] in bed         [x] sitting                             Specialty equipment ordered:       [] Wheelchair cushion    [] Specialty Bed/Mattress     [x] Assistive Device: please continue to use any assistive devices advised by the provider discussed during your visit   [x] Surgical shoe    [] Podus Boot(s)   [] Foam Boot(s)    [] CROW Boot     _____________________________________________________________________________________________     Dietary:  Continue your diet

## 2023-05-17 ENCOUNTER — TELEPHONE (OUTPATIENT)
Dept: PRIMARY CARE CLINIC | Age: 68
End: 2023-05-17

## 2023-05-17 ENCOUNTER — TELEPHONE (OUTPATIENT)
Dept: CARDIOLOGY CLINIC | Age: 68
End: 2023-05-17

## 2023-05-17 ENCOUNTER — HOSPITAL ENCOUNTER (OUTPATIENT)
Dept: WOUND CARE | Age: 68
Discharge: HOME | End: 2023-05-17
Payer: MEDICARE

## 2023-05-17 VITALS
HEART RATE: 73 BPM | SYSTOLIC BLOOD PRESSURE: 111 MMHG | DIASTOLIC BLOOD PRESSURE: 80 MMHG | TEMPERATURE: 97.4 F | RESPIRATION RATE: 18 BRPM

## 2023-05-17 DIAGNOSIS — G81.94 LEFT HEMIPARESIS (HCC): ICD-10-CM

## 2023-05-17 DIAGNOSIS — R25.2 SPASTICITY: ICD-10-CM

## 2023-05-17 DIAGNOSIS — S91.302A NON-HEALING OPEN WOUND OF LEFT HEEL: Primary | ICD-10-CM

## 2023-05-17 DIAGNOSIS — L89.620 PRESSURE ULCER OF HEEL, LEFT, UNSTAGEABLE (HCC): ICD-10-CM

## 2023-05-17 DIAGNOSIS — L89.623 PRESSURE ULCER OF LEFT HEEL, STAGE 3 (HCC): ICD-10-CM

## 2023-05-17 DIAGNOSIS — Z22.322 MRSA (METHICILLIN RESISTANT STAPH AUREUS) CULTURE POSITIVE: ICD-10-CM

## 2023-05-17 PROCEDURE — 99212 OFFICE O/P EST SF 10 MIN: CPT | Performed by: NURSE PRACTITIONER

## 2023-05-17 PROCEDURE — 99213 OFFICE O/P EST LOW 20 MIN: CPT

## 2023-05-17 RX ORDER — GABAPENTIN 100 MG/1
100 CAPSULE ORAL 4 TIMES DAILY
Qty: 360 CAPSULE | Refills: 1 | Status: SHIPPED | OUTPATIENT
Start: 2023-05-17 | End: 2023-11-13

## 2023-05-17 NOTE — PROGRESS NOTES
medications you would normally use for pain as permitted by your family doctor. For persistent pain not relieved by the above interventions, please call your family doctor.  ________________________________________________________________________________  Call your doctor now or seek immediate medical care if:    You have symptoms of infection, such as: Increased pain, swelling, warmth, or redness. Red streaks leading from the area. Pus draining from the area. A fever. _______________________________________________________________________     Return Appointment:  DME/Wound Dressing Supplies Provided by:  [facility]  (Supply companies distribute one month supply at a time. Please call them directly to reorder supplies when you run out)     ECF or Home Healthcare: Your Home Care Agency is responsible to order your supplies. Return Appointment: With Tona Walters CNP  in 1 Week(s)                      : Eliza Blair      Electronically signed by Bakari Sheehan RN on 5/17/2023 at [address]: Should you experience any significant chnges in your wound(s) or have questions about your wound care, please contact the [address] [place] [address] at [address] 8:30 am - 4:30 pm and Friday 8:30 am - 1:00 pm.  If you need help with your wound outside these hours and cannot wait until we are again available, contact your PCP or go to the hospital emergency room. PLEASE NOTE: IF YOU ARE UNABLE TO OBTAIN WOUND SUPPLIES, CONTINUE TO USE THE SUPPLIES YOU HAVE AVAILABLE UNTIL YOU ARE ABLE TO REACH US. KEEP THE WOUND COVERED AT ALL TIMES.                   [facility]                         Electronically signed by MADISYN Calvillo CNP on 5/17/2023 at 8:47 AM

## 2023-05-17 NOTE — TELEPHONE ENCOUNTER
Patient would like refill on gabapentin (NEURONTIN) 100 MG, last seen 3/27/23, please send to [facility]

## 2023-05-18 ENCOUNTER — OFFICE VISIT (OUTPATIENT)
Dept: CARDIOLOGY CLINIC | Age: 68
End: 2023-05-18
Payer: MEDICARE

## 2023-05-18 ENCOUNTER — TELEPHONE (OUTPATIENT)
Dept: CARDIOLOGY CLINIC | Age: 68
End: 2023-05-18

## 2023-05-18 VITALS
WEIGHT: 217 LBS | SYSTOLIC BLOOD PRESSURE: 120 MMHG | BODY MASS INDEX: 31.07 KG/M2 | OXYGEN SATURATION: 95 % | HEIGHT: 70 IN | DIASTOLIC BLOOD PRESSURE: 84 MMHG | HEART RATE: 80 BPM

## 2023-05-18 DIAGNOSIS — I48.0 PAROXYSMAL A-FIB (HCC): Primary | ICD-10-CM

## 2023-05-18 DIAGNOSIS — Z95.818 PRESENCE OF WATCHMAN LEFT ATRIAL APPENDAGE CLOSURE DEVICE: ICD-10-CM

## 2023-05-18 DIAGNOSIS — I25.10 CORONARY ARTERY DISEASE INVOLVING NATIVE CORONARY ARTERY OF NATIVE HEART WITHOUT ANGINA PECTORIS: ICD-10-CM

## 2023-05-18 DIAGNOSIS — E78.2 MIXED HYPERLIPIDEMIA: ICD-10-CM

## 2023-05-18 DIAGNOSIS — I10 ESSENTIAL HYPERTENSION: ICD-10-CM

## 2023-05-18 LAB
ANION GAP SERPL CALCULATED.3IONS-SCNC: 15 MMOL/L (ref 3–16)
BUN SERPL-MCNC: 24 MG/DL (ref 7–20)
CALCIUM SERPL-MCNC: 9.4 MG/DL (ref 8.3–10.6)
CHLORIDE SERPL-SCNC: 100 MMOL/L (ref 99–110)
CO2 SERPL-SCNC: 22 MMOL/L (ref 21–32)
CREAT SERPL-MCNC: 0.9 MG/DL (ref 0.8–1.3)
DEPRECATED RDW RBC AUTO: 13.6 % (ref 12.4–15.4)
GFR SERPLBLD CREATININE-BSD FMLA CKD-EPI: >60 ML/MIN/{1.73_M2}
GLUCOSE SERPL-MCNC: 87 MG/DL (ref 70–99)
HCT VFR BLD AUTO: 47.9 % (ref 40.5–52.5)
HGB BLD-MCNC: 15.8 G/DL (ref 13.5–17.5)
MCH RBC QN AUTO: 29.7 PG (ref 26–34)
MCHC RBC AUTO-ENTMCNC: 32.9 G/DL (ref 31–36)
MCV RBC AUTO: 90 FL (ref 80–100)
PLATELET # BLD AUTO: 225 K/UL (ref 135–450)
PMV BLD AUTO: 9.3 FL (ref 5–10.5)
POTASSIUM SERPL-SCNC: 4.5 MMOL/L (ref 3.5–5.1)
RBC # BLD AUTO: 5.32 M/UL (ref 4.2–5.9)
SODIUM SERPL-SCNC: 137 MMOL/L (ref 136–145)
WBC # BLD AUTO: 6.1 K/UL (ref 4–11)

## 2023-05-18 PROCEDURE — G8417 CALC BMI ABV UP PARAM F/U: HCPCS | Performed by: INTERNAL MEDICINE

## 2023-05-18 PROCEDURE — 93000 ELECTROCARDIOGRAM COMPLETE: CPT | Performed by: INTERNAL MEDICINE

## 2023-05-18 PROCEDURE — 3074F SYST BP LT 130 MM HG: CPT | Performed by: INTERNAL MEDICINE

## 2023-05-18 PROCEDURE — 1123F ACP DISCUSS/DSCN MKR DOCD: CPT | Performed by: INTERNAL MEDICINE

## 2023-05-18 PROCEDURE — 3017F COLORECTAL CA SCREEN DOC REV: CPT | Performed by: INTERNAL MEDICINE

## 2023-05-18 PROCEDURE — 99215 OFFICE O/P EST HI 40 MIN: CPT | Performed by: INTERNAL MEDICINE

## 2023-05-18 PROCEDURE — 1036F TOBACCO NON-USER: CPT | Performed by: INTERNAL MEDICINE

## 2023-05-18 PROCEDURE — 3078F DIAST BP <80 MM HG: CPT | Performed by: INTERNAL MEDICINE

## 2023-05-18 PROCEDURE — G8427 DOCREV CUR MEDS BY ELIG CLIN: HCPCS | Performed by: INTERNAL MEDICINE

## 2023-05-18 NOTE — PATIENT INSTRUCTIONS
The morning of your procedure you will park in the hospital parking lot and report directly to the cath lab to check in.     Pre-Procedure Instructions   You will need to fast for at least 8 hours prior to procedure. No caffeine the morning of. Hold all diabetic medications including, Metfomin. If you take Lantus/Levemir only take ½ your normal dose the evening before. All other medications can be taken in the morning with sips of water. Do not use any lotions, creams or perfume the morning of procedure. Pre-procedure lab work will need to be completed 5-7 days prior to procedure. Please have a responsible adult to drive you home after procedure. We advise you have someone to stay with you for 24 hours following procedure for precautionary measures. Depending on procedure you may require an overnight stay. Cath lab will provide you with all post procedure instructions. If you have any questions regarding the procedure itself or medications, please call [phone] and ask to speak with a nurse.

## 2023-05-18 NOTE — PROGRESS NOTES
[facility]  Cardiology Consult    Eveline Cushing  1955    May 18, 2023    Primary Cardiologist: Jesusita Turcios MD  Referring Physician: Jesusita Turcios MD    Reason for Referral: Left atrial appendage closure    CC: \"I am here to discuss my results. \"      Subjective:     History of Present Illness:    Eveline Cushing is a 76 y.o. patient with a PMH significant for coronary artery disease s/p APRIL 2009, hypertension, hyperlipidemia, paroxysmal atrial fibrillation S/p LAAC 3/2023(new onset 2/10/23) and hemorrhagic CVA. He is unable to start anticoagulation due to the hemorrhagic CVA. Today, he is here to discuss his coronary CTA results. He has been doing okay. He continues to work on a daily basis. Patient denies exertional chest pain/pressure, dyspnea at rest, CRUZ, PND, orthopnea, palpitations, lightheadedness, weight changes, changes in LE edema, and syncope. Patient reports compliance to his medications. Past Medical History:   has a past medical history of CAD (coronary artery disease), HTN (hypertension), Hyperlipemia, Left hemiparesis (HCC), MRSA (methicillin resistant staph aureus) culture positive, Non-healing open wound of left heel, Pressure injury of left ankle, stage 2 (HCC), Pressure ulcer of heel, left, unstageable ([address] Pressure ulcer of left heel, stage 3 ([address] S/P PTCA (percutaneous transluminal coronary angioplasty), Spasticity, and Stroke ([address] Surgical History:   has a past surgical history that includes Inguinal hernia repair; Cardiac surgery; Intracapsular cataract extraction (Right, 08/30/2019); Intracapsular cataract extraction (Left, 09/06/2019); and hip surgery (Left). Social History:   reports that he has never smoked. He has never used smokeless tobacco. He reports that he does not drink alcohol and does not use drugs. Family History:  family history includes Breast Cancer in his sister;  Heart Attack in his brother; Heart Disease in his father; Hypertension in his

## 2023-05-22 NOTE — TELEPHONE ENCOUNTER
Date of Procedure: Friday 6/9/23    Time of arrival: 8:00 am     Procedure time: 10:30 am     Called and spoke to GERARD and he is agreeable to date and time. Reviewed and sent Tirendo message the instructions and he verbalized understanding. Encouraged to call with any questions or concerns. Published on [employer] and e-mail to [place].

## 2023-05-24 ENCOUNTER — HOSPITAL ENCOUNTER (OUTPATIENT)
Dept: WOUND CARE | Age: 68
Discharge: HOME | End: 2023-05-24
Payer: MEDICARE

## 2023-05-24 VITALS
RESPIRATION RATE: 18 BRPM | SYSTOLIC BLOOD PRESSURE: 110 MMHG | HEART RATE: 91 BPM | TEMPERATURE: 97.9 F | DIASTOLIC BLOOD PRESSURE: 68 MMHG

## 2023-05-24 DIAGNOSIS — S91.302A NON-HEALING OPEN WOUND OF LEFT HEEL: Primary | ICD-10-CM

## 2023-05-24 DIAGNOSIS — I63.9 CEREBROVASCULAR ACCIDENT (CVA), UNSPECIFIED MECHANISM (HCC): Chronic | ICD-10-CM

## 2023-05-24 DIAGNOSIS — Z22.322 MRSA (METHICILLIN RESISTANT STAPH AUREUS) CULTURE POSITIVE: ICD-10-CM

## 2023-05-24 DIAGNOSIS — L89.623 PRESSURE ULCER OF LEFT HEEL, STAGE 3 (HCC): ICD-10-CM

## 2023-05-24 DIAGNOSIS — R25.2 SPASTICITY: ICD-10-CM

## 2023-05-24 DIAGNOSIS — G81.94 LEFT HEMIPARESIS (HCC): ICD-10-CM

## 2023-05-24 DIAGNOSIS — L89.620 PRESSURE ULCER OF HEEL, LEFT, UNSTAGEABLE (HCC): ICD-10-CM

## 2023-05-24 PROCEDURE — 11042 DBRDMT SUBQ TIS 1ST 20SQCM/<: CPT

## 2023-05-24 RX ORDER — BACITRACIN ZINC AND POLYMYXIN B SULFATE 500; 1000 [USP'U]/G; [USP'U]/G
OINTMENT TOPICAL ONCE
OUTPATIENT
Start: 2023-05-24 | End: 2023-05-24

## 2023-05-24 RX ORDER — LIDOCAINE HYDROCHLORIDE 20 MG/ML
JELLY TOPICAL ONCE
OUTPATIENT
Start: 2023-05-24 | End: 2023-05-24

## 2023-05-24 RX ORDER — CLOBETASOL PROPIONATE 0.5 MG/G
OINTMENT TOPICAL ONCE
OUTPATIENT
Start: 2023-05-24 | End: 2023-05-24

## 2023-05-24 RX ORDER — LIDOCAINE 40 MG/G
CREAM TOPICAL ONCE
OUTPATIENT
Start: 2023-05-24 | End: 2023-05-24

## 2023-05-24 RX ORDER — LIDOCAINE 50 MG/G
OINTMENT TOPICAL ONCE
Status: COMPLETED | OUTPATIENT
Start: 2023-05-24 | End: 2023-05-24

## 2023-05-24 RX ORDER — BACITRACIN, NEOMYCIN, POLYMYXIN B 400; 3.5; 5 [USP'U]/G; MG/G; [USP'U]/G
OINTMENT TOPICAL ONCE
OUTPATIENT
Start: 2023-05-24 | End: 2023-05-24

## 2023-05-24 RX ORDER — BETAMETHASONE DIPROPIONATE 0.05 %
OINTMENT (GRAM) TOPICAL ONCE
OUTPATIENT
Start: 2023-05-24 | End: 2023-05-24

## 2023-05-24 RX ORDER — LIDOCAINE 50 MG/G
OINTMENT TOPICAL ONCE
OUTPATIENT
Start: 2023-05-24 | End: 2023-05-24

## 2023-05-24 RX ORDER — LIDOCAINE HYDROCHLORIDE 40 MG/ML
SOLUTION TOPICAL ONCE
OUTPATIENT
Start: 2023-05-24 | End: 2023-05-24

## 2023-05-24 RX ORDER — GENTAMICIN SULFATE 1 MG/G
OINTMENT TOPICAL ONCE
OUTPATIENT
Start: 2023-05-24 | End: 2023-05-24

## 2023-05-24 RX ORDER — GINSENG 100 MG
CAPSULE ORAL ONCE
OUTPATIENT
Start: 2023-05-24 | End: 2023-05-24

## 2023-05-24 RX ADMIN — LIDOCAINE 0.5 CM: 50 OINTMENT TOPICAL at 08:06

## 2023-05-24 ASSESSMENT — PAIN SCALES - GENERAL
PAINLEVEL_OUTOF10: 0
PAINLEVEL_OUTOF10: 0

## 2023-05-31 ENCOUNTER — HOSPITAL ENCOUNTER (OUTPATIENT)
Dept: WOUND CARE | Age: 68
Discharge: HOME | End: 2023-05-31
Payer: MEDICARE

## 2023-05-31 VITALS
RESPIRATION RATE: 18 BRPM | DIASTOLIC BLOOD PRESSURE: 89 MMHG | TEMPERATURE: 98.1 F | SYSTOLIC BLOOD PRESSURE: 133 MMHG | HEART RATE: 111 BPM

## 2023-05-31 DIAGNOSIS — Z22.322 MRSA (METHICILLIN RESISTANT STAPH AUREUS) CULTURE POSITIVE: ICD-10-CM

## 2023-05-31 DIAGNOSIS — S91.302A NON-HEALING OPEN WOUND OF LEFT HEEL: ICD-10-CM

## 2023-05-31 DIAGNOSIS — I63.9 CEREBROVASCULAR ACCIDENT (CVA), UNSPECIFIED MECHANISM (HCC): ICD-10-CM

## 2023-05-31 DIAGNOSIS — G81.94 LEFT HEMIPARESIS (HCC): ICD-10-CM

## 2023-05-31 DIAGNOSIS — L89.623 PRESSURE ULCER OF LEFT HEEL, STAGE 3 (HCC): Primary | ICD-10-CM

## 2023-05-31 DIAGNOSIS — L89.620 PRESSURE ULCER OF HEEL, LEFT, UNSTAGEABLE (HCC): ICD-10-CM

## 2023-05-31 DIAGNOSIS — R25.2 SPASTICITY: ICD-10-CM

## 2023-05-31 PROCEDURE — 11042 DBRDMT SUBQ TIS 1ST 20SQCM/<: CPT

## 2023-05-31 PROCEDURE — 11042 DBRDMT SUBQ TIS 1ST 20SQCM/<: CPT | Performed by: NURSE PRACTITIONER

## 2023-05-31 RX ORDER — LIDOCAINE HYDROCHLORIDE 20 MG/ML
JELLY TOPICAL ONCE
OUTPATIENT
Start: 2023-05-31 | End: 2023-05-31

## 2023-05-31 RX ORDER — LIDOCAINE 40 MG/G
CREAM TOPICAL ONCE
OUTPATIENT
Start: 2023-05-31 | End: 2023-05-31

## 2023-05-31 RX ORDER — BACITRACIN, NEOMYCIN, POLYMYXIN B 400; 3.5; 5 [USP'U]/G; MG/G; [USP'U]/G
OINTMENT TOPICAL ONCE
OUTPATIENT
Start: 2023-05-31 | End: 2023-05-31

## 2023-05-31 RX ORDER — GINSENG 100 MG
CAPSULE ORAL ONCE
OUTPATIENT
Start: 2023-05-31 | End: 2023-05-31

## 2023-05-31 RX ORDER — LIDOCAINE 50 MG/G
OINTMENT TOPICAL ONCE
OUTPATIENT
Start: 2023-05-31 | End: 2023-05-31

## 2023-05-31 RX ORDER — CLOBETASOL PROPIONATE 0.5 MG/G
OINTMENT TOPICAL ONCE
OUTPATIENT
Start: 2023-05-31 | End: 2023-05-31

## 2023-05-31 RX ORDER — LIDOCAINE HYDROCHLORIDE 40 MG/ML
SOLUTION TOPICAL ONCE
Status: COMPLETED | OUTPATIENT
Start: 2023-05-31 | End: 2023-05-31

## 2023-05-31 RX ORDER — BACITRACIN ZINC AND POLYMYXIN B SULFATE 500; 1000 [USP'U]/G; [USP'U]/G
OINTMENT TOPICAL ONCE
OUTPATIENT
Start: 2023-05-31 | End: 2023-05-31

## 2023-05-31 RX ORDER — GENTAMICIN SULFATE 1 MG/G
OINTMENT TOPICAL ONCE
OUTPATIENT
Start: 2023-05-31 | End: 2023-05-31

## 2023-05-31 RX ORDER — BETAMETHASONE DIPROPIONATE 0.05 %
OINTMENT (GRAM) TOPICAL ONCE
OUTPATIENT
Start: 2023-05-31 | End: 2023-05-31

## 2023-05-31 RX ORDER — LIDOCAINE HYDROCHLORIDE 40 MG/ML
SOLUTION TOPICAL ONCE
OUTPATIENT
Start: 2023-05-31 | End: 2023-05-31

## 2023-05-31 RX ADMIN — LIDOCAINE HYDROCHLORIDE: 40 SOLUTION TOPICAL at 08:22

## 2023-05-31 ASSESSMENT — PAIN SCALES - GENERAL
PAINLEVEL_OUTOF10: 0
PAINLEVEL_OUTOF10: 0

## 2023-05-31 NOTE — PROGRESS NOTES
[address]   Progress Note and Procedure Note      Leona Thompson  MEDICAL RECORD NUMBER:  [id number]  AGE: 76 y.o. GENDER: male  : 1955  EPISODE DATE:  2023    Subjective:     Chief Complaint   Patient presents with    Wound Check     Follow up for left heel wound. HISTORY of PRESENT ILLNESS HPI   Leona Thompson is a 76 y.o. male who presents today for left heel wound. 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 Pt is as active as he can be. Once wound is closed can resume physical therapy with goal of walking with walker instead of using wheelchair for mobility. Chronic spastic movements of extremities. Had a Watchman inserted on 3/17/23. History of Wound Context:   On 22  He fell and sustained displaced fracture of base of neck of left femur and developed  a pressure ulcer left heel. Postop he developed left heel DTI  2022 pt first seen at [facility] for treatment of left heel wound dx as stable eschar over wound. Pt has an offloading boot in place to float heel and prevent further heel damage. 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 Pt has residual left sided weakness and ongoing therapy has been delayed because of pressure ulcer. Pt also has chronic bilateral lower leg edema for which he uses compression stockings. Not using compression on left leg because of wound. Pt has spastic movements bilateral upper and lower extremities since after stroke with is tx with Baclofen; but friction still a concern. PT and DP pulse strong with Doppler. Reports getting protein supplements. Pt transferring only, no walking with walker still too unstable. Noting intermittent spastic movements legs during visit. Concern about friction on left heel with this movement. Pt wears post-op shoe on left foot. His son Severo Overcast and daughter Georgie Pradhan are monitoring and changing outer dressings as needed.   10/19/22 Eschar debrided because

## 2023-06-02 ENCOUNTER — TELEPHONE (OUTPATIENT)
Dept: CARDIOLOGY CLINIC | Age: 68
End: 2023-06-02

## 2023-06-02 NOTE — TELEPHONE ENCOUNTER
Date of Procedure: Wednesday 7/5/23 @ [facility]     Time of arrival: 10:00 am     Procedure time: 11:30 am     Called and spoke to GERARD and he is agreeable to the newe date and time. Reviewed and sent [employer] instructions and he verbalized understanding. Encouraged to call with any questions or concerns. Updated on [employer] and e-mail to [place].

## 2023-06-07 ENCOUNTER — HOSPITAL ENCOUNTER (OUTPATIENT)
Dept: WOUND CARE | Age: 68
Discharge: HOME | End: 2023-06-07
Payer: MEDICARE

## 2023-06-07 VITALS
DIASTOLIC BLOOD PRESSURE: 80 MMHG | RESPIRATION RATE: 18 BRPM | TEMPERATURE: 98.2 F | SYSTOLIC BLOOD PRESSURE: 123 MMHG | HEART RATE: 87 BPM

## 2023-06-07 DIAGNOSIS — I63.9 CEREBROVASCULAR ACCIDENT (CVA), UNSPECIFIED MECHANISM (HCC): ICD-10-CM

## 2023-06-07 DIAGNOSIS — Z22.322 MRSA (METHICILLIN RESISTANT STAPH AUREUS) CULTURE POSITIVE: ICD-10-CM

## 2023-06-07 DIAGNOSIS — S91.302A NON-HEALING OPEN WOUND OF LEFT HEEL: ICD-10-CM

## 2023-06-07 DIAGNOSIS — G81.94 LEFT HEMIPARESIS (HCC): ICD-10-CM

## 2023-06-07 DIAGNOSIS — R25.2 SPASTICITY: ICD-10-CM

## 2023-06-07 DIAGNOSIS — L89.620 PRESSURE ULCER OF HEEL, LEFT, UNSTAGEABLE (HCC): ICD-10-CM

## 2023-06-07 DIAGNOSIS — L89.623 PRESSURE ULCER OF LEFT HEEL, STAGE 3 (HCC): Primary | ICD-10-CM

## 2023-06-07 PROCEDURE — 11042 DBRDMT SUBQ TIS 1ST 20SQCM/<: CPT

## 2023-06-07 RX ORDER — BETAMETHASONE DIPROPIONATE 0.05 %
OINTMENT (GRAM) TOPICAL ONCE
OUTPATIENT
Start: 2023-06-07 | End: 2023-06-07

## 2023-06-07 RX ORDER — LIDOCAINE 50 MG/G
OINTMENT TOPICAL ONCE
OUTPATIENT
Start: 2023-06-07 | End: 2023-06-07

## 2023-06-07 RX ORDER — IBUPROFEN 200 MG
TABLET ORAL ONCE
OUTPATIENT
Start: 2023-06-07 | End: 2023-06-07

## 2023-06-07 RX ORDER — LIDOCAINE 50 MG/G
OINTMENT TOPICAL ONCE
Status: COMPLETED | OUTPATIENT
Start: 2023-06-07 | End: 2023-06-07

## 2023-06-07 RX ORDER — LIDOCAINE HYDROCHLORIDE 20 MG/ML
JELLY TOPICAL ONCE
OUTPATIENT
Start: 2023-06-07 | End: 2023-06-07

## 2023-06-07 RX ORDER — CLOBETASOL PROPIONATE 0.5 MG/G
OINTMENT TOPICAL ONCE
OUTPATIENT
Start: 2023-06-07 | End: 2023-06-07

## 2023-06-07 RX ORDER — LIDOCAINE HYDROCHLORIDE 40 MG/ML
SOLUTION TOPICAL ONCE
OUTPATIENT
Start: 2023-06-07 | End: 2023-06-07

## 2023-06-07 RX ORDER — GENTAMICIN SULFATE 1 MG/G
OINTMENT TOPICAL ONCE
OUTPATIENT
Start: 2023-06-07 | End: 2023-06-07

## 2023-06-07 RX ORDER — BACITRACIN ZINC AND POLYMYXIN B SULFATE 500; 1000 [USP'U]/G; [USP'U]/G
OINTMENT TOPICAL ONCE
OUTPATIENT
Start: 2023-06-07 | End: 2023-06-07

## 2023-06-07 RX ORDER — GINSENG 100 MG
CAPSULE ORAL ONCE
OUTPATIENT
Start: 2023-06-07 | End: 2023-06-07

## 2023-06-07 RX ORDER — LIDOCAINE 40 MG/G
CREAM TOPICAL ONCE
OUTPATIENT
Start: 2023-06-07 | End: 2023-06-07

## 2023-06-07 RX ADMIN — LIDOCAINE: 50 OINTMENT TOPICAL at 08:17

## 2023-06-07 ASSESSMENT — PAIN DESCRIPTION - DESCRIPTORS: DESCRIPTORS: DISCOMFORT

## 2023-06-07 ASSESSMENT — PAIN DESCRIPTION - ONSET: ONSET: ON-GOING

## 2023-06-07 ASSESSMENT — PAIN - FUNCTIONAL ASSESSMENT: PAIN_FUNCTIONAL_ASSESSMENT: ACTIVITIES ARE NOT PREVENTED

## 2023-06-07 ASSESSMENT — PAIN SCALES - GENERAL
PAINLEVEL_OUTOF10: 1
PAINLEVEL_OUTOF10: 0

## 2023-06-07 ASSESSMENT — PAIN DESCRIPTION - ORIENTATION: ORIENTATION: LEFT

## 2023-06-07 ASSESSMENT — PAIN DESCRIPTION - FREQUENCY: FREQUENCY: INTERMITTENT

## 2023-06-07 ASSESSMENT — PAIN DESCRIPTION - LOCATION: LOCATION: FOOT

## 2023-06-07 ASSESSMENT — PAIN DESCRIPTION - PAIN TYPE: TYPE: CHRONIC PAIN

## 2023-06-08 NOTE — PROGRESS NOTES
Lidocaine Ointment Topical       Debridement: Excisional Debridement    Using curette and forceps the wound(s)/ulcer(s) was/were debrided down through and including the removal of epidermis, dermis, and subcutaneous tissue. Devitalized Tissue Debrided:  fibrin, biofilm, and slough    Pre Debridement Measurements:  Are located in the [place]  Documentation Flow Sheet    Diabetic/Pressure/Non Pressure Ulcers only:  Ulcer: Pressure ulcer, Stage 3     Wound/Ulcer #: 1    Post Debridement Measurements:  Wound/Ulcer Descriptions are Pre Debridement except measurements:    Wound 08/08/22 Heel Left #1 ( states since about 6/24/2022) (Active)   Wound Image   06/07/23 0805   Wound Etiology Pressure Stage 3 04/05/23 0813   Dressing Status Old drainage noted 05/31/23 0819   Wound Cleansed Vashe 01/25/23 1045   Dressing/Treatment Other (comment); Moisten with saline;Alginate;Gauze dressing/dressing sponge;Roll gauze 06/07/23 0845   Offloading for Diabetic Foot Ulcers Post op shoe 05/24/23 0855   Wound Length (cm) 0.2 cm 06/07/23 0805   Wound Width (cm) 0.2 cm 06/07/23 0805   Wound Depth (cm) 0.3 cm 06/07/23 0805   Wound Surface Area (cm^2) 0.04 cm^2 06/07/23 0805   Change in Wound Size % (l*w) 99.77 06/07/23 0805   Wound Volume (cm^3) 0.012 cm^3 06/07/23 0805   Wound Healing % 99 06/07/23 0805   Post-Procedure Length (cm) 0.3 cm 06/07/23 0841   Post-Procedure Width (cm) 0.3 cm 06/07/23 0841   Post-Procedure Depth (cm) 0.4 cm 06/07/23 0841   Post-Procedure Surface Area (cm^2) 0.09 cm^2 06/07/23 0841   Post-Procedure Volume (cm^3) 0.036 cm^3 06/07/23 0841   Undermining Starts ___ O'Clock 6 02/01/23 0815   Undermining Ends___ O'Clock 12 02/01/23 0815   Undermining Maxium Distance (cm) .7 02/01/23 0815   Wound Assessment Dry;Slough 06/07/23 0805   Drainage Amount Small 06/07/23 0805   Drainage Description Purulent;Yellow; Thick 06/07/23 0805   Odor None 06/07/23 0805   Anahy-wound Assessment Hyperkeratosis (callous); Dry/flaky

## 2023-06-19 ENCOUNTER — TELEPHONE (OUTPATIENT)
Dept: PRIMARY CARE CLINIC | Age: 68
End: 2023-06-19

## 2023-06-19 RX ORDER — HYDRALAZINE HYDROCHLORIDE 50 MG/1
50 TABLET, FILM COATED ORAL 3 TIMES DAILY
Qty: 270 TABLET | Refills: 1 | Status: SHIPPED | OUTPATIENT
Start: 2023-06-19

## 2023-06-19 RX ORDER — AMLODIPINE BESYLATE 10 MG/1
10 TABLET ORAL DAILY
Qty: 90 TABLET | Refills: 1 | Status: SHIPPED | OUTPATIENT
Start: 2023-06-19

## 2023-06-19 NOTE — TELEPHONE ENCOUNTER
PT called in requesting a refill for his Amlodipine. Please send to [facility].      Best call back number: [phone]    LOV: 3/27/23  NOV: 6/27/23

## 2023-06-21 ENCOUNTER — HOSPITAL ENCOUNTER (OUTPATIENT)
Dept: WOUND CARE | Age: 68
Discharge: HOME | End: 2023-06-21
Payer: MEDICARE

## 2023-06-21 VITALS
DIASTOLIC BLOOD PRESSURE: 80 MMHG | TEMPERATURE: 98.1 F | RESPIRATION RATE: 18 BRPM | SYSTOLIC BLOOD PRESSURE: 119 MMHG | HEART RATE: 81 BPM

## 2023-06-21 DIAGNOSIS — Z22.322 MRSA (METHICILLIN RESISTANT STAPH AUREUS) CULTURE POSITIVE: ICD-10-CM

## 2023-06-21 DIAGNOSIS — R25.2 SPASTICITY: ICD-10-CM

## 2023-06-21 DIAGNOSIS — L89.623 PRESSURE ULCER OF LEFT HEEL, STAGE 3 (HCC): ICD-10-CM

## 2023-06-21 DIAGNOSIS — S91.302A NON-HEALING OPEN WOUND OF LEFT HEEL: Primary | ICD-10-CM

## 2023-06-21 DIAGNOSIS — L89.620 PRESSURE ULCER OF HEEL, LEFT, UNSTAGEABLE (HCC): ICD-10-CM

## 2023-06-21 DIAGNOSIS — I63.9 CEREBROVASCULAR ACCIDENT (CVA), UNSPECIFIED MECHANISM (HCC): Chronic | ICD-10-CM

## 2023-06-21 DIAGNOSIS — G81.94 LEFT HEMIPARESIS (HCC): ICD-10-CM

## 2023-06-21 PROCEDURE — 99213 OFFICE O/P EST LOW 20 MIN: CPT

## 2023-06-21 RX ORDER — BACITRACIN ZINC AND POLYMYXIN B SULFATE 500; 1000 [USP'U]/G; [USP'U]/G
OINTMENT TOPICAL ONCE
OUTPATIENT
Start: 2023-06-21 | End: 2023-06-21

## 2023-06-21 RX ORDER — LIDOCAINE HYDROCHLORIDE 20 MG/ML
JELLY TOPICAL ONCE
OUTPATIENT
Start: 2023-06-21 | End: 2023-06-21

## 2023-06-21 RX ORDER — LIDOCAINE 50 MG/G
OINTMENT TOPICAL ONCE
OUTPATIENT
Start: 2023-06-21 | End: 2023-06-21

## 2023-06-21 RX ORDER — LIDOCAINE HYDROCHLORIDE 40 MG/ML
SOLUTION TOPICAL ONCE
OUTPATIENT
Start: 2023-06-21 | End: 2023-06-21

## 2023-06-21 RX ORDER — GENTAMICIN SULFATE 1 MG/G
OINTMENT TOPICAL ONCE
OUTPATIENT
Start: 2023-06-21 | End: 2023-06-21

## 2023-06-21 RX ORDER — IBUPROFEN 200 MG
TABLET ORAL ONCE
OUTPATIENT
Start: 2023-06-21 | End: 2023-06-21

## 2023-06-21 RX ORDER — BETAMETHASONE DIPROPIONATE 0.05 %
OINTMENT (GRAM) TOPICAL ONCE
OUTPATIENT
Start: 2023-06-21 | End: 2023-06-21

## 2023-06-21 RX ORDER — LIDOCAINE HYDROCHLORIDE 40 MG/ML
SOLUTION TOPICAL ONCE
Status: COMPLETED | OUTPATIENT
Start: 2023-06-21 | End: 2023-06-21

## 2023-06-21 RX ORDER — GINSENG 100 MG
CAPSULE ORAL ONCE
OUTPATIENT
Start: 2023-06-21 | End: 2023-06-21

## 2023-06-21 RX ORDER — LIDOCAINE 40 MG/G
CREAM TOPICAL ONCE
OUTPATIENT
Start: 2023-06-21 | End: 2023-06-21

## 2023-06-21 RX ORDER — CLOBETASOL PROPIONATE 0.5 MG/G
OINTMENT TOPICAL ONCE
OUTPATIENT
Start: 2023-06-21 | End: 2023-06-21

## 2023-06-21 RX ADMIN — LIDOCAINE HYDROCHLORIDE 5 ML: 40 SOLUTION TOPICAL at 08:21

## 2023-06-21 ASSESSMENT — PAIN SCALES - GENERAL
PAINLEVEL_OUTOF10: 0
PAINLEVEL_OUTOF10: 0

## 2023-06-21 NOTE — PROGRESS NOTES
rest, repositioning  Associated Signs/Symptoms: edema and drainage     Ulcer Identification:  Ulcer Type: pressure ulcer     Contributing Factors: edema; while hospitalized developed wound from chronic pressure and decreased mobility     Acute Wound: N/A not an acute wound          PAST MEDICAL HISTORY        Diagnosis Date    CAD (coronary artery disease)     HTN (hypertension)     Hyperlipemia     Left hemiparesis ([address] 8/11/2020    MRSA (methicillin resistant staph aureus) culture positive 9/14/2022    heel    Non-healing open wound of left heel 10/12/2022    Pressure injury of left ankle, stage 2 ([address] 9/13/2021    Pressure ulcer of heel, left, unstageable ([address] 8/8/2022    Stable eschar covered.     Pressure ulcer of left heel, stage 3 ([address] 10/20/2022    S/P PTCA (percutaneous transluminal coronary angioplasty) 2010    two stents place    Spasticity 7/9/2019    Stroke ([address] 10/2012    left-sided weakness       PAST SURGICAL HISTORY    Past Surgical History:   Procedure Laterality Date    CARDIAC SURGERY      HIP SURGERY Left     INGUINAL HERNIA REPAIR      right side    INTRACAPSULAR CATARACT EXTRACTION Right 08/30/2019    PHACOEMULSIFICATION WITH INTRAOCULAR LENS IMPLANT performed by Isa Kay MD at [address] [place], [place] [address]vd EXTRACTION Left 09/06/2019    PHACOEMULSIFICATION WITH INTRAOCULAR LENS IMPLANT performed by Isa Kay MD at [facility]    Family History   Problem Relation Age of Onset    Hypertension Mother     Heart Disease Father     Breast Cancer Sister     Heart Attack Brother        SOCIAL HISTORY    Social History     Tobacco Use    Smoking status: Never    Smokeless tobacco: Never   Vaping Use    Vaping Use: Never used   Substance Use Topics    Alcohol use: No    Drug use: No       ALLERGIES    No Known Allergies    MEDICATIONS    Current Outpatient Medications on File Prior to Encounter   Medication Sig Dispense Refill    hydrALAZINE

## 2023-06-23 RX ORDER — TIZANIDINE 4 MG/1
4 TABLET ORAL EVERY 8 HOURS PRN
Qty: 90 TABLET | Refills: 1 | Status: SHIPPED | OUTPATIENT
Start: 2023-06-23

## 2023-06-27 ENCOUNTER — OFFICE VISIT (OUTPATIENT)
Dept: PRIMARY CARE CLINIC | Age: 68
End: 2023-06-27
Payer: MEDICARE

## 2023-06-27 VITALS — RESPIRATION RATE: 18 BRPM | SYSTOLIC BLOOD PRESSURE: 117 MMHG | DIASTOLIC BLOOD PRESSURE: 87 MMHG | HEART RATE: 98 BPM

## 2023-06-27 DIAGNOSIS — I69.30 HISTORY OF HEMORRHAGIC CEREBROVASCULAR ACCIDENT (CVA) WITH RESIDUAL DEFICIT: ICD-10-CM

## 2023-06-27 DIAGNOSIS — I25.10 CORONARY ARTERY DISEASE INVOLVING NATIVE CORONARY ARTERY OF NATIVE HEART WITHOUT ANGINA PECTORIS: ICD-10-CM

## 2023-06-27 DIAGNOSIS — E78.5 HYPERLIPIDEMIA LDL GOAL <70: ICD-10-CM

## 2023-06-27 DIAGNOSIS — Z98.61 S/P PTCA (PERCUTANEOUS TRANSLUMINAL CORONARY ANGIOPLASTY): ICD-10-CM

## 2023-06-27 DIAGNOSIS — I10 ESSENTIAL HYPERTENSION: ICD-10-CM

## 2023-06-27 DIAGNOSIS — L89.623 PRESSURE ULCER OF LEFT HEEL, STAGE 3 (HCC): ICD-10-CM

## 2023-06-27 DIAGNOSIS — I48.19 PERSISTENT ATRIAL FIBRILLATION (HCC): ICD-10-CM

## 2023-06-27 PROCEDURE — 1123F ACP DISCUSS/DSCN MKR DOCD: CPT | Performed by: FAMILY MEDICINE

## 2023-06-27 PROCEDURE — 99214 OFFICE O/P EST MOD 30 MIN: CPT | Performed by: FAMILY MEDICINE

## 2023-06-27 PROCEDURE — 1036F TOBACCO NON-USER: CPT | Performed by: FAMILY MEDICINE

## 2023-06-27 PROCEDURE — 3074F SYST BP LT 130 MM HG: CPT | Performed by: FAMILY MEDICINE

## 2023-06-27 PROCEDURE — 3017F COLORECTAL CA SCREEN DOC REV: CPT | Performed by: FAMILY MEDICINE

## 2023-06-27 PROCEDURE — 3079F DIAST BP 80-89 MM HG: CPT | Performed by: FAMILY MEDICINE

## 2023-06-27 PROCEDURE — G8427 DOCREV CUR MEDS BY ELIG CLIN: HCPCS | Performed by: FAMILY MEDICINE

## 2023-06-27 PROCEDURE — G8417 CALC BMI ABV UP PARAM F/U: HCPCS | Performed by: FAMILY MEDICINE

## 2023-06-27 RX ORDER — ATORVASTATIN CALCIUM 40 MG/1
40 TABLET, FILM COATED ORAL DAILY
Qty: 90 TABLET | Refills: 3 | Status: SHIPPED | OUTPATIENT
Start: 2023-06-27

## 2023-06-27 ASSESSMENT — ENCOUNTER SYMPTOMS
TROUBLE SWALLOWING: 0
ABDOMINAL PAIN: 0
BLOOD IN STOOL: 0
CONSTIPATION: 0
DIARRHEA: 0
SHORTNESS OF BREATH: 0
VOICE CHANGE: 0

## 2023-06-27 ASSESSMENT — PATIENT HEALTH QUESTIONNAIRE - PHQ9
SUM OF ALL RESPONSES TO PHQ9 QUESTIONS 1 & 2: 0
SUM OF ALL RESPONSES TO PHQ QUESTIONS 1-9: 0
SUM OF ALL RESPONSES TO PHQ QUESTIONS 1-9: 0
1. LITTLE INTEREST OR PLEASURE IN DOING THINGS: 0
SUM OF ALL RESPONSES TO PHQ QUESTIONS 1-9: 0
SUM OF ALL RESPONSES TO PHQ QUESTIONS 1-9: 0
2. FEELING DOWN, DEPRESSED OR HOPELESS: 0

## 2023-06-28 ENCOUNTER — HOSPITAL ENCOUNTER (OUTPATIENT)
Dept: WOUND CARE | Age: 68
Discharge: HOME | End: 2023-06-28
Payer: MEDICARE

## 2023-06-28 VITALS
HEART RATE: 90 BPM | TEMPERATURE: 97.3 F | RESPIRATION RATE: 16 BRPM | SYSTOLIC BLOOD PRESSURE: 128 MMHG | DIASTOLIC BLOOD PRESSURE: 84 MMHG

## 2023-06-28 DIAGNOSIS — L89.623 PRESSURE ULCER OF LEFT HEEL, STAGE 3 (HCC): ICD-10-CM

## 2023-06-28 DIAGNOSIS — I63.9 CEREBROVASCULAR ACCIDENT (CVA), UNSPECIFIED MECHANISM (HCC): ICD-10-CM

## 2023-06-28 DIAGNOSIS — R25.2 SPASTICITY: ICD-10-CM

## 2023-06-28 DIAGNOSIS — L89.620 PRESSURE ULCER OF HEEL, LEFT, UNSTAGEABLE (HCC): ICD-10-CM

## 2023-06-28 DIAGNOSIS — G81.94 LEFT HEMIPARESIS (HCC): ICD-10-CM

## 2023-06-28 DIAGNOSIS — Z22.322 MRSA (METHICILLIN RESISTANT STAPH AUREUS) CULTURE POSITIVE: ICD-10-CM

## 2023-06-28 DIAGNOSIS — S91.302A NON-HEALING OPEN WOUND OF LEFT HEEL: Primary | ICD-10-CM

## 2023-06-28 PROCEDURE — 99212 OFFICE O/P EST SF 10 MIN: CPT

## 2023-06-28 RX ORDER — BETAMETHASONE DIPROPIONATE 0.05 %
OINTMENT (GRAM) TOPICAL ONCE
Status: CANCELLED | OUTPATIENT
Start: 2023-06-28 | End: 2023-06-28

## 2023-06-28 RX ORDER — LIDOCAINE HYDROCHLORIDE 20 MG/ML
JELLY TOPICAL ONCE
Status: CANCELLED | OUTPATIENT
Start: 2023-06-28 | End: 2023-06-28

## 2023-06-28 RX ORDER — BACITRACIN ZINC AND POLYMYXIN B SULFATE 500; 1000 [USP'U]/G; [USP'U]/G
OINTMENT TOPICAL ONCE
Status: CANCELLED | OUTPATIENT
Start: 2023-06-28 | End: 2023-06-28

## 2023-06-28 RX ORDER — LIDOCAINE 50 MG/G
OINTMENT TOPICAL ONCE
Status: CANCELLED | OUTPATIENT
Start: 2023-06-28 | End: 2023-06-28

## 2023-06-28 RX ORDER — GENTAMICIN SULFATE 1 MG/G
OINTMENT TOPICAL ONCE
Status: CANCELLED | OUTPATIENT
Start: 2023-06-28 | End: 2023-06-28

## 2023-06-28 RX ORDER — LIDOCAINE 40 MG/G
CREAM TOPICAL ONCE
Status: CANCELLED | OUTPATIENT
Start: 2023-06-28 | End: 2023-06-28

## 2023-06-28 RX ORDER — IBUPROFEN 200 MG
TABLET ORAL ONCE
Status: CANCELLED | OUTPATIENT
Start: 2023-06-28 | End: 2023-06-28

## 2023-06-28 RX ORDER — GINSENG 100 MG
CAPSULE ORAL ONCE
Status: CANCELLED | OUTPATIENT
Start: 2023-06-28 | End: 2023-06-28

## 2023-06-28 RX ORDER — LIDOCAINE HYDROCHLORIDE 40 MG/ML
SOLUTION TOPICAL ONCE
Status: CANCELLED | OUTPATIENT
Start: 2023-06-28 | End: 2023-06-28

## 2023-06-28 RX ORDER — CLOBETASOL PROPIONATE 0.5 MG/G
OINTMENT TOPICAL ONCE
Status: CANCELLED | OUTPATIENT
Start: 2023-06-28 | End: 2023-06-28

## 2023-07-05 ENCOUNTER — HOSPITAL ENCOUNTER (OUTPATIENT)
Dept: CARDIAC CATH/INVASIVE PROCEDURES | Age: 68
Discharge: HOME | End: 2023-07-05
Payer: MEDICARE

## 2023-07-05 VITALS
TEMPERATURE: 97.3 F | SYSTOLIC BLOOD PRESSURE: 144 MMHG | DIASTOLIC BLOOD PRESSURE: 95 MMHG | WEIGHT: 220 LBS | OXYGEN SATURATION: 97 % | HEIGHT: 70 IN | BODY MASS INDEX: 31.5 KG/M2 | HEART RATE: 104 BPM | RESPIRATION RATE: 20 BRPM

## 2023-07-05 LAB
ANION GAP SERPL CALCULATED.3IONS-SCNC: 10 MMOL/L (ref 3–16)
BUN SERPL-MCNC: 22 MG/DL (ref 7–20)
CALCIUM SERPL-MCNC: 8.8 MG/DL (ref 8.3–10.6)
CHLORIDE SERPL-SCNC: 106 MMOL/L (ref 99–110)
CO2 SERPL-SCNC: 23 MMOL/L (ref 21–32)
CREAT SERPL-MCNC: 0.9 MG/DL (ref 0.8–1.3)
DEPRECATED RDW RBC AUTO: 14.5 % (ref 12.4–15.4)
EKG ATRIAL RATE: 65 BPM
EKG DIAGNOSIS: NORMAL
EKG Q-T INTERVAL: 368 MS
EKG QRS DURATION: 92 MS
EKG QTC CALCULATION (BAZETT): 479 MS
EKG R AXIS: 96 DEGREES
EKG T AXIS: -14 DEGREES
EKG VENTRICULAR RATE: 102 BPM
GFR SERPLBLD CREATININE-BSD FMLA CKD-EPI: >60 ML/MIN/{1.73_M2}
GLUCOSE SERPL-MCNC: 105 MG/DL (ref 70–99)
HCT VFR BLD AUTO: 44.7 % (ref 40.5–52.5)
HGB BLD-MCNC: 15 G/DL (ref 13.5–17.5)
LV EF: 60 %
LVEF MODALITY: NORMAL
MCH RBC QN AUTO: 30.8 PG (ref 26–34)
MCHC RBC AUTO-ENTMCNC: 33.6 G/DL (ref 31–36)
MCV RBC AUTO: 91.7 FL (ref 80–100)
PLATELET # BLD AUTO: 221 K/UL (ref 135–450)
PMV BLD AUTO: 9.3 FL (ref 5–10.5)
POC ACT LR: 179 SEC
POC ACT LR: 194 SEC
POTASSIUM SERPL-SCNC: 4.9 MMOL/L (ref 3.5–5.1)
RBC # BLD AUTO: 4.87 M/UL (ref 4.2–5.9)
SODIUM SERPL-SCNC: 139 MMOL/L (ref 136–145)
WBC # BLD AUTO: 8.3 K/UL (ref 4–11)

## 2023-07-05 PROCEDURE — 6360000004 HC RX CONTRAST MEDICATION: Performed by: INTERNAL MEDICINE

## 2023-07-05 PROCEDURE — C1769 GUIDE WIRE: HCPCS

## 2023-07-05 PROCEDURE — 93010 ELECTROCARDIOGRAM REPORT: CPT | Performed by: INTERNAL MEDICINE

## 2023-07-05 PROCEDURE — 99152 MOD SED SAME PHYS/QHP 5/>YRS: CPT

## 2023-07-05 PROCEDURE — 93458 L HRT ARTERY/VENTRICLE ANGIO: CPT | Performed by: INTERNAL MEDICINE

## 2023-07-05 PROCEDURE — 2709999900 HC NON-CHARGEABLE SUPPLY

## 2023-07-05 PROCEDURE — C1887 CATHETER, GUIDING: HCPCS

## 2023-07-05 PROCEDURE — 92928 PRQ TCAT PLMT NTRAC ST 1 LES: CPT | Performed by: INTERNAL MEDICINE

## 2023-07-05 PROCEDURE — 93458 L HRT ARTERY/VENTRICLE ANGIO: CPT

## 2023-07-05 PROCEDURE — C1874 STENT, COATED/COV W/DEL SYS: HCPCS

## 2023-07-05 PROCEDURE — 6370000000 HC RX 637 (ALT 250 FOR IP)

## 2023-07-05 PROCEDURE — 2500000003 HC RX 250 WO HCPCS

## 2023-07-05 PROCEDURE — 6360000002 HC RX W HCPCS

## 2023-07-05 PROCEDURE — 99153 MOD SED SAME PHYS/QHP EA: CPT

## 2023-07-05 PROCEDURE — 85027 COMPLETE CBC AUTOMATED: CPT

## 2023-07-05 PROCEDURE — C9600 PERC DRUG-EL COR STENT SING: HCPCS

## 2023-07-05 PROCEDURE — 80048 BASIC METABOLIC PNL TOTAL CA: CPT

## 2023-07-05 PROCEDURE — C1894 INTRO/SHEATH, NON-LASER: HCPCS

## 2023-07-05 PROCEDURE — 2580000003 HC RX 258

## 2023-07-05 PROCEDURE — 93005 ELECTROCARDIOGRAM TRACING: CPT | Performed by: INTERNAL MEDICINE

## 2023-07-05 PROCEDURE — 85347 COAGULATION TIME ACTIVATED: CPT

## 2023-07-05 PROCEDURE — C1725 CATH, TRANSLUMIN NON-LASER: HCPCS

## 2023-07-05 RX ORDER — ONDANSETRON 2 MG/ML
4 INJECTION INTRAMUSCULAR; INTRAVENOUS EVERY 6 HOURS PRN
Status: DISCONTINUED | OUTPATIENT
Start: 2023-07-05 | End: 2023-07-06 | Stop reason: HOSPADM

## 2023-07-05 RX ORDER — SODIUM CHLORIDE 0.9 % (FLUSH) 0.9 %
5-40 SYRINGE (ML) INJECTION EVERY 12 HOURS SCHEDULED
Status: DISCONTINUED | OUTPATIENT
Start: 2023-07-05 | End: 2023-07-05 | Stop reason: SDUPTHER

## 2023-07-05 RX ORDER — SODIUM CHLORIDE 0.9 % (FLUSH) 0.9 %
5-40 SYRINGE (ML) INJECTION PRN
Status: DISCONTINUED | OUTPATIENT
Start: 2023-07-05 | End: 2023-07-06 | Stop reason: HOSPADM

## 2023-07-05 RX ORDER — METOPROLOL SUCCINATE 50 MG/1
50 TABLET, EXTENDED RELEASE ORAL DAILY
Qty: 30 TABLET | Refills: 3 | Status: SHIPPED | OUTPATIENT
Start: 2023-07-05

## 2023-07-05 RX ORDER — SODIUM CHLORIDE 0.9 % (FLUSH) 0.9 %
5-40 SYRINGE (ML) INJECTION EVERY 12 HOURS SCHEDULED
Status: DISCONTINUED | OUTPATIENT
Start: 2023-07-05 | End: 2023-07-06 | Stop reason: HOSPADM

## 2023-07-05 RX ORDER — METHYLPREDNISOLONE SODIUM SUCCINATE 125 MG/2ML
125 INJECTION, POWDER, LYOPHILIZED, FOR SOLUTION INTRAMUSCULAR; INTRAVENOUS ONCE
Status: DISCONTINUED | OUTPATIENT
Start: 2023-07-05 | End: 2023-07-06 | Stop reason: HOSPADM

## 2023-07-05 RX ORDER — ACETAMINOPHEN 325 MG/1
650 TABLET ORAL EVERY 4 HOURS PRN
Status: DISCONTINUED | OUTPATIENT
Start: 2023-07-05 | End: 2023-07-06 | Stop reason: HOSPADM

## 2023-07-05 RX ORDER — SODIUM CHLORIDE 9 MG/ML
INJECTION, SOLUTION INTRAVENOUS CONTINUOUS
Status: DISCONTINUED | OUTPATIENT
Start: 2023-07-05 | End: 2023-07-06 | Stop reason: HOSPADM

## 2023-07-05 RX ORDER — SODIUM CHLORIDE 9 MG/ML
INJECTION, SOLUTION INTRAVENOUS PRN
Status: DISCONTINUED | OUTPATIENT
Start: 2023-07-05 | End: 2023-07-05 | Stop reason: SDUPTHER

## 2023-07-05 RX ORDER — DIPHENHYDRAMINE HYDROCHLORIDE 50 MG/ML
25 INJECTION INTRAMUSCULAR; INTRAVENOUS ONCE
Status: DISCONTINUED | OUTPATIENT
Start: 2023-07-05 | End: 2023-07-06 | Stop reason: HOSPADM

## 2023-07-05 RX ORDER — SODIUM CHLORIDE 9 MG/ML
INJECTION, SOLUTION INTRAVENOUS PRN
Status: DISCONTINUED | OUTPATIENT
Start: 2023-07-05 | End: 2023-07-06 | Stop reason: HOSPADM

## 2023-07-05 RX ORDER — SODIUM CHLORIDE 0.9 % (FLUSH) 0.9 %
5-40 SYRINGE (ML) INJECTION PRN
Status: DISCONTINUED | OUTPATIENT
Start: 2023-07-05 | End: 2023-07-05 | Stop reason: SDUPTHER

## 2023-07-05 RX ORDER — ASPIRIN 325 MG
325 TABLET ORAL ONCE
Status: DISCONTINUED | OUTPATIENT
Start: 2023-07-05 | End: 2023-07-06 | Stop reason: HOSPADM

## 2023-07-05 RX ADMIN — IOPAMIDOL 145 ML: 755 INJECTION, SOLUTION INTRAVENOUS at 14:10

## 2023-07-05 NOTE — H&P
enhancement  superior to the watch man device. Coronary artery calcification is seen, severe in the LAD. MARZENA 3/20/2023  There is a very small pericardial effusion at the start of the procedure. There is no evidence of thrombus within the left atrium or left atrial  appendage. LEN ostial measurements as follows  48 degrees 1.91cm  95 degrees 2.22cm  105 degrees 2.16cm  130 degrees 1.96cm  135 degrees 2.31cm  185 degrees 1.97cm  A 31mm Watchman device is successfully deployed into the left atrial  appendage. The device appears adequately compressed and there is no evidence  of leak by color flow. There is no change in the effusion upon completion of the procedure. Assessment and Plan      Atrial Fibrillation, Paroxysmal      Referring reason: Hemorrhagic CVA     CHADSvasc is at least 5 (Age,CVA,CAD,HTN)    HASbled is at least 4      S/[facility] 3/20/2023. Asymptomatic. Continue Asa and Plavix. Coronary artery disease  Continue Asa, Plavix and statin therapy. Severe coronary artery calcification noted on CTA. Severely stenotic LAD disease with possible proximal extension. In view of CTA results recommend coronary angiogram. Risks, benefits, expectations and alternative treatments discussed. The patient verbalizes understanding and agrees to proceed. Essential hypertension  Controlled. Continue current medical managgment. Hyperlipidemia  Continue statin therapy. Hemorrhagic CVA  Currently in a wheelchair and experiencing tremors. Thank you for allowing us to participate in the care of [facility]. Please do not hesitate to contact me if you have any questions.      Beth Whitfield MD, MPH

## 2023-07-05 NOTE — PROCEDURES
[facility]   Procedure Note    CLINICAL HISTORY AND INDICATIONS:       Kong Aldrich is a 76 y.o. male with a history of coronary artery disease. [id number]}. INFORMED CONSENT:      Informed consent was obtained from the patient and the family members. I explained to them risks and benefits of the procedure. I explained to them we will do this from either the common femoral artery access or radial access. I explained to the patient that we will use lidocaine for local anaesthesia and moderate sedation. I explained to them any risk of perforation of the vessel, stroke, heart attack, bleeding, death and myocardial infarction during the procedure. The patient understood the risks and benefits and gave informed consent and would like to go ahead with the procedure. Patient agreed to use dual antiplatelet therapy. PROCEDURES PERFORMED:     Left heart catheterization PCI    PROCEDURE TECHNIQUE:      Radial Access: The patient was approached from the right radial artery using a 5-6  Latvian slender sheath. Left coronary angiography was done using a Pete L3.5 diagnostic catheter. Right coronary angiography was done using a Pete R4 guide catheter. Left ventriculography was done using a pigtail catheter. COMPLICATIONS:  None. At the end of the procedure a radial band device was used for hemostasis. EBL: 20 cc    Moderate Sedation:    ASA 2  Mallampati 2    An independent trained observer pushed medications at my direction. We monitored the patient's level of consciousness and vital signs/physiologic status throughout the procedure duration (see start and start times above). HEMODYNAMICS:   LVEDP 15. There was no gradient between the left ventricle and aorta. ANGIOGRAM/CORONARY ARTERIOGRAM:         Right or Left dominant system    1.   The left main coronary artery arising normal fashion from the left coronary cusp giving rise to the left anterior descending artery

## 2023-07-05 NOTE — DISCHARGE INSTRUCTIONS
POST RADIAL ANGIOGRAM / INTERVENTION DISCHARGE INSTRUCTIONS      Activity:  You may drive in [place] unless instructed by your doctor   Resume normal activity in one week  Avoid lifting, pushing, or pulling more than 10 lbs. For one week. (A gallon of milk is 7 lbs)  You may walk at an easy pace on ground level  Plan rest periods during the day  Avoid tension and stress. Learn to manage your stress. Avoid work that increases muscle tension       (straining with bowel movements, moving furniture)  Do not make any important / legal decisions within 24 hours after procedure  Follow up with cardiac rehab [facility] phone # [phone])    Wound Care: You may shower, but no bathtubs, pools, or hot tubs for one week. Inspect area daily. Normal observations:  Soreness and tenderness that may last for one week, mild pink to red oozing from incision site for up to 24 hrs after discharge, bruising that could last up to two weeks. Remove dressing from wrist 24 hours after procedure. Clean wrist with soap and water. Dry area thoroughly. Apply bandaide for 48 hrs. NO lotion or powder.      Nutrition:   Low fat, low salt diet (guidelines for Heart Healthy eating)  Limit caffeine to 1-2 cups per day (coffee, tea, chocolate, soda)  Eat high fiber to avoid constipation and straining during bowel movements (fresh veggies and fruit, whole grain)  Limit alcohol to two servings a day ( 8 oz beer, 1 oz liquor, 4 oz wine)    Call your Doctor if you have:  A fast or a slow heartbeat  Problems or questions with your medications  Bleeding that is not stopped after holding pressure for 10 min  Increased swelling or bruising of the wrist  Unusual pain, numbness or tingling at the hand or down the arm  Any signs of infection ( redness, yellow drainage, swelling, pain, fever)  Unusual swelling of lower legs/feet, chest discomfort, unusual weakness or fatigue  IF experiencing any recurrent chest pain, arm or jaw pain, shortness of

## 2023-07-06 NOTE — PROGRESS NOTES
Call placed to patient to check on status post PCI 7/5/23. No answer. Message left for patient to call back if any issues.

## 2023-07-24 ENCOUNTER — TELEPHONE (OUTPATIENT)
Dept: CARDIOLOGY CLINIC | Age: 68
End: 2023-07-24

## 2023-07-24 NOTE — TELEPHONE ENCOUNTER
Ricky Calderón appt from 8/18/23 per Dr. Blanca Wells schedule and moved appt to 8/25/23    ov/west/ron/6 mo f/up/ Contact Odilon Schultz with any schedule change 6 Month F/U successful Watchman implant
show

## 2023-08-25 ENCOUNTER — OFFICE VISIT (OUTPATIENT)
Dept: CARDIOLOGY CLINIC | Age: 68
End: 2023-08-25

## 2023-08-25 ENCOUNTER — TELEPHONE (OUTPATIENT)
Dept: PRIMARY CARE CLINIC | Age: 68
End: 2023-08-25

## 2023-08-25 VITALS
DIASTOLIC BLOOD PRESSURE: 70 MMHG | HEIGHT: 70 IN | HEART RATE: 79 BPM | SYSTOLIC BLOOD PRESSURE: 110 MMHG | BODY MASS INDEX: 31.57 KG/M2 | OXYGEN SATURATION: 95 %

## 2023-08-25 DIAGNOSIS — I10 ESSENTIAL HYPERTENSION: Primary | ICD-10-CM

## 2023-08-25 RX ORDER — CHLORTHALIDONE 25 MG/1
25 TABLET ORAL DAILY
Qty: 30 TABLET | Refills: 3 | Status: SHIPPED | OUTPATIENT
Start: 2023-08-25

## 2023-08-25 RX ORDER — AMLODIPINE BESYLATE 10 MG/1
10 TABLET ORAL DAILY
Qty: 90 TABLET | Refills: 1 | Status: SHIPPED | OUTPATIENT
Start: 2023-08-25

## 2023-08-25 RX ORDER — AMLODIPINE BESYLATE 10 MG/1
10 TABLET ORAL DAILY
Qty: 90 TABLET | Refills: 1 | Status: SHIPPED | OUTPATIENT
Start: 2023-08-25 | End: 2023-08-25 | Stop reason: SDUPTHER

## 2023-08-25 NOTE — PROGRESS NOTES
[facility]  Cardiology Consult Note      Vladimir Weiss  1955, 76 y.o.      CC: \" CAD             Marilu Hayden MD:      HPI:   This is a 76 y.o. male with a PMH significant for CAD with APRIL in 2009, hypertension, hyperlipidemia and hemorrhagic CVA with tremors (off plavix at time of stroke). Mr. Trever Freeman originally came in to have a cardiologist nearby. He has known history of two-vessel stents over a decade ago. He is doing well with no angina. His cholesterol values are excellent. On today's visit his main issue was asymptomatic A-fib that was discovered on a routine EKG. This is an new arrhythmia for him patient has a chads Vasc score of greater than 4 and therefore needs to be anticoagulated. Unfortunately he has a history of spontaneous hemorrhagic stroke and therefore cannot be on anticoagulation. He is status post watchman  Past Medical History:   Diagnosis Date    CAD (coronary artery disease)     HTN (hypertension)     Hyperlipemia     Left hemiparesis ([address]) 8/11/2020    MRSA (methicillin resistant staph aureus) culture positive 9/14/2022    heel    Non-healing open wound of left heel 10/12/2022    Pressure injury of left ankle, stage 2 ([address]) 9/13/2021    Pressure ulcer of heel, left, unstageable ([address]) 8/8/2022    Stable eschar covered.     Pressure ulcer of left heel, stage 3 ([address]) 10/20/2022    S/P PTCA (percutaneous transluminal coronary angioplasty) 2010    two stents place    Spasticity 7/9/2019    Stroke ([address]) 10/2012    left-sided weakness      Past Surgical History:   Procedure Laterality Date    CARDIAC SURGERY      HIP SURGERY Left     INGUINAL HERNIA REPAIR      right side    INTRACAPSULAR CATARACT EXTRACTION Right 08/30/2019    PHACOEMULSIFICATION WITH INTRAOCULAR LENS IMPLANT performed by Marisel Patel MD at [facility] Left 09/06/2019    PHACOEMULSIFICATION WITH INTRAOCULAR LENS IMPLANT performed by Marisel Patel MD at

## 2023-08-25 NOTE — TELEPHONE ENCOUNTER
Patient would like handicap sticker letter, please mail to patient's address at [address] [place] [address]

## 2023-09-05 ENCOUNTER — TELEPHONE (OUTPATIENT)
Dept: CARDIOLOGY CLINIC | Age: 68
End: 2023-09-05

## 2023-09-05 NOTE — TELEPHONE ENCOUNTER
I called patient to discuss his DAPT therapy post Watchman. Patient saw Dr. Jerry Hernandez on 8/25/2023 and appears no mediation was changed at his 6 month appointment. I spoke with Dr. Leti Munoz regarding this and his orders was to stop taking BASA and remain on Plavix d/t his CAD history. Left message for patient to call me to inform him of this decision.

## 2023-09-06 NOTE — TELEPHONE ENCOUNTER
Spoke with patient regarding his 6 month appointment per Dr. Elizabet Mcintyre was ok to stop taking BASA and to remain on Plavix d/t patients CAD. Patient states understanding.

## 2023-09-20 RX ORDER — CLOPIDOGREL BISULFATE 75 MG/1
75 TABLET ORAL DAILY
Qty: 90 TABLET | Refills: 1 | Status: SHIPPED | OUTPATIENT
Start: 2023-09-20

## 2023-09-20 NOTE — TELEPHONE ENCOUNTER
Patient called the office regarding his Plavix refill.  He would like to use the current pharmacy:     [address], [address] [facility] [phone]

## 2023-09-20 NOTE — TELEPHONE ENCOUNTER
Requested Prescriptions     Pending Prescriptions Disp Refills    clopidogrel (PLAVIX) 75 MG tablet [Pharmacy Med Name: CLOPIDOGREL BISULFATE 75MG TABS] 90 tablet 0     Sig: TAKE ONE TABLET BY MOUTH ONE TIME A DAY          Number: 90    Refills: 1    Last Office Visit:  08/25/2023    Next Office Visit: None scheduled    Last Refill: 03/21/2023    Last Labs:  05/18/2023

## 2023-09-26 RX ORDER — TIZANIDINE 4 MG/1
4 TABLET ORAL EVERY 8 HOURS PRN
Qty: 90 TABLET | Refills: 2 | Status: SHIPPED | OUTPATIENT
Start: 2023-09-26

## 2023-09-27 ENCOUNTER — OFFICE VISIT (OUTPATIENT)
Dept: PRIMARY CARE CLINIC | Age: 68
End: 2023-09-27

## 2023-09-27 VITALS — SYSTOLIC BLOOD PRESSURE: 102 MMHG | HEART RATE: 68 BPM | DIASTOLIC BLOOD PRESSURE: 71 MMHG | RESPIRATION RATE: 18 BRPM

## 2023-09-27 DIAGNOSIS — I48.19 PERSISTENT ATRIAL FIBRILLATION (HCC): ICD-10-CM

## 2023-09-27 DIAGNOSIS — Z00.00 MEDICARE ANNUAL WELLNESS VISIT, SUBSEQUENT: ICD-10-CM

## 2023-09-27 DIAGNOSIS — I10 ESSENTIAL HYPERTENSION: Primary | ICD-10-CM

## 2023-09-27 DIAGNOSIS — G81.94 LEFT HEMIPARESIS (HCC): ICD-10-CM

## 2023-09-27 DIAGNOSIS — E78.5 HYPERLIPIDEMIA LDL GOAL <70: ICD-10-CM

## 2023-09-27 DIAGNOSIS — I69.30 HISTORY OF HEMORRHAGIC CEREBROVASCULAR ACCIDENT (CVA) WITH RESIDUAL DEFICIT: ICD-10-CM

## 2023-09-27 RX ORDER — HYDRALAZINE HYDROCHLORIDE 50 MG/1
50 TABLET, FILM COATED ORAL 2 TIMES DAILY
Qty: 180 TABLET | Refills: 1 | Status: SHIPPED | OUTPATIENT
Start: 2023-09-27

## 2023-09-27 ASSESSMENT — ENCOUNTER SYMPTOMS
BLOOD IN STOOL: 0
DIARRHEA: 0
CONSTIPATION: 0
TROUBLE SWALLOWING: 0
ABDOMINAL PAIN: 0
VOICE CHANGE: 0
SHORTNESS OF BREATH: 0

## 2023-09-27 ASSESSMENT — LIFESTYLE VARIABLES
HOW OFTEN DO YOU HAVE A DRINK CONTAINING ALCOHOL: NEVER
HOW MANY STANDARD DRINKS CONTAINING ALCOHOL DO YOU HAVE ON A TYPICAL DAY: PATIENT DOES NOT DRINK

## 2023-09-27 ASSESSMENT — PATIENT HEALTH QUESTIONNAIRE - PHQ9
SUM OF ALL RESPONSES TO PHQ QUESTIONS 1-9: 0
2. FEELING DOWN, DEPRESSED OR HOPELESS: 0
1. LITTLE INTEREST OR PLEASURE IN DOING THINGS: 0
SUM OF ALL RESPONSES TO PHQ9 QUESTIONS 1 & 2: 0
SUM OF ALL RESPONSES TO PHQ QUESTIONS 1-9: 0

## 2023-09-27 NOTE — PROGRESS NOTES
2023     Kong Aldrich (:  1955) is a 76 y.o. male, here for evaluation of the following medical concerns:    HPI  {Visit Chronic CHP (Optional):75829}    Review of Systems   Constitutional:  Negative for activity change. HENT:  Negative for trouble swallowing and voice change. Eyes:  Negative for visual disturbance. Respiratory:  Negative for shortness of breath. Cardiovascular:  Negative for chest pain and leg swelling. Gastrointestinal:  Negative for abdominal pain, blood in stool, constipation and diarrhea. Genitourinary:  Negative for difficulty urinating, dysuria, frequency, hematuria and scrotal swelling. Musculoskeletal:  Negative for arthralgias and myalgias. Skin:  Negative for rash. Neurological:  Negative for dizziness. Psychiatric/Behavioral:  Negative for behavioral problems. Prior to Visit Medications    Medication Sig Taking? Authorizing Provider   zoster recombinant adjuvanted vaccine (SHINGRIX) 50 MCG/0.5ML SUSR injection Inject 0.5 mLs into the muscle once for 1 dose Yes Fozia Everett MD   hydrALAZINE (APRESOLINE) 50 MG tablet Take 1 tablet by mouth 2 times daily Yes Fozia Everett MD   tiZANidine (ZANAFLEX) 4 MG tablet Take 1 tablet by mouth every 8 hours as needed (muscle pain) Yes Fozia Everett MD   clopidogrel (PLAVIX) 75 MG tablet Take 1 tablet by mouth daily Yes Bernadine Cockayne, MD   chlorthalidone (HYGROTON) 25 MG tablet Take 1 tablet by mouth daily Yes Bernadine Cockayne, MD   metoprolol succinate (TOPROL XL) 50 MG extended release tablet Take 1 tablet by mouth daily Yes Sae Montaño MD   atorvastatin (LIPITOR) 40 MG tablet Take 1 tablet by mouth daily Yes Fozia Everett MD   gabapentin (NEURONTIN) 100 MG capsule Take 1 capsule by mouth 4 times daily for 180 days.  Yes Fozia Everett MD   lisinopril (PRINIVIL;ZESTRIL) 20 MG tablet Take 1 tablet by mouth daily Yes Fozia Everett MD   acetaminophen (TYLENOL) 500 MG tablet Take 2 tablets by mouth

## 2023-09-30 NOTE — PROGRESS NOTES
Yet to be scheduled. Medicare Annual Wellness Visit    Stacy Padilla is here for Medicare AWV    Assessment & Plan   Essential hypertension  -Controlled. Continue hydralazine 50 mg twice daily, chlorthalidone 25 mg daily, Toprol-XL 50 mg daily and lisinopril 20 mg daily. -     hydrALAZINE (APRESOLINE) 50 MG tablet; Take 1 tablet by mouth 2 times daily, Disp-180 tablet, R-1Normal    Hyperlipidemia LDL goal <70  -Controlled. Continue Lipitor 40 mg daily. Persistent atrial fibrillation (HCC)  -Controlled. Continue beta-blocker. He opted for left atrial appendage closure  vs DOAC    History of hemorrhagic cerebrovascular accident (CVA) with residual deficit  Left hemiparesis (HCC)  -Continue statin and Plavix    Medicare annual wellness visit, subsequent  He lives by himself. He has no dementia, able to do basic activities of daily living., still works part-time    Recommendations for [employer] Due: see orders and patient instructions/AVS.  Recommended screening schedule for the next 5-10 years is provided to the patient in written form: see Patient Instructions/AVS.     No follow-ups on file. Subjective   Patient presented to the office for annual wellness visit and for regular follow-up. He has prior stroke in 2012 with left hemiparesis. He has coronary artery disease status post angioplasty 2010. He has hypertension  And takes hydralazine, chlorthalidone Toprol-XL and lisinopril blood pressure is fairly controlled. He has hyperlipidemia and reported to be compliant with his statin, takes Lipitor 40 mg daily. He was recently diagnosed to have paroxysmal A-fib declined long-term anticoagulation but opted for left atrial appendage closure. procedure yet to be scheduled he denies headache nausea vomiting. Denies chest pain or shortness of breath. Denies bowel or urinary disturbance.     Patient's complete Health Risk Assessment and screening values have been reviewed and are found in

## 2023-10-09 RX ORDER — LISINOPRIL 20 MG/1
20 TABLET ORAL DAILY
Qty: 90 TABLET | Refills: 1 | Status: SHIPPED | OUTPATIENT
Start: 2023-10-09

## 2023-11-06 RX ORDER — GABAPENTIN 100 MG/1
100 CAPSULE ORAL 4 TIMES DAILY
Qty: 360 CAPSULE | Refills: 1 | Status: SHIPPED | OUTPATIENT
Start: 2023-11-06 | End: 2024-05-04

## 2023-11-10 RX ORDER — METOPROLOL SUCCINATE 50 MG/1
50 TABLET, EXTENDED RELEASE ORAL DAILY
Qty: 90 TABLET | Refills: 1 | Status: SHIPPED | OUTPATIENT
Start: 2023-11-10

## 2023-12-26 SDOH — ECONOMIC STABILITY: FOOD INSECURITY: WITHIN THE PAST 12 MONTHS, YOU WORRIED THAT YOUR FOOD WOULD RUN OUT BEFORE YOU GOT MONEY TO BUY MORE.: NEVER TRUE

## 2023-12-26 SDOH — ECONOMIC STABILITY: FOOD INSECURITY: WITHIN THE PAST 12 MONTHS, THE FOOD YOU BOUGHT JUST DIDN'T LAST AND YOU DIDN'T HAVE MONEY TO GET MORE.: NEVER TRUE

## 2023-12-26 SDOH — ECONOMIC STABILITY: HOUSING INSECURITY
IN THE LAST 12 MONTHS, WAS THERE A TIME WHEN YOU DID NOT HAVE A STEADY PLACE TO SLEEP OR SLEPT IN A SHELTER (INCLUDING NOW)?: NO

## 2023-12-26 SDOH — ECONOMIC STABILITY: INCOME INSECURITY: HOW HARD IS IT FOR YOU TO PAY FOR THE VERY BASICS LIKE FOOD, HOUSING, MEDICAL CARE, AND HEATING?: SOMEWHAT HARD

## 2023-12-27 ENCOUNTER — OFFICE VISIT (OUTPATIENT)
Dept: PRIMARY CARE CLINIC | Age: 68
End: 2023-12-27
Payer: MEDICARE

## 2023-12-27 VITALS — SYSTOLIC BLOOD PRESSURE: 118 MMHG | DIASTOLIC BLOOD PRESSURE: 79 MMHG | HEART RATE: 65 BPM | RESPIRATION RATE: 18 BRPM

## 2023-12-27 DIAGNOSIS — I48.19 PERSISTENT ATRIAL FIBRILLATION (HCC): ICD-10-CM

## 2023-12-27 DIAGNOSIS — R73.09 ELEVATED GLUCOSE: ICD-10-CM

## 2023-12-27 DIAGNOSIS — E78.5 HYPERLIPIDEMIA LDL GOAL <70: ICD-10-CM

## 2023-12-27 DIAGNOSIS — I69.30 HISTORY OF HEMORRHAGIC CEREBROVASCULAR ACCIDENT (CVA) WITH RESIDUAL DEFICIT: ICD-10-CM

## 2023-12-27 DIAGNOSIS — G81.94 LEFT HEMIPARESIS (HCC): ICD-10-CM

## 2023-12-27 DIAGNOSIS — Z12.5 SCREENING PSA (PROSTATE SPECIFIC ANTIGEN): ICD-10-CM

## 2023-12-27 DIAGNOSIS — I10 ESSENTIAL HYPERTENSION: Primary | ICD-10-CM

## 2023-12-27 PROCEDURE — G8427 DOCREV CUR MEDS BY ELIG CLIN: HCPCS | Performed by: FAMILY MEDICINE

## 2023-12-27 PROCEDURE — 3017F COLORECTAL CA SCREEN DOC REV: CPT | Performed by: FAMILY MEDICINE

## 2023-12-27 PROCEDURE — G8417 CALC BMI ABV UP PARAM F/U: HCPCS | Performed by: FAMILY MEDICINE

## 2023-12-27 PROCEDURE — 99214 OFFICE O/P EST MOD 30 MIN: CPT | Performed by: FAMILY MEDICINE

## 2023-12-27 PROCEDURE — 1036F TOBACCO NON-USER: CPT | Performed by: FAMILY MEDICINE

## 2023-12-27 PROCEDURE — 3074F SYST BP LT 130 MM HG: CPT | Performed by: FAMILY MEDICINE

## 2023-12-27 PROCEDURE — 3078F DIAST BP <80 MM HG: CPT | Performed by: FAMILY MEDICINE

## 2023-12-27 PROCEDURE — G8484 FLU IMMUNIZE NO ADMIN: HCPCS | Performed by: FAMILY MEDICINE

## 2023-12-27 PROCEDURE — 1123F ACP DISCUSS/DSCN MKR DOCD: CPT | Performed by: FAMILY MEDICINE

## 2023-12-27 RX ORDER — ATORVASTATIN CALCIUM 40 MG/1
40 TABLET, FILM COATED ORAL DAILY
Qty: 90 TABLET | Refills: 1 | Status: SHIPPED | OUTPATIENT
Start: 2023-12-27

## 2023-12-28 NOTE — PROGRESS NOTES
2023     Jose Murphy (:  1955) is a 68 y.o. male, here for evaluation of the following medical concerns:    Patient is 68 years old white male A-fib, prior CVA with left hemiparesis, hypertension and hyperlipidemia presented to the office for regular follow-up..  His blood pressure is controlled with hydralazine, chlorthalidone, Toprol and lisinopril.  His hyperlipidemia is controlled with Lipitor 40 mg daily compliant with statin and tolerating medication without side effect.  Atrial fibrillation is controlled with the beta-blocker.  He underwent left atrial appendage closure.  He has left hemiparesis secondary to stroke but able to move around with a walker and able to work part-time.  He denies chest pain or shortness of breath.  Denies palpitation denies bowel or urinary disturbance.      Review of Systems   Constitutional:  Negative for activity change.   HENT:  Negative for trouble swallowing and voice change.    Eyes:  Negative for visual disturbance.   Respiratory:  Negative for shortness of breath.    Cardiovascular:  Negative for chest pain and leg swelling.   Gastrointestinal:  Negative for abdominal pain, blood in stool, constipation and diarrhea.   Genitourinary:  Negative for difficulty urinating, dysuria, frequency, hematuria and scrotal swelling.   Musculoskeletal:  Negative for arthralgias and myalgias.   Skin:  Negative for rash.   Neurological:  Negative for dizziness.   Psychiatric/Behavioral:  Negative for behavioral problems.        Prior to Visit Medications    Medication Sig Taking? Authorizing Provider   atorvastatin (LIPITOR) 40 MG tablet Take 1 tablet by mouth daily Yes Pedro Hernandez MD   hydrALAZINE (APRESOLINE) 50 MG tablet Take 1 tablet by mouth 2 times daily Yes Pedro Hernandez MD   metoprolol succinate (TOPROL XL) 50 MG extended release tablet Take 1 tablet by mouth daily  Pedro Hernandez MD   gabapentin (NEURONTIN) 100 MG capsule Take 1 capsule by mouth 4

## 2024-01-02 NOTE — PATIENT INSTRUCTIONS
GENERAL OFFICE POLICIES        Telephone Calls: Messages will be answered within 1-2 business days, unless the provider is out of the office.  If it is urgent a covering provider will answer. (this does not include Medication refills).      MyChart:  We recommend all patients sign up for [employer].  Through this portal you can see your lab results, request refills, schedule appointments, pay your bill and send messages to the office.   [employer] messages will be answered within 1-2 business days unless the provider is out of the office.  For urgent matters, please call the office.    Appointments:  All appointments must be scheduled.  We ask all patients to schedule their next follow up appointment before they leave the office to make sure you will be able to be seen before you run out of medications.  24 hours' notice is required to cancel or reschedule an appointment to avoid being marked as a no show.  You may be dismissed from the practice after 3 no shows.      LATE for Appointment: If you are 15 or more minutes late for your appointment, you may be asked to reschedule.    MA/LAB APPTS: Must be scheduled, cannot accept walk in lab visits.  We only draw labs for patients established in our office.  We only do injections for medications ordered by our office.    Acute Sick Visits:  Nothing other than acute complaint will be addressed at this visit.    TRADITIONAL MEDICARE DOES NOT COVER PHYSICALS  MEDICARE WELLNESS VISITS: These are NOT physicals, but the free annual visit offered by Medicare to discuss wellness issues. Medication refills, checkups, etc. will not be addressed during this visit.    Medication Refills: Refills are handled electronically; please contact your pharmacy for refills even if current refills have been exhausted. If you are on a controlled medication, you will be referred to a specialist (pain specialist, psychiatry, etc).     Forms: There is a $35 fee to fill out FMLA/Disability paperwork,

## 2024-01-05 RX ORDER — CHLORTHALIDONE 25 MG/1
25 TABLET ORAL DAILY
Qty: 30 TABLET | Refills: 3 | Status: SHIPPED | OUTPATIENT
Start: 2024-01-05

## 2024-02-12 NOTE — TELEPHONE ENCOUNTER
Medication:   Requested Prescriptions     Pending Prescriptions Disp Refills    amLODIPine (NORVASC) 10 MG tablet 90 tablet 3     Sig: Take 1 tablet by mouth daily        Last Filled:      Patient Phone Number: [phone] (home)     Last appt: 9/1/2022   Next appt: Visit date not found    Last OARRS: No flowsheet data found.
Patent

## 2024-02-13 RX ORDER — TIZANIDINE 4 MG/1
4 TABLET ORAL EVERY 8 HOURS PRN
Qty: 90 TABLET | Refills: 2 | Status: SHIPPED | OUTPATIENT
Start: 2024-02-13

## 2024-03-11 ENCOUNTER — HOSPITAL ENCOUNTER (OUTPATIENT)
Dept: WOUND CARE | Age: 69
Discharge: HOME | End: 2024-03-11
Attending: NURSE PRACTITIONER
Payer: MEDICARE

## 2024-03-11 VITALS
TEMPERATURE: 97 F | SYSTOLIC BLOOD PRESSURE: 129 MMHG | DIASTOLIC BLOOD PRESSURE: 86 MMHG | HEART RATE: 90 BPM | RESPIRATION RATE: 18 BRPM

## 2024-03-11 DIAGNOSIS — Z22.322 MRSA (METHICILLIN RESISTANT STAPH AUREUS) CULTURE POSITIVE: ICD-10-CM

## 2024-03-11 DIAGNOSIS — S91.302A NON-HEALING OPEN WOUND OF LEFT HEEL: Primary | ICD-10-CM

## 2024-03-11 DIAGNOSIS — I63.9 CEREBROVASCULAR ACCIDENT (CVA), UNSPECIFIED MECHANISM (HCC): ICD-10-CM

## 2024-03-11 DIAGNOSIS — B37.2 SKIN YEAST INFECTION: ICD-10-CM

## 2024-03-11 DIAGNOSIS — L89.620 PRESSURE ULCER OF HEEL, LEFT, UNSTAGEABLE (HCC): ICD-10-CM

## 2024-03-11 DIAGNOSIS — G81.94 LEFT HEMIPARESIS (HCC): ICD-10-CM

## 2024-03-11 DIAGNOSIS — L89.623 PRESSURE ULCER OF LEFT HEEL, STAGE 3 (HCC): ICD-10-CM

## 2024-03-11 DIAGNOSIS — R25.2 SPASTICITY: ICD-10-CM

## 2024-03-11 PROCEDURE — 99202 OFFICE O/P NEW SF 15 MIN: CPT | Performed by: NURSE PRACTITIONER

## 2024-03-11 PROCEDURE — 99213 OFFICE O/P EST LOW 20 MIN: CPT

## 2024-03-11 RX ORDER — CLOBETASOL PROPIONATE 0.5 MG/G
OINTMENT TOPICAL ONCE
OUTPATIENT
Start: 2024-03-11 | End: 2024-03-11

## 2024-03-11 RX ORDER — IBUPROFEN 200 MG
TABLET ORAL ONCE
OUTPATIENT
Start: 2024-03-11 | End: 2024-03-11

## 2024-03-11 RX ORDER — LIDOCAINE HYDROCHLORIDE 20 MG/ML
JELLY TOPICAL ONCE
OUTPATIENT
Start: 2024-03-11 | End: 2024-03-11

## 2024-03-11 RX ORDER — SODIUM CHLOR/HYPOCHLOROUS ACID 0.033 %
SOLUTION, IRRIGATION IRRIGATION ONCE
OUTPATIENT
Start: 2024-03-11 | End: 2024-03-11

## 2024-03-11 RX ORDER — TRIAMCINOLONE ACETONIDE 1 MG/G
OINTMENT TOPICAL ONCE
OUTPATIENT
Start: 2024-03-11 | End: 2024-03-11

## 2024-03-11 RX ORDER — LIDOCAINE HYDROCHLORIDE 40 MG/ML
SOLUTION TOPICAL ONCE
OUTPATIENT
Start: 2024-03-11 | End: 2024-03-11

## 2024-03-11 RX ORDER — GENTAMICIN SULFATE 1 MG/G
OINTMENT TOPICAL ONCE
OUTPATIENT
Start: 2024-03-11 | End: 2024-03-11

## 2024-03-11 RX ORDER — GINSENG 100 MG
CAPSULE ORAL ONCE
OUTPATIENT
Start: 2024-03-11 | End: 2024-03-11

## 2024-03-11 RX ORDER — BETAMETHASONE DIPROPIONATE 0.5 MG/G
CREAM TOPICAL ONCE
OUTPATIENT
Start: 2024-03-11 | End: 2024-03-11

## 2024-03-11 RX ORDER — BACITRACIN ZINC AND POLYMYXIN B SULFATE 500; 1000 [USP'U]/G; [USP'U]/G
OINTMENT TOPICAL ONCE
OUTPATIENT
Start: 2024-03-11 | End: 2024-03-11

## 2024-03-11 RX ORDER — LIDOCAINE 50 MG/G
OINTMENT TOPICAL ONCE
OUTPATIENT
Start: 2024-03-11 | End: 2024-03-11

## 2024-03-11 RX ORDER — LIDOCAINE 40 MG/G
CREAM TOPICAL ONCE
OUTPATIENT
Start: 2024-03-11 | End: 2024-03-11

## 2024-03-11 ASSESSMENT — PAIN SCALES - GENERAL: PAINLEVEL_OUTOF10: 0

## 2024-03-11 NOTE — DISCHARGE INSTRUCTIONS
[facility] Wound Care Physician Orders and Discharge Instructions  [facility]  [address], [address]  [place], [place] [address]  Telephone: [phone]      FAX [phone]  MONDAY - THURSDAY 8:00 am - 4:30 pm and Friday 8:00 am - 12:00 pm.        NAME:  Jose Murphy  YOB: 1955  MEDICAL RECORD NUMBER:  [id number]  DATE:  3/11/2024      Return Appointment:  [x] Return Appointment: As Needed  [] Wound and dressing supply provider:   [] ECF or Home Healthcare:  [] Wound Assessment: [] Physician or NP scheduled for Wound Assessment:   [] Orders placed during your visit:      Important Reminders:   Please wash hands with soap and water before and after every dressing change.  Do not scrub wounds.  Keep wounds dry in shower unless otherwise instructed by the physician.  SMOKING can slow would healing. Stop smoking as soon as possible to improve healing and prevent further complications associated with smoking.      Anahy-Wound Topical Treatments:  Do not apply lotions, creams, or ointments to wound bed unless directed.   [] Apply moisturizing lotion to skin surrounding the wound prior to dressing change.  [] Apply antifungal ointment to skin surrounding the wound prior to dressing change.  [] Apply thin film of no sting moisture barrier ointment to skin immediately around      wound.  [] Other:       Wound Location: Right Plantar Foot    Wound Cleansing: Normal Saline    Primary Dressing:  [x] Miconazole Powder  []     Secondary Dressing:  [x] ABD Pad- Placed in shoe  []       Dressing Frequency:  [x] Daily         Pressure Relief and Off Loading:  [x] Off-loading when [x] walking   in [x]bed [x] sitting   Turn every 2 hours when in bed   Avoid putting direct pressure on the site of the wound. Limit side lying to 30 degree tilt. Limit elevating the head of the bed greater than 30 degrees.                                      [x] Assistive Devices   Quad Cane;

## 2024-03-12 NOTE — PROGRESS NOTES
[facility]   Progress Note and Procedure Note      Jose Murphy  MEDICAL RECORD NUMBER:  [id number]  AGE: 69 y.o.   GENDER: male  : 1955  EPISODE DATE:  3/11/2024    Subjective:     Chief Complaint   Patient presents with    Wound Check     Initial visit for right foot wound. Itching and peeled a layer of skin on heel.          HISTORY of PRESENT ILLNESS HPI     Jose Murphy is a 69 y.o. male who presents today for wound/ulcer evaluation.   History of Wound Context: one week ago  Wound/Ulcer Pain Timing/Severity: mild  Quality of pain: tender  Severity:  0 / 10   Modifying Factors: None  Associated Signs/Symptoms:  dry, cracking skin  on plantar aspect of right heel with no open wound but pink tissue at area he states he peeled skin off.    Ulcer Identification:  Ulcer Type:  yeast overgrowth, dry cracking skin    Contributing Factors: none    Acute Wound: Other: skin peeling    PAST MEDICAL HISTORY        Diagnosis Date    CAD (coronary artery disease)     HTN (hypertension)     Hyperlipemia     Left hemiparesis ([facility]) 2020    MRSA (methicillin resistant staph aureus) culture positive 2022    heel    Non-healing open wound of left heel 10/12/2022    Pressure injury of left ankle, stage 2 ([facility]) 2021    Pressure ulcer of heel, left, unstageable ([facility]) 2022    Stable eschar covered.    Pressure ulcer of left heel, stage 3 ([facility]) 10/20/2022    S/P PTCA (percutaneous transluminal coronary angioplasty) 2010    two stents place    Spasticity 2019    Stroke ([facility]) 10/2012    left-sided weakness       PAST SURGICAL HISTORY    Past Surgical History:   Procedure Laterality Date    CARDIAC SURGERY      HIP SURGERY Left     INGUINAL HERNIA REPAIR      right side    INTRACAPSULAR CATARACT EXTRACTION Right 2019    PHACOEMULSIFICATION WITH INTRAOCULAR LENS IMPLANT performed by Maximiliano Byers MD at [facility]    INTRACAPSULAR CATARACT EXTRACTION Left

## 2024-04-02 ENCOUNTER — TELEPHONE (OUTPATIENT)
Dept: PRIMARY CARE CLINIC | Age: 69
End: 2024-04-02

## 2024-04-02 DIAGNOSIS — E78.5 HYPERLIPIDEMIA LDL GOAL <70: ICD-10-CM

## 2024-04-02 DIAGNOSIS — Z13.1 SCREENING FOR DIABETES MELLITUS: ICD-10-CM

## 2024-04-02 DIAGNOSIS — Z12.5 SCREENING PSA (PROSTATE SPECIFIC ANTIGEN): ICD-10-CM

## 2024-04-02 DIAGNOSIS — Z13.29 SCREENING FOR THYROID DISORDER: ICD-10-CM

## 2024-04-02 DIAGNOSIS — I48.19 PERSISTENT ATRIAL FIBRILLATION (HCC): ICD-10-CM

## 2024-04-02 DIAGNOSIS — I10 ESSENTIAL HYPERTENSION: Primary | ICD-10-CM

## 2024-04-02 DIAGNOSIS — I10 ESSENTIAL HYPERTENSION: ICD-10-CM

## 2024-04-02 LAB
ALBUMIN SERPL-MCNC: 4.4 G/DL (ref 3.4–5)
ALBUMIN/GLOB SERPL: 1.6 {RATIO} (ref 1.1–2.2)
ALP SERPL-CCNC: 70 U/L (ref 40–129)
ALT SERPL-CCNC: 28 U/L (ref 10–40)
ANION GAP SERPL CALCULATED.3IONS-SCNC: 13 MMOL/L (ref 3–16)
AST SERPL-CCNC: 21 U/L (ref 15–37)
BILIRUB SERPL-MCNC: 0.7 MG/DL (ref 0–1)
BUN SERPL-MCNC: 24 MG/DL (ref 7–20)
CALCIUM SERPL-MCNC: 9.4 MG/DL (ref 8.3–10.6)
CHLORIDE SERPL-SCNC: 101 MMOL/L (ref 99–110)
CHOLEST SERPL-MCNC: 127 MG/DL (ref 0–199)
CO2 SERPL-SCNC: 29 MMOL/L (ref 21–32)
CREAT SERPL-MCNC: 1.1 MG/DL (ref 0.8–1.3)
DEPRECATED RDW RBC AUTO: 14.2 % (ref 12.4–15.4)
GFR SERPLBLD CREATININE-BSD FMLA CKD-EPI: 73 ML/MIN/{1.73_M2}
GLUCOSE SERPL-MCNC: 91 MG/DL (ref 70–99)
HCT VFR BLD AUTO: 48.2 % (ref 40.5–52.5)
HDLC SERPL-MCNC: 35 MG/DL (ref 40–60)
HGB BLD-MCNC: 16.4 G/DL (ref 13.5–17.5)
LDLC SERPL CALC-MCNC: 79 MG/DL
MCH RBC QN AUTO: 31.5 PG (ref 26–34)
MCHC RBC AUTO-ENTMCNC: 34.1 G/DL (ref 31–36)
MCV RBC AUTO: 92.5 FL (ref 80–100)
PLATELET # BLD AUTO: 185 K/UL (ref 135–450)
PMV BLD AUTO: 9.8 FL (ref 5–10.5)
POTASSIUM SERPL-SCNC: 4.2 MMOL/L (ref 3.5–5.1)
PROT SERPL-MCNC: 7.1 G/DL (ref 6.4–8.2)
PSA SERPL DL<=0.01 NG/ML-MCNC: 3.73 NG/ML (ref 0–4)
RBC # BLD AUTO: 5.21 M/UL (ref 4.2–5.9)
SODIUM SERPL-SCNC: 143 MMOL/L (ref 136–145)
T4 FREE SERPL-MCNC: 1.1 NG/DL (ref 0.9–1.8)
TRIGL SERPL-MCNC: 64 MG/DL (ref 0–150)
TSH SERPL DL<=0.005 MIU/L-ACNC: 1.88 UIU/ML (ref 0.27–4.2)
VLDLC SERPL CALC-MCNC: 13 MG/DL
WBC # BLD AUTO: 6.9 K/UL (ref 4–11)

## 2024-04-12 RX ORDER — LISINOPRIL 20 MG/1
20 TABLET ORAL DAILY
Qty: 90 TABLET | Refills: 1 | Status: SHIPPED | OUTPATIENT
Start: 2024-04-12

## 2024-04-14 ASSESSMENT — PATIENT HEALTH QUESTIONNAIRE - PHQ9
1. LITTLE INTEREST OR PLEASURE IN DOING THINGS: NOT AT ALL
SUM OF ALL RESPONSES TO PHQ QUESTIONS 1-9: 0
SUM OF ALL RESPONSES TO PHQ9 QUESTIONS 1 & 2: 0
2. FEELING DOWN, DEPRESSED OR HOPELESS: NOT AT ALL
SUM OF ALL RESPONSES TO PHQ9 QUESTIONS 1 & 2: 0
SUM OF ALL RESPONSES TO PHQ QUESTIONS 1-9: 0
SUM OF ALL RESPONSES TO PHQ QUESTIONS 1-9: 0
2. FEELING DOWN, DEPRESSED OR HOPELESS: NOT AT ALL
SUM OF ALL RESPONSES TO PHQ QUESTIONS 1-9: 0
1. LITTLE INTEREST OR PLEASURE IN DOING THINGS: NOT AT ALL

## 2024-04-15 ENCOUNTER — OFFICE VISIT (OUTPATIENT)
Dept: PRIMARY CARE CLINIC | Age: 69
End: 2024-04-15
Payer: MEDICARE

## 2024-04-15 VITALS — DIASTOLIC BLOOD PRESSURE: 80 MMHG | RESPIRATION RATE: 18 BRPM | SYSTOLIC BLOOD PRESSURE: 124 MMHG | HEART RATE: 72 BPM

## 2024-04-15 DIAGNOSIS — I10 ESSENTIAL HYPERTENSION: Primary | ICD-10-CM

## 2024-04-15 DIAGNOSIS — I25.110 CORONARY ARTERY DISEASE INVOLVING NATIVE CORONARY ARTERY OF NATIVE HEART WITH UNSTABLE ANGINA PECTORIS (HCC): ICD-10-CM

## 2024-04-15 DIAGNOSIS — E78.5 HYPERLIPIDEMIA LDL GOAL <70: ICD-10-CM

## 2024-04-15 DIAGNOSIS — Z98.61 S/P PTCA (PERCUTANEOUS TRANSLUMINAL CORONARY ANGIOPLASTY): ICD-10-CM

## 2024-04-15 DIAGNOSIS — B35.3 TINEA PEDIS OF RIGHT FOOT: ICD-10-CM

## 2024-04-15 DIAGNOSIS — I69.30 HISTORY OF HEMORRHAGIC CEREBROVASCULAR ACCIDENT (CVA) WITH RESIDUAL DEFICIT: ICD-10-CM

## 2024-04-15 DIAGNOSIS — G81.94 LEFT HEMIPARESIS (HCC): ICD-10-CM

## 2024-04-15 DIAGNOSIS — I48.19 PERSISTENT ATRIAL FIBRILLATION (HCC): ICD-10-CM

## 2024-04-15 PROCEDURE — 99214 OFFICE O/P EST MOD 30 MIN: CPT | Performed by: FAMILY MEDICINE

## 2024-04-15 PROCEDURE — 1036F TOBACCO NON-USER: CPT | Performed by: FAMILY MEDICINE

## 2024-04-15 PROCEDURE — 3079F DIAST BP 80-89 MM HG: CPT | Performed by: FAMILY MEDICINE

## 2024-04-15 PROCEDURE — 3017F COLORECTAL CA SCREEN DOC REV: CPT | Performed by: FAMILY MEDICINE

## 2024-04-15 PROCEDURE — 3074F SYST BP LT 130 MM HG: CPT | Performed by: FAMILY MEDICINE

## 2024-04-15 PROCEDURE — 1123F ACP DISCUSS/DSCN MKR DOCD: CPT | Performed by: FAMILY MEDICINE

## 2024-04-15 PROCEDURE — G8427 DOCREV CUR MEDS BY ELIG CLIN: HCPCS | Performed by: FAMILY MEDICINE

## 2024-04-15 PROCEDURE — G8417 CALC BMI ABV UP PARAM F/U: HCPCS | Performed by: FAMILY MEDICINE

## 2024-04-15 RX ORDER — ASPIRIN 81 MG/1
81 TABLET, CHEWABLE ORAL DAILY
COMMUNITY

## 2024-04-15 RX ORDER — CLOTRIMAZOLE AND BETAMETHASONE DIPROPIONATE 10; .64 MG/G; MG/G
CREAM TOPICAL 2 TIMES DAILY
Qty: 45 G | Refills: 1 | Status: SHIPPED | OUTPATIENT
Start: 2024-04-15

## 2024-04-15 RX ORDER — ATORVASTATIN CALCIUM 80 MG/1
80 TABLET, FILM COATED ORAL DAILY
Qty: 90 TABLET | Refills: 1 | Status: SHIPPED | OUTPATIENT
Start: 2024-04-15

## 2024-04-15 RX ORDER — ASPIRIN 81 MG/1
81 TABLET ORAL DAILY
Qty: 90 TABLET | Refills: 0 | COMMUNITY
Start: 2024-04-15

## 2024-04-15 ASSESSMENT — ENCOUNTER SYMPTOMS
DIARRHEA: 0
BLOOD IN STOOL: 0
ABDOMINAL PAIN: 0
CONSTIPATION: 0
SHORTNESS OF BREATH: 0
TROUBLE SWALLOWING: 0
VOICE CHANGE: 0

## 2024-04-15 NOTE — PROGRESS NOTES
4/15/2024     Jose Murphy (:  1955) is a 69 y.o. male, here for evaluation of the following medical concerns:    HPI  Patient is 69 years white male medical history significant for hypertension, hyperlipidemia, coronary artery disease, status post PTCA, persistent A-fib status post watchman placement, history of hemorrhagic stroke and left hemiparesis.  Patient presented to the office for regular follow-up.  He complained of athlete's foot at the bottom of right foot and requested medication.  He is otherwise stable with current regimen.  He denies chest pain denies palpitations denies shortness of breath.  Denies bowel or urinary disturbance.    Review of Systems   Constitutional:  Negative for activity change.   HENT:  Negative for trouble swallowing and voice change.    Eyes:  Negative for visual disturbance.   Respiratory:  Negative for shortness of breath.    Cardiovascular:  Negative for chest pain and leg swelling.   Gastrointestinal:  Negative for abdominal pain, blood in stool, constipation and diarrhea.   Genitourinary:  Negative for difficulty urinating, dysuria, frequency, hematuria and scrotal swelling.   Musculoskeletal:  Negative for arthralgias and myalgias.   Skin:  Negative for rash.   Neurological:  Negative for dizziness.   Psychiatric/Behavioral:  Negative for behavioral problems.        Prior to Visit Medications    Medication Sig Taking? Authorizing Provider   aspirin 81 MG chewable tablet Take 1 tablet by mouth daily Yes Provider, MD Flo   atorvastatin (LIPITOR) 80 MG tablet Take 1 tablet by mouth daily Yes Pedro Hernandez MD   aspirin 81 MG EC tablet Take 1 tablet by mouth daily Yes Pedro Hernandez MD   clotrimazole-betamethasone (LOTRISONE) 1-0.05 % cream Apply topically 2 times daily Apply to affected areas 2x/day for up to 2 weeks. Yes Pedro Hernandez MD   lisinopril (PRINIVIL;ZESTRIL) 20 MG tablet Take 1 tablet by mouth daily  Pedro Hernandez MD

## 2024-04-15 NOTE — PATIENT INSTRUCTIONS
GENERAL OFFICE POLICIES        Telephone Calls: Messages will be answered within 1-2 business days, unless the provider is out of the office.  If it is urgent a covering provider will answer. (this does not include Medication refills).      MyChart:  We recommend all patients sign up for [email or web address].  Through this portal you can see your lab results, request refills, schedule appointments, pay your bill and send messages to the office.   [email or web address] messages will be answered within 1-2 business days unless the provider is out of the office.  For urgent matters, please call the office.    Appointments:  All appointments must be scheduled.  We ask all patients to schedule their next follow up appointment before they leave the office to make sure you will be able to be seen before you run out of medications.  24 hours' notice is required to cancel or reschedule an appointment to avoid being marked as a no show.  You may be dismissed from the practice after 3 no shows.      LATE for Appointment: If you are 15 or more minutes late for your appointment, you may be asked to reschedule.    MA/LAB APPTS: Must be scheduled, cannot accept walk in lab visits.  We only draw labs for patients established in our office.  We only do injections for medications ordered by our office.    Acute Sick Visits:  Nothing other than acute complaint will be addressed at this visit.    TRADITIONAL MEDICARE DOES NOT COVER PHYSICALS  MEDICARE WELLNESS VISITS: These are NOT physicals, but the free annual visit offered by Medicare to discuss wellness issues. Medication refills, checkups, etc. will not be addressed during this visit.    Medication Refills: Refills are handled electronically; please contact your pharmacy for refills even if current refills have been exhausted. If you are on a controlled medication, you will be referred to a specialist (pain specialist, psychiatry, etc).     Forms: There is a $35 fee to fill out FMLA/Disability paperwork,

## 2024-05-06 RX ORDER — CHLORTHALIDONE 25 MG/1
25 TABLET ORAL DAILY
Qty: 30 TABLET | Refills: 3 | Status: SHIPPED | OUTPATIENT
Start: 2024-05-06

## 2024-05-07 RX ORDER — GABAPENTIN 100 MG/1
100 CAPSULE ORAL 4 TIMES DAILY
Qty: 360 CAPSULE | Refills: 1 | Status: SHIPPED | OUTPATIENT
Start: 2024-05-07 | End: 2024-11-03

## 2024-05-13 RX ORDER — METOPROLOL SUCCINATE 50 MG/1
50 TABLET, EXTENDED RELEASE ORAL DAILY
Qty: 90 TABLET | Refills: 1 | Status: SHIPPED | OUTPATIENT
Start: 2024-05-13

## 2024-05-13 NOTE — TELEPHONE ENCOUNTER
Medication:   Requested Prescriptions     Pending Prescriptions Disp Refills    metoprolol succinate (TOPROL XL) 50 MG extended release tablet [Pharmacy Med Name: METOPROLOL SUCCINATE ER 50MG TB24] 90 tablet 1     Sig: TAKE ONE TABLET BY MOUTH ONCE A DAY        Last Filled:      Patient Phone Number: [phone] (home)     Last appt: 4/15/2024   Next appt: 7/15/2024    Last OARRS:        No data to display

## 2024-06-28 RX ORDER — TIZANIDINE 4 MG/1
4 TABLET ORAL EVERY 8 HOURS PRN
Qty: 90 TABLET | Refills: 2 | Status: SHIPPED | OUTPATIENT
Start: 2024-06-28

## 2024-07-08 ENCOUNTER — TELEPHONE (OUTPATIENT)
Dept: PRIMARY CARE CLINIC | Age: 69
End: 2024-07-08

## 2024-07-08 NOTE — TELEPHONE ENCOUNTER
A letter was written 8/25/23- less than a year ago- when patient was here for an office visit.     It was written for 5 years.   What did  he do with the letter?  Did he get the DMV placard then?       I called pt.  Twice.    Each time I called, someone picked up, then hung up on me immediately.       If patient calls back, please try to get this information from  him.

## 2024-07-08 NOTE — TELEPHONE ENCOUNTER
Patient called he needs new prescription for handicap placard  Please mail to pt when complete  Last appt 4/24  Next visit 7/15/24

## 2024-07-15 ENCOUNTER — OFFICE VISIT (OUTPATIENT)
Dept: PRIMARY CARE CLINIC | Age: 69
End: 2024-07-15

## 2024-07-15 VITALS
HEART RATE: 72 BPM | RESPIRATION RATE: 18 BRPM | SYSTOLIC BLOOD PRESSURE: 126 MMHG | OXYGEN SATURATION: 98 % | DIASTOLIC BLOOD PRESSURE: 80 MMHG

## 2024-07-15 DIAGNOSIS — I10 ESSENTIAL HYPERTENSION: Primary | ICD-10-CM

## 2024-07-15 DIAGNOSIS — I25.110 CORONARY ARTERY DISEASE INVOLVING NATIVE CORONARY ARTERY OF NATIVE HEART WITH UNSTABLE ANGINA PECTORIS (HCC): ICD-10-CM

## 2024-07-15 DIAGNOSIS — I69.30 HISTORY OF HEMORRHAGIC CEREBROVASCULAR ACCIDENT (CVA) WITH RESIDUAL DEFICIT: ICD-10-CM

## 2024-07-15 DIAGNOSIS — Z98.61 S/P PTCA (PERCUTANEOUS TRANSLUMINAL CORONARY ANGIOPLASTY): ICD-10-CM

## 2024-07-15 DIAGNOSIS — I48.19 PERSISTENT ATRIAL FIBRILLATION (HCC): ICD-10-CM

## 2024-07-15 DIAGNOSIS — G81.94 LEFT HEMIPARESIS (HCC): ICD-10-CM

## 2024-07-15 DIAGNOSIS — E78.5 HYPERLIPIDEMIA LDL GOAL <70: ICD-10-CM

## 2024-07-20 ASSESSMENT — ENCOUNTER SYMPTOMS
BLOOD IN STOOL: 0
VOICE CHANGE: 0
SHORTNESS OF BREATH: 0
DIARRHEA: 0
CONSTIPATION: 0
TROUBLE SWALLOWING: 0
ABDOMINAL PAIN: 0

## 2024-07-20 NOTE — PROGRESS NOTES
rebound.   Musculoskeletal:         General: Normal range of motion.      Cervical back: Neck supple.   Skin:     General: Skin is warm.      Findings: No rash.   Neurological:      Mental Status: He is alert and oriented to person, place, and time.   Psychiatric:         Behavior: Behavior normal.         ASSESSMENT/PLAN:  1. Essential hypertension  Controlled.  Continue hydralazine 50 mg twice daily, Toprol-XL 50 mg daily, lisinopril 20 mg daily and chlorthalidone 25 mg daily.    2. Hyperlipidemia LDL goal <70  Controlled.  Continue Lipitor now at 80 mg daily.    3. Persistent atrial fibrillation (HCC)  Controlled.  Continue beta-blocker Toprol-XL 50 mg daily, cannot take blood thinners due to history of hemorrhagic stroke hence he underwent Watchman placement    4. Coronary artery disease involving native coronary artery of native heart with unstable angina pectoris (HCC)  5. S/P PTCA (percutaneous transluminal coronary angioplasty)  Stable.  Denies angina and no sign of heart failure.  Continue current medication.    6. History of hemorrhagic cerebrovascular accident (CVA) with residual deficit  7. 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  Continue aspirin and a statin      RTC in 3 mos    An electronic signature was used to authenticate this note.    --HONEY ABRAHAM MD on 7/20/2024 at 4:57 PM

## 2024-07-31 ENCOUNTER — TELEPHONE (OUTPATIENT)
Dept: PRIMARY CARE CLINIC | Age: 69
End: 2024-07-31

## 2024-07-31 NOTE — TELEPHONE ENCOUNTER
He misplaced his Handicap letter. A new one can be faxed to them at Fax [phone] fax cover letter must be included.

## 2024-08-01 DIAGNOSIS — I10 ESSENTIAL HYPERTENSION: ICD-10-CM

## 2024-08-01 RX ORDER — HYDRALAZINE HYDROCHLORIDE 50 MG/1
50 TABLET, FILM COATED ORAL 2 TIMES DAILY
Qty: 180 TABLET | Refills: 1 | Status: SHIPPED | OUTPATIENT
Start: 2024-08-01

## 2024-08-02 NOTE — DISCHARGE INSTRUCTIONS
[facility] Physician Orders and Discharge Instructions  [address] [place] [address], [address], [address]  Telephone: [phone]  [address] 8:30 am - 4:30 pm and Friday 8:30 am - 1:00 pm.          NAME:  Julianna Chew  YOB: 1955  MEDICAL RECORD NUMBER:  [id number]  TODAYS DATE:  12/21/22         Please wash hands with soap and water prior to and right after  every dressing change          Topical Treatments:              [x] Apply around the wound:   [x] moisturizing lotion TO LEFT LEG AND FOOT THAT IS NOT CLOSE TO WOUND         WOUND LOCATION   Left Heel **NUSHEILD #2 ( PURAPLY X 1 = TOTAL 3 GRAFTS) FIRST APPLIED 11/2/2022**     May rinse wounds with 0.9% saline TO CLARE WOUND ONLY- DO NOT [address] GRAFT, AKA THE GREEN SHEET   Do NOT SCRUB WOUND. Keep wounds dry in the shower unless otherwise instructed by the physician.         PRIMARY DRESSING:                                [x]     ENDOFORM AM    SECONDARY DRESSING:                     [X] PLAIN ALGINATE              [x]   SMALL OPTILOCK                                COMPRESSION:                    (X)    2 LAYER WRAP     HOW OFTEN TO CHANGE DRESSINGS:                   [x] LEAVE DRESSING IN PLACE UNTIL NEXT WEEK          TRY AND WEAR COMPRESSION STOCKING TO RIGHT LEG     _________________________________________________________________  PRESSURE RELIEF AND OFF-LOADING:     Off-Loading:   With heel protector  [x] Off-loading when       [x] in bed         [x] sitting                             Specialty equipment ordered:       [] Wheelchair cushion    [] Specialty Bed/Mattress     [x] Assistive Device: please continue to use any assistive devices advised by the provider discussed during your visit   [x] Surgical shoe    [] Podus Boot(s)   [] Foam Boot(s)    [] CROW Boot     _____________________________________________________________________________________________     Dietary:  Continue your diet as tolerated. Eat heart-healthy foods. These foods include vegetables, fruits, nuts, beans, lean meat, fish, and whole grains. Limit sodium, alcohol, and sugar. Protein is a teresa nutrient in helping to repair damaged tissue and promote new tissue growth. Good sources of protein are milk, yogurt, cheese, fish, lean meat, and beans. If you are a diabetic, having diabetes can make it hard for wounds to heal. So try to keep your blood sugar in its target range.  __________________________________________________________________________________________________     ACTIVITY:   Activity as tolerated  ________________________________________________________________________________________________________________     PAIN:     Please note, A small amount of pain, drainage and/or bleeding from this process might be expected, and is NORMAL. Elevate the affected limb. Use over-the-counter medications you would normally use for pain as permitted by your family doctor. For persistent pain not relieved by the above interventions, please call your family doctor.  ________________________________________________________________________________  Call your doctor now or seek immediate medical care if:    You have symptoms of infection, such as: Increased pain, swelling, warmth, or redness. Red streaks leading from the area. Pus draining from the area. A fever. _______________________________________________________________________     Return Appointment:  DME/Wound Dressing Supplies Provided by:  [facility]  (Supply companies distribute one month supply at a time. Please call them directly to reorder supplies when you run out)     ECF or Home Healthcare: Your Home Care Agency is responsible to order your supplies.      Return Appointment: With Tracey Suárez CNP  in 1 Week(s)                  [x] Orders placed during your visit:   CULTURE OBTAINED 11/30/22, results reviewed with patient      :  Kiki Buckley Electronically signed by Wang Hay RN on 12/21/2022 at 9:43 AM         [address] Information: Should you experience any significant chnges in your wound(s) or have questions about your wound care, please contact the [address] [place] [address] at [address] 8:30 am - 4:30 pm and Friday 8:30 am - 1:00 pm.  If you need help with your wound outside these hours and cannot wait until we are again available, contact your PCP or go to the hospital emergency room. PLEASE NOTE: IF YOU ARE UNABLE TO OBTAIN WOUND SUPPLIES, CONTINUE TO USE THE SUPPLIES YOU HAVE AVAILABLE UNTIL YOU ARE ABLE TO REACH US. KEEP THE WOUND COVERED AT ALL TIMES. PAST SURGICAL HISTORY:  No significant past surgical history

## 2024-08-08 ENCOUNTER — TELEPHONE (OUTPATIENT)
Dept: PRIMARY CARE CLINIC | Age: 69
End: 2024-08-08

## 2024-08-08 DIAGNOSIS — L98.9 SKIN ABNORMALITY: Primary | ICD-10-CM

## 2024-08-08 NOTE — TELEPHONE ENCOUNTER
PT called in stating that his daughter noticed a spot on his back near his left shoulder that's dark black in color, no pain and redness associated with it.     Pt would like to know if Dr. Hernandez could place a referral for dermatology.     PT also said he'd send a picture of it through his MyChart.     Best call back number: [phone]

## 2024-09-09 RX ORDER — CHLORTHALIDONE 25 MG/1
25 TABLET ORAL DAILY
Qty: 30 TABLET | Refills: 3 | OUTPATIENT
Start: 2024-09-09

## 2024-09-09 RX ORDER — CHLORTHALIDONE 25 MG/1
25 TABLET ORAL DAILY
Qty: 90 TABLET | Refills: 1 | Status: SHIPPED | OUTPATIENT
Start: 2024-09-09

## 2024-10-18 RX ORDER — LISINOPRIL 20 MG/1
20 TABLET ORAL DAILY
Qty: 90 TABLET | Refills: 1 | Status: SHIPPED | OUTPATIENT
Start: 2024-10-18

## 2024-10-21 ENCOUNTER — OFFICE VISIT (OUTPATIENT)
Dept: PRIMARY CARE CLINIC | Age: 69
End: 2024-10-21

## 2024-10-21 VITALS
HEART RATE: 86 BPM | TEMPERATURE: 97.3 F | OXYGEN SATURATION: 94 % | DIASTOLIC BLOOD PRESSURE: 79 MMHG | BODY MASS INDEX: 35.22 KG/M2 | HEIGHT: 70 IN | SYSTOLIC BLOOD PRESSURE: 118 MMHG | WEIGHT: 246 LBS

## 2024-10-21 DIAGNOSIS — I69.30 HISTORY OF HEMORRHAGIC CEREBROVASCULAR ACCIDENT (CVA) WITH RESIDUAL DEFICIT: ICD-10-CM

## 2024-10-21 DIAGNOSIS — M19.041 ARTHRITIS OF FINGER OF RIGHT HAND: Primary | ICD-10-CM

## 2024-10-21 DIAGNOSIS — E66.01 MORBID (SEVERE) OBESITY DUE TO EXCESS CALORIES: ICD-10-CM

## 2024-10-21 DIAGNOSIS — I25.110 CORONARY ARTERY DISEASE INVOLVING NATIVE CORONARY ARTERY OF NATIVE HEART WITH UNSTABLE ANGINA PECTORIS (HCC): ICD-10-CM

## 2024-10-21 DIAGNOSIS — M19.041 ARTHRITIS OF FINGER OF RIGHT HAND: ICD-10-CM

## 2024-10-21 DIAGNOSIS — G81.94 LEFT HEMIPARESIS (HCC): ICD-10-CM

## 2024-10-21 DIAGNOSIS — Z98.61 S/P PTCA (PERCUTANEOUS TRANSLUMINAL CORONARY ANGIOPLASTY): ICD-10-CM

## 2024-10-21 DIAGNOSIS — E78.5 HYPERLIPIDEMIA LDL GOAL <70: ICD-10-CM

## 2024-10-21 DIAGNOSIS — I10 ESSENTIAL HYPERTENSION: ICD-10-CM

## 2024-10-21 DIAGNOSIS — I48.19 PERSISTENT ATRIAL FIBRILLATION (HCC): ICD-10-CM

## 2024-10-21 LAB
BASOPHILS # BLD: 0 K/UL (ref 0–0.2)
BASOPHILS NFR BLD: 0.4 %
DEPRECATED RDW RBC AUTO: 14.1 % (ref 12.4–15.4)
EOSINOPHIL # BLD: 0.1 K/UL (ref 0–0.6)
EOSINOPHIL NFR BLD: 1.2 %
ERYTHROCYTE [SEDIMENTATION RATE] IN BLOOD BY WESTERGREN METHOD: 25 MM/HR (ref 0–20)
HCT VFR BLD AUTO: 45 % (ref 40.5–52.5)
HGB BLD-MCNC: 15.3 G/DL (ref 13.5–17.5)
LYMPHOCYTES # BLD: 1.3 K/UL (ref 1–5.1)
LYMPHOCYTES NFR BLD: 22.6 %
MCH RBC QN AUTO: 31.7 PG (ref 26–34)
MCHC RBC AUTO-ENTMCNC: 33.9 G/DL (ref 31–36)
MCV RBC AUTO: 93.5 FL (ref 80–100)
MONOCYTES # BLD: 0.6 K/UL (ref 0–1.3)
MONOCYTES NFR BLD: 9.3 %
NEUTROPHILS # BLD: 4 K/UL (ref 1.7–7.7)
NEUTROPHILS NFR BLD: 66.5 %
PLATELET # BLD AUTO: 193 K/UL (ref 135–450)
PMV BLD AUTO: 9.4 FL (ref 5–10.5)
RBC # BLD AUTO: 4.81 M/UL (ref 4.2–5.9)
URATE SERPL-MCNC: 8.9 MG/DL (ref 3.5–7.2)
WBC # BLD AUTO: 5.9 K/UL (ref 4–11)

## 2024-10-21 RX ORDER — NAPROXEN 500 MG/1
500 TABLET ORAL 2 TIMES DAILY WITH MEALS
Qty: 30 TABLET | Refills: 0 | Status: SHIPPED | OUTPATIENT
Start: 2024-10-21

## 2024-10-21 RX ORDER — CHLORTHALIDONE 25 MG/1
25 TABLET ORAL DAILY
Qty: 90 TABLET | Refills: 1 | Status: SHIPPED | OUTPATIENT
Start: 2024-10-21

## 2024-10-21 RX ORDER — GABAPENTIN 100 MG/1
100 CAPSULE ORAL 4 TIMES DAILY
Qty: 360 CAPSULE | Refills: 1 | Status: SHIPPED | OUTPATIENT
Start: 2024-10-21 | End: 2025-04-19

## 2024-10-21 RX ORDER — PREDNISONE 20 MG/1
20 TABLET ORAL 2 TIMES DAILY
Qty: 10 TABLET | Refills: 0 | Status: SHIPPED | OUTPATIENT
Start: 2024-10-21 | End: 2024-10-26

## 2024-10-21 RX ORDER — ASPIRIN 81 MG/1
81 TABLET ORAL DAILY
Qty: 90 TABLET | Refills: 0 | Status: SHIPPED | OUTPATIENT
Start: 2024-10-21

## 2024-10-21 RX ORDER — HYDRALAZINE HYDROCHLORIDE 50 MG/1
50 TABLET, FILM COATED ORAL 2 TIMES DAILY
Qty: 180 TABLET | Refills: 1 | Status: SHIPPED | OUTPATIENT
Start: 2024-10-21

## 2024-10-21 RX ORDER — LISINOPRIL 20 MG/1
20 TABLET ORAL DAILY
Qty: 90 TABLET | Refills: 1 | Status: SHIPPED | OUTPATIENT
Start: 2024-10-21

## 2024-10-21 SDOH — ECONOMIC STABILITY: FOOD INSECURITY: WITHIN THE PAST 12 MONTHS, YOU WORRIED THAT YOUR FOOD WOULD RUN OUT BEFORE YOU GOT MONEY TO BUY MORE.: NEVER TRUE

## 2024-10-21 SDOH — ECONOMIC STABILITY: FOOD INSECURITY: WITHIN THE PAST 12 MONTHS, THE FOOD YOU BOUGHT JUST DIDN'T LAST AND YOU DIDN'T HAVE MONEY TO GET MORE.: NEVER TRUE

## 2024-10-21 SDOH — ECONOMIC STABILITY: INCOME INSECURITY: HOW HARD IS IT FOR YOU TO PAY FOR THE VERY BASICS LIKE FOOD, HOUSING, MEDICAL CARE, AND HEATING?: SOMEWHAT HARD

## 2024-10-21 ASSESSMENT — ENCOUNTER SYMPTOMS
CONSTIPATION: 0
SHORTNESS OF BREATH: 0
DIARRHEA: 0
TROUBLE SWALLOWING: 0
BLOOD IN STOOL: 0
ABDOMINAL PAIN: 0
VOICE CHANGE: 0

## 2024-10-21 ASSESSMENT — PATIENT HEALTH QUESTIONNAIRE - PHQ9
1. LITTLE INTEREST OR PLEASURE IN DOING THINGS: NOT AT ALL
SUM OF ALL RESPONSES TO PHQ9 QUESTIONS 1 & 2: 0
SUM OF ALL RESPONSES TO PHQ QUESTIONS 1-9: 0
2. FEELING DOWN, DEPRESSED OR HOPELESS: NOT AT ALL

## 2024-10-21 NOTE — PATIENT INSTRUCTIONS
[facility] PRESCRIPTION ASSISTANCE RESOURCES  (Call [employer] / 211 if need more resources)      [facility]  What they offer: [facility] medication distributor that provides access to free medications donated by pharmaceutical companies for uninsured persons living below 300% of the federal poverty level  Contact: [facility]: [phone]; [facility]: [phone]; [facility]: [phone]; [facility]: [phone]      [facility]  What they offer: A last resort safety net for those who have no other way to access their prescription medications.  Website: [email or web address]://[email or web address]-ythbqrxrfak/   Phone number: [phone]      [facility]   What they offer:  [employer] tracks prescription drug prices and provides free drug coupons for discounts on medications.   Website: https://www.[employer]/    [employer]  What they offer:  [employer] offers free information on medications and healthcare cost savings programs including prescription assistance programs, coupons, and discount programs.   Website: https://www.[employer].org/  Helpline: 430.215.9613    [facility]ist  What they offer:  Information about free and low-cost medicine programs.    Website: https://www.rxassist.org/    [facility] $4 Prescription Program  What they offer: Prescription Program includes up to a 30-day supply for $4 and a 90-day supply for $10 of some covered generic drugs at commonly prescribed dosages    Website: https://www.[employer]/cp/4-prescriptions/3714112    [facility]  What they offer: Prescription drug discount program designed to lower the cost or prescription cost for qualifying individuals. Eligibility requirements apply.    Website: https://[place].[facility]/residents/resources/[place]-best   Phone number: 1-984.814.7026    Medicare Extra Help Program  What they offer: \"Extra Help\" is a Medicare program to help people with limited income and

## 2024-10-21 NOTE — PROGRESS NOTES
10/21/2024     Jose Murphy (:  1955) is a 69 y.o. male, here for evaluation of the following medical concerns:    HPI  Patient is 69 years old white male medical history significant for hypertension, coronary artery disease status post PTCA, hyperlipidemia, prior history of with left hemiparesis.  Chronic A-fib status post watchman placement.  He reported that he just came back from [place] to visit sister who is now at the nursing home.  He has a good time in [place].  He really presented to the office complaining of pain and swelling of right middle finger.  He denies recent injury or heavy lifting.  He reported remote history of gout but no flareup for a long time.  He has hypertension and takes hydrochlorothiazide beside hydralazine Toprol and lisinopril.    Review of Systems   Constitutional:  Negative for activity change.   HENT:  Negative for trouble swallowing and voice change.    Eyes:  Negative for visual disturbance.   Respiratory:  Negative for shortness of breath.    Cardiovascular:  Negative for chest pain and leg swelling.   Gastrointestinal:  Negative for abdominal pain, blood in stool, constipation and diarrhea.   Genitourinary:  Negative for difficulty urinating, dysuria, frequency, hematuria and scrotal swelling.   Musculoskeletal:  Negative for arthralgias and myalgias.   Skin:  Negative for rash.   Neurological:  Negative for dizziness.   Psychiatric/Behavioral:  Negative for behavioral problems.        Prior to Visit Medications    Medication Sig Taking? Authorizing Provider   gabapentin (NEURONTIN) 100 MG capsule Take 1 capsule by mouth 4 times daily for 180 days. Yes Pedro Hernandez MD   lisinopril (PRINIVIL;ZESTRIL) 20 MG tablet Take 1 tablet by mouth daily Yes Pedro Hernandez MD   hydrALAZINE (APRESOLINE) 50 MG tablet Take 1 tablet by mouth 2 times daily Yes Pedro Hernandez MD   chlorthalidone (HYGROTON) 25 MG tablet Take 1 tablet by mouth daily Yes Pedro Hernandez

## 2024-11-14 RX ORDER — METOPROLOL SUCCINATE 50 MG/1
50 TABLET, EXTENDED RELEASE ORAL DAILY
Qty: 90 TABLET | Refills: 1 | Status: SHIPPED | OUTPATIENT
Start: 2024-11-14

## 2025-01-07 DIAGNOSIS — I69.30 HISTORY OF HEMORRHAGIC CEREBROVASCULAR ACCIDENT (CVA) WITH RESIDUAL DEFICIT: ICD-10-CM

## 2025-01-07 NOTE — TELEPHONE ENCOUNTER
Medication:   Requested Prescriptions     Pending Prescriptions Disp Refills    atorvastatin (LIPITOR) 80 MG tablet [Pharmacy Med Name: ATORVASTATIN CALCIUM 80MG TABS] 90 tablet 1     Sig: TAKE ONE TABLET BY MOUTH ONCE A DAY       Last Filled:      Patient Phone Number: [phone] (home)     Last appt: 10/21/2024   Next appt: 1/21/2025    Last Lipid:   Lab Results   Component Value Date/Time    CHOL 127 04/02/2024 02:46 PM    TRIG 64 04/02/2024 02:46 PM    HDL 35 04/02/2024 02:46 PM

## 2025-01-08 RX ORDER — ATORVASTATIN CALCIUM 80 MG/1
80 TABLET, FILM COATED ORAL DAILY
Qty: 90 TABLET | Refills: 1 | Status: SHIPPED | OUTPATIENT
Start: 2025-01-08

## 2025-01-18 SDOH — ECONOMIC STABILITY: INCOME INSECURITY: IN THE LAST 12 MONTHS, WAS THERE A TIME WHEN YOU WERE NOT ABLE TO PAY THE MORTGAGE OR RENT ON TIME?: NO

## 2025-01-18 SDOH — ECONOMIC STABILITY: FOOD INSECURITY: WITHIN THE PAST 12 MONTHS, YOU WORRIED THAT YOUR FOOD WOULD RUN OUT BEFORE YOU GOT MONEY TO BUY MORE.: NEVER TRUE

## 2025-01-18 SDOH — ECONOMIC STABILITY: FOOD INSECURITY: WITHIN THE PAST 12 MONTHS, THE FOOD YOU BOUGHT JUST DIDN'T LAST AND YOU DIDN'T HAVE MONEY TO GET MORE.: NEVER TRUE

## 2025-01-18 SDOH — HEALTH STABILITY: PHYSICAL HEALTH
ON AVERAGE, HOW MANY DAYS PER WEEK DO YOU ENGAGE IN MODERATE TO STRENUOUS EXERCISE (LIKE A BRISK WALK)?: PATIENT DECLINED

## 2025-01-18 SDOH — HEALTH STABILITY: PHYSICAL HEALTH: ON AVERAGE, HOW MANY MINUTES DO YOU ENGAGE IN EXERCISE AT THIS LEVEL?: 0 MIN

## 2025-01-18 ASSESSMENT — PATIENT HEALTH QUESTIONNAIRE - PHQ9
SUM OF ALL RESPONSES TO PHQ QUESTIONS 1-9: 0
SUM OF ALL RESPONSES TO PHQ QUESTIONS 1-9: 0
SUM OF ALL RESPONSES TO PHQ9 QUESTIONS 1 & 2: 0
2. FEELING DOWN, DEPRESSED OR HOPELESS: NOT AT ALL
SUM OF ALL RESPONSES TO PHQ QUESTIONS 1-9: 0
1. LITTLE INTEREST OR PLEASURE IN DOING THINGS: NOT AT ALL
SUM OF ALL RESPONSES TO PHQ QUESTIONS 1-9: 0

## 2025-01-18 ASSESSMENT — LIFESTYLE VARIABLES
HOW MANY STANDARD DRINKS CONTAINING ALCOHOL DO YOU HAVE ON A TYPICAL DAY: PATIENT DOES NOT DRINK
HOW OFTEN DO YOU HAVE A DRINK CONTAINING ALCOHOL: 1
HOW MANY STANDARD DRINKS CONTAINING ALCOHOL DO YOU HAVE ON A TYPICAL DAY: 0
HOW OFTEN DO YOU HAVE A DRINK CONTAINING ALCOHOL: NEVER
HOW OFTEN DO YOU HAVE SIX OR MORE DRINKS ON ONE OCCASION: 1

## 2025-01-21 ENCOUNTER — OFFICE VISIT (OUTPATIENT)
Dept: PRIMARY CARE CLINIC | Age: 70
End: 2025-01-21

## 2025-01-21 VITALS
OXYGEN SATURATION: 97 % | RESPIRATION RATE: 18 BRPM | SYSTOLIC BLOOD PRESSURE: 114 MMHG | HEART RATE: 63 BPM | DIASTOLIC BLOOD PRESSURE: 73 MMHG

## 2025-01-21 DIAGNOSIS — I25.110 CORONARY ARTERY DISEASE INVOLVING NATIVE CORONARY ARTERY OF NATIVE HEART WITH UNSTABLE ANGINA PECTORIS (HCC): ICD-10-CM

## 2025-01-21 DIAGNOSIS — I69.30 HISTORY OF HEMORRHAGIC CEREBROVASCULAR ACCIDENT (CVA) WITH RESIDUAL DEFICIT: ICD-10-CM

## 2025-01-21 DIAGNOSIS — Z98.61 S/P PTCA (PERCUTANEOUS TRANSLUMINAL CORONARY ANGIOPLASTY): ICD-10-CM

## 2025-01-21 DIAGNOSIS — I48.19 PERSISTENT ATRIAL FIBRILLATION (HCC): ICD-10-CM

## 2025-01-21 DIAGNOSIS — Z00.00 MEDICARE ANNUAL WELLNESS VISIT, SUBSEQUENT: Primary | ICD-10-CM

## 2025-01-21 DIAGNOSIS — I10 ESSENTIAL HYPERTENSION: ICD-10-CM

## 2025-01-21 DIAGNOSIS — G81.94 LEFT HEMIPARESIS (HCC): ICD-10-CM

## 2025-01-21 DIAGNOSIS — E78.5 HYPERLIPIDEMIA LDL GOAL <70: ICD-10-CM

## 2025-01-21 PROBLEM — L89.620 PRESSURE ULCER OF HEEL, LEFT, UNSTAGEABLE (HCC): Status: RESOLVED | Noted: 2022-08-08 | Resolved: 2025-01-21

## 2025-01-21 PROBLEM — L89.623 PRESSURE ULCER OF LEFT HEEL, STAGE 3 (HCC): Status: RESOLVED | Noted: 2022-10-20 | Resolved: 2025-01-21

## 2025-01-21 PROBLEM — B37.2 SKIN YEAST INFECTION: Status: RESOLVED | Noted: 2024-03-11 | Resolved: 2025-01-21

## 2025-01-21 PROBLEM — B35.3 TINEA PEDIS OF RIGHT FOOT: Status: RESOLVED | Noted: 2024-04-15 | Resolved: 2025-01-21

## 2025-01-21 RX ORDER — TIZANIDINE 2 MG/1
4 TABLET ORAL 3 TIMES DAILY PRN
Qty: 90 TABLET | Refills: 1 | Status: SHIPPED | OUTPATIENT
Start: 2025-01-21 | End: 2025-01-22 | Stop reason: SDUPTHER

## 2025-01-21 RX ORDER — TIZANIDINE 2 MG/1
2 TABLET ORAL EVERY 8 HOURS PRN
Qty: 90 TABLET | Refills: 2 | Status: CANCELLED | OUTPATIENT
Start: 2025-01-21

## 2025-01-21 NOTE — PATIENT INSTRUCTIONS

## 2025-01-21 NOTE — PROGRESS NOTES
Medicare Annual Wellness Visit    Jose Murphy is here for Medicare AWV    Assessment & Plan   Medicare annual wellness visit, subsequent  -Patient still physically active, still work part-time despite handicap and disability.  He has no Alzheimer's dementia.    Essential hypertension  -Controlled.  Continue lisinopril 20 mg daily, Toprol-XL 50 mg daily, hydralazine 50 mg twice daily and chlorthalidone 25 mg daily    Hyperlipidemia LDL goal <70  -Controlled.  Continue Lipitor 80 mg daily    Persistent atrial fibrillation (HCC)  -Controlled.  Continue beta-blocker Toprol-XL 50 mg daily.  He is not on long-term anticoagulation due to history of hemorrhagic stroke.  He has  Watchman device    Coronary artery disease involving native coronary artery of native heart with unstable angina pectoris (HCC)  S/P PTCA (percutaneous transluminal coronary angioplasty)  -Stable.  Denies angina or sign of heart failure.  Continue current regimen.    History of hemorrhagic cerebrovascular accident (CVA) with residual deficit  Left hemiparesis (HCC)  -Continue current regimen aspirin and Lipitor.  Patient is still wheelchair-bound.  He requested referral to [facility] physical therapy  -     Non [facility] - External Referral To Occupational Therapy       Return in 1 year (on 1/21/2026) for Medicare AWV.     Subjective     As above    Patient's complete Health Risk Assessment and screening values have been reviewed and are found in Flowsheets. The following problems were reviewed today and where indicated follow up appointments were made and/or referrals ordered.    Positive Risk Factor Screenings with Interventions:                Abnormal BMI (obese):  There is no height or weight on file to calculate BMI. (!) Abnormal  Interventions:  Patient declines any further evaluation or treatment        Dentist Screen:  Have you seen the dentist within the past year?: (!) No    Intervention:  Advised to schedule with their dentist     Vision

## 2025-01-22 ENCOUNTER — TELEPHONE (OUTPATIENT)
Dept: PRIMARY CARE CLINIC | Age: 70
End: 2025-01-22

## 2025-01-22 RX ORDER — TIZANIDINE 2 MG/1
2 TABLET ORAL 3 TIMES DAILY PRN
Qty: 90 TABLET | Refills: 2 | Status: SHIPPED | OUTPATIENT
Start: 2025-01-22

## 2025-01-22 NOTE — TELEPHONE ENCOUNTER
[facility] out patient pharmacy called requesting that a new prescription be sent over for the tizanidine for 1 tab 3 times daily a 30 day supply.         [facility] - [place], [place] - [address] [place] [address] - P [phone] - F [phone]

## 2025-02-26 ENCOUNTER — TELEPHONE (OUTPATIENT)
Dept: PRIMARY CARE CLINIC | Age: 70
End: 2025-02-26

## 2025-02-26 NOTE — TELEPHONE ENCOUNTER
Fax to Rogelio @ [phone]    Spoke with patient.  He gave me this # to fax it to.        Sent.

## 2025-02-26 NOTE — TELEPHONE ENCOUNTER
Patient calling wanting to know if referral was sent to [facility]? Pls return patient call back @ [phone] to advise

## 2025-04-21 ENCOUNTER — OFFICE VISIT (OUTPATIENT)
Dept: PRIMARY CARE CLINIC | Age: 70
End: 2025-04-21
Payer: MEDICARE

## 2025-04-21 VITALS — HEART RATE: 53 BPM | OXYGEN SATURATION: 97 % | SYSTOLIC BLOOD PRESSURE: 115 MMHG | DIASTOLIC BLOOD PRESSURE: 74 MMHG

## 2025-04-21 DIAGNOSIS — Z98.61 S/P PTCA (PERCUTANEOUS TRANSLUMINAL CORONARY ANGIOPLASTY): ICD-10-CM

## 2025-04-21 DIAGNOSIS — I48.19 PERSISTENT ATRIAL FIBRILLATION (HCC): ICD-10-CM

## 2025-04-21 DIAGNOSIS — I10 ESSENTIAL HYPERTENSION: ICD-10-CM

## 2025-04-21 DIAGNOSIS — I25.110 CORONARY ARTERY DISEASE INVOLVING NATIVE CORONARY ARTERY OF NATIVE HEART WITH UNSTABLE ANGINA PECTORIS (HCC): Primary | ICD-10-CM

## 2025-04-21 DIAGNOSIS — I69.30 HISTORY OF HEMORRHAGIC CEREBROVASCULAR ACCIDENT (CVA) WITH RESIDUAL DEFICIT: ICD-10-CM

## 2025-04-21 PROCEDURE — 1159F MED LIST DOCD IN RCRD: CPT | Performed by: FAMILY MEDICINE

## 2025-04-21 PROCEDURE — 3017F COLORECTAL CA SCREEN DOC REV: CPT | Performed by: FAMILY MEDICINE

## 2025-04-21 PROCEDURE — 3078F DIAST BP <80 MM HG: CPT | Performed by: FAMILY MEDICINE

## 2025-04-21 PROCEDURE — 1036F TOBACCO NON-USER: CPT | Performed by: FAMILY MEDICINE

## 2025-04-21 PROCEDURE — G8427 DOCREV CUR MEDS BY ELIG CLIN: HCPCS | Performed by: FAMILY MEDICINE

## 2025-04-21 PROCEDURE — 99214 OFFICE O/P EST MOD 30 MIN: CPT | Performed by: FAMILY MEDICINE

## 2025-04-21 PROCEDURE — 1123F ACP DISCUSS/DSCN MKR DOCD: CPT | Performed by: FAMILY MEDICINE

## 2025-04-21 PROCEDURE — G8417 CALC BMI ABV UP PARAM F/U: HCPCS | Performed by: FAMILY MEDICINE

## 2025-04-21 PROCEDURE — 3074F SYST BP LT 130 MM HG: CPT | Performed by: FAMILY MEDICINE

## 2025-04-21 RX ORDER — TIZANIDINE 2 MG/1
2 TABLET ORAL 3 TIMES DAILY PRN
Qty: 270 TABLET | Refills: 1 | Status: SHIPPED | OUTPATIENT
Start: 2025-04-21

## 2025-04-21 RX ORDER — HYDRALAZINE HYDROCHLORIDE 50 MG/1
50 TABLET, FILM COATED ORAL 2 TIMES DAILY
Qty: 180 TABLET | Refills: 1 | Status: SHIPPED | OUTPATIENT
Start: 2025-04-21

## 2025-04-21 RX ORDER — LISINOPRIL 20 MG/1
20 TABLET ORAL DAILY
Qty: 90 TABLET | Refills: 1 | Status: SHIPPED | OUTPATIENT
Start: 2025-04-21

## 2025-04-21 RX ORDER — METOPROLOL SUCCINATE 50 MG/1
50 TABLET, EXTENDED RELEASE ORAL DAILY
Qty: 90 TABLET | Refills: 1 | Status: SHIPPED | OUTPATIENT
Start: 2025-04-21

## 2025-04-21 RX ORDER — GABAPENTIN 100 MG/1
100 CAPSULE ORAL 4 TIMES DAILY
Qty: 360 CAPSULE | Refills: 1 | Status: SHIPPED | OUTPATIENT
Start: 2025-04-21 | End: 2025-10-18

## 2025-04-21 RX ORDER — ASPIRIN 81 MG/1
81 TABLET ORAL DAILY
Qty: 90 TABLET | Refills: 1 | Status: SHIPPED | OUTPATIENT
Start: 2025-04-21

## 2025-04-21 ASSESSMENT — ENCOUNTER SYMPTOMS
ABDOMINAL PAIN: 0
BLOOD IN STOOL: 0
VOICE CHANGE: 0
TROUBLE SWALLOWING: 0
CONSTIPATION: 0
DIARRHEA: 0
SHORTNESS OF BREATH: 0

## 2025-04-21 NOTE — PROGRESS NOTES
2025     Jose Murphy (:  1955) is a 70 y.o. male, here for evaluation of the following medical concerns:    HPI  Patient is 70 years old white male medical history significant for hypertension, hyperlipidemia, persistent A-fib, coronary artery disease status post PTCA, history of hemorrhagic cerebral accident with left hemiparesis.  Patient presented to the office for regular follow-up.  His blood pressure is controlled with lisinopril Toprol hydralazine and chlorthalidone.  He has hyperlipidemia controlled with the statin tolerating medication without side effect.  She has atrial fibrillation controlled with beta-blocker.  He is not on long-term anticoagulation due to hemorrhagic stroke.  He has a Watchman device    Review of Systems   Constitutional:  Negative for activity change.   HENT:  Negative for trouble swallowing and voice change.    Eyes:  Negative for visual disturbance.   Respiratory:  Negative for shortness of breath.    Cardiovascular:  Negative for chest pain and leg swelling.   Gastrointestinal:  Negative for abdominal pain, blood in stool, constipation and diarrhea.   Genitourinary:  Negative for difficulty urinating, dysuria, frequency, hematuria and scrotal swelling.   Musculoskeletal:  Negative for arthralgias and myalgias.   Skin:  Negative for rash.   Neurological:  Negative for dizziness.   Psychiatric/Behavioral:  Negative for behavioral problems.        Prior to Visit Medications    Medication Sig Taking? Authorizing Provider   gabapentin (NEURONTIN) 100 MG capsule Take 1 capsule by mouth 4 times daily for 180 days. Yes Pedro Hernandez MD   aspirin 81 MG EC tablet Take 1 tablet by mouth daily Yes Pedro Hernandez MD   tiZANidine (ZANAFLEX) 2 MG tablet Take 1 tablet by mouth 3 times daily as needed (muscle pain) Yes Pedro Hernandez MD   metoprolol succinate (TOPROL XL) 50 MG extended release tablet Take 1 tablet by mouth daily Yes Pedro Hernandez MD   lisinopril

## 2025-06-12 DIAGNOSIS — I10 ESSENTIAL HYPERTENSION: ICD-10-CM

## 2025-06-12 RX ORDER — CHLORTHALIDONE 25 MG/1
25 TABLET ORAL DAILY
Qty: 90 TABLET | Refills: 1 | Status: SHIPPED | OUTPATIENT
Start: 2025-06-12

## 2025-06-12 NOTE — TELEPHONE ENCOUNTER
Medication:   Requested Prescriptions     Pending Prescriptions Disp Refills    chlorthalidone (HYGROTON) 25 MG tablet [Pharmacy Med Name: CHLORTHALIDONE 25MG TABS] 90 tablet 1     Sig: TAKE ONE TABLET BY MOUTH ONCE A DAY        Last Filled:  [unfilled]    Patient Phone Number: [phone] (home)     Last appt: 4/21/2025   Next appt: Visit date not found    Last OARRS:        No data to display

## 2025-07-07 DIAGNOSIS — I69.30 HISTORY OF HEMORRHAGIC CEREBROVASCULAR ACCIDENT (CVA) WITH RESIDUAL DEFICIT: ICD-10-CM

## 2025-07-07 RX ORDER — ATORVASTATIN CALCIUM 80 MG/1
80 TABLET, FILM COATED ORAL DAILY
Qty: 90 TABLET | Refills: 1 | Status: SHIPPED | OUTPATIENT
Start: 2025-07-07

## 2025-07-07 NOTE — TELEPHONE ENCOUNTER
Medication:   Requested Prescriptions     Pending Prescriptions Disp Refills    atorvastatin (LIPITOR) 80 MG tablet [Pharmacy Med Name: ATORVASTATIN CALCIUM 80MG TABS] 90 tablet 1     Sig: TAKE ONE TABLET BY MOUTH ONCE A DAY        Last Filled:  [unfilled]    Patient Phone Number: [phone] (home)     Last appt: 4/21/2025   Next appt: Visit date not found    Last OARRS:        No data to display

## 2025-07-17 ENCOUNTER — TELEPHONE (OUTPATIENT)
Dept: PRIMARY CARE CLINIC | Age: 70
End: 2025-07-17

## 2025-07-31 ENCOUNTER — TELEPHONE (OUTPATIENT)
Dept: PRIMARY CARE CLINIC | Age: 70
End: 2025-07-31

## 2025-08-28 ENCOUNTER — OFFICE VISIT (OUTPATIENT)
Dept: PRIMARY CARE CLINIC | Age: 70
End: 2025-08-28

## 2025-08-28 VITALS — OXYGEN SATURATION: 78 % | DIASTOLIC BLOOD PRESSURE: 76 MMHG | HEART RATE: 76 BPM | SYSTOLIC BLOOD PRESSURE: 124 MMHG

## 2025-08-28 DIAGNOSIS — E78.5 HYPERLIPIDEMIA LDL GOAL <70: ICD-10-CM

## 2025-08-28 DIAGNOSIS — I48.0 PAROXYSMAL ATRIAL FIBRILLATION (HCC): ICD-10-CM

## 2025-08-28 DIAGNOSIS — I10 ESSENTIAL HYPERTENSION: Primary | ICD-10-CM

## 2025-08-28 DIAGNOSIS — I69.30 HISTORY OF HEMORRHAGIC CEREBROVASCULAR ACCIDENT (CVA) WITH RESIDUAL DEFICIT: ICD-10-CM

## 2025-08-28 DIAGNOSIS — G81.94 LEFT HEMIPARESIS (HCC): ICD-10-CM

## 2025-08-28 DIAGNOSIS — Z12.11 COLON CANCER SCREENING: ICD-10-CM

## 2025-08-28 DIAGNOSIS — Z98.61 S/P PTCA (PERCUTANEOUS TRANSLUMINAL CORONARY ANGIOPLASTY): ICD-10-CM

## 2025-08-28 ASSESSMENT — ENCOUNTER SYMPTOMS
SHORTNESS OF BREATH: 0
TROUBLE SWALLOWING: 0
VOICE CHANGE: 0
DIARRHEA: 0
ABDOMINAL PAIN: 0
CONSTIPATION: 0
BLOOD IN STOOL: 0

## (undated) DEVICE — Device

## (undated) DEVICE — SYRINGE MED 30ML STD CLR PLAS LUERLOCK TIP N CTRL DISP

## (undated) DEVICE — SYRINGE TB 1ML NDL 25GA L0.625IN PLAS SLIP TIP CONVENTIONAL

## (undated) DEVICE — GLOVE,SURG,TRIUMPH MICRO,LTX,PF,7.5: Brand: MEDLINE

## (undated) DEVICE — Device: Brand: MEDEX

## (undated) DEVICE — SYRINGE MED 3ML CLR PLAS STD N CTRL LUERLOCK TIP DISP

## (undated) DEVICE — SOLUTION IRRIG 250ML STRL H2O PLAS POUR BTL USP

## (undated) DEVICE — SOLUTION IRRIG BSS ST 500ML

## (undated) DEVICE — SURGICAL PROC PACK SHT WEST V4